# Patient Record
Sex: FEMALE | Race: WHITE | NOT HISPANIC OR LATINO | Employment: OTHER | URBAN - NONMETROPOLITAN AREA
[De-identification: names, ages, dates, MRNs, and addresses within clinical notes are randomized per-mention and may not be internally consistent; named-entity substitution may affect disease eponyms.]

---

## 2020-08-06 ENCOUNTER — HOSPITAL ENCOUNTER (EMERGENCY)
Facility: HOSPITAL | Age: 75
Discharge: HOME | End: 2020-08-06
Attending: EMERGENCY MEDICINE
Payer: MEDICARE

## 2020-08-06 VITALS
TEMPERATURE: 97 F | HEART RATE: 60 BPM | RESPIRATION RATE: 18 BRPM | BODY MASS INDEX: 24.63 KG/M2 | SYSTOLIC BLOOD PRESSURE: 168 MMHG | DIASTOLIC BLOOD PRESSURE: 76 MMHG | HEIGHT: 63 IN | WEIGHT: 139 LBS | OXYGEN SATURATION: 99 %

## 2020-08-06 DIAGNOSIS — J44.1 COPD EXACERBATION: Primary | ICD-10-CM

## 2020-08-06 PROCEDURE — 99900031 HC PATIENT EDUCATION (STAT)

## 2020-08-06 PROCEDURE — 96374 THER/PROPH/DIAG INJ IV PUSH: CPT

## 2020-08-06 PROCEDURE — 99285 EMERGENCY DEPT VISIT HI MDM: CPT | Mod: 25

## 2020-08-06 PROCEDURE — 94640 AIRWAY INHALATION TREATMENT: CPT

## 2020-08-06 PROCEDURE — 25000242 PHARM REV CODE 250 ALT 637 W/ HCPCS: Performed by: EMERGENCY MEDICINE

## 2020-08-06 PROCEDURE — 63600175 PHARM REV CODE 636 W HCPCS: Performed by: EMERGENCY MEDICINE

## 2020-08-06 PROCEDURE — 99900035 HC TECH TIME PER 15 MIN (STAT)

## 2020-08-06 RX ORDER — METHYLPREDNISOLONE SOD SUCC 125 MG
125 VIAL (EA) INJECTION
Status: COMPLETED | OUTPATIENT
Start: 2020-08-06 | End: 2020-08-06

## 2020-08-06 RX ORDER — PREDNISONE 10 MG/1
10 TABLET ORAL DAILY
Qty: 21 TABLET | Refills: 0 | Status: SHIPPED | OUTPATIENT
Start: 2020-08-06 | End: 2020-09-05 | Stop reason: CLARIF

## 2020-08-06 RX ORDER — IPRATROPIUM BROMIDE AND ALBUTEROL SULFATE 2.5; .5 MG/3ML; MG/3ML
3 SOLUTION RESPIRATORY (INHALATION)
Status: COMPLETED | OUTPATIENT
Start: 2020-08-06 | End: 2020-08-06

## 2020-08-06 RX ADMIN — IPRATROPIUM BROMIDE AND ALBUTEROL SULFATE 3 ML: .5; 3 SOLUTION RESPIRATORY (INHALATION) at 09:08

## 2020-08-06 RX ADMIN — METHYLPREDNISOLONE SODIUM SUCCINATE 125 MG: 125 INJECTION, POWDER, FOR SOLUTION INTRAMUSCULAR; INTRAVENOUS at 09:08

## 2020-08-06 NOTE — ED PROVIDER NOTES
Encounter Date: 8/6/2020       History     Chief Complaint   Patient presents with    Shortness of Breath     began at the beginning of the week; Hx. of COPD     This is a 75-year-old white female with a history of COPD, states she quit smoking 1 week ago.  Presents the emergency room with shortness of breath and wheezing that began gradually over the last 2-3 days.  Patient denies fever, denies chest pain, denies hemoptysis.  Patient has a history of this multiple times in the past.        Review of patient's allergies indicates:  No Known Allergies  No past medical history on file.  No past surgical history on file.  No family history on file.  Social History     Tobacco Use    Smoking status: Not on file   Substance Use Topics    Alcohol use: Not on file    Drug use: Not on file     Review of Systems   Constitutional: Negative for fever.   HENT: Negative for sore throat.    Respiratory: Positive for shortness of breath and wheezing.    Cardiovascular: Negative for chest pain.   Gastrointestinal: Negative for nausea.   Genitourinary: Negative for dysuria.   Musculoskeletal: Negative for back pain.   Skin: Negative for rash.   Neurological: Negative for weakness.   Hematological: Does not bruise/bleed easily.       Physical Exam     Initial Vitals   BP Pulse Resp Temp SpO2   08/06/20 0855 08/06/20 0851 08/06/20 0851 08/06/20 0851 08/06/20 0851   (!) 214/110 80 (!) 28 97 °F (36.1 °C) (!) 94 %      MAP       --                Physical Exam    Nursing note and vitals reviewed.  Constitutional: She appears well-developed and well-nourished. She is not diaphoretic. No distress.   HENT:   Head: Normocephalic and atraumatic.   Eyes: Conjunctivae and EOM are normal. Pupils are equal, round, and reactive to light.   Neck: Normal range of motion. Neck supple.   Cardiovascular: Normal rate, regular rhythm, normal heart sounds and intact distal pulses.   No murmur heard.  Pulmonary/Chest: No respiratory distress. She has  wheezes. She has no rhonchi. She has no rales. She exhibits no tenderness.   Abdominal: Soft. Bowel sounds are normal.   Musculoskeletal: Normal range of motion. No tenderness or edema.   Neurological: She is alert and oriented to person, place, and time. She has normal strength and normal reflexes. No cranial nerve deficit. GCS score is 15. GCS eye subscore is 4. GCS verbal subscore is 5. GCS motor subscore is 6.   Skin: Skin is warm and dry. Capillary refill takes less than 2 seconds.         ED Course   Procedures  Labs Reviewed - No data to display       Imaging Results          X-Ray Chest AP Portable (Final result)  Result time 08/06/20 09:19:57    Final result by Kassidy Olivera MD (08/06/20 09:19:57)                 Impression:      No acute findings    Follow-up as clinically indicated      Electronically signed by: Kassidy Olivera MD  Date:    08/06/2020  Time:    09:19             Narrative:    EXAMINATION:  XR CHEST AP PORTABLE    CLINICAL HISTORY:  Asthma;    FINDINGS:  Increased attenuation within the right perihilar region favored to represent artifact from overlapping bronchovascular structures and costochondral calcifications.  No definite infiltrates. No signs of CHF. Mild elevation of left hemidiaphragm. Normal size cardiac silhouette.                                 Medical Decision Making:   Initial Assessment:   Patient much improved after nebulizer treatments and steroids.  Chest x-ray is negative.  Sats are 99% on room air now.  No signs of infection, no fever.  Patient stable for discharge                                 Clinical Impression:       ICD-10-CM ICD-9-CM   1. COPD exacerbation  J44.1 491.21                                Roman Maxwell MD  08/06/20 7735

## 2020-08-06 NOTE — ED TRIAGE NOTES
74 y/o F presents to ED with severe SOB, mod. Respiratory distress, and cough.  History of COPD and smoker.  Pt. States just stopped smoking last week. Denies being in contact with Covid and denies fever at this time.

## 2020-09-05 ENCOUNTER — HOSPITAL ENCOUNTER (EMERGENCY)
Facility: HOSPITAL | Age: 75
Discharge: HOME | End: 2020-09-05
Attending: EMERGENCY MEDICINE
Payer: MEDICARE

## 2020-09-05 VITALS
HEART RATE: 84 BPM | OXYGEN SATURATION: 100 % | RESPIRATION RATE: 18 BRPM | DIASTOLIC BLOOD PRESSURE: 80 MMHG | SYSTOLIC BLOOD PRESSURE: 133 MMHG | HEIGHT: 63 IN | BODY MASS INDEX: 24.27 KG/M2 | TEMPERATURE: 98 F | WEIGHT: 137 LBS

## 2020-09-05 DIAGNOSIS — J44.1 COPD EXACERBATION: Primary | ICD-10-CM

## 2020-09-05 LAB
ALBUMIN SERPL BCP-MCNC: 3.5 G/DL (ref 3.5–5.2)
ALP SERPL-CCNC: 86 U/L (ref 55–135)
ALT SERPL W/O P-5'-P-CCNC: 15 U/L (ref 10–44)
ANION GAP SERPL CALC-SCNC: 5 MMOL/L (ref 8–16)
AST SERPL-CCNC: 9 U/L (ref 10–40)
BASOPHILS # BLD AUTO: 0.06 K/UL (ref 0–0.2)
BASOPHILS NFR BLD: 0.9 % (ref 0–1.9)
BILIRUB SERPL-MCNC: 0.4 MG/DL (ref 0.1–1)
BUN SERPL-MCNC: 11 MG/DL (ref 8–23)
CALCIUM SERPL-MCNC: 8.8 MG/DL (ref 8.7–10.5)
CHLORIDE SERPL-SCNC: 97 MMOL/L (ref 95–110)
CO2 SERPL-SCNC: 28 MMOL/L (ref 23–29)
CREAT SERPL-MCNC: 0.7 MG/DL (ref 0.5–1.4)
DIFFERENTIAL METHOD: ABNORMAL
EOSINOPHIL # BLD AUTO: 0.2 K/UL (ref 0–0.5)
EOSINOPHIL NFR BLD: 2.1 % (ref 0–8)
ERYTHROCYTE [DISTWIDTH] IN BLOOD BY AUTOMATED COUNT: 13.2 % (ref 11.5–14.5)
EST. GFR  (AFRICAN AMERICAN): >60 ML/MIN/1.73 M^2
EST. GFR  (NON AFRICAN AMERICAN): >60 ML/MIN/1.73 M^2
GLUCOSE SERPL-MCNC: 124 MG/DL (ref 70–110)
HCT VFR BLD AUTO: 38.9 % (ref 37–48.5)
HGB BLD-MCNC: 13.4 G/DL (ref 12–16)
IMM GRANULOCYTES # BLD AUTO: 0.03 K/UL (ref 0–0.04)
IMM GRANULOCYTES NFR BLD AUTO: 0.4 % (ref 0–0.5)
LYMPHOCYTES # BLD AUTO: 2.2 K/UL (ref 1–4.8)
LYMPHOCYTES NFR BLD: 31.8 % (ref 18–48)
MCH RBC QN AUTO: 30.7 PG (ref 27–31)
MCHC RBC AUTO-ENTMCNC: 34.4 G/DL (ref 32–36)
MCV RBC AUTO: 89 FL (ref 82–98)
MONOCYTES # BLD AUTO: 0.5 K/UL (ref 0.3–1)
MONOCYTES NFR BLD: 7.3 % (ref 4–15)
NEUTROPHILS # BLD AUTO: 4 K/UL (ref 1.8–7.7)
NEUTROPHILS NFR BLD: 57.5 % (ref 38–73)
NRBC BLD-RTO: 0 /100 WBC
PLATELET # BLD AUTO: 345 K/UL (ref 150–350)
PMV BLD AUTO: 9.1 FL (ref 9.2–12.9)
POTASSIUM SERPL-SCNC: 3.4 MMOL/L (ref 3.5–5.1)
PROT SERPL-MCNC: 7.1 G/DL (ref 6–8.4)
RBC # BLD AUTO: 4.36 M/UL (ref 4–5.4)
SODIUM SERPL-SCNC: 130 MMOL/L (ref 136–145)
TROPONIN I SERPL DL<=0.01 NG/ML-MCNC: <0.02 NG/ML (ref 0–0.03)
WBC # BLD AUTO: 6.99 K/UL (ref 3.9–12.7)

## 2020-09-05 PROCEDURE — 25000003 PHARM REV CODE 250: Performed by: EMERGENCY MEDICINE

## 2020-09-05 PROCEDURE — 84484 ASSAY OF TROPONIN QUANT: CPT

## 2020-09-05 PROCEDURE — 96366 THER/PROPH/DIAG IV INF ADDON: CPT

## 2020-09-05 PROCEDURE — 80053 COMPREHEN METABOLIC PANEL: CPT

## 2020-09-05 PROCEDURE — 36415 COLL VENOUS BLD VENIPUNCTURE: CPT

## 2020-09-05 PROCEDURE — 85025 COMPLETE CBC W/AUTO DIFF WBC: CPT

## 2020-09-05 PROCEDURE — 94760 N-INVAS EAR/PLS OXIMETRY 1: CPT

## 2020-09-05 PROCEDURE — 25000242 PHARM REV CODE 250 ALT 637 W/ HCPCS: Performed by: EMERGENCY MEDICINE

## 2020-09-05 PROCEDURE — 63600175 PHARM REV CODE 636 W HCPCS: Performed by: EMERGENCY MEDICINE

## 2020-09-05 PROCEDURE — 96365 THER/PROPH/DIAG IV INF INIT: CPT

## 2020-09-05 PROCEDURE — 99284 EMERGENCY DEPT VISIT MOD MDM: CPT | Mod: 25

## 2020-09-05 PROCEDURE — 94640 AIRWAY INHALATION TREATMENT: CPT

## 2020-09-05 PROCEDURE — 96375 TX/PRO/DX INJ NEW DRUG ADDON: CPT

## 2020-09-05 RX ORDER — PROMETHAZINE HYDROCHLORIDE AND DEXTROMETHORPHAN HYDROBROMIDE 6.25; 15 MG/5ML; MG/5ML
5 SYRUP ORAL EVERY 4 HOURS PRN
Qty: 1 BOTTLE | Refills: 0 | Status: SHIPPED | OUTPATIENT
Start: 2020-09-05 | End: 2020-09-10

## 2020-09-05 RX ORDER — ALBUTEROL SULFATE 90 UG/1
AEROSOL, METERED RESPIRATORY (INHALATION)
COMMUNITY
Start: 2020-07-25

## 2020-09-05 RX ORDER — IPRATROPIUM BROMIDE AND ALBUTEROL SULFATE 2.5; .5 MG/3ML; MG/3ML
3 SOLUTION RESPIRATORY (INHALATION) ONCE
Status: COMPLETED | OUTPATIENT
Start: 2020-09-05 | End: 2020-09-05

## 2020-09-05 RX ORDER — METHYLPREDNISOLONE SOD SUCC 125 MG
125 VIAL (EA) INJECTION ONCE
Status: COMPLETED | OUTPATIENT
Start: 2020-09-05 | End: 2020-09-05

## 2020-09-05 RX ORDER — OMEPRAZOLE 20 MG/1
CAPSULE, DELAYED RELEASE ORAL
COMMUNITY
Start: 2020-07-15 | End: 2023-10-04 | Stop reason: SDUPTHER

## 2020-09-05 RX ORDER — PREDNISONE 50 MG/1
50 TABLET ORAL DAILY
Qty: 5 TABLET | Refills: 0 | Status: SHIPPED | OUTPATIENT
Start: 2020-09-05 | End: 2020-09-10

## 2020-09-05 RX ORDER — IPRATROPIUM BROMIDE AND ALBUTEROL SULFATE 2.5; .5 MG/3ML; MG/3ML
3 SOLUTION RESPIRATORY (INHALATION) ONCE
Status: DISCONTINUED | OUTPATIENT
Start: 2020-09-05 | End: 2020-09-06 | Stop reason: HOSPADM

## 2020-09-05 RX ORDER — ALBUTEROL SULFATE 0.83 MG/ML
SOLUTION RESPIRATORY (INHALATION)
COMMUNITY
Start: 2020-06-19

## 2020-09-05 RX ORDER — FLUTICASONE FUROATE AND VILANTEROL TRIFENATATE 100; 25 UG/1; UG/1
POWDER RESPIRATORY (INHALATION)
COMMUNITY
Start: 2020-07-19 | End: 2022-06-14

## 2020-09-05 RX ORDER — PREDNISOLONE SODIUM PHOSPHATE 15 MG/5ML
30 SOLUTION ORAL DAILY
Status: DISCONTINUED | OUTPATIENT
Start: 2020-09-06 | End: 2020-09-06 | Stop reason: HOSPADM

## 2020-09-05 RX ORDER — CARVEDILOL 25 MG/1
TABLET ORAL
COMMUNITY
Start: 2020-07-27 | End: 2022-06-14

## 2020-09-05 RX ORDER — LISINOPRIL 30 MG/1
TABLET ORAL
COMMUNITY
Start: 2020-07-10 | End: 2022-06-30 | Stop reason: CLARIF

## 2020-09-05 RX ORDER — AZITHROMYCIN 500 MG/1
500 TABLET, FILM COATED ORAL DAILY
Qty: 5 TABLET | Refills: 0 | Status: SHIPPED | OUTPATIENT
Start: 2020-09-05 | End: 2022-06-30 | Stop reason: CLARIF

## 2020-09-05 RX ORDER — CITALOPRAM 20 MG/1
TABLET, FILM COATED ORAL DAILY
COMMUNITY
Start: 2020-07-27 | End: 2023-10-04 | Stop reason: SDUPTHER

## 2020-09-05 RX ADMIN — SODIUM CHLORIDE 1000 ML: 0.9 INJECTION, SOLUTION INTRAVENOUS at 08:09

## 2020-09-05 RX ADMIN — IPRATROPIUM BROMIDE AND ALBUTEROL SULFATE 3 ML: .5; 3 SOLUTION RESPIRATORY (INHALATION) at 10:09

## 2020-09-05 RX ADMIN — METHYLPREDNISOLONE SODIUM SUCCINATE 125 MG: 125 INJECTION, POWDER, FOR SOLUTION INTRAMUSCULAR; INTRAVENOUS at 08:09

## 2020-09-05 RX ADMIN — CEFTRIAXONE 1 G: 1 INJECTION, SOLUTION INTRAVENOUS at 08:09

## 2020-09-06 NOTE — ED NOTES
NEUROLOGICAL:   Patient is awake , alert  and oriented x 4 . Pupils are PERRL. Gait is steady.   Moves all extremities without difficulty.   Patient reports no neuro complaints..  GCS 15    HEENT:   Head appears normocephalic  and symmetric .   Eyes appear WNL to both eyes. Patient reports no complaints  to both eyes .   Ears appear WNL. Patient reports no complaints  to both ears.   Nares appear patent . Patient reports no nose complaints .  Mouth appears moist, pink and teeth intact. Patient reports no mouth complaints.   Throat appears pink and moist . Patient reports no throat complaints.    CARDIOVASCULAR:   S1 and S2 present, no murmurs, gallops, or rubs, rate regular  and pulses palpable (2+)    On palpation no edema noted , noted to none.   Patient reports no CV complaints.  .   Patient vitals are WNL.    RESPIRATORY:   Airway Clear, Open, and Patent.  Respirations are even and unlabored.   Breath sounds inspiratory wheeze and expiratory wheeze to all lung fields.   Patient reports no respiratory complaints, shortness of breath on exertion and productive cough.     GASTROINTESTINAL:   Abdomen is soft  and non-tender x 4 quadrants. Bowel sounds are normoactive to all quadrants .   Patient reports no GI complaints .     GENITOURINARY:   Patient reports no  complaints.     MUSCULOSKELETAL:   full range of motion to all extremities, no swelling noted , no tenderness noted and no weakness noted.   Patient reports no musculoskeletal complaints     SKIN:   Skin appears warm , dry , good turgor, color normal for race and intact. Patient reports no skin complaints   .

## 2020-09-06 NOTE — ED PROVIDER NOTES
"Encounter Date: 9/5/2020       History     Chief Complaint   Patient presents with    Shortness of Breath     SOB x 3 days. Worse today. Pt reports "my COPD is acting up. I usually need a treatment and some steroids"      The patient is a 75-year-old female, with a past medical history of COPD, that presents to the ER for evaluation because of a COPD exacerbation.  Patient states that for the last 2-3 days she is having increased productive cough, with associated congestion.  She states that today her wheezing became worse, and she felt like she needed to come to the hospital.  The patient specifically stated that she felt like she needed several nebulizer treatments, some steroids.  She denies any the fevers or chills, chest pain, abdominal prior, nausea, vomiting or any other acute symptoms at this time    Review of patient's allergies indicates:  No Known Allergies  Past Medical History:   Diagnosis Date    COPD (chronic obstructive pulmonary disease)     Hypertension      No past surgical history on file.  No family history on file.  Social History     Tobacco Use    Smoking status: Not on file   Substance Use Topics    Alcohol use: Not on file    Drug use: Not on file     Review of Systems   All other systems reviewed and are negative.      Physical Exam     Initial Vitals   BP Pulse Resp Temp SpO2   09/05/20 1926 09/05/20 1924 09/05/20 1924 09/05/20 1924 09/05/20 1924   (!) 143/74 73 (!) 24 98 °F (36.7 °C) 98 %      MAP       --                Physical Exam    Nursing note and vitals reviewed.  Constitutional: She appears well-developed and well-nourished.   HENT:   Head: Normocephalic and atraumatic.   Eyes: Conjunctivae and EOM are normal. Pupils are equal, round, and reactive to light.   Neck: Normal range of motion. Neck supple. No tracheal deviation present.   Cardiovascular: Normal rate, regular rhythm, normal heart sounds and intact distal pulses.   Pulmonary/Chest: No respiratory distress. She " has wheezes. She has no rhonchi. She has no rales. She exhibits no tenderness.   Who the patient does have increased work of breathing, but does not appear to be in significant acute distress.  She is speaking in mostly complete sentences.  Patient has diffuse scattered inspiratory and expiratory wheezes with scattered rhonchi that are exacerbated with coughing   Abdominal: Soft. Bowel sounds are normal. She exhibits no distension and no mass. There is no abdominal tenderness. There is no rebound and no guarding.   Musculoskeletal: Normal range of motion. No tenderness or edema.   Neurological: She is alert and oriented to person, place, and time. She has normal strength and normal reflexes. She displays normal reflexes. No cranial nerve deficit or sensory deficit.   Skin: Skin is warm and dry. Capillary refill takes less than 2 seconds.   Psychiatric: She has a normal mood and affect. Her behavior is normal. Judgment and thought content normal.         ED Course   Procedures  Labs Reviewed   CBC W/ AUTO DIFFERENTIAL - Abnormal; Notable for the following components:       Result Value    MPV 9.1 (*)     All other components within normal limits   COMPREHENSIVE METABOLIC PANEL - Abnormal; Notable for the following components:    Sodium 130 (*)     Potassium 3.4 (*)     Glucose 124 (*)     AST 9 (*)     Anion Gap 5 (*)     All other components within normal limits   TROPONIN I          Imaging Results          X-Ray Chest 1 View (In process)                  Medical Decision Making:   ED Management:  The patient's chest x-ray stable, of the patient's lab work was grossly within normal limits.  Should be noted the patient had mild hyponatremia but was given a L of fluid.  Room patient states that she is feeling much better after receiving nebs and steroids, and repeat auscultation revealed almost total resolution of all wheezing.  The patient is being discharged home with a short course of pulse dose steroids as well  as a short course of p.o. antibiotics, which will ultimately decrease her risk of hospitalization over the next 30 days.  The patient was given instructions to follow-up with her primary care physician/clinic for further assessment evaluation, and to return to the ER for any acute negative symptoms.  The patient verbalized understanding of this medical point of agreed                                 Clinical Impression:       ICD-10-CM ICD-9-CM   1. COPD exacerbation  J44.1 491.21             ED Disposition Condition    Discharge Stable        ED Prescriptions     Medication Sig Dispense Start Date End Date Auth. Provider    predniSONE (DELTASONE) 50 MG Tab Take 1 tablet (50 mg total) by mouth once daily. for 5 days 5 tablet 9/5/2020 9/10/2020 Florian uCevas MD    azithromycin (ZITHROMAX) 500 MG tablet Take 1 tablet (500 mg total) by mouth once daily. 5 tablet 9/5/2020  Florian Cuevas MD    promethazine-dextromethorphan (PROMETHAZINE-DM) 6.25-15 mg/5 mL Syrp Take 5 mLs by mouth every 4 (four) hours as needed (cough). 1 Bottle 9/5/2020 9/10/2020 Florian Cuevas MD        Follow-up Information    None                                    Florian Cuevas MD  09/05/20 2041

## 2022-04-17 ENCOUNTER — HOSPITAL ENCOUNTER (EMERGENCY)
Facility: HOSPITAL | Age: 77
Discharge: HOME | End: 2022-04-17
Attending: EMERGENCY MEDICINE
Payer: MEDICARE

## 2022-04-17 VITALS
BODY MASS INDEX: 21.71 KG/M2 | SYSTOLIC BLOOD PRESSURE: 143 MMHG | OXYGEN SATURATION: 96 % | HEART RATE: 105 BPM | HEIGHT: 62 IN | RESPIRATION RATE: 18 BRPM | TEMPERATURE: 98 F | DIASTOLIC BLOOD PRESSURE: 77 MMHG | WEIGHT: 118 LBS
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
## 2022-04-17 PROCEDURE — 99284 EMERGENCY DEPT VISIT MOD MDM: CPT | Mod: 25

## 2022-04-17 RX ORDER — METHOCARBAMOL 500 MG/1
1000 TABLET, FILM COATED ORAL 3 TIMES DAILY
Qty: 30 TABLET | Refills: 0 | Status: SHIPPED | OUTPATIENT
Start: 2022-04-17 | End: 2022-04-22
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
## 2022-05-12 ENCOUNTER — TELEPHONE (OUTPATIENT)
Dept: NEUROSURGERY | Facility: CLINIC | Age: 77
End: 2022-05-12
Payer: COMMERCIAL

## 2022-05-12 NOTE — TELEPHONE ENCOUNTER
Spoke with patient and confirmed time and date for appointment. Pt confirmed they will bring all imaging on disk to appointment.    ----- Message from Manuela Mayo sent at 5/12/2022 10:36 AM CDT -----  Regarding: STAT Referral  Good Morning,    Sofia Foreman NP would like to refer the following patient to Dr. Rouse in the Neurosurgery department. The patients diagnosis is lumbar pain. I have scanned the patients referral and records into .     Please let me know if I can help schedule in any way.    Thank you,   Manuela  Benjy Valdez

## 2022-05-17 ENCOUNTER — OFFICE VISIT (OUTPATIENT)
Dept: NEUROSURGERY | Facility: CLINIC | Age: 77
End: 2022-05-17
Payer: MEDICARE

## 2022-05-17 VITALS — SYSTOLIC BLOOD PRESSURE: 137 MMHG | DIASTOLIC BLOOD PRESSURE: 76 MMHG | HEART RATE: 74 BPM

## 2022-05-17 DIAGNOSIS — S06.0X1A CONCUSSION WITH LOSS OF CONSCIOUSNESS OF 30 MINUTES OR LESS, INITIAL ENCOUNTER: Primary | ICD-10-CM

## 2022-05-17 DIAGNOSIS — M54.9 DORSALGIA, UNSPECIFIED: ICD-10-CM

## 2022-05-17 DIAGNOSIS — S32.000D COMPRESSION FRACTURE OF LUMBAR VERTEBRA WITH ROUTINE HEALING, UNSPECIFIED LUMBAR VERTEBRAL LEVEL, SUBSEQUENT ENCOUNTER: ICD-10-CM

## 2022-05-17 PROCEDURE — 99204 OFFICE O/P NEW MOD 45 MIN: CPT | Mod: S$GLB,,, | Performed by: NURSE PRACTITIONER

## 2022-05-17 PROCEDURE — 99999 PR PBB SHADOW E&M-EST. PATIENT-LVL IV: ICD-10-PCS | Mod: PBBFAC,,, | Performed by: NURSE PRACTITIONER

## 2022-05-17 PROCEDURE — 99999 PR PBB SHADOW E&M-EST. PATIENT-LVL IV: CPT | Mod: PBBFAC,,, | Performed by: NURSE PRACTITIONER

## 2022-05-17 PROCEDURE — 99204 PR OFFICE/OUTPT VISIT, NEW, LEVL IV, 45-59 MIN: ICD-10-PCS | Mod: S$GLB,,, | Performed by: NURSE PRACTITIONER
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
## 2022-05-26 ENCOUNTER — HOSPITAL ENCOUNTER (OUTPATIENT)
Dept: RADIOLOGY | Facility: HOSPITAL | Age: 77
Discharge: HOME | End: 2022-05-26
Attending: NURSE PRACTITIONER
Payer: MEDICARE

## 2022-05-26 DIAGNOSIS — S32.000D COMPRESSION FRACTURE OF LUMBAR VERTEBRA WITH ROUTINE HEALING, UNSPECIFIED LUMBAR VERTEBRAL LEVEL, SUBSEQUENT ENCOUNTER: ICD-10-CM

## 2022-05-26 PROCEDURE — 77080 DXA BONE DENSITY AXIAL: CPT | Mod: TC

## 2022-05-26 PROCEDURE — 72148 MRI LUMBAR SPINE W/O DYE: CPT | Mod: TC

## 2022-05-30 ENCOUNTER — TELEPHONE (OUTPATIENT)
Dept: NEUROSURGERY | Facility: CLINIC | Age: 77
End: 2022-05-30
Payer: COMMERCIAL

## 2022-05-30 ENCOUNTER — HOSPITAL ENCOUNTER (OUTPATIENT)
Dept: RADIOLOGY | Facility: HOSPITAL | Age: 77
Discharge: HOME | End: 2022-05-30
Attending: NURSE PRACTITIONER
Payer: MEDICARE

## 2022-05-30 DIAGNOSIS — M54.9 DORSALGIA, UNSPECIFIED: ICD-10-CM

## 2022-05-30 PROCEDURE — 72082 X-RAY EXAM ENTIRE SPI 2/3 VW: CPT | Mod: TC

## 2022-05-30 PROCEDURE — 72120 X-RAY BEND ONLY L-S SPINE: CPT | Mod: TC

## 2022-05-30 NOTE — TELEPHONE ENCOUNTER
Patient states that she will have imaging done closer to home. Informed patient that I will reach out to Dr. Rouse's staff in regards to a f/u appointment patient verbally understood.

## 2022-05-30 NOTE — TELEPHONE ENCOUNTER
Spoke with pt's daughter and confirmed time and date for appt.     ----- Message from Tri Alcala MA sent at 5/30/2022  9:23 AM CDT -----  Satish Mcginnis,     Can you assist me with getting this patient scheduled with Dr. Rouse  for an appointment? Patient is in the process of getting  Xray's done closer to home.   Thank you   Tri

## 2022-06-14 ENCOUNTER — TELEPHONE (OUTPATIENT)
Dept: NEUROSURGERY | Facility: CLINIC | Age: 77
End: 2022-06-14
Payer: MEDICARE

## 2022-06-14 ENCOUNTER — OFFICE VISIT (OUTPATIENT)
Dept: NEUROSURGERY | Facility: CLINIC | Age: 77
End: 2022-06-14
Payer: COMMERCIAL

## 2022-06-14 DIAGNOSIS — S32.000A LUMBAR COMPRESSION FRACTURE, CLOSED, INITIAL ENCOUNTER: Primary | ICD-10-CM

## 2022-06-14 DIAGNOSIS — S32.000D COMPRESSION FRACTURE OF LUMBAR VERTEBRA WITH ROUTINE HEALING, UNSPECIFIED LUMBAR VERTEBRAL LEVEL, SUBSEQUENT ENCOUNTER: Primary | ICD-10-CM

## 2022-06-14 PROCEDURE — 99214 PR OFFICE/OUTPT VISIT, EST, LEVL IV, 30-39 MIN: ICD-10-PCS | Mod: S$GLB,,, | Performed by: NEUROLOGICAL SURGERY

## 2022-06-14 PROCEDURE — 99214 OFFICE O/P EST MOD 30 MIN: CPT | Mod: S$GLB,,, | Performed by: NEUROLOGICAL SURGERY

## 2022-06-14 RX ORDER — SULINDAC 150 MG/1
TABLET ORAL
COMMUNITY
Start: 2022-05-11

## 2022-06-14 RX ORDER — METHOCARBAMOL 500 MG/1
TABLET, FILM COATED ORAL
COMMUNITY
Start: 2022-05-11 | End: 2022-06-30 | Stop reason: CLARIF

## 2022-06-14 RX ORDER — FLUTICASONE FUROATE, UMECLIDINIUM BROMIDE AND VILANTEROL TRIFENATATE 200; 62.5; 25 UG/1; UG/1; UG/1
1 POWDER RESPIRATORY (INHALATION) DAILY
COMMUNITY
Start: 2022-04-07 | End: 2023-10-04

## 2022-06-14 RX ORDER — CITALOPRAM 20 MG/1
TABLET, FILM COATED ORAL
COMMUNITY
End: 2022-06-14

## 2022-06-14 RX ORDER — VALSARTAN 160 MG/1
160 TABLET ORAL DAILY
COMMUNITY
Start: 2022-03-21 | End: 2023-10-04

## 2022-06-14 RX ORDER — DOXYCYCLINE HYCLATE 100 MG
100 TABLET ORAL 2 TIMES DAILY
COMMUNITY
Start: 2022-04-06 | End: 2022-06-30 | Stop reason: CLARIF

## 2022-06-14 RX ORDER — FLUCONAZOLE 50 MG/1
TABLET ORAL
COMMUNITY
Start: 2022-05-10 | End: 2022-06-14

## 2022-06-14 RX ORDER — CARVEDILOL 25 MG/1
TABLET ORAL 2 TIMES DAILY WITH MEALS
COMMUNITY
End: 2023-10-04 | Stop reason: SDUPTHER

## 2022-06-14 RX ORDER — FLUTICASONE PROPIONATE 50 MCG
SPRAY, SUSPENSION (ML) NASAL
COMMUNITY
End: 2023-10-04 | Stop reason: SDUPTHER

## 2022-06-14 RX ORDER — NEBULIZER AND COMPRESSOR
EACH MISCELLANEOUS
COMMUNITY
Start: 2022-05-25

## 2022-06-14 RX ORDER — NYSTATIN 100000 [USP'U]/ML
SUSPENSION ORAL
COMMUNITY
Start: 2022-04-26 | End: 2022-06-30 | Stop reason: CLARIF

## 2022-06-14 RX ORDER — PREDNISONE 20 MG/1
20 TABLET ORAL DAILY PRN
COMMUNITY
Start: 2022-04-06 | End: 2023-11-15 | Stop reason: SDUPTHER

## 2022-06-24 ENCOUNTER — TELEPHONE (OUTPATIENT)
Dept: NEUROSURGERY | Facility: CLINIC | Age: 77
End: 2022-06-24
Payer: MEDICARE

## 2022-06-30 ENCOUNTER — HOSPITAL ENCOUNTER (OUTPATIENT)
Dept: PREADMISSION TESTING | Facility: OTHER | Age: 77
Discharge: HOME | End: 2022-06-30
Attending: NEUROLOGICAL SURGERY
Payer: MEDICARE

## 2022-06-30 ENCOUNTER — TELEPHONE (OUTPATIENT)
Dept: NEUROSURGERY | Facility: CLINIC | Age: 77
End: 2022-06-30
Payer: MEDICARE

## 2022-06-30 VITALS
WEIGHT: 125 LBS | OXYGEN SATURATION: 96 % | TEMPERATURE: 98 F | BODY MASS INDEX: 22.86 KG/M2 | DIASTOLIC BLOOD PRESSURE: 71 MMHG | HEART RATE: 70 BPM | SYSTOLIC BLOOD PRESSURE: 153 MMHG

## 2022-06-30 DIAGNOSIS — Z01.818 PREOP TESTING: Primary | ICD-10-CM

## 2022-06-30 DIAGNOSIS — S32.050A CLOSED COMPRESSION FRACTURE OF L5 LUMBAR VERTEBRA, INITIAL ENCOUNTER: Primary | ICD-10-CM

## 2022-06-30 LAB
ANION GAP SERPL CALC-SCNC: 10 MMOL/L (ref 8–16)
BASOPHILS # BLD AUTO: 0.05 K/UL (ref 0–0.2)
BASOPHILS NFR BLD: 0.8 % (ref 0–1.9)
BUN SERPL-MCNC: 12 MG/DL (ref 8–23)
CALCIUM SERPL-MCNC: 9.1 MG/DL (ref 8.7–10.5)
CHLORIDE SERPL-SCNC: 100 MMOL/L (ref 95–110)
CO2 SERPL-SCNC: 25 MMOL/L (ref 23–29)
CREAT SERPL-MCNC: 0.7 MG/DL (ref 0.5–1.4)
DIFFERENTIAL METHOD: ABNORMAL
EOSINOPHIL # BLD AUTO: 0.1 K/UL (ref 0–0.5)
EOSINOPHIL NFR BLD: 1.6 % (ref 0–8)
ERYTHROCYTE [DISTWIDTH] IN BLOOD BY AUTOMATED COUNT: 15 % (ref 11.5–14.5)
EST. GFR  (AFRICAN AMERICAN): >60 ML/MIN/1.73 M^2
EST. GFR  (NON AFRICAN AMERICAN): >60 ML/MIN/1.73 M^2
GLUCOSE SERPL-MCNC: 76 MG/DL (ref 70–110)
HCT VFR BLD AUTO: 38.6 % (ref 37–48.5)
HGB BLD-MCNC: 12.7 G/DL (ref 12–16)
IMM GRANULOCYTES # BLD AUTO: 0.02 K/UL (ref 0–0.04)
IMM GRANULOCYTES NFR BLD AUTO: 0.3 % (ref 0–0.5)
LYMPHOCYTES # BLD AUTO: 1.7 K/UL (ref 1–4.8)
LYMPHOCYTES NFR BLD: 26.7 % (ref 18–48)
MCH RBC QN AUTO: 28.3 PG (ref 27–31)
MCHC RBC AUTO-ENTMCNC: 32.9 G/DL (ref 32–36)
MCV RBC AUTO: 86 FL (ref 82–98)
MONOCYTES # BLD AUTO: 0.5 K/UL (ref 0.3–1)
MONOCYTES NFR BLD: 7.9 % (ref 4–15)
NEUTROPHILS # BLD AUTO: 4 K/UL (ref 1.8–7.7)
NEUTROPHILS NFR BLD: 62.7 % (ref 38–73)
NRBC BLD-RTO: 0 /100 WBC
PLATELET # BLD AUTO: 335 K/UL (ref 150–450)
PMV BLD AUTO: 9.4 FL (ref 9.2–12.9)
POTASSIUM SERPL-SCNC: 4.6 MMOL/L (ref 3.5–5.1)
RBC # BLD AUTO: 4.48 M/UL (ref 4–5.4)
SODIUM SERPL-SCNC: 135 MMOL/L (ref 136–145)
WBC # BLD AUTO: 6.33 K/UL (ref 3.9–12.7)

## 2022-06-30 PROCEDURE — 36415 COLL VENOUS BLD VENIPUNCTURE: CPT | Performed by: ANESTHESIOLOGY

## 2022-06-30 PROCEDURE — 93010 EKG 12-LEAD: ICD-10-PCS | Mod: ,,, | Performed by: INTERNAL MEDICINE

## 2022-06-30 PROCEDURE — 85025 COMPLETE CBC W/AUTO DIFF WBC: CPT | Performed by: ANESTHESIOLOGY

## 2022-06-30 PROCEDURE — 93005 ELECTROCARDIOGRAM TRACING: CPT

## 2022-06-30 PROCEDURE — 93010 ELECTROCARDIOGRAM REPORT: CPT | Mod: ,,, | Performed by: INTERNAL MEDICINE

## 2022-06-30 PROCEDURE — 80048 BASIC METABOLIC PNL TOTAL CA: CPT | Performed by: ANESTHESIOLOGY

## 2022-06-30 RX ORDER — IPRATROPIUM BROMIDE AND ALBUTEROL SULFATE 2.5; .5 MG/3ML; MG/3ML
3 SOLUTION RESPIRATORY (INHALATION)
Status: DISCONTINUED | OUTPATIENT
Start: 2022-06-30 | End: 2022-07-01 | Stop reason: HOSPADM

## 2022-06-30 RX ORDER — LIDOCAINE HYDROCHLORIDE 10 MG/ML
0.5 INJECTION, SOLUTION EPIDURAL; INFILTRATION; INTRACAUDAL; PERINEURAL ONCE
Status: CANCELLED | OUTPATIENT
Start: 2022-06-30 | End: 2022-06-30

## 2022-06-30 RX ORDER — IBUPROFEN 200 MG
1 TABLET ORAL
COMMUNITY

## 2022-06-30 RX ORDER — SODIUM CHLORIDE, SODIUM LACTATE, POTASSIUM CHLORIDE, CALCIUM CHLORIDE 600; 310; 30; 20 MG/100ML; MG/100ML; MG/100ML; MG/100ML
INJECTION, SOLUTION INTRAVENOUS CONTINUOUS
Status: CANCELLED | OUTPATIENT
Start: 2022-06-30

## 2022-06-30 NOTE — DISCHARGE INSTRUCTIONS
Information to Prepare you for your Surgery    PRE-ADMIT TESTING -  [phone]    [address] [facility]          Your surgery has been scheduled at [facility]. We are pleased to have the opportunity to serve you. For Further Information please call [phone].    On the day of surgery please report to the Information Desk on the 1st floor.    CONTACT YOUR PHYSICIAN'S OFFICE THE DAY PRIOR TO YOUR SURGERY TO OBTAIN YOUR ARRIVAL TIME.     The evening before surgery do not eat anything after 9 p.m. ( this includes hard candy, chewing gum and mints).  You may only have GATORADE, POWERADE AND WATER  from 9 p.m. until you leave your home.   DO NOT DRINK ANY LIQUIDS ON THE WAY TO THE HOSPITAL.      Why does your anesthesiologist allow you to drink Gatorade/Powerade before surgery?  Gatorade/Powerade helps to increase your comfort before surgery and to decrease your nausea after surgery. The carbohydrates in Gatorade/Powerade help reduce your body's stress response to surgery.  If you are a diabetic-drink only water prior to surgery.      Current Visitor policy(12/27/2021) - Patients may have 2 visitors pre and post procedure. Only 2 visitors will be allowed in the Surgical building with the patient.     SPECIAL MEDICATION INSTRUCTIONS: TAKE medications checked off by the Anesthesiologist on your Medication List.    Angiogram Patients: Take medications as instructed by your physician, including aspirin.     Surgery Patients:    If you take ASPIRIN - Your PHYSICIAN/SURGEON will need to inform you IF/OR when you need to stop taking aspirin prior to your surgery.     Do Not take any medications containing IBUPROFEN.    Do Not Wear any make-up (especially eye make-up) to surgery. Please remove any false eyelashes or eyelash extensions. If you arrive the day of surgery with makeup/eyelashes on you will be required to remove prior to surgery. (There is a risk of corneal  abrasions if eye makeup/eyelash extensions are not removed)      Leave all valuables at home.   Do Not wear any jewelry or watches, including any metal in body piercings. Jewelry must be removed prior to coming to the hospital.  There is a possibility that rings that are unable to be removed may be cut off if they are on the surgical extremity.    Please remove all hair extensions, wigs, clips and any other metal accessories/ ornaments from your hair.  These items may pose a flammable/fire risk in Surgery and must be removed.    Do not shave your surgical area at least 5 days prior to your surgery. The surgical prep will be performed at the hospital according to Infection Control regulations.    Contact Lens must be removed before surgery. Either do not wear the contact lens or bring a case and solution for storage.  Please bring a container for eyeglasses or dentures as required.  Bring any paperwork your physician has provided, such as consent forms,  history and physicals, doctor's orders, etc.   Bring comfortable clothes that are loose fitting to wear upon discharge. Take into consideration the type of surgery being performed.  Maintain your diet as advised per your physician the day prior to surgery.      Adequate rest the night before surgery is advised.   Park in the Parking lot behind the hospital or in the [employer] [facility] across the street from the parking lot. Parking is complimentary.  If you will be discharged the same day as your procedure, please arrange for a responsible adult to drive you home or to accompany you if traveling by taxi.   YOU WILL NOT BE PERMITTED TO DRIVE OR TO LEAVE THE HOSPITAL ALONE AFTER SURGERY.   If you are being discharged the same day, it is strongly recommended that you arrange for someone to remain with you for the first 24 hrs following your surgery.    The Surgeon will speak to your family/visitor after your surgery regarding the outcome of your surgery and post op  care.  The Surgeon may speak to you after your surgery, but there is a possibility you may not remember the details.  Please check with your family members regarding the conversation with the Surgeon.    We strongly recommend whoever is bringing you home be present for discharge instructions.  This will ensure a thorough understanding for your post op home care.    ALL CHILDREN MUST ALWAYS BE ACCOMPANIED BY AN ADULT.    Visitors-Refer to current Visitor policy handouts.    Thank you for your cooperation.  The Staff of [facility].            Bathing Instructions with Hibiclens    Shower the evening before and morning of your procedure with Hibiclens:  Wash your face with water and your regular face wash/soap  Apply Hibiclens directly on your skin or on a wet washcloth and wash gently. When showering: Move away from the shower stream when applying Hibiclens to avoid rinsing off too soon.  Rinse thoroughly with warm water  Do not dilute Hibiclens        Dry off as usual, do not use any deodorant, powder, body lotions, perfume, after shave or cologne.

## 2022-06-30 NOTE — TELEPHONE ENCOUNTER
----- Message from Laura Yusuf sent at 6/30/2022  2:15 PM CDT -----  Regarding: Surgery  Contact: daughter @ [phone]  Daughter is calling to see why office changed surgery date. Asking for urgent call back

## 2022-06-30 NOTE — TELEPHONE ENCOUNTER
Spoke with Jerri (pt's daughter), I notified her that Dr. Allred wasn't cleared to perform surgery which is why patient's surgery was pushed back. Daughter expressed concerns due to her already taking off of work. I apologized to Jerri for the last minute changes and said at this time that is the patients new surgery date, Jerri stated ok.

## 2022-07-01 ENCOUNTER — TELEPHONE (OUTPATIENT)
Dept: NEUROSURGERY | Facility: CLINIC | Age: 77
End: 2022-07-01
Payer: MEDICARE

## 2022-07-01 NOTE — TELEPHONE ENCOUNTER
"Patient's daughter has been called informed that she will get a call next week if Dr. Allred would like to precede with surgery under general anesthesia. Patient's daughter states" her mother fully understands the risks of having surgery under general anesthesia.  "

## 2022-07-01 NOTE — TELEPHONE ENCOUNTER
"----- Message from Luz Marina Townsend MA sent at 7/1/2022  4:34 PM CDT -----    ----- Message -----  From: Dino Escobar MA  Sent: 7/1/2022   4:13 PM CDT  To: [facility] Staff    Daughter calling for her mother, she states that her mother will agree to be put "fully under" for the surgery. Please advise patient and daughter.          MARGOT MCMAHON (Daughter)   [phone] (Mobile)      "

## 2022-07-11 ENCOUNTER — TELEPHONE (OUTPATIENT)
Dept: NEUROSURGERY | Facility: CLINIC | Age: 77
End: 2022-07-11
Payer: MEDICARE

## 2022-07-11 ENCOUNTER — PATIENT MESSAGE (OUTPATIENT)
Dept: NEUROSURGERY | Facility: CLINIC | Age: 77
End: 2022-07-11
Payer: MEDICARE

## 2022-07-11 NOTE — TELEPHONE ENCOUNTER
Called pt's dtr. She is very upset about the delays in her mother's getting L5 kyphoplasty done. Allowed her to vent the focused her away from forensics and toward a solution.    Have discussed with Tita and set up a Virtual Visit with Dr Carlisle to better define pt's options. Anesthesia will need to clear patient regardless if she is done by IR or Dr Allred (as previously planned).    After last discussion, pt was calm and apologetic. Assured her we would do what was necessary to get her mom treated safely and expeditiously.

## 2022-07-12 RX ORDER — FENTANYL CITRATE 50 UG/ML
100 INJECTION, SOLUTION INTRAMUSCULAR; INTRAVENOUS ONCE
Status: CANCELLED | OUTPATIENT
Start: 2022-07-12 | End: 2022-07-12

## 2022-07-12 RX ORDER — MIDAZOLAM HYDROCHLORIDE 1 MG/ML
2 INJECTION INTRAMUSCULAR; INTRAVENOUS ONCE
Status: CANCELLED | OUTPATIENT
Start: 2022-07-12 | End: 2022-07-12

## 2022-07-13 ENCOUNTER — OFFICE VISIT (OUTPATIENT)
Dept: INTERNAL MEDICINE | Facility: CLINIC | Age: 77
End: 2022-07-13
Payer: COMMERCIAL

## 2022-07-13 ENCOUNTER — CLINICAL SUPPORT (OUTPATIENT)
Dept: INTERVENTIONAL RADIOLOGY/VASCULAR | Facility: CLINIC | Age: 77
End: 2022-07-13
Payer: MEDICARE

## 2022-07-13 ENCOUNTER — TELEPHONE (OUTPATIENT)
Dept: PREADMISSION TESTING | Facility: HOSPITAL | Age: 77
End: 2022-07-13
Payer: MEDICARE

## 2022-07-13 VITALS
TEMPERATURE: 98 F | WEIGHT: 118 LBS | HEIGHT: 60 IN | OXYGEN SATURATION: 98 % | SYSTOLIC BLOOD PRESSURE: 166 MMHG | HEART RATE: 68 BPM | BODY MASS INDEX: 23.16 KG/M2 | DIASTOLIC BLOOD PRESSURE: 80 MMHG

## 2022-07-13 DIAGNOSIS — R91.8 PULMONARY NODULES: ICD-10-CM

## 2022-07-13 DIAGNOSIS — I10 HYPERTENSION, UNSPECIFIED TYPE: ICD-10-CM

## 2022-07-13 DIAGNOSIS — I70.0 ATHEROSCLEROSIS OF AORTA: ICD-10-CM

## 2022-07-13 DIAGNOSIS — K21.9 GASTROESOPHAGEAL REFLUX DISEASE WITHOUT ESOPHAGITIS: ICD-10-CM

## 2022-07-13 DIAGNOSIS — S32.050A CLOSED COMPRESSION FRACTURE OF L5 LUMBAR VERTEBRA, INITIAL ENCOUNTER: ICD-10-CM

## 2022-07-13 DIAGNOSIS — R06.83 SNORING: ICD-10-CM

## 2022-07-13 DIAGNOSIS — S32.050A CLOSED COMPRESSION FRACTURE OF L5 LUMBAR VERTEBRA, INITIAL ENCOUNTER: Primary | ICD-10-CM

## 2022-07-13 DIAGNOSIS — J44.9 CHRONIC OBSTRUCTIVE PULMONARY DISEASE, UNSPECIFIED COPD TYPE: ICD-10-CM

## 2022-07-13 DIAGNOSIS — F32.A DEPRESSION, UNSPECIFIED DEPRESSION TYPE: ICD-10-CM

## 2022-07-13 PROCEDURE — 99204 PR OFFICE/OUTPT VISIT, NEW, LEVL IV, 45-59 MIN: ICD-10-PCS | Mod: S$GLB,,, | Performed by: NURSE PRACTITIONER

## 2022-07-13 PROCEDURE — 99999 PR PBB SHADOW E&M-EST. PATIENT-LVL IV: ICD-10-PCS | Mod: PBBFAC,,, | Performed by: NURSE PRACTITIONER

## 2022-07-13 PROCEDURE — 99999 PR PBB SHADOW E&M-EST. PATIENT-LVL IV: CPT | Mod: PBBFAC,,, | Performed by: NURSE PRACTITIONER

## 2022-07-13 PROCEDURE — 99204 OFFICE O/P NEW MOD 45 MIN: CPT | Mod: S$GLB,,, | Performed by: NURSE PRACTITIONER

## 2022-07-13 NOTE — ASSESSMENT & PLAN NOTE
Treated with Albuterol inhaler prn/nebs and Trelegy daily.     Sats today: 98%. No distress noted today.   Followed per outside Pulmonology-; received last clinic note along with PFTs.   Last episode x 1 month ago with steroid use at that time. Reports multiple exacerbations requiring prednisone.   Current smoker  Denies intubations or hospitalizations    PFTs available: 4/6/22  Severe obstructive ventilatory defect with bronchodilator response
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
## 2022-07-13 NOTE — ASSESSMENT & PLAN NOTE
Denies SRINIVAS. Possible sleep apnea: recommend caution with sedating medication in the perioperative period.

## 2022-07-13 NOTE — TELEPHONE ENCOUNTER
----- Message from Charles Owens MA sent at 7/13/2022  9:00 AM CDT -----  Regarding: Medical Clearance  Good Morning,     Patient schedule for IR procedure on Friday 7/15/22. Can you please schedule an appointment for medical clearance.     - Risk Assessment/Stratification   - Medical/Surgical History Review   - Comprehensive Physical Assessment   - Statement of Optimization   - Perioperative Recommendations     Tita Gutierrez
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
## 2022-07-13 NOTE — ASSESSMENT & PLAN NOTE
Bilateral per outside CT of chest 8/26/21 --  to be scanned in media  Followed per outside pulmonology; stable.

## 2022-07-13 NOTE — ASSESSMENT & PLAN NOTE
Currently being treated with Prilosec prn.  Encouraged to elevate HOB and avoid laying down for 2-3 hours after meals.   Smoking cessation was recommended as well as reduction of alcohol consumption.

## 2022-07-13 NOTE — ASSESSMENT & PLAN NOTE
Treated with: Celexa 20 mg daily; followed per PCP.  Denies suicidal/homicidal ideations  Recommend good sleep (7-8 hrs), healthy eating, and limit use of alcohol and/or tobacco.

## 2022-07-13 NOTE — ASSESSMENT & PLAN NOTE
Current BP  not at goal today; 166/80.    Taking: Coreg/valsartan  Encouraged keeping a healthy weight and BMI    Lifestyle changes to reduce systolic BP:  Smoking cessation; exercise 30 minutes per day,  5 days per week or 150 minutes weekly; sodium reduction and avoidance of high salt foods such as processed meats, frozen meals and  fast foods.      I recommend monitoring BP during perioperative period as well as uncontrolled pain which can elevated blood pressure.

## 2022-07-14 ENCOUNTER — PATIENT MESSAGE (OUTPATIENT)
Dept: NEUROSURGERY | Facility: CLINIC | Age: 77
End: 2022-07-14
Payer: MEDICARE

## 2022-07-14 ENCOUNTER — LAB VISIT (OUTPATIENT)
Dept: LAB | Facility: HOSPITAL | Age: 77
End: 2022-07-14
Attending: FAMILY MEDICINE
Payer: MEDICARE

## 2022-07-14 ENCOUNTER — PATIENT MESSAGE (OUTPATIENT)
Dept: INTERVENTIONAL RADIOLOGY/VASCULAR | Facility: HOSPITAL | Age: 77
End: 2022-07-14
Payer: MEDICARE

## 2022-07-14 DIAGNOSIS — S32.050A CLOSED COMPRESSION FRACTURE OF L5 LUMBAR VERTEBRA, INITIAL ENCOUNTER: ICD-10-CM

## 2022-07-14 LAB
INR PPP: 1 (ref 0.8–1.2)
PROTHROMBIN TIME: 10.7 SEC (ref 9–12.5)

## 2022-07-14 PROCEDURE — 85610 PROTHROMBIN TIME: CPT | Performed by: FAMILY MEDICINE

## 2022-07-14 PROCEDURE — 36415 COLL VENOUS BLD VENIPUNCTURE: CPT | Performed by: FAMILY MEDICINE

## 2022-07-14 NOTE — ANESTHESIA PREPROCEDURE EVALUATION
07/14/2022  Pre-operative evaluation for * No procedures listed *    Ruth Gamboa is a 77 y.o. femalew/ PMHx of COPD (last episode ~1 months ago with steroid use), current smoker, GERD, HTN, w/ compression fracture of L5 vertabra.      PFTs 4/6/22  Severe obstructive ventilatory defect with bronchodilator response    Patient Active Problem List   Diagnosis    Closed compression fracture of L5 lumbar vertebra, initial encounter    COPD (chronic obstructive pulmonary disease)    Gastroesophageal reflux disease without esophagitis    Depression    Atherosclerosis of aorta    HTN (hypertension)    Snoring    Pulmonary nodules       Review of patient's allergies indicates:  No Known Allergies    Current Outpatient Medications on File Prior to Encounter   Medication Sig Dispense Refill    albuterol (PROVENTIL) 2.5 mg /3 mL (0.083 %) nebulizer solution USE 1 VIAL IN NEBULIZER EVERY 6 HOURS      carvediloL (COREG) 25 MG tablet 2 (two) times daily with meals.      citalopram (CELEXA) 20 MG tablet once daily.      fluticasone propionate (FLONASE) 50 mcg/actuation nasal spray 2 sprays in each nare      nicotine (NICODERM CQ) 21 mg/24 hr Place 1 patch onto the skin every 24 hours.      omeprazole (PRILOSEC) 20 MG capsule Take by mouth as needed.      sulindac (CLINORIL) 150 MG tablet TAKE 1 TABLET BY MOUTH TWICE DAILY WITH FOOD AS NEEDED FOR PAIN      TRELEGY ELLIPTA 200-62.5-25 mcg inhaler Inhale 1 puff into the lungs once daily.      valsartan (DIOVAN) 160 MG tablet Take 160 mg by mouth once daily.      albuterol (PROVENTIL/VENTOLIN HFA) 90 mcg/actuation inhaler       COMP-AIR NEBULIZER COMPRESSOR Kesha use as directed      predniSONE (DELTASONE) 20 MG tablet Take 20 mg by mouth daily as needed.       No current facility-administered medications on file prior to encounter.       Past Surgical  History:   Procedure Laterality Date    GALLBLADDER SURGERY      HYSTERECTOMY      SINUS SURGERY      TYMPANOSTOMY TUBE PLACEMENT         Social History     Socioeconomic History    Marital status:    Tobacco Use    Smoking status: Current Every Day Smoker     Packs/day: 0.25     Years: 60.00     Pack years: 15.00     Types: Cigarettes    Smokeless tobacco: Never Used   Substance and Sexual Activity    Alcohol use: Not Currently    Drug use: Never         CBC: No results for input(s): WBC, RBC, HGB, HCT, PLT, MCV, MCH, MCHC in the last 72 hours.    CMP: No results for input(s): NA, K, CL, CO2, BUN, CREATININE, GLU, MG, PHOS, CALCIUM, ALBUMIN, PROT, ALKPHOS, ALT, AST, BILITOT in the last 72 hours.    INR  Recent Labs     07/14/22  1045   INR 1.0           2D Echo:  No results found for this or any previous visit.      Pre-op Assessment    I have reviewed the Patient Summary Reports.     I have reviewed the Nursing Notes. I have reviewed the NPO Status.   I have reviewed the Medications.     Review of Systems  Cardiovascular:   Hypertension    Pulmonary:   COPD    Hepatic/GI:   GERD    Musculoskeletal:   subacute painful osteoporotic L5 vertebral fracture with delayed healing       Physical Exam  General: Well nourished, Alert and Oriented    Airway:  Mallampati: II   Mouth Opening: Normal  TM Distance: Normal  Tongue: Normal  Neck ROM: Normal ROM    Chest/Lungs:  Clear to auscultation, Normal Respiratory Rate    Heart:  Rate: Normal  Rhythm: Regular Rhythm  Sounds: Normal        Anesthesia Plan  Type of Anesthesia, risks & benefits discussed:    Anesthesia Type: Gen Natural Airway, Gen ETT, MAC  Intra-op Monitoring Plan: Standard ASA Monitors  Post Op Pain Control Plan: multimodal analgesia and IV/PO Opioids PRN  Induction:  IV  Airway Plan: Direct and Video, Post-Induction  Informed Consent: Informed consent signed with the Patient and all parties understand the risks and agree with anesthesia plan.   All questions answered.   ASA Score: 3  Anesthesia Plan Notes: Per IR NOTE, plan for vertebroplasty L5 under conscious sedation supplied by anesthesia    Ready For Surgery From Anesthesia Perspective.     .

## 2022-07-15 ENCOUNTER — HOSPITAL ENCOUNTER (OUTPATIENT)
Dept: INTERVENTIONAL RADIOLOGY/VASCULAR | Facility: HOSPITAL | Age: 77
Discharge: HOME | End: 2022-07-15
Attending: NEUROLOGICAL SURGERY
Payer: MEDICARE

## 2022-07-15 ENCOUNTER — ANESTHESIA (OUTPATIENT)
Dept: INTERVENTIONAL RADIOLOGY/VASCULAR | Facility: HOSPITAL | Age: 77
End: 2022-07-15
Payer: MEDICARE

## 2022-07-15 VITALS
TEMPERATURE: 98 F | OXYGEN SATURATION: 93 % | HEART RATE: 70 BPM | RESPIRATION RATE: 18 BRPM | DIASTOLIC BLOOD PRESSURE: 74 MMHG | BODY MASS INDEX: 22.78 KG/M2 | SYSTOLIC BLOOD PRESSURE: 161 MMHG | HEIGHT: 60 IN | WEIGHT: 116 LBS

## 2022-07-15 DIAGNOSIS — S32.050A CLOSED COMPRESSION FRACTURE OF L5 LUMBAR VERTEBRA, INITIAL ENCOUNTER: ICD-10-CM

## 2022-07-15 PROCEDURE — C1713 ANCHOR/SCREW BN/BN,TIS/BN: HCPCS

## 2022-07-15 PROCEDURE — 25000003 PHARM REV CODE 250: Performed by: FAMILY MEDICINE

## 2022-07-15 PROCEDURE — 37000008 HC ANESTHESIA 1ST 15 MINUTES

## 2022-07-15 PROCEDURE — 25000003 PHARM REV CODE 250: Performed by: RADIOLOGY

## 2022-07-15 PROCEDURE — 22511 IR KYPHOPLASTY LUMBAR FIRST VERTEBRAL WITH IMAGING: ICD-10-PCS | Mod: ,,, | Performed by: RADIOLOGY

## 2022-07-15 PROCEDURE — D9220A PRA ANESTHESIA: Mod: ANES,,, | Performed by: ANESTHESIOLOGY

## 2022-07-15 PROCEDURE — 63600175 PHARM REV CODE 636 W HCPCS: Performed by: STUDENT IN AN ORGANIZED HEALTH CARE EDUCATION/TRAINING PROGRAM

## 2022-07-15 PROCEDURE — D9220A PRA ANESTHESIA: Mod: CRNA,,, | Performed by: STUDENT IN AN ORGANIZED HEALTH CARE EDUCATION/TRAINING PROGRAM

## 2022-07-15 PROCEDURE — 37000009 HC ANESTHESIA EA ADD 15 MINS

## 2022-07-15 PROCEDURE — D9220A PRA ANESTHESIA: ICD-10-PCS | Mod: ANES,,, | Performed by: ANESTHESIOLOGY

## 2022-07-15 PROCEDURE — 22511 PERQ LUMBOSACRAL INJECTION: CPT | Mod: ,,, | Performed by: RADIOLOGY

## 2022-07-15 PROCEDURE — D9220A PRA ANESTHESIA: ICD-10-PCS | Mod: CRNA,,, | Performed by: STUDENT IN AN ORGANIZED HEALTH CARE EDUCATION/TRAINING PROGRAM

## 2022-07-15 PROCEDURE — 63600175 PHARM REV CODE 636 W HCPCS: Performed by: FAMILY MEDICINE

## 2022-07-15 RX ORDER — CEFAZOLIN SODIUM 1 G/50ML
1 SOLUTION INTRAVENOUS
Status: COMPLETED | OUTPATIENT
Start: 2022-07-15 | End: 2022-07-15

## 2022-07-15 RX ORDER — OXYCODONE HYDROCHLORIDE 5 MG/1
5 TABLET ORAL EVERY 4 HOURS PRN
Status: DISCONTINUED | OUTPATIENT
Start: 2022-07-15 | End: 2022-07-16 | Stop reason: HOSPADM

## 2022-07-15 RX ORDER — SODIUM CHLORIDE 9 MG/ML
INJECTION, SOLUTION INTRAVENOUS CONTINUOUS
Status: DISCONTINUED | OUTPATIENT
Start: 2022-07-15 | End: 2022-07-16 | Stop reason: HOSPADM

## 2022-07-15 RX ORDER — HYDROMORPHONE HYDROCHLORIDE 1 MG/ML
0.2 INJECTION, SOLUTION INTRAMUSCULAR; INTRAVENOUS; SUBCUTANEOUS EVERY 5 MIN PRN
Status: DISCONTINUED | OUTPATIENT
Start: 2022-07-15 | End: 2022-07-16 | Stop reason: HOSPADM

## 2022-07-15 RX ORDER — ONDANSETRON 2 MG/ML
4 INJECTION INTRAMUSCULAR; INTRAVENOUS DAILY PRN
Status: DISCONTINUED | OUTPATIENT
Start: 2022-07-15 | End: 2022-07-16 | Stop reason: HOSPADM

## 2022-07-15 RX ORDER — FENTANYL CITRATE 50 UG/ML
25 INJECTION, SOLUTION INTRAMUSCULAR; INTRAVENOUS EVERY 5 MIN PRN
Status: DISCONTINUED | OUTPATIENT
Start: 2022-07-15 | End: 2022-07-16 | Stop reason: HOSPADM

## 2022-07-15 RX ORDER — ONDANSETRON 2 MG/ML
INJECTION INTRAMUSCULAR; INTRAVENOUS
Status: DISCONTINUED | OUTPATIENT
Start: 2022-07-15 | End: 2022-07-15

## 2022-07-15 RX ORDER — HALOPERIDOL 5 MG/ML
0.5 INJECTION INTRAMUSCULAR EVERY 10 MIN PRN
Status: DISCONTINUED | OUTPATIENT
Start: 2022-07-15 | End: 2022-07-16 | Stop reason: HOSPADM

## 2022-07-15 RX ORDER — LIDOCAINE HYDROCHLORIDE 10 MG/ML
INJECTION INFILTRATION; PERINEURAL CODE/TRAUMA/SEDATION MEDICATION
Status: COMPLETED | OUTPATIENT
Start: 2022-07-15 | End: 2022-07-15

## 2022-07-15 RX ORDER — LIDOCAINE HYDROCHLORIDE 10 MG/ML
1 INJECTION, SOLUTION EPIDURAL; INFILTRATION; INTRACAUDAL; PERINEURAL ONCE
Status: DISCONTINUED | OUTPATIENT
Start: 2022-07-15 | End: 2022-07-16 | Stop reason: HOSPADM

## 2022-07-15 RX ORDER — FENTANYL CITRATE 50 UG/ML
INJECTION, SOLUTION INTRAMUSCULAR; INTRAVENOUS
Status: DISCONTINUED | OUTPATIENT
Start: 2022-07-15 | End: 2022-07-15

## 2022-07-15 RX ORDER — ACETAMINOPHEN 325 MG/1
650 TABLET ORAL EVERY 4 HOURS PRN
Status: DISCONTINUED | OUTPATIENT
Start: 2022-07-15 | End: 2022-07-16 | Stop reason: HOSPADM

## 2022-07-15 RX ORDER — OXYCODONE HYDROCHLORIDE 5 MG/1
5 TABLET ORAL
Status: DISCONTINUED | OUTPATIENT
Start: 2022-07-15 | End: 2022-07-16 | Stop reason: HOSPADM

## 2022-07-15 RX ORDER — MIDAZOLAM HYDROCHLORIDE 1 MG/ML
INJECTION, SOLUTION INTRAMUSCULAR; INTRAVENOUS
Status: DISCONTINUED | OUTPATIENT
Start: 2022-07-15 | End: 2022-07-15

## 2022-07-15 RX ORDER — ONDANSETRON 2 MG/ML
4 INJECTION INTRAMUSCULAR; INTRAVENOUS EVERY 6 HOURS PRN
Status: DISCONTINUED | OUTPATIENT
Start: 2022-07-15 | End: 2022-07-16 | Stop reason: HOSPADM

## 2022-07-15 RX ADMIN — LIDOCAINE HYDROCHLORIDE 10 ML: 10 INJECTION, SOLUTION INFILTRATION; PERINEURAL at 03:07

## 2022-07-15 RX ADMIN — FENTANYL CITRATE 25 MCG: 50 INJECTION, SOLUTION INTRAMUSCULAR; INTRAVENOUS at 02:07

## 2022-07-15 RX ADMIN — FENTANYL CITRATE 25 MCG: 50 INJECTION, SOLUTION INTRAMUSCULAR; INTRAVENOUS at 03:07

## 2022-07-15 RX ADMIN — MIDAZOLAM HYDROCHLORIDE 1 MG: 1 INJECTION, SOLUTION INTRAMUSCULAR; INTRAVENOUS at 02:07

## 2022-07-15 RX ADMIN — ONDANSETRON 4 MG: 2 INJECTION INTRAMUSCULAR; INTRAVENOUS at 03:07

## 2022-07-15 RX ADMIN — CEFAZOLIN SODIUM 1 G: 1 SOLUTION INTRAVENOUS at 02:07

## 2022-07-15 RX ADMIN — SODIUM CHLORIDE: 0.9 INJECTION, SOLUTION INTRAVENOUS at 02:07

## 2022-07-15 NOTE — PROCEDURES
Radiology Post-Procedure Note    Pre Op Diagnosis: LBP    Post Op Diagnosis: Same    Procedure: Lumbar verterbroplasty     Procedure performed by: Javier Carlisle MD    Written Informed Consent Obtained: Yes    Specimen Removed: NO    Estimated Blood Loss: Minimal    Findings:     L5 vertebroplasty    - right transpedicular / posterolateral approach   - Needle used: 13 gauge    Patient tolerated procedure well.        Bal Thomason MD     Neuro Endovascular Surgery Fellow   [facility]

## 2022-07-15 NOTE — TRANSFER OF CARE
Anesthesia Transfer of Care Note    Patient: Ruth Gamboa    Procedure(s) Performed: * No procedures listed *    Patient location: PACU    Anesthesia Type: MAC    Transport from OR: Transported from OR on room air with adequate spontaneous ventilation    Post pain: adequate analgesia    Post assessment: no apparent anesthetic complications and tolerated procedure well    Post vital signs: stable    Level of consciousness: awake, alert and oriented    Nausea/Vomiting: no nausea/vomiting    Complications: none    Transfer of care protocol was followed      Last vitals:   Visit Vitals  /66 (BP Location: Right arm, Patient Position: Lying)   Pulse 65   Temp 36.5 °C (97.7 °F) (Temporal)   Resp 16   Ht 5' (1.524 m)   Wt 52.6 kg (116 lb)   SpO2 95%   Breastfeeding No   BMI 22.65 kg/m²

## 2022-07-15 NOTE — NURSING TRANSFER
Nursing Transfer Note      7/15/2022     Reason patient is being transferred: postop    Transfer To: [facility]    Transfer via stretcher    Transfer with pt belongings (clothing)    Transported by RN    Medicines sent: n/a    Any special needs or follow-up needed: d/c home    Chart send with patient: Yes    Notified: daughter    Patient reassessed at: 7/15 7198
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
## 2022-07-15 NOTE — PLAN OF CARE
Pt arrived to unit accompanied by her daughter.  Pre op orders and assessment initiated.  Pt remains npo.

## 2022-07-15 NOTE — PLAN OF CARE
Vertebroplasty completed, pt tolerated well. No apparent distress noted. Dressing applied CDI. Per Dr Carlisle 1 hour flat till 1630., the 2 hours sitting up at 35-45 degrees. Patient transferred to PACU 10, report given at bedside, accompanied by anesthesia and IR teams.

## 2022-07-15 NOTE — PLAN OF CARE
Pt arrived to IR room 4 for vertebroplasty, no acute distress noted. Orders, consents and labs reviewed on chart. Anesthesia at bedside.

## 2022-07-18 NOTE — ANESTHESIA POSTPROCEDURE EVALUATION
Anesthesia Post Evaluation    Patient: Ruth Gamboa    Procedure(s) Performed: * No procedures listed *    Final Anesthesia Type: MAC      Patient location during evaluation: PACU  Patient participation: Yes- Able to Participate  Level of consciousness: awake and alert  Post-procedure vital signs: reviewed and stable  Pain management: adequate  Airway patency: patent    PONV status at discharge: No PONV  Anesthetic complications: no      Cardiovascular status: stable  Respiratory status: unassisted and spontaneous ventilation  Hydration status: euvolemic  Follow-up not needed.          Vitals Value Taken Time   /87 07/15/22 1831   Temp 36.7 °C (98 °F) 07/15/22 1815   Pulse 70 07/15/22 1836   Resp 18 07/15/22 1815   SpO2 91 % 07/15/22 1836   Vitals shown include unvalidated device data.      Event Time   Out of Recovery 17:28:00         Pain/Joey Score: No data recorded

## 2022-08-01 ENCOUNTER — CLINICAL SUPPORT (OUTPATIENT)
Dept: INTERVENTIONAL RADIOLOGY/VASCULAR | Facility: CLINIC | Age: 77
End: 2022-08-01
Payer: MEDICARE

## 2022-08-01 DIAGNOSIS — S32.050A CLOSED COMPRESSION FRACTURE OF L5 LUMBAR VERTEBRA, INITIAL ENCOUNTER: Primary | ICD-10-CM

## 2022-08-01 PROCEDURE — 99203 PR OFFICE/OUTPT VISIT, NEW, LEVL III, 30-44 MIN: ICD-10-PCS | Mod: 95,,, | Performed by: RADIOLOGY

## 2022-08-01 PROCEDURE — 99203 OFFICE O/P NEW LOW 30 MIN: CPT | Mod: 95,,, | Performed by: RADIOLOGY
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
## 2022-10-07 DIAGNOSIS — M54.9 BACK PAIN, UNSPECIFIED BACK LOCATION, UNSPECIFIED BACK PAIN LATERALITY, UNSPECIFIED CHRONICITY: Primary | ICD-10-CM

## 2022-10-11 ENCOUNTER — HOSPITAL ENCOUNTER (OUTPATIENT)
Dept: RADIOLOGY | Facility: HOSPITAL | Age: 77
Discharge: HOME | End: 2022-10-11
Payer: MEDICARE

## 2022-10-11 ENCOUNTER — TELEPHONE (OUTPATIENT)
Dept: INTERVENTIONAL RADIOLOGY/VASCULAR | Facility: HOSPITAL | Age: 77
End: 2022-10-11
Payer: MEDICARE

## 2022-10-11 DIAGNOSIS — M54.9 BACK PAIN, UNSPECIFIED BACK LOCATION, UNSPECIFIED BACK PAIN LATERALITY, UNSPECIFIED CHRONICITY: ICD-10-CM

## 2022-10-11 DIAGNOSIS — M47.819 FACET ARTHROPATHY: ICD-10-CM

## 2022-10-11 DIAGNOSIS — M54.9 BACK PAIN, UNSPECIFIED BACK LOCATION, UNSPECIFIED BACK PAIN LATERALITY, UNSPECIFIED CHRONICITY: Primary | ICD-10-CM

## 2022-10-11 PROCEDURE — 72100 X-RAY EXAM L-S SPINE 2/3 VWS: CPT | Mod: TC

## 2022-10-11 PROCEDURE — 72100 X-RAY EXAM L-S SPINE 2/3 VWS: CPT | Mod: 26,,, | Performed by: RADIOLOGY

## 2022-10-11 PROCEDURE — 72100 XR LUMBAR SPINE AP AND LATERAL: ICD-10-PCS | Mod: 26,,, | Performed by: RADIOLOGY

## 2022-10-11 NOTE — PROGRESS NOTES
Dr. Carlisle called patient and daughter to review X-Ray results. Patient reports her pain is different from the pain she had prior to khyphoplasty. Offered Bilat L4-5, L5-S1 facet joint injections. Will call if they want to schedule. Order placed and clinic staff notified.     Kellee Alex PA-C  Interventional Radiology  [phone]

## 2022-11-01 ENCOUNTER — HOSPITAL ENCOUNTER (OUTPATIENT)
Dept: INTERVENTIONAL RADIOLOGY/VASCULAR | Facility: HOSPITAL | Age: 77
Discharge: HOME | End: 2022-11-01
Payer: MEDICARE

## 2022-11-01 VITALS
DIASTOLIC BLOOD PRESSURE: 81 MMHG | HEART RATE: 89 BPM | RESPIRATION RATE: 16 BRPM | SYSTOLIC BLOOD PRESSURE: 183 MMHG | OXYGEN SATURATION: 100 %

## 2022-11-01 DIAGNOSIS — M47.819 FACET ARTHROPATHY: ICD-10-CM

## 2022-11-01 DIAGNOSIS — M54.9 BACK PAIN, UNSPECIFIED BACK LOCATION, UNSPECIFIED BACK PAIN LATERALITY, UNSPECIFIED CHRONICITY: ICD-10-CM

## 2022-11-01 PROCEDURE — 64494 PR INJ DX/THER AGNT PARAVERT FACET JOINT,IMG GUIDE,LUMBAR/SAC, 2ND LEVEL: ICD-10-PCS | Mod: 50,,, | Performed by: RADIOLOGY

## 2022-11-01 PROCEDURE — 64494 INJ PARAVERT F JNT L/S 2 LEV: CPT | Mod: 50 | Performed by: RADIOLOGY

## 2022-11-01 PROCEDURE — 64493 INJ PARAVERT F JNT L/S 1 LEV: CPT | Mod: 50 | Performed by: RADIOLOGY

## 2022-11-01 PROCEDURE — 64493 IR FACET INJ LUMBAR 1ST VERT BI: ICD-10-PCS | Mod: 50,,, | Performed by: RADIOLOGY

## 2022-11-01 PROCEDURE — 64494 INJ PARAVERT F JNT L/S 2 LEV: CPT | Mod: 50,,, | Performed by: RADIOLOGY

## 2022-11-01 PROCEDURE — A4215 STERILE NEEDLE: HCPCS

## 2022-11-01 PROCEDURE — 63600175 PHARM REV CODE 636 W HCPCS: Performed by: STUDENT IN AN ORGANIZED HEALTH CARE EDUCATION/TRAINING PROGRAM

## 2022-11-01 RX ORDER — METHYLPREDNISOLONE ACETATE 40 MG/ML
INJECTION, SUSPENSION INTRA-ARTICULAR; INTRALESIONAL; INTRAMUSCULAR; SOFT TISSUE
Status: COMPLETED | OUTPATIENT
Start: 2022-11-01 | End: 2022-11-01

## 2022-11-01 RX ADMIN — METHYLPREDNISOLONE ACETATE 80 MG: 40 INJECTION, SUSPENSION INTRA-ARTICULAR; INTRALESIONAL; INTRAMUSCULAR; SOFT TISSUE at 01:11

## 2022-11-01 NOTE — PROCEDURES
Radiology Post-Procedure Note    Pre Op Diagnosis: LBP    Post Op Diagnosis: Same    Procedure: Lumbar Facet    Procedure performed by: Javier Carlisle MD    Written Informed Consent Obtained: Yes    Specimen Removed: NO    Estimated Blood Loss: Minimal    Findings:     Level injected: Bilateral L4-L5 / L5-S1 facets   Needle used: 22 gauge  Dose:  20 mg Depo-methylprednisolone at each facet    2 mL Lidocaine 1% MPF at each facet     Patient tolerated procedure well.        Bal Thomason MD     Neuro Endovascular Surgery Fellow   [facility]
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
## 2022-11-01 NOTE — PLAN OF CARE
Procedure complete. Pt tolerated the procedure well. Site CDI. VS stable. Pt discharged to family.

## 2022-11-01 NOTE — PLAN OF CARE
Pt arrived to rm 4 for outpt spinal inj. Pt in no acute distress, placed on monitor, VSS. Awaiting consent.

## 2022-11-22 NOTE — TELEPHONE ENCOUNTER
----- Message from Aparna Lees NP sent at 5/29/2022 10:08 PM CDT -----  Regarding: Appointment  It looks like she got most of what I ordered. She just needs the flex/ex and needs to see Dr. Rouse please.    Thanks!      [FreeTextEntry1] : 11/18/2022: TURBT pathology\par 1. 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 Bladder Tumor #2: \par HG TCC infiltrates into muscle, (T2)
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
## 2023-01-19 ENCOUNTER — HOSPITAL ENCOUNTER (OUTPATIENT)
Dept: RADIOLOGY | Facility: HOSPITAL | Age: 78
Discharge: HOME | End: 2023-01-19
Attending: NEUROLOGICAL SURGERY
Payer: MEDICARE

## 2023-01-19 DIAGNOSIS — S32.000D COMPRESSION FRACTURE OF LUMBAR VERTEBRA WITH ROUTINE HEALING, UNSPECIFIED LUMBAR VERTEBRAL LEVEL, SUBSEQUENT ENCOUNTER: ICD-10-CM

## 2023-01-19 PROCEDURE — 72100 X-RAY EXAM L-S SPINE 2/3 VWS: CPT | Mod: TC

## 2023-10-04 ENCOUNTER — PATIENT MESSAGE (OUTPATIENT)
Dept: ADMINISTRATIVE | Facility: OTHER | Age: 78
End: 2023-10-04
Payer: MEDICARE

## 2023-10-04 PROBLEM — F17.210 CIGARETTE SMOKER: Status: ACTIVE | Noted: 2023-10-04

## 2023-10-06 ENCOUNTER — HOSPITAL ENCOUNTER (OUTPATIENT)
Dept: RADIOLOGY | Facility: HOSPITAL | Age: 78
Discharge: HOME | End: 2023-10-06
Attending: INTERNAL MEDICINE
Payer: MEDICARE

## 2023-10-06 DIAGNOSIS — F17.210 CIGARETTE SMOKER: ICD-10-CM

## 2023-10-06 DIAGNOSIS — R91.8 PULMONARY NODULES: ICD-10-CM

## 2023-10-06 PROCEDURE — 71046 X-RAY EXAM CHEST 2 VIEWS: CPT | Mod: TC

## 2023-10-12 ENCOUNTER — LAB VISIT (OUTPATIENT)
Dept: LAB | Facility: HOSPITAL | Age: 78
End: 2023-10-12
Attending: INTERNAL MEDICINE
Payer: MEDICARE

## 2023-10-12 DIAGNOSIS — D51.9 ANEMIA DUE TO VITAMIN B12 DEFICIENCY, UNSPECIFIED B12 DEFICIENCY TYPE: ICD-10-CM

## 2023-10-12 DIAGNOSIS — D52.0 DIETARY FOLATE DEFICIENCY ANEMIA: ICD-10-CM

## 2023-10-12 DIAGNOSIS — D64.9 MILD ANEMIA: ICD-10-CM

## 2023-10-12 LAB
FERRITIN SERPL-MCNC: 44 NG/ML (ref 20–300)
FOLATE SERPL-MCNC: 8.5 NG/ML (ref 4–24)
IRON SATN MFR SERPL: 12 % (ref 20–50)
IRON SERPL-MCNC: 31 UG/DL (ref 30–160)
RETICS/RBC NFR AUTO: 1.2 % (ref 0.5–2.5)
TOTAL IRON BINDING CAPACITY: 267 UG/DL (ref 250–450)
VIT B12 SERPL-MCNC: 807 PG/ML (ref 210–950)

## 2023-10-12 PROCEDURE — 82746 ASSAY OF FOLIC ACID SERUM: CPT | Performed by: INTERNAL MEDICINE

## 2023-10-12 PROCEDURE — 83540 ASSAY OF IRON: CPT | Performed by: INTERNAL MEDICINE

## 2023-10-12 PROCEDURE — 82607 VITAMIN B-12: CPT | Performed by: INTERNAL MEDICINE

## 2023-10-12 PROCEDURE — 85045 AUTOMATED RETICULOCYTE COUNT: CPT | Performed by: INTERNAL MEDICINE

## 2023-10-12 PROCEDURE — 83550 IRON BINDING TEST: CPT | Performed by: INTERNAL MEDICINE

## 2023-10-12 PROCEDURE — 36415 COLL VENOUS BLD VENIPUNCTURE: CPT | Performed by: INTERNAL MEDICINE

## 2023-10-12 PROCEDURE — 82728 ASSAY OF FERRITIN: CPT | Performed by: INTERNAL MEDICINE

## 2023-12-06 PROCEDURE — 82272 OCCULT BLD FECES 1-3 TESTS: CPT | Performed by: INTERNAL MEDICINE

## 2023-12-12 ENCOUNTER — LAB VISIT (OUTPATIENT)
Dept: LAB | Facility: HOSPITAL | Age: 78
End: 2023-12-12
Attending: INTERNAL MEDICINE
Payer: MEDICARE

## 2023-12-12 DIAGNOSIS — D64.9 MILD ANEMIA: ICD-10-CM

## 2023-12-12 PROBLEM — Z72.0 TOBACCO ABUSE: Status: ACTIVE | Noted: 2023-12-12

## 2023-12-12 LAB
OB PNL STL: NEGATIVE
OB PNL STL: NEGATIVE

## 2024-01-16 ENCOUNTER — LAB VISIT (OUTPATIENT)
Dept: LAB | Facility: HOSPITAL | Age: 79
End: 2024-01-16
Attending: OTOLARYNGOLOGY
Payer: MEDICARE

## 2024-01-16 DIAGNOSIS — J30.9 RHINITIS, ALLERGIC: ICD-10-CM

## 2024-01-16 DIAGNOSIS — J30.9 SPASMODIC RHINORRHEA: Primary | ICD-10-CM

## 2024-01-16 LAB — IGE SERPL-ACNC: <35 IU/ML (ref 0–100)

## 2024-01-16 PROCEDURE — 82785 ASSAY OF IGE: CPT | Performed by: OTOLARYNGOLOGY

## 2024-01-16 PROCEDURE — 36415 COLL VENOUS BLD VENIPUNCTURE: CPT | Performed by: OTOLARYNGOLOGY

## 2024-01-16 PROCEDURE — 86003 ALLG SPEC IGE CRUDE XTRC EA: CPT | Performed by: OTOLARYNGOLOGY

## 2024-01-16 PROCEDURE — 86003 ALLG SPEC IGE CRUDE XTRC EA: CPT | Mod: 59 | Performed by: OTOLARYNGOLOGY

## 2024-01-23 LAB
A ALTERNATA IGE QN: <0.1 KU/L
A FUMIGATUS IGE QN: <0.1 KU/L
ALLERGEN BOXELDER MAPLE TREE IGE: <0.1 KU/L
ALLERGEN MAPLE (BOX ELDER) CLASS: NORMAL
ALLERGEN MULBERRY CLASS: NORMAL
ALLERGEN MULBERRY TREE IGE: <0.1 KU/L
ALLERGEN PIGWEED IGE: <0.1 KU/L
ALLERGEN WALNUT TREE IGE: <0.1 KU/L
BERMUDA GRASS IGE QN: <0.1 KU/L
C HERBARUM IGE QN: <0.1 KU/L
CAT DANDER IGE QN: <0.1 KU/L
CEDAR IGE QN: <0.1 KU/L
CLAM IGE QN: <0.1 KU/L
CODFISH IGE QN: <0.1 KU/L
COMMON PIGWEED CLASS: NORMAL
COMMON RAGWEED IGE QN: <0.1 KU/L
CORN IGE QN: <0.1 KU/L
COW MILK IGE QN: <0.1 KU/L
D FARINAE IGE QN: <0.1 KU/L
D PTERONYSS IGE QN: <0.1 KU/L
DEPRECATED A ALTERNATA IGE RAST QL: NORMAL
DEPRECATED A FUMIGATUS IGE RAST QL: NORMAL
DEPRECATED BERMUDA GRASS IGE RAST QL: NORMAL
DEPRECATED C HERBARUM IGE RAST QL: NORMAL
DEPRECATED CAT DANDER IGE RAST QL: NORMAL
DEPRECATED CEDAR IGE RAST QL: NORMAL
DEPRECATED CLAM IGE RAST QL: NORMAL
DEPRECATED CODFISH IGE RAST QL: NORMAL
DEPRECATED COMMON RAGWEED IGE RAST QL: NORMAL
DEPRECATED CORN IGE RAST QL: NORMAL
DEPRECATED COW MILK IGE RAST QL: NORMAL
DEPRECATED D FARINAE IGE RAST QL: NORMAL
DEPRECATED D PTERONYSS IGE RAST QL: NORMAL
DEPRECATED DOG DANDER IGE RAST QL: NORMAL
DEPRECATED EGG WHITE IGE RAST QL: NORMAL
DEPRECATED ELDER IGE RAST QL: NORMAL
DEPRECATED P NOTATUM IGE RAST QL: NORMAL
DEPRECATED PEANUT IGE RAST QL: NORMAL
DEPRECATED PECAN/HICK TREE IGE RAST QL: NORMAL
DEPRECATED ROACH IGE RAST QL: NORMAL
DEPRECATED SCALLOP IGE RAST QL: NORMAL
DEPRECATED SESAME SEED IGE RAST QL: NORMAL
DEPRECATED SHRIMP IGE RAST QL: NORMAL
DEPRECATED SILVER BIRCH IGE RAST QL: NORMAL
DEPRECATED SOYBEAN IGE RAST QL: NORMAL
DEPRECATED TIMOTHY IGE RAST QL: NORMAL
DEPRECATED WALNUT IGE RAST QL: NORMAL
DEPRECATED WHEAT IGE RAST QL: NORMAL
DEPRECATED WHITE OAK IGE RAST QL: NORMAL
DOG DANDER IGE QN: <0.1 KU/L
EGG WHITE IGE QN: <0.1 KU/L
ELDER IGE QN: <0.1 KU/L
ELM CEDAR CLASS: NORMAL
ELM CEDAR, IGE: <0.1 KU/L
P NOTATUM IGE QN: <0.1 KU/L
PEANUT IGE QN: <0.1 KU/L
PECAN/HICK TREE IGE QN: <0.1 KU/L
ROACH IGE QN: <0.1 KU/L
SCALLOP IGE QN: <0.1 KU/L
SESAME SEED IGE QN: <0.1 KU/L
SHRIMP IGE QN: <0.1 KU/L
SILVER BIRCH IGE QN: <0.1 KU/L
SOYBEAN IGE QN: <0.1 KU/L
TIMOTHY IGE QN: <0.1 KU/L
WALNUT IGE QN: <0.1 KU/L
WALNUT TREE CLASS: NORMAL
WHEAT IGE QN: <0.1 KU/L
WHITE OAK IGE QN: <0.1 KU/L

## 2024-05-16 PROBLEM — F33.1 MAJOR DEPRESSIVE DISORDER, RECURRENT, MODERATE: Status: ACTIVE | Noted: 2024-05-16

## 2024-05-30 ENCOUNTER — HOSPITAL ENCOUNTER (OUTPATIENT)
Dept: RADIOLOGY | Facility: HOSPITAL | Age: 79
Discharge: HOME | End: 2024-05-30
Attending: INTERNAL MEDICINE
Payer: MEDICARE

## 2024-05-30 DIAGNOSIS — Z72.0 TOBACCO ABUSE: ICD-10-CM

## 2024-05-30 DIAGNOSIS — F17.210 CIGARETTE SMOKER: ICD-10-CM

## 2024-05-30 DIAGNOSIS — R63.0 ANOREXIA: ICD-10-CM

## 2024-05-30 PROCEDURE — 71250 CT THORAX DX C-: CPT | Mod: TC

## 2024-06-07 ENCOUNTER — LAB VISIT (OUTPATIENT)
Dept: LAB | Facility: HOSPITAL | Age: 79
End: 2024-06-07
Attending: INTERNAL MEDICINE
Payer: MEDICARE

## 2024-06-07 DIAGNOSIS — J44.9 COPD (CHRONIC OBSTRUCTIVE PULMONARY DISEASE): ICD-10-CM

## 2024-06-07 PROCEDURE — 87077 CULTURE AEROBIC IDENTIFY: CPT | Performed by: INTERNAL MEDICINE

## 2024-06-07 PROCEDURE — 87205 SMEAR GRAM STAIN: CPT | Performed by: INTERNAL MEDICINE

## 2024-06-07 PROCEDURE — 87070 CULTURE OTHR SPECIMN AEROBIC: CPT | Performed by: INTERNAL MEDICINE

## 2024-06-07 PROCEDURE — 87186 SC STD MICRODIL/AGAR DIL: CPT | Performed by: INTERNAL MEDICINE

## 2024-06-10 LAB
BACTERIA SPEC AEROBE CULT: ABNORMAL
BACTERIA SPEC AEROBE CULT: ABNORMAL
GRAM STN SPEC: ABNORMAL
GRAM STN SPEC: ABNORMAL

## 2024-09-17 PROBLEM — J47.9 BRONCHIOLECTASIS: Status: ACTIVE | Noted: 2024-09-17

## 2024-09-18 PROBLEM — R53.81 DEBILITY: Status: ACTIVE | Noted: 2024-09-18

## 2024-11-27 ENCOUNTER — HOSPITAL ENCOUNTER (OUTPATIENT)
Dept: RADIOLOGY | Facility: HOSPITAL | Age: 79
Discharge: HOME | End: 2024-11-27
Payer: MEDICARE

## 2024-11-27 DIAGNOSIS — R93.3 ABNORMAL FINDINGS ON DIAGNOSTIC IMAGING OF OTHER PARTS OF DIGESTIVE TRACT: ICD-10-CM

## 2024-11-27 PROBLEM — R30.0 DYSURIA: Status: ACTIVE | Noted: 2024-11-27

## 2024-11-27 PROBLEM — R79.9 ABNORMAL FINDING OF BLOOD CHEMISTRY, UNSPECIFIED: Status: ACTIVE | Noted: 2024-11-27

## 2024-11-27 PROBLEM — R63.0 ANOREXIA: Status: ACTIVE | Noted: 2024-11-27

## 2024-11-27 PROBLEM — R63.4 WEIGHT LOSS, UNINTENTIONAL: Status: ACTIVE | Noted: 2024-11-27

## 2024-11-27 PROCEDURE — 71271 CT THORAX LUNG CANCER SCR C-: CPT | Mod: TC

## 2024-12-02 ENCOUNTER — HOSPITAL ENCOUNTER (OUTPATIENT)
Facility: HOSPITAL | Age: 79
Discharge: HOME HEALTH | End: 2024-12-07
Attending: EMERGENCY MEDICINE | Admitting: INTERNAL MEDICINE
Payer: MEDICARE

## 2024-12-02 DIAGNOSIS — R19.7 DIARRHEA: ICD-10-CM

## 2024-12-02 DIAGNOSIS — J44.9 COPD (CHRONIC OBSTRUCTIVE PULMONARY DISEASE): Primary | ICD-10-CM

## 2024-12-02 DIAGNOSIS — S32.050A CLOSED COMPRESSION FRACTURE OF L5 LUMBAR VERTEBRA, INITIAL ENCOUNTER: ICD-10-CM

## 2024-12-02 DIAGNOSIS — R53.81 DEBILITY: ICD-10-CM

## 2024-12-02 DIAGNOSIS — R63.0 ANOREXIA: ICD-10-CM

## 2024-12-02 DIAGNOSIS — I10 HYPERTENSION, UNSPECIFIED TYPE: ICD-10-CM

## 2024-12-02 LAB
ALBUMIN SERPL BCP-MCNC: 2.4 G/DL (ref 3.5–5.2)
ALP SERPL-CCNC: 107 U/L (ref 55–135)
ALT SERPL W/O P-5'-P-CCNC: 10 U/L (ref 10–44)
AMORPH CRY UR QL COMP ASSIST: ABNORMAL
ANION GAP SERPL CALC-SCNC: 5 MMOL/L (ref 3–11)
AST SERPL-CCNC: 9 U/L (ref 10–40)
BACTERIA #/AREA URNS HPF: ABNORMAL /HPF
BASOPHILS # BLD AUTO: 0.04 K/UL (ref 0–0.2)
BASOPHILS NFR BLD: 0.5 % (ref 0–1.9)
BILIRUB SERPL-MCNC: 0.2 MG/DL (ref 0.1–1)
BILIRUB UR QL STRIP: NEGATIVE
BUN SERPL-MCNC: 12 MG/DL (ref 8–23)
CALCIUM SERPL-MCNC: 8.7 MG/DL (ref 8.7–10.5)
CHLORIDE SERPL-SCNC: 101 MMOL/L (ref 95–110)
CLARITY UR: ABNORMAL
CO2 SERPL-SCNC: 30 MMOL/L (ref 23–29)
COLOR UR: YELLOW
CREAT SERPL-MCNC: 0.8 MG/DL (ref 0.5–1.4)
DIFFERENTIAL METHOD BLD: ABNORMAL
EOSINOPHIL # BLD AUTO: 0.1 K/UL (ref 0–0.5)
EOSINOPHIL NFR BLD: 1.5 % (ref 0–8)
ERYTHROCYTE [DISTWIDTH] IN BLOOD BY AUTOMATED COUNT: 16.2 % (ref 11.5–14.5)
EST. GFR  (NO RACE VARIABLE): >60 ML/MIN/1.73 M^2
GLUCOSE SERPL-MCNC: 102 MG/DL (ref 70–110)
GLUCOSE UR QL STRIP: NEGATIVE
HCT VFR BLD AUTO: 32.5 % (ref 37–48.5)
HGB BLD-MCNC: 10.2 G/DL (ref 12–16)
HGB UR QL STRIP: NEGATIVE
HYALINE CASTS #/AREA URNS LPF: 0.3 /LPF
IMM GRANULOCYTES # BLD AUTO: 0.03 K/UL (ref 0–0.04)
IMM GRANULOCYTES NFR BLD AUTO: 0.4 % (ref 0–0.5)
KETONES UR QL STRIP: NEGATIVE
LACTATE SERPL-SCNC: 1.2 MMOL/L (ref 0.5–2.2)
LEUKOCYTE ESTERASE UR QL STRIP: ABNORMAL
LYMPHOCYTES # BLD AUTO: 1.5 K/UL (ref 1–4.8)
LYMPHOCYTES NFR BLD: 17.2 % (ref 18–48)
MAGNESIUM SERPL-MCNC: 2 MG/DL (ref 1.6–2.6)
MCH RBC QN AUTO: 26.6 PG (ref 27–31)
MCHC RBC AUTO-ENTMCNC: 31.4 G/DL (ref 32–36)
MCV RBC AUTO: 85 FL (ref 82–98)
MICROSCOPIC COMMENT: ABNORMAL
MONOCYTES # BLD AUTO: 0.7 K/UL (ref 0.3–1)
MONOCYTES NFR BLD: 7.8 % (ref 4–15)
NEUTROPHILS # BLD AUTO: 6.2 K/UL (ref 1.8–7.7)
NEUTROPHILS NFR BLD: 72.6 % (ref 38–73)
NITRITE UR QL STRIP: NEGATIVE
NRBC BLD-RTO: 0 /100 WBC
PH UR STRIP: 7 [PH] (ref 5–8)
PLATELET # BLD AUTO: 530 K/UL (ref 150–450)
PMV BLD AUTO: 9.2 FL (ref 9.2–12.9)
POTASSIUM SERPL-SCNC: 3.8 MMOL/L (ref 3.5–5.1)
PROT SERPL-MCNC: 7.5 G/DL (ref 6–8.4)
PROT UR QL STRIP: NEGATIVE
RBC # BLD AUTO: 3.84 M/UL (ref 4–5.4)
RBC #/AREA URNS HPF: 2 /HPF (ref 0–4)
SODIUM SERPL-SCNC: 136 MMOL/L (ref 136–145)
SP GR UR STRIP: <=1.005 (ref 1–1.03)
SQUAMOUS #/AREA URNS HPF: 4 /HPF
URN SPEC COLLECT METH UR: ABNORMAL
UROBILINOGEN UR STRIP-ACNC: NEGATIVE EU/DL
WBC # BLD AUTO: 8.56 K/UL (ref 3.9–12.7)
WBC #/AREA URNS HPF: 3 /HPF (ref 0–5)

## 2024-12-02 PROCEDURE — G0378 HOSPITAL OBSERVATION PER HR: HCPCS

## 2024-12-02 PROCEDURE — 25000003 PHARM REV CODE 250: Performed by: EMERGENCY MEDICINE

## 2024-12-02 PROCEDURE — 96374 THER/PROPH/DIAG INJ IV PUSH: CPT

## 2024-12-02 PROCEDURE — 96361 HYDRATE IV INFUSION ADD-ON: CPT

## 2024-12-02 PROCEDURE — 80053 COMPREHEN METABOLIC PANEL: CPT | Performed by: NURSE PRACTITIONER

## 2024-12-02 PROCEDURE — 83605 ASSAY OF LACTIC ACID: CPT | Performed by: NURSE PRACTITIONER

## 2024-12-02 PROCEDURE — 99285 EMERGENCY DEPT VISIT HI MDM: CPT

## 2024-12-02 PROCEDURE — 85025 COMPLETE CBC W/AUTO DIFF WBC: CPT | Performed by: NURSE PRACTITIONER

## 2024-12-02 PROCEDURE — 96375 TX/PRO/DX INJ NEW DRUG ADDON: CPT

## 2024-12-02 PROCEDURE — 63600175 PHARM REV CODE 636 W HCPCS: Performed by: NURSE PRACTITIONER

## 2024-12-02 PROCEDURE — 81000 URINALYSIS NONAUTO W/SCOPE: CPT | Performed by: NURSE PRACTITIONER

## 2024-12-02 PROCEDURE — 83735 ASSAY OF MAGNESIUM: CPT | Performed by: NURSE PRACTITIONER

## 2024-12-02 PROCEDURE — 25000003 PHARM REV CODE 250: Performed by: NURSE PRACTITIONER

## 2024-12-02 RX ORDER — ACETAMINOPHEN 325 MG/1
650 TABLET ORAL EVERY 8 HOURS PRN
Status: DISCONTINUED | OUTPATIENT
Start: 2024-12-02 | End: 2024-12-03 | Stop reason: SDUPTHER

## 2024-12-02 RX ORDER — CEFTRIAXONE 1 G/1
1 INJECTION, POWDER, FOR SOLUTION INTRAMUSCULAR; INTRAVENOUS
Status: COMPLETED | OUTPATIENT
Start: 2024-12-02 | End: 2024-12-02

## 2024-12-02 RX ORDER — SODIUM CHLORIDE 9 MG/ML
INJECTION, SOLUTION INTRAVENOUS CONTINUOUS
Status: ACTIVE | OUTPATIENT
Start: 2024-12-02 | End: 2024-12-03

## 2024-12-02 RX ORDER — TALC
6 POWDER (GRAM) TOPICAL NIGHTLY PRN
Status: DISCONTINUED | OUTPATIENT
Start: 2024-12-02 | End: 2024-12-07 | Stop reason: HOSPADM

## 2024-12-02 RX ORDER — SODIUM CHLORIDE 0.9 % (FLUSH) 0.9 %
10 SYRINGE (ML) INJECTION
Status: DISCONTINUED | OUTPATIENT
Start: 2024-12-02 | End: 2024-12-07 | Stop reason: HOSPADM

## 2024-12-02 RX ORDER — MORPHINE SULFATE 4 MG/ML
4 INJECTION, SOLUTION INTRAMUSCULAR; INTRAVENOUS
Status: COMPLETED | OUTPATIENT
Start: 2024-12-02 | End: 2024-12-02

## 2024-12-02 RX ORDER — ONDANSETRON HYDROCHLORIDE 2 MG/ML
4 INJECTION, SOLUTION INTRAVENOUS EVERY 8 HOURS PRN
Status: DISCONTINUED | OUTPATIENT
Start: 2024-12-02 | End: 2024-12-07 | Stop reason: HOSPADM

## 2024-12-02 RX ORDER — ONDANSETRON HYDROCHLORIDE 2 MG/ML
4 INJECTION, SOLUTION INTRAVENOUS
Status: COMPLETED | OUTPATIENT
Start: 2024-12-02 | End: 2024-12-02

## 2024-12-02 RX ADMIN — SODIUM CHLORIDE 1000 ML: 9 INJECTION, SOLUTION INTRAVENOUS at 03:12

## 2024-12-02 RX ADMIN — CEFTRIAXONE 1 G: 1 INJECTION, POWDER, FOR SOLUTION INTRAMUSCULAR; INTRAVENOUS at 06:12

## 2024-12-02 RX ADMIN — ONDANSETRON 4 MG: 2 INJECTION INTRAMUSCULAR; INTRAVENOUS at 03:12

## 2024-12-02 RX ADMIN — SODIUM CHLORIDE: 9 INJECTION, SOLUTION INTRAVENOUS at 07:12

## 2024-12-02 RX ADMIN — MORPHINE SULFATE 4 MG: 4 INJECTION, SOLUTION INTRAMUSCULAR; INTRAVENOUS at 03:12
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
## 2024-12-03 PROBLEM — R10.30 LOWER ABDOMINAL PAIN: Status: ACTIVE | Noted: 2024-12-03

## 2024-12-03 LAB
ALBUMIN SERPL BCP-MCNC: 1.9 G/DL (ref 3.5–5.2)
ALP SERPL-CCNC: 89 U/L (ref 55–135)
ALT SERPL W/O P-5'-P-CCNC: 6 U/L (ref 10–44)
ANION GAP SERPL CALC-SCNC: 5 MMOL/L (ref 3–11)
AST SERPL-CCNC: 10 U/L (ref 10–40)
BASOPHILS # BLD AUTO: 0.02 K/UL (ref 0–0.2)
BASOPHILS NFR BLD: 0.3 % (ref 0–1.9)
BILIRUB SERPL-MCNC: 0.2 MG/DL (ref 0.1–1)
BUN SERPL-MCNC: 9 MG/DL (ref 8–23)
CALCIUM SERPL-MCNC: 8.4 MG/DL (ref 8.7–10.5)
CHLORIDE SERPL-SCNC: 103 MMOL/L (ref 95–110)
CO2 SERPL-SCNC: 27 MMOL/L (ref 23–29)
CREAT SERPL-MCNC: 0.7 MG/DL (ref 0.5–1.4)
DIFFERENTIAL METHOD BLD: ABNORMAL
EOSINOPHIL # BLD AUTO: 0.1 K/UL (ref 0–0.5)
EOSINOPHIL NFR BLD: 1.3 % (ref 0–8)
ERYTHROCYTE [DISTWIDTH] IN BLOOD BY AUTOMATED COUNT: 15.7 % (ref 11.5–14.5)
EST. GFR  (NO RACE VARIABLE): >60 ML/MIN/1.73 M^2
FERRITIN SERPL-MCNC: 255 NG/ML (ref 20–300)
FOLATE SERPL-MCNC: 5.8 NG/ML (ref 4–24)
GLUCOSE SERPL-MCNC: 89 MG/DL (ref 70–110)
HCT VFR BLD AUTO: 29.3 % (ref 37–48.5)
HGB BLD-MCNC: 9.4 G/DL (ref 12–16)
IMM GRANULOCYTES # BLD AUTO: 0.01 K/UL (ref 0–0.04)
IMM GRANULOCYTES NFR BLD AUTO: 0.1 % (ref 0–0.5)
IRON SATN MFR SERPL: 10 % (ref 20–50)
IRON SERPL-MCNC: 17 UG/DL (ref 30–160)
LYMPHOCYTES # BLD AUTO: 1.4 K/UL (ref 1–4.8)
LYMPHOCYTES NFR BLD: 20.1 % (ref 18–48)
MCH RBC QN AUTO: 26.8 PG (ref 27–31)
MCHC RBC AUTO-ENTMCNC: 32.1 G/DL (ref 32–36)
MCV RBC AUTO: 84 FL (ref 82–98)
MONOCYTES # BLD AUTO: 0.5 K/UL (ref 0.3–1)
MONOCYTES NFR BLD: 7.4 % (ref 4–15)
NEUTROPHILS # BLD AUTO: 5.1 K/UL (ref 1.8–7.7)
NEUTROPHILS NFR BLD: 70.8 % (ref 38–73)
NRBC BLD-RTO: 0 /100 WBC
PLATELET # BLD AUTO: 455 K/UL (ref 150–450)
PMV BLD AUTO: 9.5 FL (ref 9.2–12.9)
POTASSIUM SERPL-SCNC: 4 MMOL/L (ref 3.5–5.1)
PROT SERPL-MCNC: 6.3 G/DL (ref 6–8.4)
RBC # BLD AUTO: 3.51 M/UL (ref 4–5.4)
SODIUM SERPL-SCNC: 135 MMOL/L (ref 136–145)
TOTAL IRON BINDING CAPACITY: 162 UG/DL (ref 250–450)
WBC # BLD AUTO: 7.18 K/UL (ref 3.9–12.7)

## 2024-12-03 PROCEDURE — 25000242 PHARM REV CODE 250 ALT 637 W/ HCPCS

## 2024-12-03 PROCEDURE — 63700000 PHARM REV CODE 250 ALT 637 W/O HCPCS: Performed by: INTERNAL MEDICINE

## 2024-12-03 PROCEDURE — G0378 HOSPITAL OBSERVATION PER HR: HCPCS

## 2024-12-03 PROCEDURE — 99900031 HC PATIENT EDUCATION (STAT)

## 2024-12-03 PROCEDURE — 94761 N-INVAS EAR/PLS OXIMETRY MLT: CPT

## 2024-12-03 PROCEDURE — 25000003 PHARM REV CODE 250

## 2024-12-03 PROCEDURE — 94640 AIRWAY INHALATION TREATMENT: CPT

## 2024-12-03 PROCEDURE — 80053 COMPREHEN METABOLIC PANEL: CPT | Performed by: EMERGENCY MEDICINE

## 2024-12-03 PROCEDURE — 36415 COLL VENOUS BLD VENIPUNCTURE: CPT

## 2024-12-03 PROCEDURE — 36415 COLL VENOUS BLD VENIPUNCTURE: CPT | Performed by: EMERGENCY MEDICINE

## 2024-12-03 PROCEDURE — 85025 COMPLETE CBC W/AUTO DIFF WBC: CPT | Performed by: EMERGENCY MEDICINE

## 2024-12-03 PROCEDURE — 25000242 PHARM REV CODE 250 ALT 637 W/ HCPCS: Performed by: INTERNAL MEDICINE

## 2024-12-03 PROCEDURE — 83540 ASSAY OF IRON: CPT

## 2024-12-03 PROCEDURE — 25500020 PHARM REV CODE 255: Performed by: INTERNAL MEDICINE

## 2024-12-03 PROCEDURE — 82607 VITAMIN B-12: CPT

## 2024-12-03 PROCEDURE — 99900035 HC TECH TIME PER 15 MIN (STAT)

## 2024-12-03 PROCEDURE — 94799 UNLISTED PULMONARY SVC/PX: CPT

## 2024-12-03 PROCEDURE — 25000003 PHARM REV CODE 250: Performed by: INTERNAL MEDICINE

## 2024-12-03 PROCEDURE — 82746 ASSAY OF FOLIC ACID SERUM: CPT

## 2024-12-03 PROCEDURE — 82728 ASSAY OF FERRITIN: CPT

## 2024-12-03 PROCEDURE — 83921 ORGANIC ACID SINGLE QUANT: CPT

## 2024-12-03 RX ORDER — SODIUM CHLORIDE 9 MG/ML
INJECTION, SOLUTION INTRAVENOUS CONTINUOUS
Status: DISCONTINUED | OUTPATIENT
Start: 2024-12-03 | End: 2024-12-03

## 2024-12-03 RX ORDER — FLUCONAZOLE 100 MG/1
100 TABLET ORAL DAILY
Status: DISCONTINUED | OUTPATIENT
Start: 2024-12-03 | End: 2024-12-07 | Stop reason: HOSPADM

## 2024-12-03 RX ORDER — ALBUTEROL SULFATE 2.5 MG/.5ML
2.5 SOLUTION RESPIRATORY (INHALATION) EVERY 6 HOURS
Status: DISCONTINUED | OUTPATIENT
Start: 2024-12-03 | End: 2024-12-03

## 2024-12-03 RX ORDER — MIRTAZAPINE 7.5 MG/1
7.5 TABLET, FILM COATED ORAL NIGHTLY
Status: DISCONTINUED | OUTPATIENT
Start: 2024-12-03 | End: 2024-12-07 | Stop reason: HOSPADM

## 2024-12-03 RX ORDER — HYDROCODONE BITARTRATE AND ACETAMINOPHEN 10; 325 MG/1; MG/1
1 TABLET ORAL 3 TIMES DAILY PRN
Status: DISCONTINUED | OUTPATIENT
Start: 2024-12-03 | End: 2024-12-07

## 2024-12-03 RX ORDER — BUDESONIDE 0.5 MG/2ML
0.5 INHALANT ORAL EVERY 12 HOURS
Status: DISCONTINUED | OUTPATIENT
Start: 2024-12-03 | End: 2024-12-03

## 2024-12-03 RX ORDER — HYDROCODONE BITARTRATE AND ACETAMINOPHEN 7.5; 325 MG/1; MG/1
1 TABLET ORAL 3 TIMES DAILY PRN
Status: DISCONTINUED | OUTPATIENT
Start: 2024-12-03 | End: 2024-12-07

## 2024-12-03 RX ORDER — PNV NO.95/FERROUS FUM/FOLIC AC 28MG-0.8MG
100 TABLET ORAL DAILY
Status: DISCONTINUED | OUTPATIENT
Start: 2024-12-03 | End: 2024-12-07 | Stop reason: HOSPADM

## 2024-12-03 RX ORDER — HYDROCODONE BITARTRATE AND ACETAMINOPHEN 7.5; 325 MG/1; MG/1
1 TABLET ORAL 3 TIMES DAILY PRN
Status: DISCONTINUED | OUTPATIENT
Start: 2024-12-03 | End: 2024-12-03

## 2024-12-03 RX ORDER — CYPROHEPTADINE HYDROCHLORIDE 4 MG/1
4 TABLET ORAL 2 TIMES DAILY
Status: DISCONTINUED | OUTPATIENT
Start: 2024-12-03 | End: 2024-12-07 | Stop reason: HOSPADM

## 2024-12-03 RX ORDER — ALBUTEROL SULFATE 90 UG/1
2 INHALANT RESPIRATORY (INHALATION) EVERY 4 HOURS PRN
Status: DISCONTINUED | OUTPATIENT
Start: 2024-12-03 | End: 2024-12-05

## 2024-12-03 RX ORDER — TRAMADOL HYDROCHLORIDE 50 MG/1
50 TABLET ORAL EVERY 6 HOURS PRN
Status: DISCONTINUED | OUTPATIENT
Start: 2024-12-03 | End: 2024-12-03

## 2024-12-03 RX ORDER — CITALOPRAM 10 MG/1
20 TABLET ORAL DAILY
Status: DISCONTINUED | OUTPATIENT
Start: 2024-12-03 | End: 2024-12-07 | Stop reason: HOSPADM

## 2024-12-03 RX ORDER — ALBUTEROL SULFATE 2.5 MG/.5ML
2.5 SOLUTION RESPIRATORY (INHALATION)
Status: DISCONTINUED | OUTPATIENT
Start: 2024-12-03 | End: 2024-12-03

## 2024-12-03 RX ORDER — TRAMADOL HYDROCHLORIDE 50 MG/1
50 TABLET ORAL EVERY 6 HOURS PRN
Status: DISCONTINUED | OUTPATIENT
Start: 2024-12-03 | End: 2024-12-07 | Stop reason: HOSPADM

## 2024-12-03 RX ORDER — CARVEDILOL 12.5 MG/1
25 TABLET ORAL 2 TIMES DAILY WITH MEALS
Status: DISCONTINUED | OUTPATIENT
Start: 2024-12-03 | End: 2024-12-07 | Stop reason: HOSPADM

## 2024-12-03 RX ADMIN — IOHEXOL 100 ML: 350 INJECTION, SOLUTION INTRAVENOUS at 10:12

## 2024-12-03 RX ADMIN — CARVEDILOL 25 MG: 12.5 TABLET, FILM COATED ORAL at 10:12

## 2024-12-03 RX ADMIN — FLUCONAZOLE 100 MG: 100 TABLET ORAL at 06:12

## 2024-12-03 RX ADMIN — SODIUM CHLORIDE: 0.9 INJECTION, SOLUTION INTRAVENOUS at 10:12

## 2024-12-03 RX ADMIN — HYDROCODONE BITARTRATE AND ACETAMINOPHEN 1 TABLET: 7.5; 325 TABLET ORAL at 09:12

## 2024-12-03 RX ADMIN — CYPROHEPTADINE HYDROCHLORIDE 4 MG: 4 TABLET ORAL at 09:12

## 2024-12-03 RX ADMIN — CITALOPRAM HYDROBROMIDE 20 MG: 10 TABLET ORAL at 10:12

## 2024-12-03 RX ADMIN — VITAM B12 100 MCG: 100 TAB at 10:12

## 2024-12-03 RX ADMIN — TRAMADOL HYDROCHLORIDE 50 MG: 50 TABLET, COATED ORAL at 10:12

## 2024-12-03 RX ADMIN — HYDROCODONE BITARTRATE AND ACETAMINOPHEN 1 TABLET: 7.5; 325 TABLET ORAL at 04:12

## 2024-12-03 RX ADMIN — MIRTAZAPINE 7.5 MG: 7.5 TABLET ORAL at 09:12

## 2024-12-03 RX ADMIN — ALBUTEROL SULFATE 2.5 MG: 2.5 SOLUTION RESPIRATORY (INHALATION) at 01:12

## 2024-12-03 RX ADMIN — LISINOPRIL 30 MG: 10 TABLET ORAL at 10:12

## 2024-12-03 RX ADMIN — FLUTICASONE FUROATE, UMECLIDINIUM BROMIDE AND VILANTEROL TRIFENATATE 1 PUFF: 200; 62.5; 25 POWDER RESPIRATORY (INHALATION) at 02:12

## 2024-12-03 NOTE — PLAN OF CARE
[place] [facility]  Initial Discharge Assessment       Primary Care Provider: Clark Calix III, MD    Admission Diagnosis: Diarrhea [R19.7]    Admission Date: 12/2/2024  Expected Discharge Date:     Transition of Care Barriers: None    Payor: [employer] [facility] / Plan: [employer] SECURE [employer] / Product Type: Medicare Advantage /     Extended Emergency Contact Information  Primary Emergency Contact: MARGOT MCMAHON  Address: [address] [place] [address]  Mobile Phone: [phone]  Relation: Daughter  Preferred language: English   needed? No    Discharge Plan A: Home with family, Home  Discharge Plan B: Other (TBD)      [id number] - [place], [place] - [address] AT [facility] [address]  [address]  [facility] [address]  Phone: [phone] Fax: [phone]      Initial Assessment (most recent)       Adult Discharge Assessment - 12/03/24 1144          Discharge Assessment    Assessment Type Discharge Planning Assessment     Confirmed/corrected address, phone number and insurance Yes     Confirmed Demographics Correct on Facesheet     Source of Information patient;family     When was your last doctors appointment? 12/02/24     Reason For Admission Diarrhea     People in Home alone   The patient live alone, but her daughter alternates from [facility] to check on the patient.    Do you expect to return to your current living situation? Yes     Do you have help at home or someone to help you manage your care at home? Yes     Who are your caregiver(s) and their phone number(s)? MARGOT (Daughter)  [phone] (Mobile)     Prior to hospitilization cognitive status: Alert/Oriented     Current cognitive status: Alert/Oriented     Walking or Climbing Stairs Difficulty no     Dressing/Bathing Difficulty no     Do you have any problems with: --   The patient's daughter assist the patient with food as  needed. She also reports that patient is able to cook when she is at her baseline.    Home Accessibility stairs to enter home     Number of Stairs, Main Entrance three     Stair Railings, Main Entrance railing on right side (ascending)   Daughter reports that there is also a rail on the porch if needed.    Home Layout Able to live on 1st floor     Equipment Currently Used at Home none     Readmission within 30 days? No     Patient currently being followed by outpatient case management? Unable to determine (comments)     Do you currently have service(s) that help you manage your care at home? No     Do you take prescription medications? Yes     Do you have prescription coverage? Yes     Coverage [facility] [employer] [facility] SECURE SNP     Do you have any problems affording any of your prescribed medications? TBD     Is the patient taking medications as prescribed? yes     Who is going to help you get home at discharge? family     How do you get to doctors appointments? family or friend will provide     Are you on dialysis? No     Discharge Plan A Home with family;Home     Discharge Plan B Other   TBD    Discharge Plan discussed with: Patient;Adult children     Transition of Care Barriers None                 Discharge assessment completed with the  patient and her daughter, Ginger. The patient lives in a mobile home alone, but Ginger reports that she visits the patient often. Ginger stated that the patient was able to complete all of her ADLs on her own prior to being admitted to the hospital. The patient currently does not utilize DME for her care. The plan is for the patient to return back home once she is discharged.     The patient does presents with a few social determinants of health. She declined community resources at this time. Discharge planning checklist given to patient/family with instructions to contact  for any needs.  SW will follow as needed.

## 2024-12-03 NOTE — ASSESSMENT & PLAN NOTE
Nutrition consulted. Most recent weight and BMI monitored-     Measurements:  Wt Readings from Last 1 Encounters:   12/02/24 39.7 kg (87 lb 8 oz)   Body mass index is 16.53 kg/m².    Patient has been screened and assessed by RD.    Malnutrition Type:  Context:    Level:      Malnutrition Characteristic Summary:       Interventions/Recommendations (treatment strategy):    12/3 KD: Monitor I&O.

## 2024-12-03 NOTE — HPI
79-year-old white female with a history of COPD, cholecystectomy, and HTN, remote hx C-dif, who presents to the ED as instructed by her pcp for evaluation of diarrhea, dehydration, possible C-dif. Patient reports that she has been experiencing diarrhea intermittently over the last 2 months, endorsing worsening over the last 2 weeks, characterized by frequent, black, watery stools with foul odor. Patient also reports generalized weakness and bilateral lower back pain radiating into abdomen and lower extremities. Daughter adds that patient was demonstrating decreased appetite but has been doing well on periactin. Lastly, patient endorses urinary incontinence that has started over the last few days. She denies measured fever, URI s/s, cough, chest pain, or painful urination. She presented to clinic today for evaluation, was found to be tachycardic, and was sent to the ED. Admitted to IP for further evaluation and treatment. CT-ABD/pelvis ordered

## 2024-12-03 NOTE — PLAN OF CARE
POC reviewed with pt, Purposeful rounding ongoing, VSS, IVFs infusing per MD orders without complications, IV site clean/Dry/Intact, Pt denies pain/needs at this time, CB in reach, bed in lowest position, lighting adjusted to pt's preference, care plan ongoing.        Problem: Skin Injury Risk Increased  Goal: Skin Health and Integrity  Outcome: Progressing     Problem: Adult Inpatient Plan of Care  Goal: Plan of Care Review  Outcome: Progressing  Goal: Patient-Specific Goal (Individualized)  Outcome: Progressing  Goal: Absence of Hospital-Acquired Illness or Injury  Outcome: Progressing  Goal: Optimal Comfort and Wellbeing  Outcome: Progressing  Goal: Readiness for Transition of Care  Outcome: Progressing

## 2024-12-03 NOTE — PLAN OF CARE
12/03/24 1233   ARIAS Message   Medicare Outpatient and Observation Notification regarding financial responsibility Given to patient/caregiver;Explained to patient/caregiver;Signed/date by patient/caregiver;Other (comments)  (Verbal consent from patient at the bedside.)   Date ARIAS was signed 12/03/24   Time ARIAS was signed 1239

## 2024-12-03 NOTE — CONSULTS
[facility]  Adult Nutrition  Consult Note    SUMMARY     Recommendations  1. Rec'd Cardiac Diet.   2. Rec'd ONS: Ensure Enlive TID to provide 1050kcal and 60g of protein.   3. Rec'd appetite stimulant.   4. Rec'd encourage PO intake and compliance with drinking ONS. 5. Rec'd daily weights.   6. RD to follow and make rec's accordingly.  Goals:   1. Pt will consume > 50% of meals by next RD follow up.  Nutrition Goal Status: new  Communication of RD Recs: reviewed with RN    Assessment and Plan    Nutrition Problem  Severe Malnutrition    Related to (etiology):   Inadequate protein energy intake    Signs and Symptoms (as evidenced by):   Severe muscle loss, severe fat loss,  weight loss > 2% in 1 week, energy intake less than 75% for greater than or equal to 1 month, BMI = 16.54    Interventions/Recommendations (treatment strategy):  1. Rec'd Cardiac Diet.   2. Rec'd ONS: Ensure Enlive TID to provide 1050kcal and 60g of protein.   3. Rec'd appetite stimulant.   4. Rec'd encourage PO intake and compliance with drinking ONS. 5. Rec'd daily weights.   6. RD to follow and make rec's accordingly.    Nutrition Diagnosis Status:   New      Malnutrition Assessment  Malnutrition Context: acute illness or injury  Malnutrition Level: severe  Skin (Micronutrient): dry  Nails (Micronutrient): brittle, thin  Hair/Scalp (Micronutrient): dry, plucked easily  Teeth (Micronutrient): broken dentition  Tongue (Micronutrient): other (see comments) (Yeast spots)  Neck/Chest (Micronutrient): bony prominence, muscle wasting  Musculoskeletal/Lower Extremities: muscle wasting, subcutaneous fat loss   Micronutrient Evaluation Summary: suspected deficiency   Weight Loss (Malnutrition): greater than 2% in 1 week  Energy Intake (Malnutrition): less than 75% for greater than or equal to 1 month  Subcutaneous Fat (Malnutrition): severe depletion  Muscle Mass (Malnutrition): severe depletion   Orbital Region (Subcutaneous Fat Loss): severe  "depletion  Upper Arm Region (Subcutaneous Fat Loss): severe depletion   [place] Region (Muscle Loss): severe depletion  Clavicle Bone Region (Muscle Loss): severe depletion  Clavicle and Acromion Bone Region (Muscle Loss): severe depletion  Scapular Bone Region (Muscle Loss): severe depletion  Dorsal Hand (Muscle Loss): severe depletion  Patellar Region (Muscle Loss): severe depletion  Anterior Thigh Region (Muscle Loss): severe depletion  Posterior Calf Region (Muscle Loss): severe depletion     Reason for Assessment    Reason For Assessment: consult (Weight Loss)  Diagnosis: other (see comments) (Diarrhea)  General Information Comments: RD consulted for weight loss. Spoke with pt about current appetite and PO intake. Pt's daughter present in room during assessment. Pt reports poor appetite and unintentional weight loss. Daughter states pt UBW is 109 lbs. Stated pt was her UBW this time last year. Pt's daughter also reports pt weight 90 lbs. on Wednesday and is currently 87 lbs today. Pt is having diarrhea. Pt is currently on an appetite stimulant. Will add ONS and rec'd continue appetite stimulant. NFPE completed. Also rec'd daily weights.  RD to follow and make rec's a ccordingly.  Nutrition Discharge Planning: TBD as care progresses    Nutrition Risk Screen    Nutrition Risk Screen: reduced oral intake over the last month    Nutrition/Diet History    Patient Reported Diet/Restrictions/Preferences: general  Spiritual, Cultural Beliefs, Yazidism Practices, Values that Affect Care: no  Food Allergies: NKFA  Factors Affecting Nutritional Intake: decreased appetite, diarrhea    Anthropometrics    Temp: 99.7 °F (37.6 °C)  Height Method: Stated  Height: 5' 1" (154.9 cm)  Height (inches): 61 in  Weight Method: Bed Scale  Weight: 39.7 kg (87 lb 8.4 oz)  Weight (lb): 87.52 lb  Ideal Body Weight (IBW), Female: 105 lb  % Ideal Body Weight, Female (lb): 83.35 %  % Ideal Body Weight Malnutrition: 80-90% - mild deficit  BMI " (Calculated): 16.5  BMI Grade: 16 - 16.9 protein-energy malnutrition grade II  Usual Body Weight (UBW), k.44 kg  Weight Change Amount: 22 lb  % Usual Body Weight: 80.47  % Weight Change From Usual Weight: -19.7 %    Lab/Procedures/Meds    Pertinent Labs Reviewed: reviewed  Pertinent Medications Reviewed: reviewed  Pertinent Medications Comments: Periactin    Estimated/Assessed Needs    Weight Used For Calorie Calculations: 39.7 kg (87 lb 8.4 oz)  Energy Calorie Requirements (kcal): 2052-7496 (35-40kcal/kg)  Energy Need Method: Kcal/kg  Protein Requirements: 47-59 (1.2-1.5g/kg)  Weight Used For Protein Calculations: 39.7 kg (87 lb 8.4 oz)  Fluid Requirements (mL): 1395-8344 (1mL/kcal)  Estimated Fluid Requirement Method: RDA Method  RDA Method (mL): 1389    Nutrition Prescription Ordered    Current Diet Order: Cardiac  Oral Nutrition Supplement: None    Evaluation of Received Nutrient/Fluid Intake    % Kcal Needs: 0-25%  % Protein Needs: 0-25%  I/O: +799  Energy Calories Required: not meeting needs  Protein Required: not meeting needs  Tolerance: tolerating  % Intake of Estimated Energy Needs: 0 - 25 %  % Meal Intake: 0 - 25 %    Nutrition Risk    Level of Risk/Frequency of Follow-up: moderate     Monitor and Evaluation    Food and Nutrient Intake: energy intake, food and beverage intake  Food and Nutrient Adminstration: diet order  Knowledge/Beliefs/Attitudes: food and nutrition knowledge/skill, beliefs and attitudes  Physical Activity and Function: nutrition-related ADLs and IADLs  Anthropometric Measurements: height/length, weight, weight change, body mass index  Biochemical Data, Medical Tests and Procedures: electrolyte and renal panel, gastrointestinal profile, glucose/endocrine profile, inflammatory profile, lipid profile  Nutrition-Focused Physical Findings: overall appearance, extremities, muscles and bones, head and eyes, skin     Nutrition Follow-Up    RD Follow-up?: Yes

## 2024-12-03 NOTE — PLAN OF CARE
Recommendations  1. Rec'd Cardiac Diet.   2. Rec'd ONS: Ensure Enlive TID to provide 1050kcal and 60g of protein.   3. Rec'd appetite stimulant.   4. Rec'd encourage PO intake and compliance with drinking ONS. 5. Rec'd daily weights.   6. RD to follow and make rec's accordingly.  Goals:   1. Pt will consume > 50% of meals by next RD follow up.  Nutrition Goal Status: new
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
## 2024-12-04 LAB
ALBUMIN SERPL BCP-MCNC: 1.9 G/DL (ref 3.5–5.2)
ALP SERPL-CCNC: 81 U/L (ref 55–135)
ALT SERPL W/O P-5'-P-CCNC: 10 U/L (ref 10–44)
ANION GAP SERPL CALC-SCNC: 9 MMOL/L (ref 3–11)
AST SERPL-CCNC: 14 U/L (ref 10–40)
BASOPHILS # BLD AUTO: 0.03 K/UL (ref 0–0.2)
BASOPHILS NFR BLD: 0.4 % (ref 0–1.9)
BILIRUB SERPL-MCNC: 0.3 MG/DL (ref 0.1–1)
BILIRUB UR QL STRIP: NEGATIVE
BUN SERPL-MCNC: 13 MG/DL (ref 8–23)
CALCIUM SERPL-MCNC: 8.6 MG/DL (ref 8.7–10.5)
CHLORIDE SERPL-SCNC: 101 MMOL/L (ref 95–110)
CLARITY UR: CLEAR
CO2 SERPL-SCNC: 27 MMOL/L (ref 23–29)
COLOR UR: YELLOW
CREAT SERPL-MCNC: 0.8 MG/DL (ref 0.5–1.4)
DIFFERENTIAL METHOD BLD: ABNORMAL
EOSINOPHIL # BLD AUTO: 0.1 K/UL (ref 0–0.5)
EOSINOPHIL NFR BLD: 1.6 % (ref 0–8)
ERYTHROCYTE [DISTWIDTH] IN BLOOD BY AUTOMATED COUNT: 15.5 % (ref 11.5–14.5)
EST. GFR  (NO RACE VARIABLE): >60 ML/MIN/1.73 M^2
GLUCOSE SERPL-MCNC: 67 MG/DL (ref 70–110)
GLUCOSE UR QL STRIP: NEGATIVE
HCT VFR BLD AUTO: 28.1 % (ref 37–48.5)
HGB BLD-MCNC: 8.9 G/DL (ref 12–16)
HGB UR QL STRIP: NEGATIVE
IMM GRANULOCYTES # BLD AUTO: 0.02 K/UL (ref 0–0.04)
IMM GRANULOCYTES NFR BLD AUTO: 0.3 % (ref 0–0.5)
KETONES UR QL STRIP: NEGATIVE
LEUKOCYTE ESTERASE UR QL STRIP: NEGATIVE
LYMPHOCYTES # BLD AUTO: 1.4 K/UL (ref 1–4.8)
LYMPHOCYTES NFR BLD: 19.5 % (ref 18–48)
MAGNESIUM SERPL-MCNC: 1.7 MG/DL (ref 1.6–2.6)
MCH RBC QN AUTO: 26.5 PG (ref 27–31)
MCHC RBC AUTO-ENTMCNC: 31.7 G/DL (ref 32–36)
MCV RBC AUTO: 84 FL (ref 82–98)
MONOCYTES # BLD AUTO: 0.5 K/UL (ref 0.3–1)
MONOCYTES NFR BLD: 7.7 % (ref 4–15)
NEUTROPHILS # BLD AUTO: 4.9 K/UL (ref 1.8–7.7)
NEUTROPHILS NFR BLD: 70.5 % (ref 38–73)
NITRITE UR QL STRIP: NEGATIVE
NRBC BLD-RTO: 0 /100 WBC
PH UR STRIP: 7 [PH] (ref 5–8)
PLATELET # BLD AUTO: 403 K/UL (ref 150–450)
PMV BLD AUTO: 9.1 FL (ref 9.2–12.9)
POTASSIUM SERPL-SCNC: 3.9 MMOL/L (ref 3.5–5.1)
PROT SERPL-MCNC: 6 G/DL (ref 6–8.4)
PROT UR QL STRIP: NEGATIVE
RBC # BLD AUTO: 3.36 M/UL (ref 4–5.4)
SODIUM SERPL-SCNC: 137 MMOL/L (ref 136–145)
SP GR UR STRIP: >=1.03 (ref 1–1.03)
URN SPEC COLLECT METH UR: ABNORMAL
UROBILINOGEN UR STRIP-ACNC: NEGATIVE EU/DL
WBC # BLD AUTO: 6.91 K/UL (ref 3.9–12.7)

## 2024-12-04 PROCEDURE — 80053 COMPREHEN METABOLIC PANEL: CPT

## 2024-12-04 PROCEDURE — 97166 OT EVAL MOD COMPLEX 45 MIN: CPT

## 2024-12-04 PROCEDURE — 25000003 PHARM REV CODE 250: Performed by: INTERNAL MEDICINE

## 2024-12-04 PROCEDURE — 99900031 HC PATIENT EDUCATION (STAT)

## 2024-12-04 PROCEDURE — 83735 ASSAY OF MAGNESIUM: CPT

## 2024-12-04 PROCEDURE — 25000003 PHARM REV CODE 250

## 2024-12-04 PROCEDURE — G0378 HOSPITAL OBSERVATION PER HR: HCPCS

## 2024-12-04 PROCEDURE — 97162 PT EVAL MOD COMPLEX 30 MIN: CPT

## 2024-12-04 PROCEDURE — 99900035 HC TECH TIME PER 15 MIN (STAT)

## 2024-12-04 PROCEDURE — 36415 COLL VENOUS BLD VENIPUNCTURE: CPT

## 2024-12-04 PROCEDURE — 63700000 PHARM REV CODE 250 ALT 637 W/O HCPCS: Performed by: INTERNAL MEDICINE

## 2024-12-04 PROCEDURE — 81003 URINALYSIS AUTO W/O SCOPE: CPT

## 2024-12-04 PROCEDURE — 94761 N-INVAS EAR/PLS OXIMETRY MLT: CPT

## 2024-12-04 PROCEDURE — 85025 COMPLETE CBC W/AUTO DIFF WBC: CPT

## 2024-12-04 RX ORDER — PANTOPRAZOLE SODIUM 40 MG/1
40 TABLET, DELAYED RELEASE ORAL DAILY
Status: DISCONTINUED | OUTPATIENT
Start: 2024-12-05 | End: 2024-12-04

## 2024-12-04 RX ORDER — PANTOPRAZOLE SODIUM 40 MG/1
40 TABLET, DELAYED RELEASE ORAL DAILY
Status: DISCONTINUED | OUTPATIENT
Start: 2024-12-04 | End: 2024-12-07 | Stop reason: HOSPADM

## 2024-12-04 RX ADMIN — FLUTICASONE FUROATE, UMECLIDINIUM BROMIDE AND VILANTEROL TRIFENATATE 1 PUFF: 200; 62.5; 25 POWDER RESPIRATORY (INHALATION) at 09:12

## 2024-12-04 RX ADMIN — LISINOPRIL 30 MG: 10 TABLET ORAL at 09:12

## 2024-12-04 RX ADMIN — HYDROCODONE BITARTRATE AND ACETAMINOPHEN 1 TABLET: 10; 325 TABLET ORAL at 06:12

## 2024-12-04 RX ADMIN — CARVEDILOL 25 MG: 12.5 TABLET, FILM COATED ORAL at 06:12

## 2024-12-04 RX ADMIN — FLUCONAZOLE 100 MG: 100 TABLET ORAL at 09:12

## 2024-12-04 RX ADMIN — PANTOPRAZOLE SODIUM 40 MG: 40 TABLET, DELAYED RELEASE ORAL at 08:12

## 2024-12-04 RX ADMIN — MIRTAZAPINE 7.5 MG: 7.5 TABLET ORAL at 08:12

## 2024-12-04 RX ADMIN — CYPROHEPTADINE HYDROCHLORIDE 4 MG: 4 TABLET ORAL at 09:12

## 2024-12-04 RX ADMIN — CITALOPRAM HYDROBROMIDE 20 MG: 10 TABLET ORAL at 09:12

## 2024-12-04 RX ADMIN — HYDROCODONE BITARTRATE AND ACETAMINOPHEN 1 TABLET: 10; 325 TABLET ORAL at 12:12

## 2024-12-04 RX ADMIN — CYPROHEPTADINE HYDROCHLORIDE 4 MG: 4 TABLET ORAL at 08:12

## 2024-12-04 RX ADMIN — VITAM B12 100 MCG: 100 TAB at 09:12

## 2024-12-04 RX ADMIN — CARVEDILOL 25 MG: 12.5 TABLET, FILM COATED ORAL at 09:12

## 2024-12-04 NOTE — ASSESSMENT & PLAN NOTE
12/3 KD: CT-ABD/pelvis ordered today, Labs- iron study, c-diff panel   12/4 KD:CT abdomen/pelvis indicate right hernia, no bowel obstruction, and possible sacroiliitis.  Plans to consult ortho outpatient.

## 2024-12-04 NOTE — PLAN OF CARE
12/04/24 1053   Post-Acute Status   Post-Acute Authorization Placement   Post-Acute Placement Status Referrals Sent   Coverage [facility]GD [facility] SECURE SNP   Discharge Delays None known at this time   Discharge Plan   Discharge Plan A Rehab   Discharge Plan B Home;Home Health     New referral. The patient is in agreement with the referral to inpatient rehab at the facility of her choice. New referral was sent MONICA Toure with [facility].    Addendum: 1515- MONICA Toure has submitted for the prior auth for inpatient rehab.
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
## 2024-12-04 NOTE — PLAN OF CARE
Problem: Occupational Therapy  Goal: Occupational Therapy Goal  Description: Goals to be met by: 12/11/24     Patient will increase functional independence with ADLs by performing:    Feeding with [place].  UE Dressing with Modified [place].  LE Dressing with Modified [place].  Grooming while standing at sink with Modified [place].  Toileting from toilet with Modified [place] for hygiene and clothing management.   Bathing from  shower chair/bench with Modified [place].  Toilet transfer to toilet with Modified [place].    Outcome: Established OT POC

## 2024-12-04 NOTE — PLAN OF CARE
The patient agreed for me to contact [facility] regarding Me[employer]. I spoke to Stephanie with [facility] who expressed that the assessment takes place at the patient's home. The patient and her daughter would have to contact the [place] agency after the patient is discharged to start the process. I expressed to her that we can possibly contact [facility] to inquire about one week supply of meals after she is discharged. The patient is unsure if she would like home health.
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
## 2024-12-04 NOTE — ASSESSMENT & PLAN NOTE
Nutrition consulted. Most recent weight and BMI monitored-     Measurements:  Wt Readings from Last 1 Encounters:   12/03/24 39.7 kg (87 lb 8.4 oz)   Body mass index is 16.54 kg/m².    Patient has been screened and assessed by RD.    Malnutrition Type:  Context: acute illness or injury  Level: severe    Malnutrition Characteristic Summary:  Weight Loss (Malnutrition): greater than 2% in 1 week  Energy Intake (Malnutrition): less than 75% for greater than or equal to 1 month  Subcutaneous Fat (Malnutrition): severe depletion  Muscle Mass (Malnutrition): severe depletion    Interventions/Recommendations (treatment strategy):    12/3 KD: Monitor I&O.  1. Rec'd Cardiac Diet. 2. Rec'd ONS: Ensure Enlive TID to provide 1050kcal and 60g of protein. 3. Rec'd appetite stimulant. 4. Rec'd encourage PO intake and compliance with drinking ONS. 5. Rec'd daily weights. 6. RD to follow and make rec's accordingly.

## 2024-12-04 NOTE — PROGRESS NOTES
Pharmacist Renal Dose Adjustment Note    Ruth Gamboa is a 79 y.o. female being treated with the medication fluconazole.     Patient Data:    Vital Signs (Most Recent):  Temp: 97.8 °F (36.6 °C) (12/03/24 1641)  Pulse: 91 (12/03/24 1641)  Resp: 18 (12/03/24 1641)  BP: (!) 106/56 (12/03/24 1641)  SpO2: (!) 91 % (12/03/24 1641) Vital Signs (72h Range):  Temp:  [97.7 °F (36.5 °C)-99.7 °F (37.6 °C)]   Pulse:  []   Resp:  [16-20]   BP: (106-178)/()   SpO2:  [91 %-100 %]      Recent Labs   Lab 11/27/24  1335 12/02/24  1534 12/03/24  0616   CREATININE 0.8 0.8 0.7     Serum creatinine: 0.7 mg/dL 12/03/24 0616  Estimated creatinine clearance: 41.5 mL/min    Fluconazole 200 mg oral daily will be changed to fluconazole 100 mg oral daily per renal dose protocol for CrCl < 50 ml/min.     Thank you,  Pharmacist's Name: Mariano Velasco

## 2024-12-04 NOTE — HOSPITAL COURSE
12/4 KD:  Awake and alert, appetite good.  Was not able to obtain stool specimen, but no longer having diarrhea.  CT abdomen/pelvis indicate right hernia, no bowel obstruction, and possible sacroiliitis.  We will order therapy today for strengthening and conditioning.  Requesting IP rehab.   consult to help sign pt. up with [employer]-meals on wheels and monthly commodities.  12/5 KD:  Awake and alert appetite good no abdominal pain.  No diarrhea reported.  Awaiting IP rehab approval from insurance.  12/6 KD:  Awaiting insurance approval for IP rehab.  12/7 [place] weekend coverage. Patient alert and awake, sitting upright endorses improvement to appetite. Daughter at bedside voices concern for constipation for 5 days, after episodes of loose bowel movements. Encouraged continued advancement of diet from bland and increasing fluid intake. Skin folds clearing with nystatin powder. Patient with no new complaints, requests to go home with continued strengthening and supportive management at home. Patient to be discharged with home health services.

## 2024-12-04 NOTE — PLAN OF CARE
Problem: Physical Therapy  Goal: Physical Therapy Goal  Description: Patient will increase functional independence with mobility by performin. Bed to chair transfer with Modified Independentwith or without none using Stand Pivot TECHNIQUE  2. Gait  x 200  feet with Modified Independent with or without none  3. Lower extremity exercise program x10 reps per handout, with assistance as needed   Outcome: Progressing

## 2024-12-05 ENCOUNTER — CLINICAL SUPPORT (OUTPATIENT)
Dept: SMOKING CESSATION | Facility: CLINIC | Age: 79
End: 2024-12-05

## 2024-12-05 DIAGNOSIS — F17.200 NICOTINE DEPENDENCE: Primary | ICD-10-CM

## 2024-12-05 LAB
ALBUMIN SERPL BCP-MCNC: 1.9 G/DL (ref 3.5–5.2)
ALP SERPL-CCNC: 92 U/L (ref 55–135)
ALT SERPL W/O P-5'-P-CCNC: 11 U/L (ref 10–44)
ANION GAP SERPL CALC-SCNC: 5 MMOL/L (ref 3–11)
AST SERPL-CCNC: 14 U/L (ref 10–40)
BASOPHILS # BLD AUTO: 0.02 K/UL (ref 0–0.2)
BASOPHILS NFR BLD: 0.3 % (ref 0–1.9)
BILIRUB SERPL-MCNC: 0.2 MG/DL (ref 0.1–1)
BUN SERPL-MCNC: 15 MG/DL (ref 8–23)
CALCIUM SERPL-MCNC: 8.6 MG/DL (ref 8.7–10.5)
CHLORIDE SERPL-SCNC: 99 MMOL/L (ref 95–110)
CO2 SERPL-SCNC: 29 MMOL/L (ref 23–29)
CREAT SERPL-MCNC: 0.8 MG/DL (ref 0.5–1.4)
DIFFERENTIAL METHOD BLD: ABNORMAL
EOSINOPHIL # BLD AUTO: 0.1 K/UL (ref 0–0.5)
EOSINOPHIL NFR BLD: 1 % (ref 0–8)
ERYTHROCYTE [DISTWIDTH] IN BLOOD BY AUTOMATED COUNT: 15.7 % (ref 11.5–14.5)
EST. GFR  (NO RACE VARIABLE): >60 ML/MIN/1.73 M^2
GLUCOSE SERPL-MCNC: 102 MG/DL (ref 70–110)
HCT VFR BLD AUTO: 27.3 % (ref 37–48.5)
HGB BLD-MCNC: 8.8 G/DL (ref 12–16)
IMM GRANULOCYTES # BLD AUTO: 0.03 K/UL (ref 0–0.04)
IMM GRANULOCYTES NFR BLD AUTO: 0.4 % (ref 0–0.5)
LYMPHOCYTES # BLD AUTO: 1.3 K/UL (ref 1–4.8)
LYMPHOCYTES NFR BLD: 16.5 % (ref 18–48)
MAGNESIUM SERPL-MCNC: 1.8 MG/DL (ref 1.6–2.6)
MCH RBC QN AUTO: 26.6 PG (ref 27–31)
MCHC RBC AUTO-ENTMCNC: 32.2 G/DL (ref 32–36)
MCV RBC AUTO: 83 FL (ref 82–98)
MONOCYTES # BLD AUTO: 0.6 K/UL (ref 0.3–1)
MONOCYTES NFR BLD: 7.2 % (ref 4–15)
NEUTROPHILS # BLD AUTO: 5.8 K/UL (ref 1.8–7.7)
NEUTROPHILS NFR BLD: 74.6 % (ref 38–73)
NRBC BLD-RTO: 0 /100 WBC
PLATELET # BLD AUTO: 400 K/UL (ref 150–450)
PMV BLD AUTO: 9.4 FL (ref 9.2–12.9)
POTASSIUM SERPL-SCNC: 4.4 MMOL/L (ref 3.5–5.1)
PROT SERPL-MCNC: 6.3 G/DL (ref 6–8.4)
RBC # BLD AUTO: 3.31 M/UL (ref 4–5.4)
SODIUM SERPL-SCNC: 133 MMOL/L (ref 136–145)
VIT B12 SERPL-MCNC: 239 NG/L (ref 180–914)
WBC # BLD AUTO: 7.78 K/UL (ref 3.9–12.7)

## 2024-12-05 PROCEDURE — 97110 THERAPEUTIC EXERCISES: CPT

## 2024-12-05 PROCEDURE — 25000242 PHARM REV CODE 250 ALT 637 W/ HCPCS

## 2024-12-05 PROCEDURE — 80053 COMPREHEN METABOLIC PANEL: CPT

## 2024-12-05 PROCEDURE — 94640 AIRWAY INHALATION TREATMENT: CPT | Mod: XB

## 2024-12-05 PROCEDURE — 85025 COMPLETE CBC W/AUTO DIFF WBC: CPT

## 2024-12-05 PROCEDURE — 83735 ASSAY OF MAGNESIUM: CPT

## 2024-12-05 PROCEDURE — 25000003 PHARM REV CODE 250

## 2024-12-05 PROCEDURE — 63700000 PHARM REV CODE 250 ALT 637 W/O HCPCS: Performed by: INTERNAL MEDICINE

## 2024-12-05 PROCEDURE — 94761 N-INVAS EAR/PLS OXIMETRY MLT: CPT

## 2024-12-05 PROCEDURE — 99900031 HC PATIENT EDUCATION (STAT)

## 2024-12-05 PROCEDURE — 97110 THERAPEUTIC EXERCISES: CPT | Mod: CO

## 2024-12-05 PROCEDURE — 97530 THERAPEUTIC ACTIVITIES: CPT

## 2024-12-05 PROCEDURE — 36415 COLL VENOUS BLD VENIPUNCTURE: CPT

## 2024-12-05 PROCEDURE — 99999 PR PBB SHADOW E&M-EST. PATIENT-LVL I: CPT | Mod: PBBFAC,,,

## 2024-12-05 PROCEDURE — G0378 HOSPITAL OBSERVATION PER HR: HCPCS

## 2024-12-05 PROCEDURE — 99900035 HC TECH TIME PER 15 MIN (STAT)

## 2024-12-05 PROCEDURE — 25000003 PHARM REV CODE 250: Performed by: INTERNAL MEDICINE

## 2024-12-05 RX ORDER — IPRATROPIUM BROMIDE AND ALBUTEROL SULFATE 2.5; .5 MG/3ML; MG/3ML
3 SOLUTION RESPIRATORY (INHALATION)
Status: DISCONTINUED | OUTPATIENT
Start: 2024-12-05 | End: 2024-12-07 | Stop reason: HOSPADM

## 2024-12-05 RX ORDER — ALBUTEROL SULFATE 2.5 MG/.5ML
2.5 SOLUTION RESPIRATORY (INHALATION) EVERY 4 HOURS PRN
Status: DISCONTINUED | OUTPATIENT
Start: 2024-12-05 | End: 2024-12-05

## 2024-12-05 RX ORDER — ALBUTEROL SULFATE 2.5 MG/.5ML
2.5 SOLUTION RESPIRATORY (INHALATION)
Status: DISCONTINUED | OUTPATIENT
Start: 2024-12-05 | End: 2024-12-05

## 2024-12-05 RX ORDER — NYSTATIN 100000 [USP'U]/ML
5 SUSPENSION ORAL
Status: DISCONTINUED | OUTPATIENT
Start: 2024-12-05 | End: 2024-12-07 | Stop reason: HOSPADM

## 2024-12-05 RX ORDER — MICONAZOLE NITRATE 2 G/100G
POWDER TOPICAL 2 TIMES DAILY
Status: DISCONTINUED | OUTPATIENT
Start: 2024-12-05 | End: 2024-12-07 | Stop reason: HOSPADM

## 2024-12-05 RX ADMIN — NYSTATIN 500000 UNITS: 100000 SUSPENSION ORAL at 08:12

## 2024-12-05 RX ADMIN — MICONAZOLE NITRATE: 20 POWDER TOPICAL at 08:12

## 2024-12-05 RX ADMIN — CYPROHEPTADINE HYDROCHLORIDE 4 MG: 4 TABLET ORAL at 08:12

## 2024-12-05 RX ADMIN — PANTOPRAZOLE SODIUM 40 MG: 40 TABLET, DELAYED RELEASE ORAL at 08:12

## 2024-12-05 RX ADMIN — ALBUTEROL SULFATE 2.5 MG: 2.5 SOLUTION RESPIRATORY (INHALATION) at 08:12

## 2024-12-05 RX ADMIN — VITAM B12 100 MCG: 100 TAB at 08:12

## 2024-12-05 RX ADMIN — FLUTICASONE FUROATE, UMECLIDINIUM BROMIDE AND VILANTEROL TRIFENATATE 1 PUFF: 200; 62.5; 25 POWDER RESPIRATORY (INHALATION) at 09:12

## 2024-12-05 RX ADMIN — CITALOPRAM HYDROBROMIDE 20 MG: 10 TABLET ORAL at 08:12

## 2024-12-05 RX ADMIN — MIRTAZAPINE 7.5 MG: 7.5 TABLET ORAL at 08:12

## 2024-12-05 RX ADMIN — HYDROCODONE BITARTRATE AND ACETAMINOPHEN 1 TABLET: 10; 325 TABLET ORAL at 07:12

## 2024-12-05 RX ADMIN — FLUCONAZOLE 100 MG: 100 TABLET ORAL at 08:12

## 2024-12-05 RX ADMIN — ALBUTEROL SULFATE 2 PUFF: 90 AEROSOL, METERED RESPIRATORY (INHALATION) at 02:12

## 2024-12-05 RX ADMIN — CARVEDILOL 25 MG: 12.5 TABLET, FILM COATED ORAL at 05:12

## 2024-12-05 RX ADMIN — HYDROCODONE BITARTRATE AND ACETAMINOPHEN 1 TABLET: 10; 325 TABLET ORAL at 02:12

## 2024-12-05 NOTE — PROGRESS NOTES
[facility]  Adult Nutrition  Progress Note    SUMMARY       Recommendations  1. Rec'd Cardiac Diet.   2. Rec'd ONS: Ensure Enlive TID to provide 1050kcal and 60g of protein.   3. Rec'd appetite stimulant.  4. Rec'd encourage PO intake and compliance with drinking ONS. 5. Rec'd daily weights. 6. RD to follow and make rec's accordingly.  Goals:   1. Pt will consume > 50% of meals by next RD follow up.  Nutrition Goal Status: progressing towards goal  Communication of RD Recs: other (Documented in POC)    Assessment and Plan     Nutrition Problem  Severe Malnutrition     Related to (etiology):   Inadequate protein energy intake     Signs and Symptoms (as evidenced by):   Severe muscle loss, severe fat loss,  weight loss > 2% in 1 week, energy intake less than 75% for greater than or equal to 1 month, BMI = 16.54     Interventions/Recommendations (treatment strategy):  1. Rec'd Cardiac Diet.   2. Rec'd ONS: Ensure Enlive TID to provide 1050kcal and 60g of protein.   3. Rec'd appetite stimulant.   4. Rec'd encourage PO intake and compliance with drinking ONS. 5. Rec'd daily weights.   6. RD to follow and make rec's accordingly.     Nutrition Diagnosis Status:   Continues    Nutrition Related Social Determinants of Health:  Case Management provided patient with information to [place] [facility] regarding Meals-on-Wheels and commodities.     Malnutrition Assessment  Malnutrition Context: acute illness or injury  Malnutrition Level: severe  Skin (Micronutrient): dry  Nails (Micronutrient): brittle, thin  Hair/Scalp (Micronutrient): dry, plucked easily  Teeth (Micronutrient): broken dentition  Tongue (Micronutrient): other (see comments) (Yeast spots)  Neck/Chest (Micronutrient): bony prominence, muscle wasting  Musculoskeletal/Lower Extremities: muscle wasting, subcutaneous fat loss   Micronutrient Evaluation Summary: suspected deficiency   Weight Loss (Malnutrition): greater than 2% in 1 week  Energy Intake  "(Malnutrition): less than 75% for greater than or equal to 1 month  Subcutaneous Fat (Malnutrition): severe depletion  Muscle Mass (Malnutrition): severe depletion   Orbital Region (Subcutaneous Fat Loss): severe depletion  Upper Arm Region (Subcutaneous Fat Loss): severe depletion   Confucianism Region (Muscle Loss): severe depletion  Clavicle Bone Region (Muscle Loss): severe depletion  Clavicle and Acromion Bone Region (Muscle Loss): severe depletion  Scapular Bone Region (Muscle Loss): severe depletion  Dorsal Hand (Muscle Loss): severe depletion  Patellar Region (Muscle Loss): severe depletion  Anterior Thigh Region (Muscle Loss): severe depletion  Posterior Calf Region (Muscle Loss): severe depletion     Reason for Assessment    Reason For Assessment: RD follow-up  Diagnosis: other (see comments) (Diarrhea)  General Information Comments: Followed up on pt this morning. Appetite is improving. Continue to encourage good PO intake and compliance with drinking ONS. RD to follow and make rec's accordingly.  Nutrition Discharge Planning: Cardiac Diet with Ensure or Boost TID    Nutrition Risk Screen    Nutrition Risk Screen: no indicators present    Nutrition/Diet History    Patient Reported Diet/Restrictions/Preferences: general  Spiritual, Cultural Beliefs, Mormon Practices, Values that Affect Care: no  Food Allergies: NKFA  Factors Affecting Nutritional Intake: decreased appetite, diarrhea    Anthropometrics    Temp: 98.6 °F (37 °C)  Height Method: Stated  Height: 5' 1" (154.9 cm)  Height (inches): 61 in  Weight Method: Bed Scale  Weight: 39.7 kg (87 lb 8.4 oz)  Weight (lb): 87.52 lb  Ideal Body Weight (IBW), Female: 105 lb  % Ideal Body Weight, Female (lb): 83.35 %  % Ideal Body Weight Malnutrition: 80-90% - mild deficit  BMI (Calculated): 16.5  BMI Grade: 16 - 16.9 protein-energy malnutrition grade II  Usual Body Weight (UBW), k.44 kg  Weight Change Amount: 22 lb  % Usual Body Weight: 80.47  % Weight Change " From Usual Weight: -19.7 %    Lab/Procedures/Meds    Pertinent Labs Reviewed: reviewed  Pertinent Medications Reviewed: reviewed  Pertinent Medications Comments: Periactin    Estimated/Assessed Needs    Weight Used For Calorie Calculations: 39.7 kg (87 lb 8.4 oz)  Energy Calorie Requirements (kcal): 9324-9740 (35-40kcal/kg)  Energy Need Method: Kcal/kg  Protein Requirements: 47-59 (1.2-1.5g/kg)  Weight Used For Protein Calculations: 39.7 kg (87 lb 8.4 oz)  Fluid Requirements (mL): 2610-3276 (1mL/kcal)  Estimated Fluid Requirement Method: RDA Method  RDA Method (mL): 1389    Nutrition Prescription Ordered    Current Diet Order: Cardiac  Oral Nutrition Supplement: Ensure Enlive (TID)    Evaluation of Received Nutrient/Fluid Intake    % Kcal Needs: 25-50%  % Protein Needs: 25-50%  I/O: +240  Energy Calories Required: not meeting needs  Protein Required: not meeting needs  Tolerance: tolerating  % Intake of Estimated Energy Needs: 25 - 50 %  % Meal Intake: 25 - 50 %    Nutrition Risk    Level of Risk/Frequency of Follow-up: moderate     Monitor and Evaluation    Food and Nutrient Intake: energy intake, food and beverage intake  Food and Nutrient Adminstration: diet order  Knowledge/Beliefs/Attitudes: food and nutrition knowledge/skill, beliefs and attitudes  Physical Activity and Function: nutrition-related ADLs and IADLs  Anthropometric Measurements: height/length, weight, weight change, body mass index  Biochemical Data, Medical Tests and Procedures: electrolyte and renal panel, gastrointestinal profile, glucose/endocrine profile, inflammatory profile, lipid profile  Nutrition-Focused Physical Findings: overall appearance, extremities, muscles and bones, head and eyes, skin     Nutrition Follow-Up    RD Follow-up?: Yes

## 2024-12-05 NOTE — PT/OT/SLP PROGRESS
"Physical Therapy Treatment    Patient Name:  Ruth Gamboa   MRN:  [id number]    Recommendations:     Discharge Recommendations:  (post acute care rehabilitation)  Discharge Equipment Recommendations: none (pending progress)  Barriers to discharge: None    Assessment:     Ruth Gamboa is a 79 y.o. female admitted with a medical diagnosis of Diarrhea.  She presents with the following impairments/functional limitations: weakness, impaired joint extensibility, impaired endurance, impaired muscle length, impaired balance, impaired cardiopulmonary response to activity, gait instability, decreased lower extremity function, decreased upper extremity function Patient was found sitting at edge of bed, on room air, agreeable to therapy.  Patient required set up assistance with sit <> stand with definite UE support; ambulated ~811 feet with RW, decrease JADYN and cues to stay inside the walker for better alignment and support. .    Rehab Prognosis: Good; patient would benefit from acute skilled PT services to address these deficits and reach maximum level of function.    Recent Surgery: * No surgery found *      Plan:     During this hospitalization, patient to be seen 5 x/week to address the identified rehab impairments via gait training, therapeutic activities, therapeutic exercises, neuromuscular re-education and progress toward the following goals:    Plan of Care Expires:  12/11/24    Subjective     Chief Complaint: "I live alone."  Patient/Family Comments/goals: Go to rehab per daughter to improve her strength.  Pain/Comfort:  Pain Rating 1:  (did not quantify)  Location - Side 1: Bilateral  Location 1: groin  Pain Addressed 1: Reposition  Pain Rating Post-Intervention 1:  (did not quantify)      Objective:     Communicated with nurse, patient and daughter  prior to session.  Patient found up in chair with peripheral IV, telemetry upon PT entry to room.     General Precautions: Standard, fall  Orthopedic Precautions: " N/A  Braces: N/A  Respiratory Status: Room air     Functional Mobility:  Transfers:     Sit to Stand:  supervision with rolling walker  Gait:  ~811 feet with RW, decrease JADYN and cues to stay inside the walker for better alignment and support., SPO2 after walking was 94%  with HR of 75 bpm , c/o pain on both groin area.  Balance: SBA with static standing using a RW       AM-PAC 6 CLICK MOBILITY  Turning over in bed (including adjusting bedclothes, sheets and blankets)?: 4  Sitting down on and standing up from a chair with arms (e.g., wheelchair, bedside commode, etc.): 4  Moving from lying on back to sitting on the side of the bed?: 4  Moving to and from a bed to a chair (including a wheelchair)?: 3  Need to walk in hospital room?: 3  Climbing 3-5 steps with a railing?: 3 (based on clinical judgement)  Basic Mobility Total Score: 21       Treatment & Education:  Sit to stand   Gait with RW   Deep breathing     Patient left up in chair with call button in reach, nurse  notified, and daughter  present..    GOALS:   Multidisciplinary Problems       Physical Therapy Goals          Problem: Physical Therapy    Goal Priority Disciplines Outcome Interventions   Physical Therapy Goal     PT, PT/OT Progressing    Description: Goals to be met by 2024  Patient will increase functional independence with mobility by performin. Bed to chair transfer with Modified Independent with or proper A.D.  using Stand step TECHNIQUE  2. Gait  x 800  feet with Modified Independent with proper A.D. 3. Lower extremity exercise program x10 reps .                       Time Tracking:     PT Received On: 24  PT Start Time: 0956     PT Stop Time: 1019  PT Total Time (min): 23 min     Billable Minutes: Therapeutic Activity 15 and Therapeutic Exercise 8    Treatment Type: Treatment  PT/PTA: PT     Number of PTA visits since last PT visit: 0     2024

## 2024-12-05 NOTE — PLAN OF CARE
Spoke to IPR , Mesfin, and she informed me that the prior authorization for rehab is still pending. SW will remain available.       Addendum: IPR , Joann, informed me that the patient's rehab referral has to go to the medical director for further review. The NP was informed.

## 2024-12-05 NOTE — PLAN OF CARE
Problem: Occupational Therapy  Goal: Occupational Therapy Goal  Description: Goals to be met by: 12/11/24     Patient will increase functional independence with ADLs by performing:    Feeding with [place].  UE Dressing with Modified [place].  LE Dressing with Modified [place].  Grooming while standing at sink with Modified [place].  Toileting from toilet with Modified [place] for hygiene and clothing management.   Bathing from  shower chair/bench with Modified [place].  Toilet transfer to toilet with Modified [place].    Outcome: Progressing

## 2024-12-05 NOTE — PLAN OF CARE
[facility] Surg  Discharge Reassessment    Primary Care Provider: Clark Calix III, MD    Expected Discharge Date:     Reassessment (most recent)       Discharge Reassessment - 12/05/24 1522          Discharge Reassessment    Assessment Type Discharge Planning Reassessment     Did the patient's condition or plan change since previous assessment? Yes     Discharge Plan discussed with: Patient;Adult children     Discharge Plan A Rehab     Discharge Plan B Home;Home with family     DME Needed Upon Discharge  other (see comments)     Why the patient remains in the hospital Insurance issues   Pending determination from the patient's insurance for [facility].       Post-Acute Status    Post-Acute Placement Status Pending payor medical review/second level review     Coverage [facility] SECURE SNP     Discharge Delays None known at this time                   Discharge re-assessment is completed. Spoke to the patient and her daughter, Jerri. I informed them that we are still waiting on the final determination for the inpatient rehab referral. If the patient is denied, the patient will return home with her daughter. I spoke to them about possible long-term placement, but Jerri expressed that she will not put her mother in a nursing home. The patient also declined home health at this time.     I informed the patient and her daughter that they would have to call [employer] [place] on Aging for an assessment in order to qualify for Meals-on-Wheels. A representative has to assess the patient in her home.      I plan to get with the NP to get the patient set up with post-acute meals after she discharge.

## 2024-12-05 NOTE — PROGRESS NOTES
Individual Follow-Up Form    12/5/2024    Quit Date: TBD    Clinical Status of Patient: Inpatient    Length of Service: 15 minutes    Continuing Medication: no       Target Symptoms: Withdrawal and medication side effects. The following were  rated moderate (3) to severe (4) on TCRS:  Moderate (3): desire/crave tobacco  Severe (4): none    Comments: Smoking cessation education provided. Pt reports that she is currently a .25-.5 pk/day cigarette smoker. Pt states that she and her daughter are interested in quitting tobacco. Provided Ms. Gamboa and her daughter with information regarding future treatment options, explanation of program structure, medication/NRT options, and general outline of counseling sessions.  Brochures and contact information for smoking cessation department left at the pt's bedside. Notified of virtual option through [facility] portal. Pt accepts referral to Ambulatory Smoking Cessation clinic. Order placed.    Diagnosis: F17.200

## 2024-12-05 NOTE — PLAN OF CARE
Plan of care reviewed and ongoing with patient and daughter at the bedside. 20 gauge IV to L FA saline locked/flushes well, room air tolerating well, ambulates to BR with minimal assistance. No BM occurences during the night. Call light and personal items within reach of patient.       Problem: Skin Injury Risk Increased  Goal: Skin Health and Integrity  Outcome: Not Progressing     Problem: Adult Inpatient Plan of Care  Goal: Plan of Care Review  Outcome: Not Progressing  Goal: Patient-Specific Goal (Individualized)  Outcome: Not Progressing  Goal: Absence of Hospital-Acquired Illness or Injury  Outcome: Not Progressing  Goal: Optimal Comfort and Wellbeing  Outcome: Not Progressing  Goal: Readiness for Transition of Care  Outcome: Not Progressing     Problem: Infection  Goal: Absence of Infection Signs and Symptoms  Outcome: Not Progressing     Problem: Fall Injury Risk  Goal: Absence of Fall and Fall-Related Injury  Outcome: Not Progressing
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
## 2024-12-05 NOTE — PT/OT/SLP PROGRESS
Occupational Therapy   Treatment    Name: Ruth Gamboa  MRN: [id number]  Admitting Diagnosis:  Diarrhea       Recommendations:     Discharge Recommendations: High Intensity Therapy  Discharge Equipment Recommendations:  other (see comments) (TBD based on progress)  Barriers to discharge:  Other (Comment) (medical and functional status)    Assessment:     Ruth Gamboa is a 79 y.o. female with a medical diagnosis of Diarrhea.  She presents with good participation and motivation. Upon entry, patient found ambulating in room with unsteady gait to bathroom. BURNETT assisted patient with ADL task of lower body dressing and toileting. Performance deficits affecting function are weakness, impaired endurance, impaired self care skills, impaired functional mobility, gait instability, impaired balance, decreased upper extremity function, decreased lower extremity function, decreased safety awareness, decreased ROM, impaired cardiopulmonary response to activity, impaired joint extensibility, impaired muscle length.     Rehab Prognosis:  Good; patient would benefit from acute skilled OT services to address these deficits and reach maximum level of function.       Plan:     Patient to be seen 5 x/week to address the above listed problems via self-care/home management, therapeutic activities, therapeutic exercises  Plan of Care Expires: 12/11/24  Plan of Care Reviewed with: patient    Subjective     Chief Complaint: Reported none  Patient/Family Comments/goals: Get stronger  Pain/Comfort:  Pain Rating 1: 0/10  Pain Rating Post-Intervention 1: 0/10    Objective:     Communicated with: occupational therapist and nurse prior to session.  Patient found HOB elevated with telemetry, peripheral IV upon OT entry to room.    General Precautions: Standard, fall, hearing impaired    Orthopedic Precautions:N/A  Braces: N/A  Respiratory Status: Room air     Occupational Performance:     Functional Mobility/Transfers:  Patient completed Sit <>  Stand Transfer with contact guard assistance  with  hand-held assist   Patient completed Toilet Transfer Step Transfer technique with contact guard assistance with  hand-held assist  Functional Mobility: Pt ambulated about 20 ft without AD and slight unsteady gait.     Activities of Daily Living:  Lower Body Dressing: supervision with moderate verbal cues. Physical therapist attempted to dilia brief while standing. Pt educated on safety while completing ADL tasks.   Toileting: supervision standing to complete manjit-care      [facility] 6 Click ADL: 19    Treatment & Education:  Patient engaged in active range of motion therapeutic exercise for 2 x 15 sitting up in chair in the following planes: shoulder flexion/extension, shoulder abduction/adduction, elbow flexion/extension, scapular retractions/protraction, scapular elevation/depression, shoulder rotations (forward and reverse), wrist flexion/extension, wrist radial/ulnar deviation, forearm supination/pronation, and composite fist. Patient needed rest breaks between each set due to impaired muscle endurance and activity tolerance. Patient educated to continue to complete exercise throughout the day to increase strength and endurance to facilitate an increase in ADL/IADLs. Patient verbally understood.       Patient left up in chair with all lines intact, call button in reach, and nurse notified    GOALS:   Multidisciplinary Problems       Occupational Therapy Goals          Problem: Occupational Therapy    Goal Priority Disciplines Outcome Interventions   Occupational Therapy Goal     OT, PT/OT Progressing    Description: Goals to be met by: 12/11/24     Patient will increase functional independence with ADLs by performing:    Feeding with [place].  UE Dressing with Modified [place].  LE Dressing with Modified [place].  Grooming while standing at sink with Modified [place].  Toileting from toilet with Modified [place] for hygiene and clothing  management.   Bathing from  shower chair/bench with Modified [place].  Toilet transfer to toilet with Modified [place].                         Time Tracking:     OT Date of Treatment: 12/05/24  OT Start Time: 0916  OT Stop Time: 0935  OT Total Time (min): 19 min    Billable Minutes:Therapeutic Exercise 19 min    OT/SUJIT: SUJIT     Number of SUJIT visits since last OT visit: 1    12/5/2024  Shala BURNETT

## 2024-12-05 NOTE — PLAN OF CARE
Problem: Physical Therapy  Goal: Physical Therapy Goal  Description: Goals to be met by 2024  Patient will increase functional independence with mobility by performin. Bed to chair transfer with Modified Independent with or proper A.D.  using Stand step TECHNIQUE  2. Gait  x 800  feet with Modified Independent with proper A.D. 3. Lower extremity exercise program x10 reps .  Outcome: Progressing, tolerated ambulation for about 811 feet on room air with no SOB, c/o fatigue post ambulation

## 2024-12-05 NOTE — PLAN OF CARE
Recommendations  1. Rec'd Cardiac Diet.   2. Rec'd ONS: Ensure Enlive TID to provide 1050kcal and 60g of protein.   3. Rec'd appetite stimulant.  4. Rec'd encourage PO intake and compliance with drinking ONS. 5. Rec'd daily weights. 6. RD to follow and make rec's accordingly.  Goals:   1. Pt will consume > 50% of meals by next RD follow up.  Nutrition Goal Status: progressing towards goal

## 2024-12-06 LAB
ALBUMIN SERPL BCP-MCNC: 1.9 G/DL (ref 3.5–5.2)
ALP SERPL-CCNC: 98 U/L (ref 55–135)
ALT SERPL W/O P-5'-P-CCNC: 11 U/L (ref 10–44)
ANION GAP SERPL CALC-SCNC: 6 MMOL/L (ref 3–11)
AST SERPL-CCNC: 13 U/L (ref 10–40)
BASOPHILS # BLD AUTO: 0.02 K/UL (ref 0–0.2)
BASOPHILS NFR BLD: 0.3 % (ref 0–1.9)
BILIRUB SERPL-MCNC: 0.2 MG/DL (ref 0.1–1)
BUN SERPL-MCNC: 19 MG/DL (ref 8–23)
CALCIUM SERPL-MCNC: 8.8 MG/DL (ref 8.7–10.5)
CHLORIDE SERPL-SCNC: 99 MMOL/L (ref 95–110)
CO2 SERPL-SCNC: 29 MMOL/L (ref 23–29)
CREAT SERPL-MCNC: 0.8 MG/DL (ref 0.5–1.4)
DIFFERENTIAL METHOD BLD: ABNORMAL
EOSINOPHIL # BLD AUTO: 0.1 K/UL (ref 0–0.5)
EOSINOPHIL NFR BLD: 1 % (ref 0–8)
ERYTHROCYTE [DISTWIDTH] IN BLOOD BY AUTOMATED COUNT: 15.4 % (ref 11.5–14.5)
EST. GFR  (NO RACE VARIABLE): >60 ML/MIN/1.73 M^2
GLUCOSE SERPL-MCNC: 95 MG/DL (ref 70–110)
HCT VFR BLD AUTO: 28.5 % (ref 37–48.5)
HGB BLD-MCNC: 9.2 G/DL (ref 12–16)
IMM GRANULOCYTES # BLD AUTO: 0.02 K/UL (ref 0–0.04)
IMM GRANULOCYTES NFR BLD AUTO: 0.3 % (ref 0–0.5)
LYMPHOCYTES # BLD AUTO: 1.2 K/UL (ref 1–4.8)
LYMPHOCYTES NFR BLD: 16.5 % (ref 18–48)
MAGNESIUM SERPL-MCNC: 1.8 MG/DL (ref 1.6–2.6)
MCH RBC QN AUTO: 26.9 PG (ref 27–31)
MCHC RBC AUTO-ENTMCNC: 32.3 G/DL (ref 32–36)
MCV RBC AUTO: 83 FL (ref 82–98)
MONOCYTES # BLD AUTO: 0.7 K/UL (ref 0.3–1)
MONOCYTES NFR BLD: 9.7 % (ref 4–15)
NEUTROPHILS # BLD AUTO: 5.3 K/UL (ref 1.8–7.7)
NEUTROPHILS NFR BLD: 72.2 % (ref 38–73)
NRBC BLD-RTO: 0 /100 WBC
PLATELET # BLD AUTO: 419 K/UL (ref 150–450)
PMV BLD AUTO: 9.8 FL (ref 9.2–12.9)
POTASSIUM SERPL-SCNC: 4.9 MMOL/L (ref 3.5–5.1)
PROT SERPL-MCNC: 6.6 G/DL (ref 6–8.4)
RBC # BLD AUTO: 3.42 M/UL (ref 4–5.4)
SODIUM SERPL-SCNC: 134 MMOL/L (ref 136–145)
WBC # BLD AUTO: 7.32 K/UL (ref 3.9–12.7)

## 2024-12-06 PROCEDURE — 99900031 HC PATIENT EDUCATION (STAT)

## 2024-12-06 PROCEDURE — 99900035 HC TECH TIME PER 15 MIN (STAT)

## 2024-12-06 PROCEDURE — 63700000 PHARM REV CODE 250 ALT 637 W/O HCPCS: Performed by: INTERNAL MEDICINE

## 2024-12-06 PROCEDURE — 80053 COMPREHEN METABOLIC PANEL: CPT

## 2024-12-06 PROCEDURE — 85025 COMPLETE CBC W/AUTO DIFF WBC: CPT

## 2024-12-06 PROCEDURE — G0378 HOSPITAL OBSERVATION PER HR: HCPCS

## 2024-12-06 PROCEDURE — 25000003 PHARM REV CODE 250

## 2024-12-06 PROCEDURE — 94640 AIRWAY INHALATION TREATMENT: CPT | Mod: XB

## 2024-12-06 PROCEDURE — 36415 COLL VENOUS BLD VENIPUNCTURE: CPT

## 2024-12-06 PROCEDURE — 97110 THERAPEUTIC EXERCISES: CPT | Mod: CO

## 2024-12-06 PROCEDURE — 25000003 PHARM REV CODE 250: Performed by: INTERNAL MEDICINE

## 2024-12-06 PROCEDURE — 25000242 PHARM REV CODE 250 ALT 637 W/ HCPCS: Performed by: INTERNAL MEDICINE

## 2024-12-06 PROCEDURE — 83735 ASSAY OF MAGNESIUM: CPT

## 2024-12-06 PROCEDURE — 94761 N-INVAS EAR/PLS OXIMETRY MLT: CPT

## 2024-12-06 PROCEDURE — 97110 THERAPEUTIC EXERCISES: CPT

## 2024-12-06 PROCEDURE — 97530 THERAPEUTIC ACTIVITIES: CPT

## 2024-12-06 RX ORDER — BISACODYL 5 MG
10 TABLET, DELAYED RELEASE (ENTERIC COATED) ORAL 2 TIMES DAILY PRN
Status: DISCONTINUED | OUTPATIENT
Start: 2024-12-06 | End: 2024-12-06

## 2024-12-06 RX ORDER — DOCUSATE SODIUM 100 MG/1
100 CAPSULE, LIQUID FILLED ORAL 2 TIMES DAILY PRN
Status: DISCONTINUED | OUTPATIENT
Start: 2024-12-06 | End: 2024-12-07 | Stop reason: HOSPADM

## 2024-12-06 RX ADMIN — FLUTICASONE FUROATE, UMECLIDINIUM BROMIDE AND VILANTEROL TRIFENATATE 1 PUFF: 200; 62.5; 25 POWDER RESPIRATORY (INHALATION) at 10:12

## 2024-12-06 RX ADMIN — CYPROHEPTADINE HYDROCHLORIDE 4 MG: 4 TABLET ORAL at 09:12

## 2024-12-06 RX ADMIN — IPRATROPIUM BROMIDE AND ALBUTEROL SULFATE 3 ML: 2.5; .5 SOLUTION RESPIRATORY (INHALATION) at 08:12

## 2024-12-06 RX ADMIN — CARVEDILOL 25 MG: 12.5 TABLET, FILM COATED ORAL at 08:12

## 2024-12-06 RX ADMIN — NYSTATIN 500000 UNITS: 100000 SUSPENSION ORAL at 08:12

## 2024-12-06 RX ADMIN — FLUCONAZOLE 100 MG: 100 TABLET ORAL at 10:12

## 2024-12-06 RX ADMIN — VITAM B12 100 MCG: 100 TAB at 10:12

## 2024-12-06 RX ADMIN — IPRATROPIUM BROMIDE AND ALBUTEROL SULFATE 3 ML: 2.5; .5 SOLUTION RESPIRATORY (INHALATION) at 07:12

## 2024-12-06 RX ADMIN — BISACODYL 10 MG: 5 TABLET, COATED ORAL at 10:12

## 2024-12-06 RX ADMIN — MICONAZOLE NITRATE: 20 POWDER TOPICAL at 09:12

## 2024-12-06 RX ADMIN — TRAMADOL HYDROCHLORIDE 50 MG: 50 TABLET, COATED ORAL at 02:12

## 2024-12-06 RX ADMIN — NYSTATIN 500000 UNITS: 100000 SUSPENSION ORAL at 09:12

## 2024-12-06 RX ADMIN — MIRTAZAPINE 7.5 MG: 7.5 TABLET ORAL at 09:12

## 2024-12-06 RX ADMIN — CITALOPRAM HYDROBROMIDE 20 MG: 10 TABLET ORAL at 10:12

## 2024-12-06 RX ADMIN — MICONAZOLE NITRATE: 20 POWDER TOPICAL at 08:12

## 2024-12-06 RX ADMIN — CARVEDILOL 25 MG: 12.5 TABLET, FILM COATED ORAL at 06:12

## 2024-12-06 RX ADMIN — LISINOPRIL 30 MG: 10 TABLET ORAL at 10:12

## 2024-12-06 RX ADMIN — IPRATROPIUM BROMIDE AND ALBUTEROL SULFATE 3 ML: 2.5; .5 SOLUTION RESPIRATORY (INHALATION) at 02:12

## 2024-12-06 RX ADMIN — PANTOPRAZOLE SODIUM 40 MG: 40 TABLET, DELAYED RELEASE ORAL at 10:12

## 2024-12-06 RX ADMIN — TRAMADOL HYDROCHLORIDE 50 MG: 50 TABLET, COATED ORAL at 09:12

## 2024-12-06 RX ADMIN — NYSTATIN 500000 UNITS: 100000 SUSPENSION ORAL at 12:12

## 2024-12-06 RX ADMIN — NYSTATIN 500000 UNITS: 100000 SUSPENSION ORAL at 06:12

## 2024-12-06 NOTE — PLAN OF CARE
Plan of care reviewed and ongoing with patient and daughter at the bedside. 20 gauge IV to L FA saline locked/flushes well, room air tolerating well, ambulates to BR with minimal assistance. No BM occurences during the night. Call light and personal items within reach of patient.       Problem: Adult Inpatient Plan of Care  Goal: Plan of Care Review  Outcome: Progressing  Goal: Absence of Hospital-Acquired Illness or Injury  Outcome: Progressing     Problem: Skin Injury Risk Increased  Goal: Skin Health and Integrity  Outcome: Not Progressing     Problem: Adult Inpatient Plan of Care  Goal: Patient-Specific Goal (Individualized)  Outcome: Not Progressing  Goal: Optimal Comfort and Wellbeing  Outcome: Not Progressing  Goal: Readiness for Transition of Care  Outcome: Not Progressing     Problem: Infection  Goal: Absence of Infection Signs and Symptoms  Outcome: Not Progressing     Problem: Fall Injury Risk  Goal: Absence of Fall and Fall-Related Injury  Outcome: Not Progressing

## 2024-12-06 NOTE — PT/OT/SLP PROGRESS
"Physical Therapy Treatment    Patient Name:  Ruth Gamboa   MRN:  [id number]    Recommendations:     Discharge Recommendations:  (post acute care rehabilitation)  Discharge Equipment Recommendations: walker, rolling  Barriers to discharge: None    Assessment:     Ruth Gamboa is a 79 y.o. female admitted with a medical diagnosis of Diarrhea.  She presents with the following impairments/functional limitations: weakness, impaired endurance, impaired balance, impaired cardiopulmonary response to activity, gait instability Patient tolerated ambulation with a RW on room air with no SOB or LOB. Progress as tolerated and encourage increase activity.     Rehab Prognosis: Good and Fair; patient would benefit from acute skilled PT services to address these deficits and reach maximum level of function.    Recent Surgery: * No surgery found *      Plan:     During this hospitalization, patient to be seen 5 x/week to address the identified rehab impairments via gait training, therapeutic activities, neuromuscular re-education and progress toward the following goals:    Plan of Care Expires:  12/11/24    Subjective     Chief Complaint: "The doctor said I can walk but not far."   Patient/Family Comments/goals: Daughter wants the patient to stop smoking.   Pain/Comfort:  Pain Rating 1: 0/10  Pain Rating Post-Intervention 1: 0/10      Objective:     Communicated with nurse, patient and daughter  prior to session.  Patient found supine with peripheral IV, telemetry upon PT entry to room.     General Precautions: Standard, fall  Orthopedic Precautions: N/A  Braces: N/A  Respiratory Status: Room air     Functional Mobility:  Bed Mobility:     Rolling Left:  independence  Rolling Right: independence  Scooting: independence  Bridging: independence  Supine to Sit: independence  Transfers:     Sit to Stand:  modified independence with rolling walker  Gait:  ~833 feet with RW, decrease JADYN and cues to stay inside the walker for better " alignment and support.  Balance: independent with static sitting, modified independent with static standing using a RW       AM-PAC 6 CLICK MOBILITY  Turning over in bed (including adjusting bedclothes, sheets and blankets)?: 4  Sitting down on and standing up from a chair with arms (e.g., wheelchair, bedside commode, etc.): 4  Moving from lying on back to sitting on the side of the bed?: 4  Moving to and from a bed to a chair (including a wheelchair)?: 4  Need to walk in hospital room?: 3  Climbing 3-5 steps with a railing?: 3 (based on clinical judgement)  Basic Mobility Total Score: 22       Treatment & Education:  Rolling side <> side  Supine > sit  Scooting to the edge  of the bed   Sitting balance/tolerance  Sit <> stand with RW  Standing balance/tolerance   Out of bed   Gait with RW     Patient left up in chair with call button in reach and nurse  notified..    GOALS:   Multidisciplinary Problems       Physical Therapy Goals          Problem: Physical Therapy    Goal Priority Disciplines Outcome Interventions   Physical Therapy Goal     PT, PT/OT Progressing    Description: Goals to be met by 2024  Patient will increase functional independence with mobility by performin. Bed to chair transfer with Modified Independent with or proper A.D.  using Stand step TECHNIQUE  2. Gait  x 800  feet with Modified Independent with proper A.D. 3. Lower extremity exercise program x10 reps .                       Time Tracking:     PT Received On: 24  PT Start Time: 901     PT Stop Time: 931  PT Total Time (min): 30 min     Billable Minutes: Therapeutic Activity 15 and Therapeutic Exercise 15    Treatment Type: Treatment  PT/PTA: PT     Number of PTA visits since last PT visit: 0     2024

## 2024-12-06 NOTE — PLAN OF CARE
Problem: Physical Therapy  Goal: Physical Therapy Goal  Description: Goals to be met by 2024  Patient will increase functional independence with mobility by performin. Bed to chair transfer with Modified Independent with or proper A.D.  using Stand step TECHNIQUE  2. Gait  x 800  feet with Modified Independent with proper A.D. 3. Lower extremity exercise program x10 reps .  Outcome: Progressing, tolerated gait with RW with no LOB or SOB

## 2024-12-06 NOTE — PLAN OF CARE
Information was sent to [facility] for possible post-acute meals to be set-up for when the patient is discharged.       Addendum:I had another discussion with the patient and her daughter about there options for discharge. The patient is now agreeing to home health services with the agency of her choice. The Patient's Choice Form was signed for Nursing Care. A copy was placed in her chart.

## 2024-12-06 NOTE — PLAN OF CARE
Recommendations  1. Rec'd Cardiac Diet.    2. Rec'd ONS: Ensure Enlive TID to provide 1050kcal and 60g of protein.     3. Rec'd appetite stimulant.    4. Rec'd encourage PO intake and compliance with drinking ONS. 5. Rec'd daily weights.   6. Rec'd daily multivitamin.    7. RD to follow and make rec's accordingly.  Goals:   1. Pt will consume > 50% of meals by next RD follow up.  Nutrition Goal Status: progressing towards goal

## 2024-12-06 NOTE — PT/OT/SLP PROGRESS
Occupational Therapy   Treatment    Name: Ruth Gamboa  MRN: [id number]  Admitting Diagnosis:  Diarrhea       Recommendations:     Discharge Recommendations: High Intensity Therapy  Discharge Equipment Recommendations:  none  Barriers to discharge:  Other (Comment) (medical and functional status)    Assessment:     Ruth Gamboa is a 79 y.o. female with a medical diagnosis of Diarrhea.  She presents with good participation and motivation. Performance deficits affecting function are weakness, impaired endurance, impaired self care skills, impaired functional mobility, gait instability, impaired balance, decreased upper extremity function, decreased lower extremity function, decreased safety awareness, decreased ROM, impaired cardiopulmonary response to activity, impaired joint extensibility, impaired muscle length.     Rehab Prognosis:  Good; patient would benefit from acute skilled OT services to address these deficits and reach maximum level of function.       Plan:     Patient to be seen 5 x/week to address the above listed problems via self-care/home management, therapeutic activities, therapeutic exercises  Plan of Care Expires: 12/11/24  Plan of Care Reviewed with: patient, daughter    Subjective     Chief Complaint: Its cold in here  Patient/Family Comments/goals: Get stronger  Pain/Comfort:  Pain Rating 1: 0/10  Pain Rating Post-Intervention 1: 0/10    Objective:     Communicated with: occupational therapist and nurse prior to session.  Patient found HOB elevated with peripheral IV, telemetry upon OT entry to room.    General Precautions: Standard, fall    Orthopedic Precautions:N/A  Braces: N/A  Respiratory Status: Room air     AMPAC 6 Click ADL: 19    Treatment & Education:  Patient engaged in active range of motion therapeutic exercise for 2 x 10 up in chair in the following planes: shoulder flexion/extension, shoulder abduction/adduction, elbow flexion/extension, scapular retractions/protraction,  scapular elevation/depression, shoulder rotations (forward and reverse), wrist flexion/extension, wrist radial/ulnar deviation, forearm supination/pronation, and composite fist. Patient needed rest breaks between each set due to impaired muscle endurance and activity tolerance. Patient educated to continue to complete exercise throughout the day to increase strength and endurance to facilitate an increase in ADL/IADLs. Patient verbally understood. Pt was provided education / instruction regarding role of OT and established OT POC.       Patient left up in chair with all lines intact, call button in reach, nurse notified, and daughter present    GOALS:   Multidisciplinary Problems       Occupational Therapy Goals          Problem: Occupational Therapy    Goal Priority Disciplines Outcome Interventions   Occupational Therapy Goal     OT, PT/OT Progressing    Description: Goals to be met by: 12/11/24     Patient will increase functional independence with ADLs by performing:    Feeding with [place].  UE Dressing with Modified [place].  LE Dressing with Modified [place].  Grooming while standing at sink with Modified [place].  Toileting from toilet with Modified [place] for hygiene and clothing management.   Bathing from  shower chair/bench with Modified [place].  Toilet transfer to toilet with Modified [place].                         Time Tracking:     OT Date of Treatment: 12/06/24  OT Start Time: 0949  OT Stop Time: 1006  OT Total Time (min): 17 min    Billable Minutes:Therapeutic Exercise 17 min    OT/SUJIT: SUJIT     Number of SUJIT visits since last OT visit: 2    12/6/2024  Shala BURNETT

## 2024-12-06 NOTE — CONSULTS
[facility] Surg  Adult Nutrition  Consult Note    SUMMARY     Recommendations  1. Rec'd Cardiac Diet.    2. Rec'd ONS: Ensure Enlive TID to provide 1050kcal and 60g of protein.     3. Rec'd appetite stimulant.    4. Rec'd encourage PO intake and compliance with drinking ONS. 5. Rec'd daily weights.   6. Rec'd daily multivitamin.    7. RD to follow and make rec's accordingly.  Goals:   1. Pt will consume > 50% of meals by next RD follow up.  Nutrition Goal Status: progressing towards goal  Communication of RD Recs: other (Documented in POC)    Assessment and Plan     Nutrition Problem  Severe Malnutrition     Related to (etiology):   Inadequate protein energy intake     Signs and Symptoms (as evidenced by):   Severe muscle loss, severe fat loss,  weight loss > 2% in 1 week, energy intake less than 75% for greater than or equal to 1 month, BMI = 16.54     Interventions/Recommendations (treatment strategy):  1. Rec'd Cardiac Diet.    2. Rec'd ONS: Ensure Enlive TID to provide 1050kcal and 60g of protein.     3. Rec'd appetite stimulant.    4. Rec'd encourage PO intake and compliance with drinking ONS. 5. Rec'd daily weights.   6. Rec'd daily multivitamin.    7. RD to follow and make rec's accordingly.     Nutrition Diagnosis Status:   Continues     Nutrition Related Social Determinants of Health:  Case Management provided patient with information to [place] [employer] regarding Meals-on-Wheels and commodities.      Malnutrition Assessment  Malnutrition Context: acute illness or injury  Malnutrition Level: severe  Skin (Micronutrient): dry  Nails (Micronutrient): brittle, thin  Hair/Scalp (Micronutrient): dry, plucked easily  Teeth (Micronutrient): broken dentition  Tongue (Micronutrient): other (see comments) (Yeast spots)  Neck/Chest (Micronutrient): bony prominence, muscle wasting  Musculoskeletal/Lower Extremities: muscle wasting, subcutaneous fat loss   Micronutrient Evaluation Summary: suspected deficiency  "  Weight Loss (Malnutrition): greater than 2% in 1 week  Energy Intake (Malnutrition): less than 75% for greater than or equal to 1 month  Subcutaneous Fat (Malnutrition): severe depletion  Muscle Mass (Malnutrition): severe depletion   Orbital Region (Subcutaneous Fat Loss): severe depletion  Upper Arm Region (Subcutaneous Fat Loss): severe depletion   [place] Region (Muscle Loss): severe depletion  Clavicle Bone Region (Muscle Loss): severe depletion  Clavicle and Acromion Bone Region (Muscle Loss): severe depletion  Scapular Bone Region (Muscle Loss): severe depletion  Dorsal Hand (Muscle Loss): severe depletion  Patellar Region (Muscle Loss): severe depletion  Anterior Thigh Region (Muscle Loss): severe depletion  Posterior Calf Region (Muscle Loss): severe depletion     Reason for Assessment    Reason For Assessment: consult  Diagnosis: other (see comments) (Diarrhea)  General Information Comments: RD re-consulted for rec's. Pt's PO intake is poor with 25% of meals eaten. Pt is recieving Ensure Enlive TID. RD to follow and continue to make rec's accordingly.  Nutrition Discharge Planning: Cardiac Diet with Ensure or Boost TID    Nutrition Risk Screen    Nutrition Risk Screen: no indicators present    Nutrition/Diet History    Patient Reported Diet/Restrictions/Preferences: general  Spiritual, Cultural Beliefs, Roman Catholic Practices, Values that Affect Care: no  Food Allergies: NKFA  Factors Affecting Nutritional Intake: decreased appetite, diarrhea    Anthropometrics    Temp: 98.6 °F (37 °C)  Height Method: Stated  Height: 5' 1" (154.9 cm)  Height (inches): 61 in  Weight Method: Bed Scale  Weight: 39.7 kg (87 lb 8.4 oz)  Weight (lb): 87.52 lb  Ideal Body Weight (IBW), Female: 105 lb  % Ideal Body Weight, Female (lb): 83.35 %  % Ideal Body Weight Malnutrition: 80-90% - mild deficit  BMI (Calculated): 16.5  BMI Grade: 16 - 16.9 protein-energy malnutrition grade II  Usual Body Weight (UBW), k.44 kg  Weight " Change Amount: 22 lb  % Usual Body Weight: 80.47  % Weight Change From Usual Weight: -19.7 %    Lab/Procedures/Meds    Pertinent Labs Reviewed: reviewed  Pertinent Medications Reviewed: reviewed  Pertinent Medications Comments: Periactin    Estimated/Assessed Needs    Weight Used For Calorie Calculations: 39.7 kg (87 lb 8.4 oz)  Energy Calorie Requirements (kcal): 2407-3402 (35-40kcal/kg)  Energy Need Method: Kcal/kg  Protein Requirements: 47-59 (1.2-1.5g/kg)  Weight Used For Protein Calculations: 39.7 kg (87 lb 8.4 oz)  Fluid Requirements (mL): 1935-8204 (1mL/kcal)  Estimated Fluid Requirement Method: RDA Method  RDA Method (mL): 1389    Nutrition Prescription Ordered    Current Diet Order: Cardiac  Oral Nutrition Supplement: Ensure Enlive (TID)    Evaluation of Received Nutrient/Fluid Intake    % Kcal Needs: 25%  % Protein Needs: 25%  I/O: +240  Energy Calories Required: not meeting needs  Protein Required: not meeting needs  Tolerance: tolerating  % Intake of Estimated Energy Needs: 0 - 25 %  % Meal Intake: 0 - 25 %    Nutrition Risk    Level of Risk/Frequency of Follow-up: moderate     Monitor and Evaluation    Food and Nutrient Intake: energy intake, food and beverage intake  Food and Nutrient Adminstration: diet order  Knowledge/Beliefs/Attitudes: food and nutrition knowledge/skill, beliefs and attitudes  Physical Activity and Function: nutrition-related ADLs and IADLs  Anthropometric Measurements: height/length, weight, weight change, body mass index  Biochemical Data, Medical Tests and Procedures: electrolyte and renal panel, gastrointestinal profile, glucose/endocrine profile, inflammatory profile, lipid profile  Nutrition-Focused Physical Findings: overall appearance, extremities, muscles and bones, head and eyes, skin     Nutrition Follow-Up    RD Follow-up?: Yes

## 2024-12-06 NOTE — PLAN OF CARE
Patient was denied for IPR after the attending completed the [id number]. Dr. Calix was notified that the patient was denied.
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
## 2024-12-06 NOTE — PLAN OF CARE
Problem: Occupational Therapy  Goal: Occupational Therapy Goal  Description: Goals to be met by: 12/11/24     Patient will increase functional independence with ADLs by performing:    Feeding with [place].  UE Dressing with Modified [place].  LE Dressing with Modified [place].  Grooming while standing at sink with Modified [place].  Toileting from toilet with Modified [place] for hygiene and clothing management.   Bathing from  shower chair/bench with Modified [place].  Toilet transfer to toilet with Modified [place].    Outcome: Progressing

## 2024-12-06 NOTE — PLAN OF CARE
POC reviewed w/ pt and daughter and purposeful rounding ongoing. Meds administered per MAR. Pt c/o pain and PRN tramadol given w/ relief obtained. VSS on RA. Pt AAOx4 and stand-by assist to bathroom. PRN stool softener given for c/o constipation w/ no relief reported at this time. Call bell and personal items w/ in reach. Pt denies needs at this time.     Problem: Skin Injury Risk Increased  Goal: Skin Health and Integrity  Outcome: Progressing     Problem: Adult Inpatient Plan of Care  Goal: Plan of Care Review  Outcome: Progressing  Goal: Patient-Specific Goal (Individualized)  Outcome: Progressing  Goal: Absence of Hospital-Acquired Illness or Injury  Outcome: Progressing  Goal: Optimal Comfort and Wellbeing  Outcome: Progressing  Goal: Readiness for Transition of Care  Outcome: Progressing     Problem: Infection  Goal: Absence of Infection Signs and Symptoms  Outcome: Progressing     Problem: Fall Injury Risk  Goal: Absence of Fall and Fall-Related Injury  Outcome: Progressing
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
## 2024-12-07 VITALS
HEIGHT: 61 IN | OXYGEN SATURATION: 91 % | DIASTOLIC BLOOD PRESSURE: 49 MMHG | TEMPERATURE: 99 F | BODY MASS INDEX: 16.52 KG/M2 | HEART RATE: 87 BPM | WEIGHT: 87.5 LBS | SYSTOLIC BLOOD PRESSURE: 103 MMHG | RESPIRATION RATE: 24 BRPM

## 2024-12-07 PROBLEM — K59.09 OTHER CONSTIPATION: Status: ACTIVE | Noted: 2024-12-07

## 2024-12-07 LAB
ALBUMIN SERPL BCP-MCNC: 1.8 G/DL (ref 3.5–5.2)
ALP SERPL-CCNC: 92 U/L (ref 55–135)
ALT SERPL W/O P-5'-P-CCNC: 9 U/L (ref 10–44)
ANION GAP SERPL CALC-SCNC: 6 MMOL/L (ref 3–11)
AST SERPL-CCNC: 17 U/L (ref 10–40)
BASOPHILS # BLD AUTO: 0.03 K/UL (ref 0–0.2)
BASOPHILS NFR BLD: 0.4 % (ref 0–1.9)
BILIRUB SERPL-MCNC: 0.2 MG/DL (ref 0.1–1)
BUN SERPL-MCNC: 20 MG/DL (ref 8–23)
CALCIUM SERPL-MCNC: 8.6 MG/DL (ref 8.7–10.5)
CHLORIDE SERPL-SCNC: 98 MMOL/L (ref 95–110)
CO2 SERPL-SCNC: 29 MMOL/L (ref 23–29)
CREAT SERPL-MCNC: 0.8 MG/DL (ref 0.5–1.4)
DIFFERENTIAL METHOD BLD: ABNORMAL
EOSINOPHIL # BLD AUTO: 0.1 K/UL (ref 0–0.5)
EOSINOPHIL NFR BLD: 0.9 % (ref 0–8)
ERYTHROCYTE [DISTWIDTH] IN BLOOD BY AUTOMATED COUNT: 15.6 % (ref 11.5–14.5)
EST. GFR  (NO RACE VARIABLE): >60 ML/MIN/1.73 M^2
GLUCOSE SERPL-MCNC: 91 MG/DL (ref 70–110)
HCT VFR BLD AUTO: 25.2 % (ref 37–48.5)
HGB BLD-MCNC: 8.2 G/DL (ref 12–16)
IMM GRANULOCYTES # BLD AUTO: 0.03 K/UL (ref 0–0.04)
IMM GRANULOCYTES NFR BLD AUTO: 0.4 % (ref 0–0.5)
LYMPHOCYTES # BLD AUTO: 1.3 K/UL (ref 1–4.8)
LYMPHOCYTES NFR BLD: 17.3 % (ref 18–48)
MAGNESIUM SERPL-MCNC: 1.8 MG/DL (ref 1.6–2.6)
MCH RBC QN AUTO: 26.9 PG (ref 27–31)
MCHC RBC AUTO-ENTMCNC: 32.5 G/DL (ref 32–36)
MCV RBC AUTO: 83 FL (ref 82–98)
MONOCYTES # BLD AUTO: 0.6 K/UL (ref 0.3–1)
MONOCYTES NFR BLD: 8 % (ref 4–15)
NEUTROPHILS # BLD AUTO: 5.4 K/UL (ref 1.8–7.7)
NEUTROPHILS NFR BLD: 73 % (ref 38–73)
NRBC BLD-RTO: 0 /100 WBC
PLATELET # BLD AUTO: 386 K/UL (ref 150–450)
PMV BLD AUTO: 9.4 FL (ref 9.2–12.9)
POTASSIUM SERPL-SCNC: 4.7 MMOL/L (ref 3.5–5.1)
PROT SERPL-MCNC: 6.3 G/DL (ref 6–8.4)
RBC # BLD AUTO: 3.05 M/UL (ref 4–5.4)
SODIUM SERPL-SCNC: 133 MMOL/L (ref 136–145)
WBC # BLD AUTO: 7.42 K/UL (ref 3.9–12.7)

## 2024-12-07 PROCEDURE — 36415 COLL VENOUS BLD VENIPUNCTURE: CPT

## 2024-12-07 PROCEDURE — 25000242 PHARM REV CODE 250 ALT 637 W/ HCPCS: Performed by: INTERNAL MEDICINE

## 2024-12-07 PROCEDURE — 25000003 PHARM REV CODE 250

## 2024-12-07 PROCEDURE — 94761 N-INVAS EAR/PLS OXIMETRY MLT: CPT

## 2024-12-07 PROCEDURE — 83735 ASSAY OF MAGNESIUM: CPT

## 2024-12-07 PROCEDURE — 25000003 PHARM REV CODE 250: Performed by: INTERNAL MEDICINE

## 2024-12-07 PROCEDURE — 63700000 PHARM REV CODE 250 ALT 637 W/O HCPCS: Performed by: INTERNAL MEDICINE

## 2024-12-07 PROCEDURE — 25000003 PHARM REV CODE 250: Performed by: STUDENT IN AN ORGANIZED HEALTH CARE EDUCATION/TRAINING PROGRAM

## 2024-12-07 PROCEDURE — G0378 HOSPITAL OBSERVATION PER HR: HCPCS

## 2024-12-07 PROCEDURE — 85025 COMPLETE CBC W/AUTO DIFF WBC: CPT

## 2024-12-07 PROCEDURE — 99900035 HC TECH TIME PER 15 MIN (STAT)

## 2024-12-07 PROCEDURE — 80053 COMPREHEN METABOLIC PANEL: CPT

## 2024-12-07 PROCEDURE — 94640 AIRWAY INHALATION TREATMENT: CPT | Mod: XB

## 2024-12-07 PROCEDURE — 99900031 HC PATIENT EDUCATION (STAT)

## 2024-12-07 RX ORDER — AMOXICILLIN 250 MG
1 CAPSULE ORAL ONCE
Status: DISCONTINUED | OUTPATIENT
Start: 2024-12-07 | End: 2024-12-07

## 2024-12-07 RX ORDER — ADHESIVE BANDAGE
30 BANDAGE TOPICAL DAILY PRN
Qty: 500 ML | Refills: 0 | Status: SHIPPED | OUTPATIENT
Start: 2024-12-07 | End: 2024-12-10

## 2024-12-07 RX ORDER — ADHESIVE BANDAGE
30 BANDAGE TOPICAL ONCE
Status: COMPLETED | OUTPATIENT
Start: 2024-12-07 | End: 2024-12-07

## 2024-12-07 RX ORDER — AMOXICILLIN 250 MG
1 CAPSULE ORAL 2 TIMES DAILY
Qty: 60 TABLET | Refills: 0 | Status: SHIPPED | OUTPATIENT
Start: 2024-12-07

## 2024-12-07 RX ORDER — NYSTATIN 100000 [USP'U]/ML
5 SUSPENSION ORAL
Qty: 200 ML | Refills: 0 | Status: SHIPPED | OUTPATIENT
Start: 2024-12-07 | End: 2024-12-17

## 2024-12-07 RX ORDER — MICONAZOLE NITRATE 2 G/100G
POWDER TOPICAL 2 TIMES DAILY
Qty: 85 G | Refills: 0 | Status: SHIPPED | OUTPATIENT
Start: 2024-12-07

## 2024-12-07 RX ORDER — PANTOPRAZOLE SODIUM 40 MG/1
40 TABLET, DELAYED RELEASE ORAL DAILY
Qty: 14 TABLET | Refills: 0 | Status: SHIPPED | OUTPATIENT
Start: 2024-12-08

## 2024-12-07 RX ORDER — ADHESIVE BANDAGE
30 BANDAGE TOPICAL ONCE
Qty: 30 ML | Refills: 0 | Status: SHIPPED | OUTPATIENT
Start: 2024-12-07 | End: 2024-12-07

## 2024-12-07 RX ORDER — CYPROHEPTADINE HYDROCHLORIDE 4 MG/1
4 TABLET ORAL 2 TIMES DAILY
Qty: 60 TABLET | Refills: 0 | Status: SHIPPED | OUTPATIENT
Start: 2024-12-07

## 2024-12-07 RX ORDER — AMOXICILLIN 250 MG
1 CAPSULE ORAL 2 TIMES DAILY
Status: DISCONTINUED | OUTPATIENT
Start: 2024-12-07 | End: 2024-12-07 | Stop reason: HOSPADM

## 2024-12-07 RX ADMIN — DOCUSATE SODIUM 100 MG: 100 CAPSULE, LIQUID FILLED ORAL at 09:12

## 2024-12-07 RX ADMIN — FLUCONAZOLE 100 MG: 100 TABLET ORAL at 09:12

## 2024-12-07 RX ADMIN — CYPROHEPTADINE HYDROCHLORIDE 4 MG: 4 TABLET ORAL at 09:12

## 2024-12-07 RX ADMIN — MICONAZOLE NITRATE: 20 POWDER TOPICAL at 09:12

## 2024-12-07 RX ADMIN — VITAM B12 100 MCG: 100 TAB at 09:12

## 2024-12-07 RX ADMIN — FLUTICASONE FUROATE, UMECLIDINIUM BROMIDE AND VILANTEROL TRIFENATATE 1 PUFF: 200; 62.5; 25 POWDER RESPIRATORY (INHALATION) at 09:12

## 2024-12-07 RX ADMIN — NYSTATIN 500000 UNITS: 100000 SUSPENSION ORAL at 07:12

## 2024-12-07 RX ADMIN — LISINOPRIL 30 MG: 10 TABLET ORAL at 09:12

## 2024-12-07 RX ADMIN — PANTOPRAZOLE SODIUM 40 MG: 40 TABLET, DELAYED RELEASE ORAL at 09:12

## 2024-12-07 RX ADMIN — CITALOPRAM HYDROBROMIDE 20 MG: 10 TABLET ORAL at 09:12

## 2024-12-07 RX ADMIN — IPRATROPIUM BROMIDE AND ALBUTEROL SULFATE 3 ML: 2.5; .5 SOLUTION RESPIRATORY (INHALATION) at 01:12

## 2024-12-07 RX ADMIN — SENNOSIDES AND DOCUSATE SODIUM 1 TABLET: 50; 8.6 TABLET ORAL at 10:12

## 2024-12-07 RX ADMIN — MAGNESIUM HYDROXIDE 2400 MG: 400 SUSPENSION ORAL at 10:12

## 2024-12-07 RX ADMIN — NYSTATIN 500000 UNITS: 100000 SUSPENSION ORAL at 12:12

## 2024-12-07 RX ADMIN — IPRATROPIUM BROMIDE AND ALBUTEROL SULFATE 3 ML: 2.5; .5 SOLUTION RESPIRATORY (INHALATION) at 07:12

## 2024-12-07 RX ADMIN — CARVEDILOL 25 MG: 12.5 TABLET, FILM COATED ORAL at 07:12

## 2024-12-07 NOTE — SUBJECTIVE & OBJECTIVE
Interval History: Patient seen and examined.     Review of Systems   Constitutional:  Positive for unexpected weight change. Negative for activity change, appetite change and fever.   Respiratory:  Negative for chest tightness and shortness of breath.    Cardiovascular:  Negative for chest pain.   Gastrointestinal:  Negative for abdominal distention, abdominal pain, anal bleeding, blood in stool, constipation, diarrhea, nausea, rectal pain and vomiting.   Genitourinary:  Negative for decreased urine volume, difficulty urinating, dysuria, flank pain, frequency, hematuria, urgency, vaginal bleeding, vaginal discharge and vaginal pain.   Musculoskeletal:  Positive for arthralgias, back pain and neck pain.   Skin:  Negative for pallor.   Neurological:  Positive for weakness.   Psychiatric/Behavioral:  The patient is not nervous/anxious.      Objective:     Vital Signs (Most Recent):  Temp: 98.5 °F (36.9 °C) (12/07/24 0719)  Pulse: 94 (12/07/24 1040)  Resp: 18 (12/07/24 0729)  BP: (!) 103/49 (12/07/24 0719)  SpO2: 95 % (12/07/24 0729) Vital Signs (24h Range):  Temp:  [96.4 °F (35.8 °C)-98.5 °F (36.9 °C)] 98.5 °F (36.9 °C)  Pulse:  [77-94] 94  Resp:  [14-18] 18  SpO2:  [88 %-95 %] 95 %  BP: (103-124)/(49-57) 103/49     Weight: 39.7 kg (87 lb 8.4 oz)  Body mass index is 16.54 kg/m².    Intake/Output Summary (Last 24 hours) at 12/7/2024 1245  Last data filed at 12/6/2024 1715  Gross per 24 hour   Intake 240 ml   Output --   Net 240 ml         Physical Exam  Constitutional:       Appearance: Normal appearance. She is not ill-appearing.   HENT:      Head: Normocephalic and atraumatic.      Mouth/Throat:      Mouth: Mucous membranes are dry.   Eyes:      Extraocular Movements: Extraocular movements intact.      Pupils: Pupils are equal, round, and reactive to light.   Cardiovascular:      Rate and Rhythm: Normal rate and regular rhythm.      Pulses: Normal pulses.   Pulmonary:      Breath sounds: No wheezing or rhonchi.    Chest:      Chest wall: No tenderness.   Abdominal:      General: Abdomen is flat. There is no distension.      Palpations: Abdomen is soft. There is no mass.      Tenderness: There is no abdominal tenderness. There is no right CVA tenderness, left CVA tenderness, guarding or rebound.      Hernia: No hernia is present.   Musculoskeletal:      Cervical back: No tenderness.   Skin:     General: Skin is dry.      Coloration: Skin is not pale.   Neurological:      Mental Status: She is alert and oriented to person, place, and time. Mental status is at baseline.   Psychiatric:         Mood and Affect: Mood normal.         Behavior: Behavior normal.             Significant Labs: All pertinent labs within the past 24 hours have been reviewed.  A1C:   Recent Labs   Lab 11/27/24  1335   HGBA1C 5.6     Recent Labs   Lab 12/03/24  0616 12/04/24  0550 12/05/24  0554 12/06/24  0549 12/07/24  0550   BILITOT 0.2 0.3 0.2 0.2 0.2     Recent Labs   Lab 12/07/24  0550   GLU 91   *   K 4.7   CL 98   CO2 29   BUN 20   CREATININE 0.8   CALCIUM 8.6*   MG 1.8     CBC:   Recent Labs   Lab 12/06/24  0549 12/07/24  0550   WBC 7.32 7.42   HGB 9.2* 8.2*   HCT 28.5* 25.2*    386     CMP:   Recent Labs   Lab 12/06/24  0549 12/07/24  0550   * 133*   K 4.9 4.7   CL 99 98   CO2 29 29   GLU 95 91   BUN 19 20   CREATININE 0.8 0.8   CALCIUM 8.8 8.6*   PROT 6.6 6.3   ALBUMIN 1.9* 1.8*   BILITOT 0.2 0.2   ALKPHOS 98 92   AST 13 17   ALT 11 9*   ANIONGAP 6 6      Recent Labs   Lab 12/06/24  0549 12/07/24  0550   MG 1.8 1.8     Lab Results   Component Value Date    TSH 4.970 (H) 11/27/2024    FREET4 1.01 11/27/2024     CT Abdomen Pelvis W WO Contrast  Narrative: EXAMINATION:  CT ABDOMEN PELVIS W WO CONTRAST    CLINICAL HISTORY:  LLQ abdominal pain;    TECHNIQUE:  Iterative reconstruction technique was used.    CT/cardiac nuclear exam/s in prior 12 months:  2.    FINDINGS:  Partially imaged volume loss and bronchiectasis right middle  lobe.  Unremarkable liver and spleen.  Status post cholecystectomy.  Unremarkable pancreas and adrenal glands.  No renal mass hydronephrosis.  Right inguinal hernia containing nondilated loops of bowel.  No bowel obstruction.  No free fluid.  Widening and sclerosis at the SI joints raising the question is of sacroiliitis.  Impression:   1. Right inguinal hernia containing nondilated loops of bowel.  No bowel obstruction.  2. No acute abdominopelvic findings.  3. Possible sacroiliitis.    Electronically signed by: Cesar Burgos MD  Date:    12/03/2024  Time:    14:28     Significant Imaging: I have reviewed all pertinent imaging results/findings within the past 24 hours.

## 2024-12-07 NOTE — PLAN OF CARE
AVS reviewed w/ pt and daughter and purposeful rounding complete. Tele discontinued per discharge orders. Pt discharged robyn w/ home health.

## 2024-12-07 NOTE — ASSESSMENT & PLAN NOTE
12/3 KD: Awaiting Urine C/S  12/5 KD:  Urine CS negative  12/7 KY encouraged maintaining adequate hydration

## 2024-12-07 NOTE — ASSESSMENT & PLAN NOTE
Add daily bowel regimen. Per patient no bowel movement for 5 days during which time, PO intake minimum with appetite improving in the past couple of days. Maintain daily adequate water intake to 1.5 to 2L (50-68 oz) fluid volume.

## 2024-12-07 NOTE — DISCHARGE SUMMARY
[facility] Medicine  Discharge Summary      Patient Name: Ruht Gamboa  MRN: [id number]  ALLI: [id number]  Patient Class: OP- Observation  Admission Date: 12/2/2024  Hospital Length of Stay: 0 days  Discharge Date and Time:  12/07/2024 12:53 PM  Attending Physician: Clark Calix III, MD   Discharging Provider: Hipolito Bond DO  Primary Care Provider: Clark Calix III, MD    Primary Care Team: Networked reference to record PCT     HPI:   79-year-old white female with a history of COPD, cholecystectomy, and HTN, remote hx C-dif, who presents to the ED as instructed by her pcp for evaluation of diarrhea, dehydration, possible C-dif. Patient reports that she has been experiencing diarrhea intermittently over the last 2 months, endorsing worsening over the last 2 weeks, characterized by frequent, black, watery stools with foul odor. Patient also reports generalized weakness and bilateral lower back pain radiating into abdomen and lower extremities. Daughter adds that patient was demonstrating decreased appetite but has been doing well on periactin. Lastly, patient endorses urinary incontinence that has started over the last few days. She denies measured fever, URI s/s, cough, chest pain, or painful urination. She presented to clinic today for evaluation, was found to be tachycardic, and was sent to the ED. Admitted to IP for further evaluation and treatment. CT-ABD/pelvis ordered     * No surgery found *      Hospital Course:   12/4 KD:  Awake and alert, appetite good.  Was not able to obtain stool specimen, but no longer having diarrhea.  CT abdomen/pelvis indicate right hernia, no bowel obstruction, and possible sacroiliitis.  We will order therapy today for strengthening and conditioning.  Requesting IP rehab.   consult to help sign pt. up with [facility] [employer]-meals on wheels and monthly commodities.  12/5 KD:  Awake and alert appetite good no abdominal pain.  No  diarrhea reported.  Awaiting IP rehab approval from insurance.  12/6 KD:  Awaiting insurance approval for IP rehab.  12/7 [place] weekend coverage. Patient alert and awake, sitting upright endorses improvement to appetite. Daughter at bedside voices concern for constipation for 5 days, after episodes of loose bowel movements. Encouraged continued advancement of diet from bland and increasing fluid intake. Skin folds clearing with nystatin powder. Patient with no new complaints, requests to go home with continued strengthening and supportive management at home. Patient to be discharged with home health services.      Goals of Care Treatment Preferences:  Code Status: DNR      SDOH Screening:  The patient declined to be screened for utility difficulties, food insecurity, transport difficulties, housing insecurity, and interpersonal safety, so no concerns could be identified this admission.     Consults:   Consults (From admission, onward)          Status Ordering Provider     Inpatient consult to Social Work/Case Management  Once        Provider:  (Not yet assigned)    Acknowledged ARIANE WHYTE III     Inpatient consult to Social Work/Case Management  Once        Provider:  (Not yet assigned)    Acknowledged ARIANE WHYTE III     Inpatient consult to Registered Dietitian/Nutritionist  Once        Provider:  (Not yet assigned)    Completed ARIANE WHYTE III     Inpatient consult to Social Work/Case Management  Once        Provider:  (Not yet assigned)    Acknowledged ROBB NUNEZ     Inpatient consult to Social Work/Case Management  Once        Provider:  (Not yet assigned)    Acknowledged ROBB NUNEZ     Inpatient consult to Registered Dietitian/Nutritionist  Once        Provider:  (Not yet assigned)    Completed ARIANE WHYTE III            * Diarrhea  12/3 KD: CT-ABD/pelvis ordered today, Labs- iron study, c-diff panel   Resolved      Other constipation  Add daily bowel regimen. Per patient no bowel  movement for 5 days during which time, PO intake minimum with appetite improving in the past couple of days. Maintain daily adequate water intake to 1.5 to 2L (50-68 oz) fluid volume.      Lower abdominal pain    12/3 KD: CT-ABD/pelvis ordered today, Labs- iron study, c-diff panel   12/4 KD:CT abdomen/pelvis indicate right hernia, no bowel obstruction, and possible sacroiliitis.  Plans to consult ortho outpatient.      Dysuria  12/3 KD: Awaiting Urine C/S  12/5 KD:  Urine CS negative  12/7 [place] encouraged maintaining adequate hydration    Weight loss, unintentional  Nutrition consulted. Most recent weight and BMI monitored-     Measurements:  Wt Readings from Last 1 Encounters:   12/03/24 39.7 kg (87 lb 8.4 oz)   Body mass index is 16.54 kg/m².    Patient has been screened and assessed by RD.    Malnutrition Type:  Context: acute illness or injury  Level: severe    Malnutrition Characteristic Summary:  Weight Loss (Malnutrition): greater than 2% in 1 week  Energy Intake (Malnutrition): less than 75% for greater than or equal to 1 month  Subcutaneous Fat (Malnutrition): severe depletion  Muscle Mass (Malnutrition): severe depletion    Interventions/Recommendations (treatment strategy):    12/3 KD: Monitor I&O.  1. Rec'd Cardiac Diet.   2. Rec'd ONS: Ensure Enlive TID to provide 1050kcal and 60g of protein.   3. Rec'd appetite stimulant.  4. Rec'd encourage PO intake and compliance with drinking ONS. 5. Rec'd daily weights. 6. Rec'd daily multivitamin.  7. RD to follow and make rec's accordingly.      Tobacco abuse  Nicotine patch and encourage smoking cessation.       Final Active Diagnoses:    Diagnosis Date Noted POA    PRINCIPAL PROBLEM:  Diarrhea [R19.7] 12/02/2024 Yes    Other constipation [K59.09] 12/07/2024 No    Lower abdominal pain [R10.30] 12/03/2024 Unknown    Dysuria [R30.0] 11/27/2024 Yes    Weight loss, unintentional [R63.4] 11/27/2024 Yes    Tobacco abuse [Z72.0] 12/12/2023 Yes      Problems Resolved  During this Admission:       Discharged Condition: stable    Disposition:     Follow Up:   Contact information for follow-up providers       Clark Calix III, MD Follow up in 3 day(s).    Specialty: Internal Medicine  Contact information:  [address] [facility] [address]  [phone]                       Contact information for after-discharge care       Home Medical Care       [facility] .    Services: Home Rehabilitation, Home Nursing, Home Medical   Contact information:  [address]  [facility] [address]  [phone]                                 Patient Instructions:      Ambulatory referral/consult to Home Health   Standing Status: Future   Referral Priority: Routine Referral Type: Home Health   Referral Reason: Specialty Services Required   Requested Specialty: Home Health Services   Number of Visits Requested: 1       Significant Diagnostic Studies: Labs: BMP:   Recent Labs   Lab 12/06/24  0549 12/07/24  0550   GLU 95 91   * 133*   K 4.9 4.7   CL 99 98   CO2 29 29   BUN 19 20   CREATININE 0.8 0.8   CALCIUM 8.8 8.6*   MG 1.8 1.8   , CMP   Recent Labs   Lab 12/06/24  0549 12/07/24  0550   * 133*   K 4.9 4.7   CL 99 98   CO2 29 29   GLU 95 91   BUN 19 20   CREATININE 0.8 0.8   CALCIUM 8.8 8.6*   PROT 6.6 6.3   ALBUMIN 1.9* 1.8*   BILITOT 0.2 0.2   ALKPHOS 98 92   AST 13 17   ALT 11 9*   ANIONGAP 6 6   , CBC   Recent Labs   Lab 12/06/24  0549 12/07/24  0550   WBC 7.32 7.42   HGB 9.2* 8.2*   HCT 28.5* 25.2*    386   , INR   Lab Results   Component Value Date    INR 1.0 07/14/2022   , Lipid Panel   Lab Results   Component Value Date    CHOL 117 (L) 11/27/2024    HDL 44 11/27/2024    LDLCALC 58.4 (L) 11/27/2024    TRIG 73 11/27/2024    CHOLHDL 37.6 11/27/2024     Recent Labs   Lab 11/27/24  1335   HGBA1C 5.6     CT Abdomen Pelvis W WO Contrast  Narrative: EXAMINATION:  CT ABDOMEN PELVIS W WO CONTRAST    CLINICAL HISTORY:  LLQ abdominal  pain;    TECHNIQUE:  Iterative reconstruction technique was used.    CT/cardiac nuclear exam/s in prior 12 months:  2.    FINDINGS:  Partially imaged volume loss and bronchiectasis right middle lobe.  Unremarkable liver and spleen.  Status post cholecystectomy.  Unremarkable pancreas and adrenal glands.  No renal mass hydronephrosis.  Right inguinal hernia containing nondilated loops of bowel.  No bowel obstruction.  No free fluid.  Widening and sclerosis at the SI joints raising the question is of sacroiliitis.  Impression: 1. Right inguinal hernia containing nondilated loops of bowel.  No bowel obstruction.  2. No acute abdominopelvic findings.  3. Possible sacroiliitis.    Electronically signed by: Cesar Burgos MD  Date:    2024  Time:    14:28     Pending Diagnostic Studies:       None           Medications:  Reconciled Home Medications:      Medication List        START taking these medications      magnesium hydroxide 400 mg/5 ml 400 mg/5 mL Susp  Commonly known as: MILK OF MAGNESIA  Take 30 mLs (2,400 mg total) by mouth once. for 1 dose            ASK your doctor about these medications      * albuterol 2.5 mg /3 mL (0.083 %) nebulizer solution  Commonly known as: PROVENTIL  USE 1 VIAL IN NEBULIZER EVERY 6 HOURS     * albuterol 90 mcg/actuation inhaler  Commonly known as: PROVENTIL/VENTOLIN HFA     * albuterol 90 mcg/actuation inhaler  Commonly known as: PROVENTIL/VENTOLIN HFA  2 puffs Inhalation every 4 hrs for 90 days  as needed for wheeze, cough or shortness of breath     alendronate 70 MG tablet  Commonly known as: FOSAMAX  TAKE 1 TABLET(70 MG) BY MOUTH EVERY 7 DAYS     azelastine 137 mcg (0.1 %) nasal spray  Commonly known as: ASTELIN  1 spray 2 (two) times daily.     budesonide 0.5 mg/2 mL nebulizer solution  Commonly known as: PULMICORT  SMARTSI Vial(s) Both Nares Twice Daily     carvediloL 25 MG tablet  Commonly known as: COREG  Take 1 tablet (25 mg total) by mouth 2 (two) times daily with  meals.     ciprofloxacin HCl 250 MG tablet  Commonly known as: CIPRO  Take 1 tablet (250 mg total) by mouth 2 (two) times daily. for 7 days     citalopram 20 MG tablet  Commonly known as: CeleXA  Take 1 tablet (20 mg total) by mouth once daily.     COMP-AIR NEBULIZER COMPRESSOR Kesha  Generic drug: nebulizer and compressor  use as directed     cyproheptadine 4 mg tablet  Commonly known as: PERIACTIN  Take 1 tablet (4 mg total) by mouth 2 (two) times daily as needed (loss appetite).     famotidine 40 MG tablet  Commonly known as: PEPCID  Take 1 tablet (40 mg total) by mouth once daily.     FeroSuL 325 mg (65 mg iron) Tab tablet  Generic drug: ferrous sulfate  TAKE 1 TABLET BY MOUTH DAILY WITH BREAKFAST     Fish OiL 1,200 (144-216) mg Cap  Generic drug: omega 3-dha-epa-fish oil  Take by mouth.     fluconazole 150 MG Tab  Commonly known as: DIFLUCAN  Take 1 tablet (150 mg total) by mouth once daily. May repeat in 72 hours if needed.     fluticasone propionate 50 mcg/actuation nasal spray  Commonly known as: FLONASE  SHAKE LIQUID AND USE 2 SPRAYS(100 MCG) IN EACH NOSTRIL DAILY     lisinopriL 30 MG tablet  Commonly known as: PRINIVIL,ZESTRIL  Take 1 tablet (30 mg total) by mouth once daily.     mirtazapine 7.5 MG Tab  Commonly known as: REMERON  Take 1 tablet (7.5 mg total) by mouth every evening.     multivitamin per tablet  Commonly known as: THERAGRAN  Take 1 tablet by mouth once daily.     nystatin-triamcinolone ointment  Commonly known as: MYCOLOG  SMARTSIG:Sparingly Topical Daily     omeprazole 20 MG capsule  Commonly known as: PRILOSEC  TAKE 1 CAPSULE BY MOUTH EVERY DAY AS NEEDED     TRELEGY ELLIPTA 100-62.5-25 mcg Dsdv  Generic drug: fluticasone-umeclidin-vilanter  INHALE 1 PUFF BY MOUTH DAILY     VITAMIN D3 25 mcg (1,000 unit) capsule  Generic drug: cholecalciferol (vitamin D3)  Take 1,000 Units by mouth once daily.     vitamin E 400 UNIT capsule  Take 400 Units by mouth once daily.           * This list has 3  medication(s) that are the same as other medications prescribed for you. Read the directions carefully, and ask your doctor or other care provider to review them with you.                  Indwelling Lines/Drains at time of discharge:   Lines/Drains/Airways       None                   Time spent on the discharge of patient: 35 minutes         Hipolito Bond DO  Department of Hospital Medicine  [facility]

## 2024-12-07 NOTE — PLAN OF CARE
POC reviewed with pt, Purposeful rounding ongoing, VSS, Pt denies pain/needs at this time, CB in reach, bed in lowest position, lighting adjusted to pt's preference, care plan ongoing.        Problem: Skin Injury Risk Increased  Goal: Skin Health and Integrity  Outcome: Progressing     Problem: Adult Inpatient Plan of Care  Goal: Plan of Care Review  Outcome: Progressing  Goal: Patient-Specific Goal (Individualized)  Outcome: Progressing  Goal: Absence of Hospital-Acquired Illness or Injury  Outcome: Progressing  Goal: Optimal Comfort and Wellbeing  Outcome: Progressing  Goal: Readiness for Transition of Care  Outcome: Progressing     Problem: Infection  Goal: Absence of Infection Signs and Symptoms  Outcome: Progressing     Problem: Fall Injury Risk  Goal: Absence of Fall and Fall-Related Injury  Outcome: Progressing

## 2024-12-07 NOTE — ASSESSMENT & PLAN NOTE
Nutrition consulted. Most recent weight and BMI monitored-     Measurements:  Wt Readings from Last 1 Encounters:   12/03/24 39.7 kg (87 lb 8.4 oz)   Body mass index is 16.54 kg/m².    Patient has been screened and assessed by RD.    Malnutrition Type:  Context: acute illness or injury  Level: severe    Malnutrition Characteristic Summary:  Weight Loss (Malnutrition): greater than 2% in 1 week  Energy Intake (Malnutrition): less than 75% for greater than or equal to 1 month  Subcutaneous Fat (Malnutrition): severe depletion  Muscle Mass (Malnutrition): severe depletion    Interventions/Recommendations (treatment strategy):    12/3 KD: Monitor I&O.  1. Rec'd Cardiac Diet.   2. Rec'd ONS: Ensure Enlive TID to provide 1050kcal and 60g of protein.   3. Rec'd appetite stimulant.  4. Rec'd encourage PO intake and compliance with drinking ONS. 5. Rec'd daily weights. 6. Rec'd daily multivitamin.  7. RD to follow and make rec's accordingly.

## 2024-12-08 NOTE — PT/OT/SLP DISCHARGE
Physical Therapy Discharge Summary    Name: Ruth Gamboa  MRN: [id number]   Principal Problem: Diarrhea     Patient Discharged from acute Physical Therapy on 2024.  Please refer to prior PT note dated   2024 for functional status.     Assessment:     Patient appropriate for care in another setting.    Objective:     GOALS:   Multidisciplinary Problems       Physical Therapy Goals          Problem: Physical Therapy    Goal Priority Disciplines Outcome Interventions   Physical Therapy Goal     PT, PT/OT Adequate for Care Transition    Description: Goals to be met by 2024  Patient will increase functional independence with mobility by performin. Bed to chair transfer with Modified Independent with or proper A.D.  using Stand step TECHNIQUE  2. Gait  x 800  feet with Modified Independent with proper A.D. 3. Lower extremity exercise program x10 reps .                       Reasons for Discontinuation of Therapy Services  Transfer to alternate level of care.      Plan:     Patient Discharged to: Home with Home Health Service.      2024

## 2024-12-08 NOTE — PLAN OF CARE
Problem: Physical Therapy  Goal: Physical Therapy Goal  Description: Goals to be met by 2024  Patient will increase functional independence with mobility by performin. Bed to chair transfer with Modified Independent with or proper A.D.  using Stand step TECHNIQUE  2. Gait  x 800  feet with Modified Independent with proper A.D. 3. Lower extremity exercise program x10 reps .  Outcome: Adequate for Care Transition, home with home health

## 2024-12-11 LAB — METHYLMALONATE SERPL-SCNC: 0.14 NMOL/ML

## 2025-01-08 ENCOUNTER — LAB VISIT (OUTPATIENT)
Dept: LAB | Facility: HOSPITAL | Age: 80
End: 2025-01-08
Attending: INTERNAL MEDICINE
Payer: MEDICARE

## 2025-01-08 DIAGNOSIS — I10 ESSENTIAL HYPERTENSION, MALIGNANT: ICD-10-CM

## 2025-01-08 DIAGNOSIS — J44.9 VANISHING LUNG: Primary | ICD-10-CM

## 2025-01-08 LAB
ALBUMIN SERPL BCP-MCNC: 2.4 G/DL (ref 3.5–5.2)
ALP SERPL-CCNC: 87 U/L (ref 55–135)
ALT SERPL W/O P-5'-P-CCNC: 8 U/L (ref 10–44)
ANION GAP SERPL CALC-SCNC: 9 MMOL/L (ref 8–16)
AST SERPL-CCNC: 13 U/L (ref 10–40)
BASOPHILS # BLD AUTO: 0.05 K/UL (ref 0–0.2)
BASOPHILS NFR BLD: 0.5 % (ref 0–1.9)
BILIRUB SERPL-MCNC: 0.2 MG/DL (ref 0.1–1)
BUN SERPL-MCNC: 11 MG/DL (ref 8–23)
CALCIUM SERPL-MCNC: 8.6 MG/DL (ref 8.7–10.5)
CHLORIDE SERPL-SCNC: 99 MMOL/L (ref 95–110)
CO2 SERPL-SCNC: 27 MMOL/L (ref 23–29)
CREAT SERPL-MCNC: 0.6 MG/DL (ref 0.5–1.4)
DIFFERENTIAL METHOD BLD: ABNORMAL
EOSINOPHIL # BLD AUTO: 0.1 K/UL (ref 0–0.5)
EOSINOPHIL NFR BLD: 0.8 % (ref 0–8)
ERYTHROCYTE [DISTWIDTH] IN BLOOD BY AUTOMATED COUNT: 15.9 % (ref 11.5–14.5)
EST. GFR  (NO RACE VARIABLE): >60 ML/MIN/1.73 M^2
GLUCOSE SERPL-MCNC: 103 MG/DL (ref 70–110)
HCT VFR BLD AUTO: 29.6 % (ref 37–48.5)
HGB BLD-MCNC: 9.3 G/DL (ref 12–16)
IMM GRANULOCYTES # BLD AUTO: 0.03 K/UL (ref 0–0.04)
IMM GRANULOCYTES NFR BLD AUTO: 0.3 % (ref 0–0.5)
LYMPHOCYTES # BLD AUTO: 1.9 K/UL (ref 1–4.8)
LYMPHOCYTES NFR BLD: 17.3 % (ref 18–48)
MAGNESIUM SERPL-MCNC: 1.9 MG/DL (ref 1.6–2.6)
MCH RBC QN AUTO: 26.2 PG (ref 27–31)
MCHC RBC AUTO-ENTMCNC: 31.4 G/DL (ref 32–36)
MCV RBC AUTO: 83 FL (ref 82–98)
MONOCYTES # BLD AUTO: 0.8 K/UL (ref 0.3–1)
MONOCYTES NFR BLD: 7 % (ref 4–15)
NEUTROPHILS # BLD AUTO: 8.2 K/UL (ref 1.8–7.7)
NEUTROPHILS NFR BLD: 74.1 % (ref 38–73)
NRBC BLD-RTO: 0 /100 WBC
PLATELET # BLD AUTO: 573 K/UL (ref 150–450)
PMV BLD AUTO: 10 FL (ref 9.2–12.9)
POTASSIUM SERPL-SCNC: 4.3 MMOL/L (ref 3.5–5.1)
PROT SERPL-MCNC: 6.4 G/DL (ref 6–8.4)
RBC # BLD AUTO: 3.55 M/UL (ref 4–5.4)
SODIUM SERPL-SCNC: 135 MMOL/L (ref 136–145)
T4 FREE SERPL-MCNC: 1.1 NG/DL (ref 0.71–1.51)
TSH SERPL DL<=0.005 MIU/L-ACNC: 5.88 UIU/ML (ref 0.4–4)
WBC # BLD AUTO: 10.99 K/UL (ref 3.9–12.7)

## 2025-01-08 PROCEDURE — 36415 COLL VENOUS BLD VENIPUNCTURE: CPT | Performed by: INTERNAL MEDICINE

## 2025-01-08 PROCEDURE — 84443 ASSAY THYROID STIM HORMONE: CPT | Performed by: INTERNAL MEDICINE

## 2025-01-08 PROCEDURE — 85025 COMPLETE CBC W/AUTO DIFF WBC: CPT | Performed by: INTERNAL MEDICINE

## 2025-01-08 PROCEDURE — 83735 ASSAY OF MAGNESIUM: CPT | Performed by: INTERNAL MEDICINE

## 2025-01-08 PROCEDURE — 80053 COMPREHEN METABOLIC PANEL: CPT | Performed by: INTERNAL MEDICINE

## 2025-01-08 PROCEDURE — 84439 ASSAY OF FREE THYROXINE: CPT | Performed by: INTERNAL MEDICINE

## 2025-01-09 PROBLEM — E03.9 HYPOTHYROIDISM: Status: ACTIVE | Noted: 2025-01-09

## 2025-01-09 PROBLEM — R79.89 ELEVATED TSH: Status: ACTIVE | Noted: 2025-01-09

## 2025-01-15 ENCOUNTER — HOSPITAL ENCOUNTER (OUTPATIENT)
Dept: RADIOLOGY | Facility: HOSPITAL | Age: 80
Discharge: HOME | End: 2025-01-15
Attending: INTERNAL MEDICINE
Payer: MEDICARE

## 2025-01-15 DIAGNOSIS — R05.9 COUGH, UNSPECIFIED TYPE: ICD-10-CM

## 2025-01-15 DIAGNOSIS — J44.9 CHRONIC OBSTRUCTIVE PULMONARY DISEASE, UNSPECIFIED COPD TYPE: ICD-10-CM

## 2025-01-15 PROCEDURE — 71046 X-RAY EXAM CHEST 2 VIEWS: CPT | Mod: TC

## 2025-02-05 ENCOUNTER — LAB VISIT (OUTPATIENT)
Dept: LAB | Facility: HOSPITAL | Age: 80
End: 2025-02-05
Payer: MEDICARE

## 2025-02-05 DIAGNOSIS — E07.9 DISEASE OF THYROID GLAND: Primary | ICD-10-CM

## 2025-02-05 LAB
T4 FREE SERPL-MCNC: 1 NG/DL (ref 0.71–1.51)
TSH SERPL DL<=0.005 MIU/L-ACNC: 2.87 UIU/ML (ref 0.4–4)

## 2025-02-05 PROCEDURE — 84443 ASSAY THYROID STIM HORMONE: CPT

## 2025-02-05 PROCEDURE — 36415 COLL VENOUS BLD VENIPUNCTURE: CPT

## 2025-02-05 PROCEDURE — 84439 ASSAY OF FREE THYROXINE: CPT

## 2025-02-18 ENCOUNTER — HOSPITAL ENCOUNTER (INPATIENT)
Facility: HOSPITAL | Age: 80
LOS: 9 days | Discharge: HOME HEALTH | DRG: 640 | End: 2025-03-01
Attending: EMERGENCY MEDICINE | Admitting: INTERNAL MEDICINE
Payer: MEDICARE

## 2025-02-18 DIAGNOSIS — R53.81 DEBILITY: ICD-10-CM

## 2025-02-18 DIAGNOSIS — J44.9 CHRONIC OBSTRUCTIVE PULMONARY DISEASE, UNSPECIFIED COPD TYPE: ICD-10-CM

## 2025-02-18 DIAGNOSIS — E43 SEVERE MALNUTRITION: ICD-10-CM

## 2025-02-18 DIAGNOSIS — Z13.6 SCREENING FOR CARDIOVASCULAR CONDITION: ICD-10-CM

## 2025-02-18 DIAGNOSIS — L89.159 PRESSURE INJURY OF SKIN OF SACRAL REGION, UNSPECIFIED INJURY STAGE: ICD-10-CM

## 2025-02-18 DIAGNOSIS — E87.6 HYPOKALEMIA: ICD-10-CM

## 2025-02-18 DIAGNOSIS — R62.7 FAILURE TO THRIVE IN ADULT: Primary | ICD-10-CM

## 2025-02-18 DIAGNOSIS — E83.42 HYPOMAGNESEMIA: ICD-10-CM

## 2025-02-18 DIAGNOSIS — Z72.0 TOBACCO ABUSE: ICD-10-CM

## 2025-02-18 PROBLEM — F17.200 TOBACCO DEPENDENCY: Status: ACTIVE | Noted: 2025-02-18

## 2025-02-18 LAB
ALBUMIN SERPL BCP-MCNC: 2.4 G/DL (ref 3.5–5.2)
ALP SERPL-CCNC: 84 U/L (ref 55–135)
ALT SERPL W/O P-5'-P-CCNC: <8 U/L (ref 10–44)
ANION GAP SERPL CALC-SCNC: 9 MMOL/L (ref 8–16)
AST SERPL-CCNC: 20 U/L (ref 10–40)
BASOPHILS # BLD AUTO: 0.04 K/UL (ref 0–0.2)
BASOPHILS NFR BLD: 0.4 % (ref 0–1.9)
BILIRUB SERPL-MCNC: 0.3 MG/DL (ref 0.1–1)
BUN SERPL-MCNC: 10 MG/DL (ref 8–23)
CALCIUM SERPL-MCNC: 9 MG/DL (ref 8.7–10.5)
CHLORIDE SERPL-SCNC: 98 MMOL/L (ref 95–110)
CO2 SERPL-SCNC: 26 MMOL/L (ref 23–29)
CREAT SERPL-MCNC: 0.6 MG/DL (ref 0.5–1.4)
DIFFERENTIAL METHOD BLD: ABNORMAL
EOSINOPHIL # BLD AUTO: 0.1 K/UL (ref 0–0.5)
EOSINOPHIL NFR BLD: 0.5 % (ref 0–8)
ERYTHROCYTE [DISTWIDTH] IN BLOOD BY AUTOMATED COUNT: 15.9 % (ref 11.5–14.5)
EST. GFR  (NO RACE VARIABLE): >60 ML/MIN/1.73 M^2
GLUCOSE SERPL-MCNC: 90 MG/DL (ref 70–110)
HCT VFR BLD AUTO: 32.9 % (ref 37–48.5)
HGB BLD-MCNC: 10.5 G/DL (ref 12–16)
IMM GRANULOCYTES # BLD AUTO: 0.03 K/UL (ref 0–0.04)
IMM GRANULOCYTES NFR BLD AUTO: 0.3 % (ref 0–0.5)
LACTATE SERPL-SCNC: 1 MMOL/L (ref 0.5–2.2)
LACTATE SERPL-SCNC: 1.1 MMOL/L (ref 0.5–2.2)
LACTATE SERPL-SCNC: 2.6 MMOL/L (ref 0.5–2.2)
LYMPHOCYTES # BLD AUTO: 1.1 K/UL (ref 1–4.8)
LYMPHOCYTES NFR BLD: 10.8 % (ref 18–48)
MAGNESIUM SERPL-MCNC: 1.7 MG/DL (ref 1.6–2.6)
MCH RBC QN AUTO: 25.9 PG (ref 27–31)
MCHC RBC AUTO-ENTMCNC: 31.9 G/DL (ref 32–36)
MCV RBC AUTO: 81 FL (ref 82–98)
MONOCYTES # BLD AUTO: 0.8 K/UL (ref 0.3–1)
MONOCYTES NFR BLD: 8 % (ref 4–15)
NEUTROPHILS # BLD AUTO: 8.2 K/UL (ref 1.8–7.7)
NEUTROPHILS NFR BLD: 80 % (ref 38–73)
NRBC BLD-RTO: 0 /100 WBC
OHS QRS DURATION: 132 MS
OHS QTC CALCULATION: 487 MS
PLATELET # BLD AUTO: 488 K/UL (ref 150–450)
PMV BLD AUTO: 9 FL (ref 9.2–12.9)
POTASSIUM SERPL-SCNC: 3.8 MMOL/L (ref 3.5–5.1)
PROCALCITONIN SERPL IA-MCNC: 0.02 NG/ML
PROT SERPL-MCNC: 6.8 G/DL (ref 6–8.4)
RBC # BLD AUTO: 4.05 M/UL (ref 4–5.4)
SODIUM SERPL-SCNC: 133 MMOL/L (ref 136–145)
WBC # BLD AUTO: 10.27 K/UL (ref 3.9–12.7)

## 2025-02-18 PROCEDURE — 99285 EMERGENCY DEPT VISIT HI MDM: CPT | Mod: 25

## 2025-02-18 PROCEDURE — 63600175 PHARM REV CODE 636 W HCPCS: Performed by: INTERNAL MEDICINE

## 2025-02-18 PROCEDURE — 85025 COMPLETE CBC W/AUTO DIFF WBC: CPT | Performed by: EMERGENCY MEDICINE

## 2025-02-18 PROCEDURE — 83605 ASSAY OF LACTIC ACID: CPT | Mod: 91 | Performed by: EMERGENCY MEDICINE

## 2025-02-18 PROCEDURE — 94799 UNLISTED PULMONARY SVC/PX: CPT

## 2025-02-18 PROCEDURE — 99900031 HC PATIENT EDUCATION (STAT)

## 2025-02-18 PROCEDURE — 96360 HYDRATION IV INFUSION INIT: CPT

## 2025-02-18 PROCEDURE — 25000003 PHARM REV CODE 250: Performed by: EMERGENCY MEDICINE

## 2025-02-18 PROCEDURE — 80053 COMPREHEN METABOLIC PANEL: CPT | Performed by: EMERGENCY MEDICINE

## 2025-02-18 PROCEDURE — 87040 BLOOD CULTURE FOR BACTERIA: CPT | Performed by: EMERGENCY MEDICINE

## 2025-02-18 PROCEDURE — 36415 COLL VENOUS BLD VENIPUNCTURE: CPT | Performed by: EMERGENCY MEDICINE

## 2025-02-18 PROCEDURE — 83735 ASSAY OF MAGNESIUM: CPT | Performed by: EMERGENCY MEDICINE

## 2025-02-18 PROCEDURE — 99900035 HC TECH TIME PER 15 MIN (STAT)

## 2025-02-18 PROCEDURE — 25000242 PHARM REV CODE 250 ALT 637 W/ HCPCS: Performed by: EMERGENCY MEDICINE

## 2025-02-18 PROCEDURE — 94761 N-INVAS EAR/PLS OXIMETRY MLT: CPT

## 2025-02-18 PROCEDURE — 94640 AIRWAY INHALATION TREATMENT: CPT

## 2025-02-18 PROCEDURE — 25000003 PHARM REV CODE 250: Performed by: INTERNAL MEDICINE

## 2025-02-18 PROCEDURE — 36415 COLL VENOUS BLD VENIPUNCTURE: CPT | Mod: XB | Performed by: INTERNAL MEDICINE

## 2025-02-18 PROCEDURE — 83605 ASSAY OF LACTIC ACID: CPT | Mod: 91 | Performed by: INTERNAL MEDICINE

## 2025-02-18 PROCEDURE — 93010 ELECTROCARDIOGRAM REPORT: CPT | Mod: ,,, | Performed by: INTERNAL MEDICINE

## 2025-02-18 PROCEDURE — 84145 PROCALCITONIN (PCT): CPT | Performed by: EMERGENCY MEDICINE

## 2025-02-18 PROCEDURE — 21400001 HC TELEMETRY ROOM

## 2025-02-18 PROCEDURE — 93005 ELECTROCARDIOGRAM TRACING: CPT

## 2025-02-18 RX ORDER — ONDANSETRON 4 MG/1
8 TABLET, ORALLY DISINTEGRATING ORAL EVERY 8 HOURS PRN
Status: DISCONTINUED | OUTPATIENT
Start: 2025-02-18 | End: 2025-03-01 | Stop reason: HOSPADM

## 2025-02-18 RX ORDER — MIRTAZAPINE 7.5 MG/1
7.5 TABLET, FILM COATED ORAL NIGHTLY
Status: DISCONTINUED | OUTPATIENT
Start: 2025-02-18 | End: 2025-02-19

## 2025-02-18 RX ORDER — CARVEDILOL 12.5 MG/1
25 TABLET ORAL 2 TIMES DAILY WITH MEALS
Status: DISCONTINUED | OUTPATIENT
Start: 2025-02-18 | End: 2025-02-18

## 2025-02-18 RX ORDER — LEVOFLOXACIN 250 MG/1
750 TABLET ORAL EVERY OTHER DAY
Status: DISCONTINUED | OUTPATIENT
Start: 2025-02-18 | End: 2025-02-20

## 2025-02-18 RX ORDER — LEVOTHYROXINE SODIUM 25 UG/1
25 TABLET ORAL
Status: DISCONTINUED | OUTPATIENT
Start: 2025-02-19 | End: 2025-03-01 | Stop reason: HOSPADM

## 2025-02-18 RX ORDER — IPRATROPIUM BROMIDE AND ALBUTEROL SULFATE 2.5; .5 MG/3ML; MG/3ML
3 SOLUTION RESPIRATORY (INHALATION) EVERY 4 HOURS PRN
Status: DISCONTINUED | OUTPATIENT
Start: 2025-02-18 | End: 2025-02-26

## 2025-02-18 RX ORDER — FAMOTIDINE 20 MG/1
20 TABLET, FILM COATED ORAL DAILY
Status: DISCONTINUED | OUTPATIENT
Start: 2025-02-18 | End: 2025-02-21

## 2025-02-18 RX ORDER — SODIUM CHLORIDE 0.9 % (FLUSH) 0.9 %
10 SYRINGE (ML) INJECTION
Status: DISCONTINUED | OUTPATIENT
Start: 2025-02-18 | End: 2025-03-01 | Stop reason: HOSPADM

## 2025-02-18 RX ORDER — IBUPROFEN 200 MG
1 TABLET ORAL DAILY
Status: DISCONTINUED | OUTPATIENT
Start: 2025-02-19 | End: 2025-03-01 | Stop reason: HOSPADM

## 2025-02-18 RX ORDER — CITALOPRAM 10 MG/1
20 TABLET ORAL DAILY
Status: DISCONTINUED | OUTPATIENT
Start: 2025-02-18 | End: 2025-03-01 | Stop reason: HOSPADM

## 2025-02-18 RX ORDER — CARVEDILOL 12.5 MG/1
12.5 TABLET ORAL 2 TIMES DAILY WITH MEALS
Status: DISCONTINUED | OUTPATIENT
Start: 2025-02-19 | End: 2025-02-27

## 2025-02-18 RX ORDER — HYDROCODONE BITARTRATE AND ACETAMINOPHEN 5; 325 MG/1; MG/1
1 TABLET ORAL EVERY 6 HOURS PRN
Refills: 0 | Status: DISCONTINUED | OUTPATIENT
Start: 2025-02-18 | End: 2025-03-01 | Stop reason: HOSPADM

## 2025-02-18 RX ORDER — SODIUM CHLORIDE 9 MG/ML
INJECTION, SOLUTION INTRAVENOUS CONTINUOUS
Status: DISCONTINUED | OUTPATIENT
Start: 2025-02-18 | End: 2025-02-23

## 2025-02-18 RX ORDER — BUDESONIDE 0.5 MG/2ML
0.5 INHALANT ORAL EVERY 12 HOURS
Status: DISCONTINUED | OUTPATIENT
Start: 2025-02-18 | End: 2025-03-01 | Stop reason: HOSPADM

## 2025-02-18 RX ORDER — TALC
6 POWDER (GRAM) TOPICAL NIGHTLY PRN
Status: DISCONTINUED | OUTPATIENT
Start: 2025-02-18 | End: 2025-03-01 | Stop reason: HOSPADM

## 2025-02-18 RX ORDER — LISINOPRIL 10 MG/1
10 TABLET ORAL DAILY
Status: DISCONTINUED | OUTPATIENT
Start: 2025-02-19 | End: 2025-02-25

## 2025-02-18 RX ORDER — ACETAMINOPHEN 325 MG/1
650 TABLET ORAL EVERY 8 HOURS PRN
Status: DISCONTINUED | OUTPATIENT
Start: 2025-02-18 | End: 2025-02-25

## 2025-02-18 RX ORDER — CYPROHEPTADINE HYDROCHLORIDE 4 MG/1
4 TABLET ORAL 2 TIMES DAILY
Status: DISCONTINUED | OUTPATIENT
Start: 2025-02-18 | End: 2025-02-19

## 2025-02-18 RX ORDER — LEVOFLOXACIN 250 MG/1
750 TABLET ORAL DAILY
Status: DISCONTINUED | OUTPATIENT
Start: 2025-02-18 | End: 2025-02-18

## 2025-02-18 RX ADMIN — BUDESONIDE 0.5 MG: 0.5 INHALANT RESPIRATORY (INHALATION) at 07:02

## 2025-02-18 RX ADMIN — CARVEDILOL 25 MG: 12.5 TABLET, FILM COATED ORAL at 05:02

## 2025-02-18 RX ADMIN — MIRTAZAPINE 7.5 MG: 7.5 TABLET ORAL at 09:02

## 2025-02-18 RX ADMIN — SODIUM CHLORIDE 1089 ML: 9 INJECTION, SOLUTION INTRAVENOUS at 11:02

## 2025-02-18 RX ADMIN — SODIUM CHLORIDE: 9 INJECTION, SOLUTION INTRAVENOUS at 11:02

## 2025-02-18 RX ADMIN — HYDROCORTISONE SODIUM SUCCINATE 50 MG: 100 INJECTION, POWDER, FOR SOLUTION INTRAMUSCULAR; INTRAVENOUS at 10:02

## 2025-02-18 RX ADMIN — HYDROCODONE BITARTRATE AND ACETAMINOPHEN 1 TABLET: 5; 325 TABLET ORAL at 09:02

## 2025-02-18 RX ADMIN — SODIUM CHLORIDE: 9 INJECTION, SOLUTION INTRAVENOUS at 03:02
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
## 2025-02-18 NOTE — PROGRESS NOTES
Pharmacist Renal Dose Adjustment Note    Ruth Gamboa is a 79 y.o. female being treated with the medication Levofloxacin    Patient Data:    Vital Signs (Most Recent):  Temp: 98.4 °F (36.9 °C) (02/18/25 1105)  Pulse: 98 (02/18/25 1432)  Resp: (!) 26 (02/18/25 1105)  BP: (!) 157/70 (02/18/25 1432)  SpO2: 96 % (02/18/25 1432) Vital Signs (72h Range):  Temp:  [98.4 °F (36.9 °C)]   Pulse:  []   Resp:  [26]   BP: (157-176)/(70-94)   SpO2:  [93 %-97 %]      Recent Labs   Lab 02/18/25  1133   CREATININE 0.6     Serum creatinine: 0.6 mg/dL 02/18/25 1133  Estimated creatinine clearance: 44.3 mL/min    Medication: Levofloxacin dose: 750 mg frequency  QD will be changed to medication: Levofloxacin dose: 750 mg frequency: every other day due to crcl 20-50 ml/min    Pharmacist's Name: Kimberly Conde  Pharmacist's Extension: [phone]

## 2025-02-19 PROBLEM — R13.10 DYSPHAGIA: Status: ACTIVE | Noted: 2025-02-19

## 2025-02-19 PROBLEM — E87.6 HYPOKALEMIA: Status: ACTIVE | Noted: 2025-02-19

## 2025-02-19 PROBLEM — R62.7 ADULT FAILURE TO THRIVE: Status: ACTIVE | Noted: 2025-02-19

## 2025-02-19 PROBLEM — E87.1 HYPONATREMIA: Status: ACTIVE | Noted: 2025-02-19

## 2025-02-19 PROBLEM — E83.42 HYPOMAGNESEMIA: Status: ACTIVE | Noted: 2025-02-19

## 2025-02-19 PROBLEM — E86.0 DEHYDRATION: Status: ACTIVE | Noted: 2025-02-19

## 2025-02-19 LAB
ALBUMIN SERPL BCP-MCNC: 2 G/DL (ref 3.5–5.2)
ALP SERPL-CCNC: 77 U/L (ref 55–135)
ALT SERPL W/O P-5'-P-CCNC: <8 U/L (ref 10–44)
ANION GAP SERPL CALC-SCNC: 9 MMOL/L (ref 8–16)
AST SERPL-CCNC: 12 U/L (ref 10–40)
BACTERIA #/AREA URNS HPF: NEGATIVE /HPF
BASOPHILS # BLD AUTO: 0.01 K/UL (ref 0–0.2)
BASOPHILS NFR BLD: 0.1 % (ref 0–1.9)
BILIRUB SERPL-MCNC: 0.1 MG/DL (ref 0.1–1)
BILIRUB UR QL STRIP: NEGATIVE
BUN SERPL-MCNC: 9 MG/DL (ref 8–23)
CALCIUM SERPL-MCNC: 7.9 MG/DL (ref 8.7–10.5)
CHLORIDE SERPL-SCNC: 104 MMOL/L (ref 95–110)
CLARITY UR: CLEAR
CO2 SERPL-SCNC: 20 MMOL/L (ref 23–29)
COLOR UR: YELLOW
CREAT SERPL-MCNC: 0.5 MG/DL (ref 0.5–1.4)
DIFFERENTIAL METHOD BLD: ABNORMAL
EOSINOPHIL # BLD AUTO: 0 K/UL (ref 0–0.5)
EOSINOPHIL NFR BLD: 0 % (ref 0–8)
ERYTHROCYTE [DISTWIDTH] IN BLOOD BY AUTOMATED COUNT: 16.2 % (ref 11.5–14.5)
EST. GFR  (NO RACE VARIABLE): >60 ML/MIN/1.73 M^2
GLUCOSE SERPL-MCNC: 190 MG/DL (ref 70–110)
GLUCOSE UR QL STRIP: NEGATIVE
HCT VFR BLD AUTO: 28.9 % (ref 37–48.5)
HGB BLD-MCNC: 9 G/DL (ref 12–16)
HGB UR QL STRIP: NEGATIVE
HYALINE CASTS #/AREA URNS LPF: 0.7 /LPF
IMM GRANULOCYTES # BLD AUTO: 0.04 K/UL (ref 0–0.04)
IMM GRANULOCYTES NFR BLD AUTO: 0.5 % (ref 0–0.5)
KETONES UR QL STRIP: NEGATIVE
LEUKOCYTE ESTERASE UR QL STRIP: ABNORMAL
LYMPHOCYTES # BLD AUTO: 0.6 K/UL (ref 1–4.8)
LYMPHOCYTES NFR BLD: 7.9 % (ref 18–48)
MAGNESIUM SERPL-MCNC: 1.5 MG/DL (ref 1.6–2.6)
MCH RBC QN AUTO: 25.8 PG (ref 27–31)
MCHC RBC AUTO-ENTMCNC: 31.1 G/DL (ref 32–36)
MCV RBC AUTO: 83 FL (ref 82–98)
MICROSCOPIC COMMENT: ABNORMAL
MONOCYTES # BLD AUTO: 0.2 K/UL (ref 0.3–1)
MONOCYTES NFR BLD: 2.5 % (ref 4–15)
NEUTROPHILS # BLD AUTO: 6.9 K/UL (ref 1.8–7.7)
NEUTROPHILS NFR BLD: 89 % (ref 38–73)
NITRITE UR QL STRIP: NEGATIVE
NRBC BLD-RTO: 0 /100 WBC
PH UR STRIP: 7 [PH] (ref 5–8)
PLATELET # BLD AUTO: 429 K/UL (ref 150–450)
PMV BLD AUTO: 9 FL (ref 9.2–12.9)
POTASSIUM SERPL-SCNC: 3.2 MMOL/L (ref 3.5–5.1)
PROT SERPL-MCNC: 5.7 G/DL (ref 6–8.4)
PROT UR QL STRIP: NEGATIVE
RBC # BLD AUTO: 3.49 M/UL (ref 4–5.4)
RBC #/AREA URNS HPF: 0 /HPF (ref 0–4)
SODIUM SERPL-SCNC: 133 MMOL/L (ref 136–145)
SP GR UR STRIP: 1.01 (ref 1–1.03)
SQUAMOUS #/AREA URNS HPF: 2 /HPF
URN SPEC COLLECT METH UR: ABNORMAL
UROBILINOGEN UR STRIP-ACNC: 1 EU/DL
WBC # BLD AUTO: 7.74 K/UL (ref 3.9–12.7)
WBC #/AREA URNS HPF: 1 /HPF (ref 0–5)

## 2025-02-19 PROCEDURE — 25000003 PHARM REV CODE 250: Performed by: EMERGENCY MEDICINE

## 2025-02-19 PROCEDURE — 85025 COMPLETE CBC W/AUTO DIFF WBC: CPT | Performed by: INTERNAL MEDICINE

## 2025-02-19 PROCEDURE — 97530 THERAPEUTIC ACTIVITIES: CPT

## 2025-02-19 PROCEDURE — 97116 GAIT TRAINING THERAPY: CPT

## 2025-02-19 PROCEDURE — 63600175 PHARM REV CODE 636 W HCPCS

## 2025-02-19 PROCEDURE — 99900031 HC PATIENT EDUCATION (STAT)

## 2025-02-19 PROCEDURE — 94640 AIRWAY INHALATION TREATMENT: CPT | Mod: XB

## 2025-02-19 PROCEDURE — 25000003 PHARM REV CODE 250: Performed by: INTERNAL MEDICINE

## 2025-02-19 PROCEDURE — S4991 NICOTINE PATCH NONLEGEND: HCPCS | Performed by: INTERNAL MEDICINE

## 2025-02-19 PROCEDURE — 97161 PT EVAL LOW COMPLEX 20 MIN: CPT

## 2025-02-19 PROCEDURE — 63600175 PHARM REV CODE 636 W HCPCS: Performed by: INTERNAL MEDICINE

## 2025-02-19 PROCEDURE — S0179 MEGESTROL 20 MG: HCPCS

## 2025-02-19 PROCEDURE — 97165 OT EVAL LOW COMPLEX 30 MIN: CPT

## 2025-02-19 PROCEDURE — 99900035 HC TECH TIME PER 15 MIN (STAT)

## 2025-02-19 PROCEDURE — 80053 COMPREHEN METABOLIC PANEL: CPT | Performed by: INTERNAL MEDICINE

## 2025-02-19 PROCEDURE — 25000003 PHARM REV CODE 250

## 2025-02-19 PROCEDURE — 94761 N-INVAS EAR/PLS OXIMETRY MLT: CPT

## 2025-02-19 PROCEDURE — 83735 ASSAY OF MAGNESIUM: CPT | Performed by: INTERNAL MEDICINE

## 2025-02-19 PROCEDURE — 25000242 PHARM REV CODE 250 ALT 637 W/ HCPCS: Performed by: EMERGENCY MEDICINE

## 2025-02-19 PROCEDURE — 36415 COLL VENOUS BLD VENIPUNCTURE: CPT | Performed by: INTERNAL MEDICINE

## 2025-02-19 PROCEDURE — G0378 HOSPITAL OBSERVATION PER HR: HCPCS

## 2025-02-19 PROCEDURE — 81000 URINALYSIS NONAUTO W/SCOPE: CPT | Performed by: EMERGENCY MEDICINE

## 2025-02-19 RX ORDER — MEGESTROL ACETATE 40 MG/ML
200 SUSPENSION ORAL DAILY
Status: DISCONTINUED | OUTPATIENT
Start: 2025-02-19 | End: 2025-03-01 | Stop reason: HOSPADM

## 2025-02-19 RX ORDER — MIRTAZAPINE 15 MG/1
15 TABLET, FILM COATED ORAL NIGHTLY
Status: DISCONTINUED | OUTPATIENT
Start: 2025-02-19 | End: 2025-03-01 | Stop reason: HOSPADM

## 2025-02-19 RX ORDER — MAGNESIUM SULFATE HEPTAHYDRATE 40 MG/ML
2 INJECTION, SOLUTION INTRAVENOUS ONCE
Status: COMPLETED | OUTPATIENT
Start: 2025-02-19 | End: 2025-02-19

## 2025-02-19 RX ORDER — DIAPER,BRIEF,INFANT-TODD,DISP
EACH MISCELLANEOUS 2 TIMES DAILY
Status: DISCONTINUED | OUTPATIENT
Start: 2025-02-19 | End: 2025-03-01 | Stop reason: HOSPADM

## 2025-02-19 RX ORDER — POTASSIUM CHLORIDE 7.45 MG/ML
10 INJECTION INTRAVENOUS
Status: COMPLETED | OUTPATIENT
Start: 2025-02-19 | End: 2025-02-19

## 2025-02-19 RX ADMIN — POTASSIUM CHLORIDE 10 MEQ: 7.46 INJECTION, SOLUTION INTRAVENOUS at 12:02

## 2025-02-19 RX ADMIN — MIRTAZAPINE 15 MG: 15 TABLET, FILM COATED ORAL at 09:02

## 2025-02-19 RX ADMIN — HYDROCORTISONE SODIUM SUCCINATE 50 MG: 100 INJECTION, POWDER, FOR SOLUTION INTRAMUSCULAR; INTRAVENOUS at 02:02

## 2025-02-19 RX ADMIN — BUDESONIDE 0.5 MG: 0.5 INHALANT RESPIRATORY (INHALATION) at 08:02

## 2025-02-19 RX ADMIN — CITALOPRAM HYDROBROMIDE 20 MG: 10 TABLET ORAL at 08:02

## 2025-02-19 RX ADMIN — HYDROCORTISONE: 0.5 CREAM TOPICAL at 10:02

## 2025-02-19 RX ADMIN — CARVEDILOL 12.5 MG: 12.5 TABLET, FILM COATED ORAL at 08:02

## 2025-02-19 RX ADMIN — LEVOTHYROXINE SODIUM 25 MCG: 25 TABLET ORAL at 05:02

## 2025-02-19 RX ADMIN — FAMOTIDINE 20 MG: 20 TABLET, FILM COATED ORAL at 08:02

## 2025-02-19 RX ADMIN — SODIUM CHLORIDE: 9 INJECTION, SOLUTION INTRAVENOUS at 09:02

## 2025-02-19 RX ADMIN — CYPROHEPTADINE HYDROCHLORIDE 4 MG: 4 TABLET ORAL at 08:02

## 2025-02-19 RX ADMIN — CARVEDILOL 12.5 MG: 12.5 TABLET, FILM COATED ORAL at 05:02

## 2025-02-19 RX ADMIN — MEGESTROL ACETATE 200 MG: 400 SUSPENSION ORAL at 10:02

## 2025-02-19 RX ADMIN — LISINOPRIL 10 MG: 10 TABLET ORAL at 08:02

## 2025-02-19 RX ADMIN — HYDROCORTISONE: 0.5 CREAM TOPICAL at 09:02

## 2025-02-19 RX ADMIN — POTASSIUM CHLORIDE 10 MEQ: 7.46 INJECTION, SOLUTION INTRAVENOUS at 10:02

## 2025-02-19 RX ADMIN — HYDROCORTISONE SODIUM SUCCINATE 50 MG: 100 INJECTION, POWDER, FOR SOLUTION INTRAMUSCULAR; INTRAVENOUS at 09:02

## 2025-02-19 RX ADMIN — HYDROCORTISONE SODIUM SUCCINATE 50 MG: 100 INJECTION, POWDER, FOR SOLUTION INTRAMUSCULAR; INTRAVENOUS at 05:02

## 2025-02-19 RX ADMIN — IPRATROPIUM BROMIDE AND ALBUTEROL SULFATE 3 ML: 2.5; .5 SOLUTION RESPIRATORY (INHALATION) at 08:02

## 2025-02-19 RX ADMIN — NICOTINE 1 PATCH: 14 PATCH, EXTENDED RELEASE TRANSDERMAL at 08:02

## 2025-02-19 RX ADMIN — MAGNESIUM SULFATE IN WATER 2 G: 40 INJECTION, SOLUTION INTRAVENOUS at 10:02

## 2025-02-19 NOTE — HPI
"ED HPI:    Chief Complaint   Patient presents with    Fever       Family stated that for the past 2 weeks pt has been having worsening generalized weakness / fever - hypotension / low SPO2 - developing wounds. Started on Levaquin on Sunday.       79-year-old female with a history of arthritis, back pain, COPD, GERD, hypertension presents to the emergency room at the direction of "home health".  They state her blood pressure was low, oxygen saturation was low, and she was dehydrated needs to be admitted to the hospital.  Family concerned about a bed sore that has been worsening over the past week or so.  Not eating and drinking well.  Continues to lose weight.  They are requesting that she be admitted to the hospital.  Questionable fever at home.    ED Course as of 02/18/25 1244   Tue Feb 18, 2025   1231 Chest x-ray with chronic changes [SD]   1237 Discussed case with  - will admit for failure to thrive, possible nursing home placement, wound care [SD     IM HPI:  Agree with above HPI.  Patient is lying in bed this morning and is awake, alert, and oriented.  She states she is feeling much better this morning.  Daughter reports patient has had decreased p.o. intake over the past few weeks.  Daughter reports continued unintentional weight loss.  Daughter reports patient has been running fever over the past few days.  Daughter reports patient has O2 levels have been in the mid 80s and patient has been hypotensive for the past several days.  Patient also noted to have incontinence dermatitis to perineum, buttocks, sacral area.  Wound care consulted.  Continue current treatment plan and monitoring.  Patient's vital signs stable this morning.  Hyponatremia noted.  We will continue IV fluids.  Hypomagnesemia and hypokalemia also noted.  We will replenish today.  I have had long conversation with patient and daughter regarding prognosis and discharge planning.  Patient is of sound mind and body.  She states she " just does not feel like taking medication or eating most of the time.  Daughter does report patient has had difficulty swallowing for past few weeks.  We will consult speech therapy.  Patient states she will do it when she wants to.  Discussed with patient that taking medications as prescribed and good nutrition or vital to patient's overall health and wound healing.  Patient states she is aware and is aware of the consequences should she not take medications or should she continue to not eat.  Daughter states they would like patient to remain in the home at this time.  She states they feel they are still able to care for patient at home.  We have discussed discharge options including hospice.  Daughter states she feels this would be beneficial service however patient states she does not feel that is necessary at this time.  Discussed patient's case with  who we will further discuss discharge planning with patient and family.

## 2025-02-19 NOTE — ASSESSMENT & PLAN NOTE
March 5, 2024      Fritz Godyo  [address] [id number]        To Whom It May Concern:    Fritz Godoy was seen in our clinic. She may return to school 3/5/2024 without restrictions.      Sincerely,        Mily Jacinto MD           Patient's most recent potassium results are listed below.   Recent Labs     02/18/25  1133 02/19/25  0541   K 3.8 3.2*     Plan  - Replete potassium per protocol  - Monitor potassium Daily  - Patient's hypokalemia is worsening. Will adjust treatment as follows:  IV replacement ordered

## 2025-02-19 NOTE — PLAN OF CARE
02/19/25 0946   Medicare Message   Important Message from Medicare regarding Discharge Appeal Rights Given to patient/caregiver;Explained to patient/caregiver;Signed/date by patient/caregiver;Other (comments)  (completed in registration)   Date IMM was signed 02/18/25   Time IMM was signed [id number]        > 5% in 1 month

## 2025-02-19 NOTE — PLAN OF CARE
Recommendations  1. Rec'd Cardiac Diet.   -Consistency rec's as per SLP.        2. Rec'd ONS: Ensure Enlive TID to provide 1050kcal and 60g of protein.        3. Rec'd appetite stimulant.      4. Rec'd Beneprotein TID.   5. Rec'd Moshe BID to promote wound healing and to provide additional nutrition.    6. Rec'd encourage PO intake and compliance with drinking ONS.   7. Rec'd daily weights.        8. Rec'd daily multivitamin.        9. RD to follow and make rec's accordingly.  Goals:   1. Pt will consume > 50% of meals by next RD follow up.  2. Pt will be started on ONS by next RD follow up.  Nutrition Goal Status: new

## 2025-02-19 NOTE — PT/OT/SLP PROGRESS
Speech Language Pathology      Ruth Gamboa  MRN: [id number]    Patient not seen today at 1135 ST evaluation attempt secondary to pt working w/ OT. Will follow-up as schedule allows.    2/19/2025

## 2025-02-19 NOTE — ASSESSMENT & PLAN NOTE
Hyponatremia is likely due to Dehydration/hypovolemia. The patient's most recent sodium results are listed below.  Recent Labs     02/18/25  1133 02/19/25  0541   * 133*     Plan  - Correct the sodium by 4-6mEq in 24 hours.   - Will treat the hyponatremia with IV fluids as follows: 127mL/hr  - Monitor sodium Daily.   - Patient hyponatremia is stable

## 2025-02-19 NOTE — ASSESSMENT & PLAN NOTE
Patient has recurrent depression which is moderate and is currently uncontrolled. Will Increase anti-depressant medications. We will not consult psychiatry at this time. Patient does not display psychosis at this time. Continue to monitor closely and adjust plan of care as needed.
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
## 2025-02-19 NOTE — ASSESSMENT & PLAN NOTE
Nutrition consulted. Most recent weight and BMI monitored-     Measurements:  Wt Readings from Last 1 Encounters:   02/18/25 36.3 kg (80 lb)   Body mass index is 14.17 kg/m².    Patient has been screened and assessed by RD.    Malnutrition Type:  Context:    Level:      Malnutrition Characteristic Summary:       Interventions/Recommendations (treatment strategy):

## 2025-02-19 NOTE — PLAN OF CARE
02/19/25 1316   ARIAS Message   Medicare Outpatient and Observation Notification regarding financial responsibility Given to patient/caregiver;Explained to patient/caregiver;Signed/date by patient/caregiver   Date ARIAS was signed 02/19/25   Time ARIAS was signed 1307

## 2025-02-19 NOTE — PLAN OF CARE
Problem: Physical Therapy  Goal: Physical Therapy Goal  Description: Goals to be met by: 2025     Patient will increase functional independence with mobility by performin. Gait  x 800 feet with Modified [place] using Rolling Walker.     Outcome: Plan of Care Established

## 2025-02-19 NOTE — PLAN OF CARE
Plan of care reviewed with the patient. Patient ate 50% of supper provided.  Call bell in reach. Patient educated to not get out of bed without assistance.
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
## 2025-02-19 NOTE — CONSULTS
Incontinence-asssociated dermatitis to perineum, sacrum  and buttocks.  Orders obtained to cleanse perineum and buttocks with perineal cleanser, pat dry and apply zinc oxide 2 X daily and prn soiling
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
## 2025-02-19 NOTE — CONSULTS
[facility]  Adult Nutrition  Consult Note    SUMMARY     Recommendations  1. Rec'd Cardiac Diet.   -Consistency rec's as per SLP.        2. Rec'd ONS: Ensure Enlive TID to provide 1050kcal and 60g of protein.        3. Rec'd appetite stimulant.      4. Rec'd Beneprotein TID.   5. Rec'd Moshe BID to promote wound healing and to provide additional nutrition.    6. Rec'd encourage PO intake and compliance with drinking ONS.   7. Rec'd daily weights.        8. Rec'd daily multivitamin.        9. RD to follow and make rec's accordingly.  Goals:   1. Pt will consume > 50% of meals by next RD follow up.  2. Pt will be started on ONS by next RD follow up.  Nutrition Goal Status: new  Communication of RD Recs: reviewed with RN    Assessment and Plan    Nutrition Problem  Severe Malnutrition  in the context of Chronic Illness (COPD)    Related to (etiology):   Inadequate protein energy intake  secondary to increased nutrient needs (calories and protein)    Signs and Symptoms (as evidenced by):   Severe muscle loss, severe fat loss, weight loss > 2% in 1 week, energy intake less than 75% for greater than 3 months, BMI = 14.18       Interventions/Recommendations (treatment strategy):  1. Rec'd Cardiac Diet.   -Consistency rec's as per SLP.        2. Rec'd ONS: Ensure Enlive TID to provide 1050kcal and 60g of protein.        3. Rec'd appetite stimulant.      4. Rec'd Beneprotein TID.   5. Rec'd Moshe BID to promote wound healing and to provide additional nutrition.    6. Rec'd encourage PO intake and compliance with drinking ONS.   7. Rec'd daily weights.        8. Rec'd daily multivitamin.        9. RD to follow and make rec's accordingly.    Nutrition Diagnosis Status:   New    Malnutrition Assessment  Malnutrition Context: chronic illness  Malnutrition Level: severe  Skin (Micronutrient): thinned, dry, wounds unhealed  Nails (Micronutrient): brittle, thin  Hair/Scalp (Micronutrient): dry, plucked  "easily  Lips/Mucous Membranes (Micronutrient): pallor  Teeth (Micronutrient): broken dentition  Tongue (Micronutrient): other (see comments) (Yeast spots)  Neck/Chest (Micronutrient): muscle wasting, bony prominence, subcutaneous fat loss  Musculoskeletal/Lower Extremities: subcutaneous fat loss, muscle wasting   Micronutrient Evaluation Summary: suspected deficiency   Energy Intake (Malnutrition): less than 75% for greater than or equal to 3 months  Subcutaneous Fat (Malnutrition): severe depletion  Muscle Mass (Malnutrition): severe depletion   Orbital Region (Subcutaneous Fat Loss): severe depletion  Upper Arm Region (Subcutaneous Fat Loss): severe depletion  Thoracic and Lumbar Region: severe depletion   [place] Region (Muscle Loss): severe depletion  Clavicle Bone Region (Muscle Loss): severe depletion  Clavicle and Acromion Bone Region (Muscle Loss): severe depletion  Scapular Bone Region (Muscle Loss): severe depletion  Dorsal Hand (Muscle Loss): severe depletion  Patellar Region (Muscle Loss): severe depletion  Anterior Thigh Region (Muscle Loss): severe depletion  Posterior Calf Region (Muscle Loss): severe depletion     Nutrition Related Social Determinants of Health:  As per , pt does have some social determinants of health. Discussed with  options for meals on wheels and commodities but  says pt/pt's family will have to set up themselves once discharged from the hospital.    Reason for Assessment    Reason For Assessment: consult (anorexia; unintentional weight loss)  Diagnosis: other (see comments) (Hypomagnesemia)  General Information Comments: RD consulted for anorexia and unintentional weight loss. Pt is severly malnourished with both muscle wasting and fat loss. As per pt's daughter the pt has lost 2-13 lbs in the last week. Noted pt's hx of COPD. Pt reports she "eats when she wants to eat." and verbalizes that she understands what she needs to do for her " "health. Rec'd multiple ONS and appetite stimulant. Continune to encourage PO intake and compliance with all ONS once ordered. RD to follow and make rec's accordingly.  Nutrition Discharge Planning: TBD as care progresses    Nutrition/Diet History    Nutrition Intake History: General diet. Pt reports she eats when she wants to.  Food Preferences: None  Spiritual, Cultural Beliefs, Bahai Practices, Values that Affect Care: no  Food Allergies: NKFA  Factors Affecting Nutritional Intake: decreased appetite, difficulty/impaired swallowing    Anthropometrics    Height: 5' 3" (160 cm)  Height (inches): 63 in  Height Method: Stated  Weight: 36.3 kg (80 lb 0.4 oz)  Weight (lb): 80.03 lb  Weight Method: Bed Scale  Ideal Body Weight (IBW), Female: 115 lb  % Ideal Body Weight, Female (lb): 69.59 %  % Ideal Body Weight Malnutrition: less than 70% -severe deficit  BMI (Calculated): 14.2  Weight Loss: unintentional  Usual Body Weight (UBW), k.44 kg  % Usual Body Weight: 73.58  % Weight Change From Usual Weight: -26.58 %    Lab/Procedures/Meds    Pertinent Labs Reviewed: reviewed  Pertinent Medications Reviewed: reviewed    Estimated/Assessed Needs    Weight Used For Calorie Calculations: 36.3 kg (80 lb 0.4 oz)  Energy Calorie Requirements (kcal): 1003-4586 (35-40 kcal/kg)  Energy Need Method: Kcal/kg  Protein Requirements: 54-72 (1.5-2.0g/kg)  Weight Used For Protein Calculations: 36.3 kg (80 lb 0.4 oz)  Fluid Requirements (mL): 1194-7677 (1mL/kcal)  Estimated Fluid Requirement Method: RDA Method  RDA Method (mL): 1270    Nutrition Prescription Ordered    Current Diet Order: Cardiac  Oral Nutrition Supplement: None    Evaluation of Received Nutrient/Fluid Intake    % Kcal Needs: 0-25%  % Protein Needs: 0-25%  Energy Calories Required: not meeting needs  Protein Required: not meeting needs  Tolerance: tolerating  % Intake of Estimated Energy Needs: 0 - 25 %  % Meal Intake: 0 - 25 %    Nutrition Risk    Level of " Risk/Frequency of Follow-up: moderate     Monitor and Evaluation    Food and Nutrient Intake: energy intake, food and beverage intake  Food and Nutrient Adminstration: diet order  Knowledge/Beliefs/Attitudes: beliefs and attitudes, food and nutrition knowledge/skill  Physical Activity and Function: nutrition-related ADLs and IADLs  Anthropometric Measurements: height/length, weight, weight change, body mass index  Biochemical Data, Medical Tests and Procedures: electrolyte and renal panel, gastrointestinal profile, glucose/endocrine profile, inflammatory profile, lipid profile  Nutrition-Focused Physical Findings: overall appearance     Nutrition Follow-Up    RD Follow-up?: Yes

## 2025-02-19 NOTE — PLAN OF CARE
Plan of care reviewed and ongoing with patient and daughter at the bedside. 18 gauge IV to L FA saline locked, room air tolerating well. Ambulated to BR independently, free of falls during the night. PRN medication administered for pain with relief noted. Call light and personal items within reach.         Problem: Skin Injury Risk Increased  Goal: Skin Health and Integrity  Outcome: Not Progressing     Problem: Adult Inpatient Plan of Care  Goal: Plan of Care Review  Outcome: Not Progressing  Goal: Patient-Specific Goal (Individualized)  Outcome: Not Progressing  Goal: Absence of Hospital-Acquired Illness or Injury  Outcome: Not Progressing  Goal: Optimal Comfort and Wellbeing  Outcome: Not Progressing  Goal: Readiness for Transition of Care  Outcome: Not Progressing

## 2025-02-19 NOTE — ASSESSMENT & PLAN NOTE
Patient with Acute on chronic debility due to age-related physical debility. The patient's latest AMPAC (Activity Measure for Post Acute Care) Score is listed below.    AM-PAC Score - How much help does the patient need for each activity listed  Basic Mobility Total Score: 17  Turning over in bed (including adjusting bedclothes, sheets and blankets)?: A little  Sitting down on and standing up from a chair with arms (e.g., wheelchair, bedside commode, etc.): A little  Moving from lying on back to sitting on the side of the bed?: A little  Moving to and from a bed to a chair (including a wheelchair)?: A little  Need to walk in hospital room?: A little  Climbing 3-5 steps with a railing?: A lot    Plan  - Progressive mobility protocol initated  - PT/OT consulted  - Fall precautions in place

## 2025-02-19 NOTE — PLAN OF CARE
Problem: Occupational Therapy  Goal: Occupational Therapy Goal  Description: Goals to be met by: 02/26/25     Patient will increase functional independence with ADLs by performing:    Feeding with Modified [place].  UE Dressing with Modified [place].  LE Dressing with Modified [place].  Grooming while standing at sink with Modified [place].  Toileting from toilet with Modified [place] for hygiene and clothing management.   Bathing from  shower chair/bench with Set-up Assistance.  Toilet transfer to toilet with Modified [place].    Outcome: Established OT POC

## 2025-02-19 NOTE — PLAN OF CARE
[facility]  [facility]       Primary Care Provider: Clark Calix III, MD    Admission Diagnosis: Screening for cardiovascular condition [Z13.6]  Failure to thrive in adult [R62.7]  Pressure injury of skin of sacral region, unspecified injury stage [L89.159]    Admission Date: 2/18/2025  Expected Discharge Date:          Payor: [employer] MGD [employer]ARE [facility] / Plan: [employer] / Product Type: Medicare Advantage /     Extended Emergency Contact Information  Primary Emergency Contact: MARGOT MCMAHON  Address: [address] [place] [address] [place]  Mobile Phone: [phone]  Relation: Daughter  Preferred language: English   needed? No    Discharge Plan A: Home with family, Home Health  Discharge Plan B: Other (TBD)      [id number] - [place], [place] - [address] AT [facility] [address]  [address] [facility] [address]  Phone: [phone] Fax: [phone]      Initial Assessment (most recent)       Adult Discharge Assessment - 02/19/25 1150          Discharge Assessment    Assessment Type Discharge Planning Assessment     Confirmed/corrected address, phone number and insurance Yes     Confirmed Demographics Correct on Facesheet     Source of Information patient;family     When was your last doctors appointment? 01/09/25     Reason For Admission Hypomagnesemia     People in Home grandchild(imelda)     Do you expect to return to your current living situation? Yes     Prior to hospitilization cognitive status: Alert/Oriented     Current cognitive status: Alert/Oriented     Walking or Climbing Stairs Difficulty no     Dressing/Bathing Difficulty no     Do you have any problems with: Needs other help     Specify other help The patient has family who is able to assist her with meals.     Home Accessibility stairs to enter home     Number of Stairs, Main Entrance three     Home Layout Able to live  on 1st floor     Equipment Currently Used at Home none     Readmission within 30 days? No     Patient currently being followed by outpatient case management? No     Do you currently have service(s) that help you manage your care at home? Yes     Name and Contact number of agency [phone]     Is the pt/caregiver preference to resume services with current agency Yes     Do you take prescription medications? Yes     Do you have prescription coverage? Yes     Coverage [facility]GD [facility] SECURE SNP     How do you get to doctors appointments? family or friend will provide     Are you on dialysis? No     Do you take coumadin? No     Discharge Plan A Home with family;Home Health     Discharge Plan B Other   TBD    DME Needed Upon Discharge  walker, rolling;commode     Discharge Plan discussed with: Patient;Adult children                 Discharge assessment completed with the patient and her daughter, Ginger. The patient is from home. She presents with a few social determinants of health. The patient lives with her grandchildren. Ginger does not live with the patient, but she is involved in the patient's care. The patient currently has home health with [facility], and she would like to resume with this agency.     I had a long discussion with the patient about her discharge plan of care. The patient's family expressed concerns about the patient lack of eating. The patient expressed that she eats when she wants. She denied any depression. I spoke to the patient about a regimen, and she was open. I talked to the patient about mental health services, but she denied. The attendings were informed.      Discharge planning checklist given to the patient/family with instructions to contact  for any needs.  SW will follow as needed.

## 2025-02-20 LAB
ALBUMIN SERPL BCP-MCNC: 2.1 G/DL (ref 3.5–5.2)
ALP SERPL-CCNC: 85 U/L (ref 55–135)
ALT SERPL W/O P-5'-P-CCNC: <8 U/L (ref 10–44)
ANION GAP SERPL CALC-SCNC: 9 MMOL/L (ref 8–16)
AST SERPL-CCNC: 14 U/L (ref 10–40)
BASOPHILS # BLD AUTO: 0.02 K/UL (ref 0–0.2)
BASOPHILS NFR BLD: 0.2 % (ref 0–1.9)
BILIRUB SERPL-MCNC: 0.1 MG/DL (ref 0.1–1)
BUN SERPL-MCNC: 12 MG/DL (ref 8–23)
CALCIUM SERPL-MCNC: 8.3 MG/DL (ref 8.7–10.5)
CHLORIDE SERPL-SCNC: 108 MMOL/L (ref 95–110)
CO2 SERPL-SCNC: 22 MMOL/L (ref 23–29)
CREAT SERPL-MCNC: 0.5 MG/DL (ref 0.5–1.4)
DIFFERENTIAL METHOD BLD: ABNORMAL
EOSINOPHIL # BLD AUTO: 0 K/UL (ref 0–0.5)
EOSINOPHIL NFR BLD: 0 % (ref 0–8)
ERYTHROCYTE [DISTWIDTH] IN BLOOD BY AUTOMATED COUNT: 16.5 % (ref 11.5–14.5)
EST. GFR  (NO RACE VARIABLE): >60 ML/MIN/1.73 M^2
GLUCOSE SERPL-MCNC: 136 MG/DL (ref 70–110)
HCT VFR BLD AUTO: 28.1 % (ref 37–48.5)
HGB BLD-MCNC: 8.8 G/DL (ref 12–16)
IMM GRANULOCYTES # BLD AUTO: 0.06 K/UL (ref 0–0.04)
IMM GRANULOCYTES NFR BLD AUTO: 0.6 % (ref 0–0.5)
LYMPHOCYTES # BLD AUTO: 0.8 K/UL (ref 1–4.8)
LYMPHOCYTES NFR BLD: 7.7 % (ref 18–48)
MAGNESIUM SERPL-MCNC: 1.9 MG/DL (ref 1.6–2.6)
MCH RBC QN AUTO: 25.9 PG (ref 27–31)
MCHC RBC AUTO-ENTMCNC: 31.3 G/DL (ref 32–36)
MCV RBC AUTO: 83 FL (ref 82–98)
MONOCYTES # BLD AUTO: 0.4 K/UL (ref 0.3–1)
MONOCYTES NFR BLD: 3.6 % (ref 4–15)
NEUTROPHILS # BLD AUTO: 8.9 K/UL (ref 1.8–7.7)
NEUTROPHILS NFR BLD: 87.9 % (ref 38–73)
NRBC BLD-RTO: 0 /100 WBC
PLATELET # BLD AUTO: 468 K/UL (ref 150–450)
PMV BLD AUTO: 9.3 FL (ref 9.2–12.9)
POTASSIUM SERPL-SCNC: 3.3 MMOL/L (ref 3.5–5.1)
PROT SERPL-MCNC: 5.8 G/DL (ref 6–8.4)
RBC # BLD AUTO: 3.4 M/UL (ref 4–5.4)
SODIUM SERPL-SCNC: 139 MMOL/L (ref 136–145)
WBC # BLD AUTO: 10.17 K/UL (ref 3.9–12.7)

## 2025-02-20 PROCEDURE — 99900031 HC PATIENT EDUCATION (STAT)

## 2025-02-20 PROCEDURE — 36415 COLL VENOUS BLD VENIPUNCTURE: CPT | Performed by: INTERNAL MEDICINE

## 2025-02-20 PROCEDURE — 25000003 PHARM REV CODE 250: Performed by: EMERGENCY MEDICINE

## 2025-02-20 PROCEDURE — 83735 ASSAY OF MAGNESIUM: CPT | Performed by: INTERNAL MEDICINE

## 2025-02-20 PROCEDURE — 25000242 PHARM REV CODE 250 ALT 637 W/ HCPCS: Performed by: EMERGENCY MEDICINE

## 2025-02-20 PROCEDURE — 63600175 PHARM REV CODE 636 W HCPCS

## 2025-02-20 PROCEDURE — 94640 AIRWAY INHALATION TREATMENT: CPT | Mod: XB

## 2025-02-20 PROCEDURE — 92610 EVALUATE SWALLOWING FUNCTION: CPT

## 2025-02-20 PROCEDURE — 97530 THERAPEUTIC ACTIVITIES: CPT | Mod: CO

## 2025-02-20 PROCEDURE — G0378 HOSPITAL OBSERVATION PER HR: HCPCS

## 2025-02-20 PROCEDURE — 63600175 PHARM REV CODE 636 W HCPCS: Performed by: INTERNAL MEDICINE

## 2025-02-20 PROCEDURE — 80053 COMPREHEN METABOLIC PANEL: CPT | Performed by: INTERNAL MEDICINE

## 2025-02-20 PROCEDURE — 97116 GAIT TRAINING THERAPY: CPT

## 2025-02-20 PROCEDURE — 85025 COMPLETE CBC W/AUTO DIFF WBC: CPT | Performed by: INTERNAL MEDICINE

## 2025-02-20 PROCEDURE — 25000003 PHARM REV CODE 250

## 2025-02-20 PROCEDURE — 97530 THERAPEUTIC ACTIVITIES: CPT

## 2025-02-20 PROCEDURE — S0179 MEGESTROL 20 MG: HCPCS

## 2025-02-20 PROCEDURE — 25000003 PHARM REV CODE 250: Performed by: INTERNAL MEDICINE

## 2025-02-20 PROCEDURE — S4991 NICOTINE PATCH NONLEGEND: HCPCS | Performed by: INTERNAL MEDICINE

## 2025-02-20 PROCEDURE — 99900035 HC TECH TIME PER 15 MIN (STAT)

## 2025-02-20 PROCEDURE — 94761 N-INVAS EAR/PLS OXIMETRY MLT: CPT

## 2025-02-20 RX ORDER — LEVOFLOXACIN 250 MG/1
750 TABLET ORAL DAILY
Status: COMPLETED | OUTPATIENT
Start: 2025-02-21 | End: 2025-02-21

## 2025-02-20 RX ORDER — ELECTROLYTES/DEXTROSE
200 SOLUTION, ORAL ORAL
Status: DISCONTINUED | OUTPATIENT
Start: 2025-02-20 | End: 2025-03-01 | Stop reason: HOSPADM

## 2025-02-20 RX ORDER — POTASSIUM CHLORIDE 7.45 MG/ML
10 INJECTION INTRAVENOUS
Status: COMPLETED | OUTPATIENT
Start: 2025-02-20 | End: 2025-02-20

## 2025-02-20 RX ADMIN — FAMOTIDINE 20 MG: 20 TABLET, FILM COATED ORAL at 08:02

## 2025-02-20 RX ADMIN — Medication 200 ML: at 06:02

## 2025-02-20 RX ADMIN — BUDESONIDE 0.5 MG: 0.5 INHALANT RESPIRATORY (INHALATION) at 08:02

## 2025-02-20 RX ADMIN — LEVOFLOXACIN 750 MG: 250 TABLET, FILM COATED ORAL at 08:02

## 2025-02-20 RX ADMIN — HYDROCORTISONE SODIUM SUCCINATE 50 MG: 100 INJECTION, POWDER, FOR SOLUTION INTRAMUSCULAR; INTRAVENOUS at 01:02

## 2025-02-20 RX ADMIN — POTASSIUM CHLORIDE 10 MEQ: 7.46 INJECTION, SOLUTION INTRAVENOUS at 11:02

## 2025-02-20 RX ADMIN — CITALOPRAM HYDROBROMIDE 20 MG: 10 TABLET ORAL at 08:02

## 2025-02-20 RX ADMIN — Medication 200 ML: at 10:02

## 2025-02-20 RX ADMIN — Medication 200 ML: at 02:02

## 2025-02-20 RX ADMIN — LEVOTHYROXINE SODIUM 25 MCG: 25 TABLET ORAL at 06:02

## 2025-02-20 RX ADMIN — HYDROCORTISONE SODIUM SUCCINATE 50 MG: 100 INJECTION, POWDER, FOR SOLUTION INTRAMUSCULAR; INTRAVENOUS at 09:02

## 2025-02-20 RX ADMIN — BUDESONIDE 0.5 MG: 0.5 INHALANT RESPIRATORY (INHALATION) at 07:02

## 2025-02-20 RX ADMIN — HYDROCORTISONE: 0.5 CREAM TOPICAL at 09:02

## 2025-02-20 RX ADMIN — IPRATROPIUM BROMIDE AND ALBUTEROL SULFATE 3 ML: 2.5; .5 SOLUTION RESPIRATORY (INHALATION) at 08:02

## 2025-02-20 RX ADMIN — CARVEDILOL 12.5 MG: 12.5 TABLET, FILM COATED ORAL at 04:02

## 2025-02-20 RX ADMIN — NICOTINE 1 PATCH: 14 PATCH, EXTENDED RELEASE TRANSDERMAL at 08:02

## 2025-02-20 RX ADMIN — CARVEDILOL 12.5 MG: 12.5 TABLET, FILM COATED ORAL at 08:02

## 2025-02-20 RX ADMIN — MIRTAZAPINE 15 MG: 15 TABLET, FILM COATED ORAL at 09:02

## 2025-02-20 RX ADMIN — HYDROCORTISONE: 0.5 CREAM TOPICAL at 08:02

## 2025-02-20 RX ADMIN — HYDROCORTISONE SODIUM SUCCINATE 50 MG: 100 INJECTION, POWDER, FOR SOLUTION INTRAMUSCULAR; INTRAVENOUS at 06:02

## 2025-02-20 RX ADMIN — LISINOPRIL 10 MG: 10 TABLET ORAL at 08:02

## 2025-02-20 RX ADMIN — POTASSIUM CHLORIDE 10 MEQ: 7.46 INJECTION, SOLUTION INTRAVENOUS at 09:02

## 2025-02-20 RX ADMIN — MEGESTROL ACETATE 200 MG: 400 SUSPENSION ORAL at 08:02

## 2025-02-20 NOTE — PT/OT/SLP PROGRESS
Occupational Therapy   Treatment    Name: Ruth Gamboa  MRN: [id number]  Admitting Diagnosis:  Hypomagnesemia       Recommendations:     Discharge Recommendations: Low Intensity Therapy  Discharge Equipment Recommendations:  bedside commode, walker, rolling, shower chair  Barriers to discharge:  Other (Comment) (current medical status)    Assessment:     Ruth Gamboa is a 80 y.o. female with a medical diagnosis of Hypomagnesemia.  She presents with good participation. Performance deficits affecting function are weakness, impaired endurance, impaired self care skills, impaired functional mobility, decreased safety awareness, impaired cardiopulmonary response to activity, impaired balance. Pt requested not to ambulate again this afternoon due to completing it already with physical therapy this morning.     Rehab Prognosis:  Good; patient would benefit from acute skilled OT services to address these deficits and reach maximum level of function.       Plan:     Patient to be seen 3 x/week to address the above listed problems via self-care/home management, therapeutic activities, therapeutic exercises  Plan of Care Expires: 02/26/25  Plan of Care Reviewed with: patient, grandchild(imelda)    Subjective     Chief Complaint: I am tired.   Patient/Family Comments/goals: Get stronger  Pain/Comfort:  Pain Rating 1: 0/10  Pain Rating Post-Intervention 1: 0/10    Objective:     Communicated with: occupational therapist and nurse prior to session.  Patient found HOB elevated with peripheral IV, telemetry upon OT entry to room.    General Precautions: Standard, fall    Orthopedic Precautions:N/A  Braces: N/A  Respiratory Status: Room air     Occupational Performance:     Bed Mobility:    Patient completed Rolling/Turning to Left with  independence  Patient completed Rolling/Turning to Right with independence  Patient completed Scooting/Bridging with modified independence  Patient completed Supine to Sit with  independence  Patient completed Sit to Supine with independence     Functional Mobility/Transfers:  Patient completed Sit <> Stand Transfer with supervision  with  rolling walker   Patient completed Toilet Transfer Step Transfer technique with supervision with  rolling walker and grab bars  Functional Mobility: Pt ambulated about 10ft to and from bathroom with no LOB with supervision    Activities of Daily Living:  Lower Body Dressing: supervision to dilia/doff brief  Toileting: supervision for manjit-care standing holding onto grab bar      [facility] 6 Click ADL: 22    Treatment & Education:  Pt was provided education / instruction regarding role of OT and established OT POC.  Patient engaged in active range of motion therapeutic exercise for 2 x 15 sitting EOB in the following planes: shoulder flexion/extension, shoulder abduction/adduction, elbow flexion/extension, scapular retractions/protraction, scapular elevation/depression, shoulder rotations (forward and reverse), wrist flexion/extension, wrist radial/ulnar deviation, forearm supination/pronation, and composite fist. Patient needed rest breaks between each set due to impaired muscle endurance and activity tolerance. Patient educated to continue to complete exercise throughout the day to increase strength and endurance to facilitate an increase in ADL/IADLs. Patient verbally understood. After completing therapeutic exercise, patient being breathing heavier. Pt O2 was checked and O2 was 96%. Pt educated to take some deep breaths. Breathing then returned back to normal.       Patient left HOB elevated with all lines intact, call button in reach, nurse notified, and grand son present    GOALS:   Multidisciplinary Problems       Occupational Therapy Goals          Problem: Occupational Therapy    Goal Priority Disciplines Outcome Interventions   Occupational Therapy Goal     OT, PT/OT Progressing    Description: Goals to be met by: 02/26/25     Patient will increase  functional independence with ADLs by performing:    Feeding with Modified [place].  UE Dressing with Modified [place].  LE Dressing with Modified [place].  Grooming while standing at sink with Modified [place].  Toileting from toilet with Modified [place] for hygiene and clothing management.   Bathing from  shower chair/bench with Set-up Assistance.  Toilet transfer to toilet with Modified [place].                         DME Justifications:  Ruth requires a commode for home use because she is confined to a single room. Ruth's mobility limitation cannot be sufficiently resolved by the use of a cane. Her functional mobility deficit can be sufficiently resolved with the use of a Rolling Walker. Patient's mobility limitation significantly impairs their ability to participate in one of more activities of daily living.  The use of a RW will significantly improve the patient's ability to participate in MRADLS and the patient will use it on regular basis in the home.    Time Tracking:     OT Date of Treatment: 02/20/25  OT Start Time: 1343  OT Stop Time: 1403  OT Total Time (min): 20 min    Billable Minutes:Therapeutic Activity 20 min    OT/SUJIT: SUJIT     Number of SUJIT visits since last OT visit: 1    2/20/2025  Shala BURNETT
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
## 2025-02-20 NOTE — ASSESSMENT & PLAN NOTE
Patient's blood pressure range in the last 24 hours was: BP  Min: 155/73  Max: 192/91.The patient's inpatient anti-hypertensive regimen is listed below:  Current Antihypertensives  carvediloL tablet 12.5 mg, 2 times daily with meals, Oral  lisinopriL tablet 10 mg, Daily, Oral    Plan  - BP is controlled, no changes needed to their regimen

## 2025-02-20 NOTE — ASSESSMENT & PLAN NOTE
Hyponatremia is likely due to Dehydration/hypovolemia. The patient's most recent sodium results are listed below.  Recent Labs     02/18/25  1133 02/19/25  0541 02/20/25  0528   * 133* 139       Plan  - Correct the sodium by 4-6mEq in 24 hours.   - Will treat the hyponatremia with IV fluids as follows: 127mL/hr  - Monitor sodium Daily.   - Patient hyponatremia is stable
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
## 2025-02-20 NOTE — ASSESSMENT & PLAN NOTE
Nutrition consulted. Most recent weight and BMI monitored-     Measurements:  Wt Readings from Last 1 Encounters:   02/19/25 36.3 kg (80 lb 0.4 oz)   Body mass index is 14.18 kg/m².    Patient has been screened and assessed by RD.    Malnutrition Type:  Context: chronic illness  Level: severe    Malnutrition Characteristic Summary:  Energy Intake (Malnutrition): less than 75% for greater than or equal to 3 months  Subcutaneous Fat (Malnutrition): severe depletion  Muscle Mass (Malnutrition): severe depletion    Interventions/Recommendations (treatment strategy):  1. Rec'd Cardiac Diet. - Consistency rec's as per SLP.      2. Rec'd ONS: Ensure Enlive TID to provide 1050kcal and 60g of protein.      3. Rec'd appetite stimulant.    4. Rec'd Beneprotein TID. 5. Rec'd Moshe BID to promote wound healing and to provide additional nutrition.  6. Rec'd encourage PO intake and compliance with drinking ONS. 7. Rec'd daily weights.      8. Rec'd daily multivitamin.      9. RD to follow and make rec's accordingly.

## 2025-02-20 NOTE — ASSESSMENT & PLAN NOTE
Patient's most recent potassium results are listed below.   Recent Labs     02/18/25  1133 02/19/25  0541 02/20/25  0528   K 3.8 3.2* 3.3*       Plan  - Replete potassium per protocol  - Monitor potassium Daily  - Patient's hypokalemia is worsening. Will adjust treatment as follows:  IV replacement ordered

## 2025-02-20 NOTE — ASSESSMENT & PLAN NOTE
Patient has Abnormal Magnesium: hypomagnesemia. Will continue to monitor electrolytes closely. Will replace the affected electrolytes and repeat labs to be done after interventions completed. The patient's magnesium results have been reviewed and are listed below.  Recent Labs   Lab 02/20/25  0528   MG 1.9

## 2025-02-20 NOTE — PLAN OF CARE
Plan of care reviewed with patient.  Patient verbalized understanding and had no further questions.  Patient tolerating diet and has had no complaints this shift.  Patient able to work with PT/OT this shift.  Patient now resting comfortably in bed locked in lowest position, side rails up x3, and call bell in reach.  Will continue to monitor.       Problem: Skin Injury Risk Increased  Goal: Skin Health and Integrity  Outcome: Progressing     Problem: Adult Inpatient Plan of Care  Goal: Plan of Care Review  Outcome: Progressing  Goal: Patient-Specific Goal (Individualized)  Outcome: Progressing  Goal: Absence of Hospital-Acquired Illness or Injury  Outcome: Progressing  Goal: Optimal Comfort and Wellbeing  Outcome: Progressing  Goal: Readiness for Transition of Care  Outcome: Progressing

## 2025-02-20 NOTE — PLAN OF CARE
Problem: Occupational Therapy  Goal: Occupational Therapy Goal  Description: Goals to be met by: 02/26/25     Patient will increase functional independence with ADLs by performing:    Feeding with Modified [place].  UE Dressing with Modified [place].  LE Dressing with Modified [place].  Grooming while standing at sink with Modified [place].  Toileting from toilet with Modified [place] for hygiene and clothing management.   Bathing from  shower chair/bench with Set-up Assistance.  Toilet transfer to toilet with Modified [place].    Outcome: Progressing

## 2025-02-20 NOTE — PLAN OF CARE
Problem: Physical Therapy  Goal: Physical Therapy Goal  Description: Goals to be met by: 2025     Patient will increase functional independence with mobility by performin. Gait  x 800 feet with Modified [place] using Rolling Walker.     Outcome: Progressing

## 2025-02-20 NOTE — PT/OT/SLP PROGRESS
"Physical Therapy Treatment    Patient Name:  Ruth Gamboa   MRN:  [id number]    Recommendations:     Discharge Recommendations: Low Intensity Therapy  Discharge Equipment Recommendations: bedside commode, walker, rolling, shower chair  Barriers to discharge: None    Assessment:     Ruth Gamboa is a 80 y.o. female admitted with a medical diagnosis of Hypomagnesemia.  She presents with the following impairments/functional limitations: weakness, impaired endurance, impaired self care skills, impaired functional mobility, impaired balance.     Patient pleasant and cooperative this morning. She was able to ambulate with improved level of independence with rolling walker today. Her daughter reports that she has been going to the bathroom independently at night. Patient is ready to go home.     Rehab Prognosis: Fair; patient would benefit from acute skilled PT services to address these deficits and reach maximum level of function.    Recent Surgery: * No surgery found *      Plan:     During this hospitalization, patient to be seen 5 x/week to address the identified rehab impairments via gait training, therapeutic activities, therapeutic exercises, neuromuscular re-education and progress toward the following goals:    Plan of Care Expires:  02/26/25    Subjective     Chief Complaint: "I want to go home"  Patient/Family Comments/goals: "She has been going to the bathroom by herself"  Pain/Comfort:  Pain Rating 1: 0/10      Objective:     Communicated with nurse, patient, and daughter prior to session.  Patient found supine with telemetry, peripheral IV upon PT entry to room.     General Precautions: Standard, fall  Orthopedic Precautions: N/A  Braces: N/A  Respiratory Status: Room air     Functional Mobility:  Bed Mobility:     Rolling Left:  independence  Rolling Right: independence  Scooting: independence  Bridging: independence  Supine to Sit: independence  Sit to Supine: independence  Transfers:     Sit to Stand:  " independence with no AD  Gait: >800 ft with RW and modified independence  Balance: independent standing with RW, independent sitting unsupported      AM-PAC 6 CLICK MOBILITY  Turning over in bed (including adjusting bedclothes, sheets and blankets)?: 4  Sitting down on and standing up from a chair with arms (e.g., wheelchair, bedside commode, etc.): 4  Moving from lying on back to sitting on the side of the bed?: 4  Moving to and from a bed to a chair (including a wheelchair)?: 4  Need to walk in hospital room?: 4  Climbing 3-5 steps with a railing?: 4  Basic Mobility Total Score: 24       Treatment & Education:  Bed mobility   Rolling x2   Supine<>Sit   Bridging x5   Scooting x3  Transfers   STSx3  Gait    Gait training with RW monitoring patient response to activity  Balance   Sitting EOB shifting weight   Standing with AD  Education   Patient educated on proper AD management, role of acute care PT, POC, importance of OOB mobility, transfer safety, and call bell usage.     Patient left ambulatory in room/atkinson with all lines intact, call button in reach, nurse notified, and family present..    GOALS:   Multidisciplinary Problems       Physical Therapy Goals          Problem: Physical Therapy    Goal Priority Disciplines Outcome Interventions   Physical Therapy Goal     PT, PT/OT Progressing    Description: Goals to be met by: 2025     Patient will increase functional independence with mobility by performin. Gait  x 800 feet with Modified [place] using Rolling Walker.                          Time Tracking:     PT Received On: 25  PT Start Time: 0900     PT Stop Time: 0923  PT Total Time (min): 23 min     Billable Minutes: Gait Training 15 and Therapeutic Activity 8    Treatment Type: Treatment  PT/PTA: PT     Number of PTA visits since last PT visit: 0     2025
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
## 2025-02-20 NOTE — ASSESSMENT & PLAN NOTE
Patient with Acute on chronic debility due to age-related physical debility. The patient's latest AMPAC (Activity Measure for Post Acute Care) Score is listed below.    AM-PAC Score - How much help does the patient need for each activity listed  Basic Mobility Total Score: 21  Turning over in bed (including adjusting bedclothes, sheets and blankets)?: None  Sitting down on and standing up from a chair with arms (e.g., wheelchair, bedside commode, etc.): None  Moving from lying on back to sitting on the side of the bed?: None  Moving to and from a bed to a chair (including a wheelchair)?: A little  Need to walk in hospital room?: A little  Climbing 3-5 steps with a railing?: A little (Clinical judgment)    Plan  - Progressive mobility protocol initated  - PT/OT consulted  - Fall precautions in place

## 2025-02-20 NOTE — HOSPITAL COURSE
2/20 DL:  Patient doing well this morning.  She is ambulating back to bed from restroom in his awake, alert, oriented.  Vital signs stable.  Hypomagnesemia resolved.  Continued hypokalemia noted.  We will replenish.  Sodium levels improving.  Continue IV fluids.  Patient with increased p.o. intake throughout the day yesterday.  Dietary following.  Recommended ensure t.i.d. with meals which we have added.  Continue Megace and Remeron.  Continue discharge planning.    2/21 DL:  Patient doing well this morning.  She is sitting up in bed eating breakfast in his awake, alert, oriented.  She continues with increased appetite and good p.o. intake.  Vital signs stable.  Magnesium 1.5 today.  We will replenish.  Patient with continued hypokalemia.  Potassium 2.5 today.  IV potassium ordered.  We will also add daily p.o. potassium.  Labs otherwise stable.  Continue discharge planning.  We will plan to discharge home once electrolytes stable.    2/22 FM:  Patient is encouraged to drink her meal supplements and increase protein.  She primarily is motivated to have sugars and sweets.  We will add protein supplements.  Patient's appetite is improved.  Electrolytes still low patient's BMs are normal.  Continue to replace.    2/23 FM:  Electrolytes improved and her p.o. intake has improved.  We will discontinue IV fluids and continue electrolyte monitoring.  We will discontinue telemetry monitor.  We will watch therapy results tomorrow and determine disposition with family.    2/24 DL:  Doing well this morning.  She is sitting up in bed and is awake, alert, oriented.  Patient continues with good p.o. intake.  Electrolytes stable this morning.  Patient with increased weakness and debility since admission.  Continue PT/OT.  Discussed potential rehab at discharge.  We will see how patient works with therapy today and further discuss final disposition is with patient and family in the morning.    2/25 DL:  Patient is sitting up on side  of bed eating breakfast.  Patient endorses headache this morning.  Patient also febrile this morning temp 100.7.  She does endorse increased SOB.  No leukocytosis.  We will obtain chest x-ray.  Blood pressure is elevated.  Medications adjusted.  Hypokalemia noted.  We will replenish.  Patient ambulate well with therapy yesterday.  Discussed discharge planning with family.  We will home with home health appropriate.  Continue discharge.    2/26 DL:  Patient lying in bed and is awake and alert.  She reports she does not feel well this morning.  She reports increased SOB, headache.  Chest x-ray obtained yesterday reveals mild bibasilar opacities which may reflect pulmonary edema or atypical pneumonia.  We will start patient on antibiotics today.  Continue steroids.  We will switch nebulizer treatments from as needed to scheduled.  Mild leukocytosis noted this morning.  Patient remains afebrile for the past 24 hours.  Labs otherwise stable.  Hypomagnesemia and hypokalemia improved.  Blood pressures improved since medication adjustment yesterday.  Continue current treatment plan and monitoring.  Continue discharge planning.    2/27 DL:  Patient is sitting on side of bed eating breakfast in his awake, alert, oriented.  Respiratory status improving.  Patient afebrile without leukocytosis.  Continue current treatment atypical pneumonia.  Patient with continued increase in p.o. intake.  Electrolytes stable.  Hypertension noted.  Antihypertensive medications adjusted.  Continue monitoring.  Labs and vital signs otherwise stable.  Continue current treatment plan.  Continue discharge planning.    2/28 DL:  Patient lying in bed in his awake, alert, oriented.  She does endorse continued increased respiratory effort.  On exam, patient with continued rhonchi and wheezes however overall improving.  She remains afebrile without leukocytosis.  We will repeat chest x-ray today.  Electrolytes stable.  Patient with good p.o. intake.   Continue appetite stimulants.  Discharge planning discussed with patient family.  Plan for discharge in the morning on oral antibiotics, appetite stimulants, and continued potassium replacement.    3/1 DL:  Patient is sitting up in bed this morning eating breakfast in his awake, alert, oriented.  Family at bedside.  Patient reports respiratory status improved.  She continues improved p.o. intake.  Repeat chest x-ray obtained yesterday stable.  Patient remains afebrile without leukocytosis.  She has completed p.o. antibiotic therapy.  We will discharge patient home today.  We will continue potassium replacement appetite stimulants at discharge.  Resume home health.  Patient advised to follow up in office in 1 week.  We will repeat labs at follow up visit.

## 2025-02-20 NOTE — PROGRESS NOTES
Pharmacist Renal Dose Adjustment Note    Ruth Gamboa is a 80 y.o. female being treated with the medication Levofloxacin    Patient Data:    Vital Signs (Most Recent):  Temp: 98.2 °F (36.8 °C) (02/20/25 0414)  Pulse: 106 (02/20/25 0838)  Resp: 18 (02/20/25 0743)  BP: (!) 192/91 (02/20/25 0838)  SpO2: (!) 94 % (02/20/25 0838) Vital Signs (72h Range):  Temp:  [97.8 °F (36.6 °C)-99.2 °F (37.3 °C)]   Pulse:  []   Resp:  [16-26]   BP: (127-192)/(58-94)   SpO2:  [93 %-100 %]      Recent Labs   Lab 02/18/25  1133 02/19/25  0541 02/20/25  0528   CREATININE 0.6 0.5 0.5     Serum creatinine: 0.5 mg/dL 02/20/25 0528  Estimated creatinine clearance: 52.3 mL/min    Medication: Levofloxacin dose:  750 mg frequency  every other day will be changed to medication: Levofloxacin dose: 750 mg frequency: QD due to crcl > 50 ml/min    Pharmacist's Name: Kimberly Conde  Pharmacist's Extension: [phone]

## 2025-02-21 PROBLEM — E43 SEVERE MALNUTRITION: Status: ACTIVE | Noted: 2025-02-21

## 2025-02-21 LAB
ALBUMIN SERPL BCP-MCNC: 2 G/DL (ref 3.5–5.2)
ALP SERPL-CCNC: 74 U/L (ref 55–135)
ALT SERPL W/O P-5'-P-CCNC: 8 U/L (ref 10–44)
ANION GAP SERPL CALC-SCNC: 8 MMOL/L (ref 8–16)
AST SERPL-CCNC: 16 U/L (ref 10–40)
BASOPHILS # BLD AUTO: 0.01 K/UL (ref 0–0.2)
BASOPHILS NFR BLD: 0.1 % (ref 0–1.9)
BILIRUB SERPL-MCNC: 0.1 MG/DL (ref 0.1–1)
BUN SERPL-MCNC: 9 MG/DL (ref 8–23)
CALCIUM SERPL-MCNC: 7.8 MG/DL (ref 8.7–10.5)
CHLORIDE SERPL-SCNC: 105 MMOL/L (ref 95–110)
CO2 SERPL-SCNC: 25 MMOL/L (ref 23–29)
CREAT SERPL-MCNC: 0.4 MG/DL (ref 0.5–1.4)
DIFFERENTIAL METHOD BLD: ABNORMAL
EOSINOPHIL # BLD AUTO: 0 K/UL (ref 0–0.5)
EOSINOPHIL NFR BLD: 0.1 % (ref 0–8)
ERYTHROCYTE [DISTWIDTH] IN BLOOD BY AUTOMATED COUNT: 16.6 % (ref 11.5–14.5)
EST. GFR  (NO RACE VARIABLE): >60 ML/MIN/1.73 M^2
GLUCOSE SERPL-MCNC: 126 MG/DL (ref 70–110)
HCT VFR BLD AUTO: 29.5 % (ref 37–48.5)
HGB BLD-MCNC: 9.2 G/DL (ref 12–16)
IMM GRANULOCYTES # BLD AUTO: 0.07 K/UL (ref 0–0.04)
IMM GRANULOCYTES NFR BLD AUTO: 0.6 % (ref 0–0.5)
LYMPHOCYTES # BLD AUTO: 0.8 K/UL (ref 1–4.8)
LYMPHOCYTES NFR BLD: 6.6 % (ref 18–48)
MAGNESIUM SERPL-MCNC: 1.5 MG/DL (ref 1.6–2.6)
MCH RBC QN AUTO: 25.5 PG (ref 27–31)
MCHC RBC AUTO-ENTMCNC: 31.2 G/DL (ref 32–36)
MCV RBC AUTO: 82 FL (ref 82–98)
MONOCYTES # BLD AUTO: 0.2 K/UL (ref 0.3–1)
MONOCYTES NFR BLD: 1.6 % (ref 4–15)
NEUTROPHILS # BLD AUTO: 10.5 K/UL (ref 1.8–7.7)
NEUTROPHILS NFR BLD: 91 % (ref 38–73)
NRBC BLD-RTO: 0 /100 WBC
PLATELET # BLD AUTO: 489 K/UL (ref 150–450)
PMV BLD AUTO: 9 FL (ref 9.2–12.9)
POTASSIUM SERPL-SCNC: 2.5 MMOL/L (ref 3.5–5.1)
PROT SERPL-MCNC: 5.6 G/DL (ref 6–8.4)
RBC # BLD AUTO: 3.61 M/UL (ref 4–5.4)
SODIUM SERPL-SCNC: 138 MMOL/L (ref 136–145)
WBC # BLD AUTO: 11.54 K/UL (ref 3.9–12.7)

## 2025-02-21 PROCEDURE — 25000003 PHARM REV CODE 250: Performed by: INTERNAL MEDICINE

## 2025-02-21 PROCEDURE — 94761 N-INVAS EAR/PLS OXIMETRY MLT: CPT

## 2025-02-21 PROCEDURE — 25000003 PHARM REV CODE 250

## 2025-02-21 PROCEDURE — 63600175 PHARM REV CODE 636 W HCPCS

## 2025-02-21 PROCEDURE — 21400001 HC TELEMETRY ROOM

## 2025-02-21 PROCEDURE — 25000003 PHARM REV CODE 250: Performed by: EMERGENCY MEDICINE

## 2025-02-21 PROCEDURE — 36410 VNPNXR 3YR/> PHY/QHP DX/THER: CPT

## 2025-02-21 PROCEDURE — 80053 COMPREHEN METABOLIC PANEL: CPT | Performed by: INTERNAL MEDICINE

## 2025-02-21 PROCEDURE — 99900031 HC PATIENT EDUCATION (STAT)

## 2025-02-21 PROCEDURE — 25000242 PHARM REV CODE 250 ALT 637 W/ HCPCS: Performed by: EMERGENCY MEDICINE

## 2025-02-21 PROCEDURE — 85025 COMPLETE CBC W/AUTO DIFF WBC: CPT | Performed by: INTERNAL MEDICINE

## 2025-02-21 PROCEDURE — 36415 COLL VENOUS BLD VENIPUNCTURE: CPT | Performed by: INTERNAL MEDICINE

## 2025-02-21 PROCEDURE — S0179 MEGESTROL 20 MG: HCPCS

## 2025-02-21 PROCEDURE — 97535 SELF CARE MNGMENT TRAINING: CPT

## 2025-02-21 PROCEDURE — 63600175 PHARM REV CODE 636 W HCPCS: Performed by: INTERNAL MEDICINE

## 2025-02-21 PROCEDURE — 97530 THERAPEUTIC ACTIVITIES: CPT

## 2025-02-21 PROCEDURE — S4991 NICOTINE PATCH NONLEGEND: HCPCS | Performed by: INTERNAL MEDICINE

## 2025-02-21 PROCEDURE — 94640 AIRWAY INHALATION TREATMENT: CPT | Mod: XB

## 2025-02-21 PROCEDURE — 99900035 HC TECH TIME PER 15 MIN (STAT)

## 2025-02-21 PROCEDURE — C1751 CATH, INF, PER/CENT/MIDLINE: HCPCS

## 2025-02-21 PROCEDURE — 83735 ASSAY OF MAGNESIUM: CPT | Performed by: INTERNAL MEDICINE

## 2025-02-21 RX ORDER — POTASSIUM CHLORIDE 7.45 MG/ML
10 INJECTION INTRAVENOUS
Status: COMPLETED | OUTPATIENT
Start: 2025-02-21 | End: 2025-02-21

## 2025-02-21 RX ORDER — MAGNESIUM SULFATE HEPTAHYDRATE 40 MG/ML
2 INJECTION, SOLUTION INTRAVENOUS ONCE
Status: COMPLETED | OUTPATIENT
Start: 2025-02-21 | End: 2025-02-21

## 2025-02-21 RX ORDER — LANOLIN ALCOHOL/MO/W.PET/CERES
1 CREAM (GRAM) TOPICAL DAILY
Status: DISCONTINUED | OUTPATIENT
Start: 2025-02-22 | End: 2025-03-01 | Stop reason: HOSPADM

## 2025-02-21 RX ORDER — POTASSIUM CHLORIDE 20 MEQ/1
20 TABLET, EXTENDED RELEASE ORAL 2 TIMES DAILY
Status: DISCONTINUED | OUTPATIENT
Start: 2025-02-21 | End: 2025-03-01 | Stop reason: HOSPADM

## 2025-02-21 RX ORDER — SODIUM CHLORIDE 0.9 % (FLUSH) 0.9 %
10 SYRINGE (ML) INJECTION EVERY 12 HOURS PRN
Status: DISCONTINUED | OUTPATIENT
Start: 2025-02-21 | End: 2025-03-01 | Stop reason: HOSPADM

## 2025-02-21 RX ORDER — PANTOPRAZOLE SODIUM 40 MG/1
40 TABLET, DELAYED RELEASE ORAL DAILY
Status: DISCONTINUED | OUTPATIENT
Start: 2025-02-21 | End: 2025-03-01 | Stop reason: HOSPADM

## 2025-02-21 RX ORDER — POTASSIUM CHLORIDE 7.45 MG/ML
10 INJECTION INTRAVENOUS
Status: DISCONTINUED | OUTPATIENT
Start: 2025-02-21 | End: 2025-02-21

## 2025-02-21 RX ADMIN — MAGNESIUM SULFATE IN WATER 2 G: 40 INJECTION, SOLUTION INTRAVENOUS at 10:02

## 2025-02-21 RX ADMIN — HYDROCORTISONE SODIUM SUCCINATE 50 MG: 100 INJECTION, POWDER, FOR SOLUTION INTRAMUSCULAR; INTRAVENOUS at 01:02

## 2025-02-21 RX ADMIN — MIRTAZAPINE 15 MG: 15 TABLET, FILM COATED ORAL at 08:02

## 2025-02-21 RX ADMIN — BUDESONIDE 0.5 MG: 0.5 INHALANT RESPIRATORY (INHALATION) at 08:02

## 2025-02-21 RX ADMIN — LISINOPRIL 10 MG: 10 TABLET ORAL at 09:02

## 2025-02-21 RX ADMIN — LEVOTHYROXINE SODIUM 25 MCG: 25 TABLET ORAL at 06:02

## 2025-02-21 RX ADMIN — IPRATROPIUM BROMIDE AND ALBUTEROL SULFATE 3 ML: 2.5; .5 SOLUTION RESPIRATORY (INHALATION) at 08:02

## 2025-02-21 RX ADMIN — HYDROCORTISONE SODIUM SUCCINATE 50 MG: 100 INJECTION, POWDER, FOR SOLUTION INTRAMUSCULAR; INTRAVENOUS at 11:02

## 2025-02-21 RX ADMIN — CARVEDILOL 12.5 MG: 12.5 TABLET, FILM COATED ORAL at 08:02

## 2025-02-21 RX ADMIN — POTASSIUM CHLORIDE 20 MEQ: 1500 TABLET, EXTENDED RELEASE ORAL at 08:02

## 2025-02-21 RX ADMIN — POTASSIUM CHLORIDE 20 MEQ: 1500 TABLET, EXTENDED RELEASE ORAL at 09:02

## 2025-02-21 RX ADMIN — NICOTINE 1 PATCH: 14 PATCH, EXTENDED RELEASE TRANSDERMAL at 09:02

## 2025-02-21 RX ADMIN — MEGESTROL ACETATE 200 MG: 400 SUSPENSION ORAL at 09:02

## 2025-02-21 RX ADMIN — POTASSIUM CHLORIDE 10 MEQ: 7.46 INJECTION, SOLUTION INTRAVENOUS at 01:02

## 2025-02-21 RX ADMIN — POTASSIUM CHLORIDE 10 MEQ: 7.46 INJECTION, SOLUTION INTRAVENOUS at 03:02

## 2025-02-21 RX ADMIN — POTASSIUM CHLORIDE 10 MEQ: 7.46 INJECTION, SOLUTION INTRAVENOUS at 12:02

## 2025-02-21 RX ADMIN — CARVEDILOL 12.5 MG: 12.5 TABLET, FILM COATED ORAL at 05:02

## 2025-02-21 RX ADMIN — FAMOTIDINE 20 MG: 20 TABLET, FILM COATED ORAL at 09:02

## 2025-02-21 RX ADMIN — BUDESONIDE 0.5 MG: 0.5 INHALANT RESPIRATORY (INHALATION) at 07:02

## 2025-02-21 RX ADMIN — POTASSIUM CHLORIDE 10 MEQ: 7.46 INJECTION, SOLUTION INTRAVENOUS at 02:02

## 2025-02-21 RX ADMIN — LEVOFLOXACIN 750 MG: 250 TABLET, FILM COATED ORAL at 09:02

## 2025-02-21 RX ADMIN — CITALOPRAM HYDROBROMIDE 20 MG: 10 TABLET ORAL at 09:02

## 2025-02-21 RX ADMIN — HYDROCORTISONE SODIUM SUCCINATE 50 MG: 100 INJECTION, POWDER, FOR SOLUTION INTRAMUSCULAR; INTRAVENOUS at 06:02

## 2025-02-21 RX ADMIN — HYDROCORTISONE: 0.5 CREAM TOPICAL at 09:02

## 2025-02-21 RX ADMIN — PANTOPRAZOLE SODIUM 40 MG: 40 TABLET, DELAYED RELEASE ORAL at 10:02
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
## 2025-02-21 NOTE — PT/OT/SLP PROGRESS
"Occupational Therapy   Treatment    Name: Ruth Gamboa  MRN: [id number]  Admitting Diagnosis:  Hypomagnesemia       Recommendations:     Discharge Recommendations: Low Intensity Therapy  Discharge Equipment Recommendations:  bedside commode, walker, rolling, shower chair  Barriers to discharge:  Other (Comment) (Medical status)    Assessment:     Ruth Gamboa is a 80 y.o. female with a medical diagnosis of Hypomagnesemia.  She presents with improved functional performance with transfers as noted by modified independence with use of RW and supervision without use of AD. Performance deficits affecting function are weakness, impaired endurance, impaired self care skills, impaired functional mobility, decreased safety awareness, impaired cardiopulmonary response to activity, impaired balance.     Rehab Prognosis:  Good; patient would benefit from acute skilled OT services to address these deficits and reach maximum level of function.       Plan:     Patient to be seen 3 x/week to address the above listed problems via self-care/home management, therapeutic activities, therapeutic exercises  Plan of Care Expires: 02/26/25  Plan of Care Reviewed with: patient, grandchild(imelda)    Subjective     Chief Complaint: Pt stated, "I don't feel well."  Patient/Family Comments/goals: Pt would like to return home with support of family.   Pain/Comfort:  Pain Rating 1: 0/10  Pain Rating Post-Intervention 1: 0/10    Objective:     Communicated with: nurse prior to session.  Patient found HOB elevated with telemetry, peripheral IV upon OT entry to room.    General Precautions: Standard, fall    Orthopedic Precautions:N/A  Braces: N/A  Respiratory Status: Room air     Occupational Performance:     Bed Mobility:    Patient completed Rolling/Turning to Left with  modified independence  Patient completed Rolling/Turning to Right with modified independence  Patient completed Scooting/Bridging with modified independence  Patient completed " Supine to Sit with modified independence  Patient completed Sit to Supine with modified independence     Functional Mobility/Transfers:  Patient completed Sit <> Stand Transfer with modified independence  with  rolling walker   Patient completed Bed <> Chair Transfer using Step Transfer technique with modified independence with rolling walker  Patient completed Toilet Transfer Step Transfer technique with modified independence with  grab bars  Functional Mobility: Pt ambulated greater than 200' between surfaces requiring supervision with use of IV Pole, but exhibit modified independence with use of RW with transfers.     Treatment & Education:  Pt was cooperative and motivated with minimal verbal encouragement while exhibiting more positive affect following activity. She performed bed mobility demonstrating modified independence secondary to use of bedrails. Pt then participated in extensive functional trasfer retraining to / from bed, chair, and toilet emphasizing fall prevention providing repetition requiring supervision for safety without use of AD, but demonstrated modified independence utilizing RW secondary to no verbal or tactile cueing. Pt ambulated greater than 200' between surfaces requiring supervision with use of IV Pole.    Patient left HOB elevated with all lines intact, call button in reach, and nurse notified    GOALS:   Multidisciplinary Problems       Occupational Therapy Goals          Problem: Occupational Therapy    Goal Priority Disciplines Outcome Interventions   Occupational Therapy Goal     OT, PT/OT Progressing    Description: Goals to be met by: 02/26/25     Patient will increase functional independence with ADLs by performing:    Feeding with Modified [place].  UE Dressing with Modified [place].  LE Dressing with Modified [place].  Grooming while standing at sink with Modified [place].  Toileting from toilet with Modified Williamson for hygiene and clothing  management.   Bathing from  shower chair/bench with Set-up Assistance.  Toilet transfer to toilet with Modified Coffee.                         DME Justifications:   Ruth's mobility limitation cannot be sufficiently resolved by the use of a cane. Her functional mobility deficit can be sufficiently resolved with the use of a Rolling Walker. Patient's mobility limitation significantly impairs their ability to participate in one of more activities of daily living.  The use of a RW will significantly improve the patient's ability to participate in MRADLS and the patient will use it on regular basis in the home.    Time Tracking:     OT Date of Treatment: 02/21/25  OT Start Time: 1432  OT Stop Time: 1503  OT Total Time (min): 31 min    Billable Minutes:Therapeutic Activity 31    OT/SUJIT: OT     Number of SUJIT visits since last OT visit: 1 2/21/2025

## 2025-02-21 NOTE — ASSESSMENT & PLAN NOTE
We will discontinue Periactin and add Megace.  Increase Remeron.  Encouraged p.o. intake.    2/21:  Patient with improved appetite and increase p.o. intake.  Continue Megace and Remeron.

## 2025-02-21 NOTE — PT/OT/SLP PROGRESS
"Physical Therapy      Patient Name:  Ruth Gamboa   MRN:  [id number]    Patient not seen this afternoon (second attempt for today) secondary to patient  is unwilling to participate in therapy, daughter present.  Patient states that she just do not want to do anything today, "may be tomorrow" , explained to patient that no P.T. is scheduled this weekend and we will follow up with her on Monday.  Daughter reports that patient has been getting up to go to the bathroom  and that the patient is not feeling well due to not "absorbing her medications".   Attending nurse KUN Luna.  We will follow up as appropriate. .       2/21/2025  "

## 2025-02-21 NOTE — ASSESSMENT & PLAN NOTE
Nutrition consulted. Most recent weight and BMI monitored-     Measurements:  Wt Readings from Last 1 Encounters:   02/19/25 36.3 kg (80 lb 0.4 oz)   Body mass index is 14.18 kg/m².    Patient has been screened and assessed by RD.    Malnutrition Type:  Context: chronic illness  Level: severe    Malnutrition Characteristic Summary:  Energy Intake (Malnutrition): less than 75% for greater than or equal to 3 months  Subcutaneous Fat (Malnutrition): severe depletion  Muscle Mass (Malnutrition): severe depletion    Interventions/Recommendations (treatment strategy):  1. Rec'd Cardiac Diet. - Consistency rec's as per SLP.      2. Rec'd ONS: Ensure Enlive TID to provide 1050kcal and 60g of protein.      3. Rec'd appetite stimulant.    4. Rec'd Beneprotein TID. 5. Rec'd Moshe BID to promote wound healing and to provide additional nutrition.  6. Rec'd encourage PO intake and compliance with drinking ONS. 7. Rec'd daily weights.      8. Rec'd daily multivitamin.      9. RD to follow and make rec's accordingly.    2/21:  Ensure t.i.d. with meals ordered.  Patient with improved p.o. intake.

## 2025-02-21 NOTE — ASSESSMENT & PLAN NOTE
Patient's COPD is controlled currently.  Patient is currently on COPD Pathway. Continue scheduled inhalers Steroids, Antibiotics, and Supplemental oxygen and monitor respiratory status closely.     2/21:  Smoking cessation encouraged.

## 2025-02-21 NOTE — PLAN OF CARE
Nutrition Plan of Care:    Recommendations  1. Rec'd Cardiac Diet.   -Consistency rec's as per SLP.        2. Rec'd ONS: Ensure Enlive TID to provide 1050kcal and 60g of protein.        3. Rec'd appetite stimulant.      4. Rec'd Beneprotein TID.   5. Rec'd Moshe BID to promote wound healing and to provide additional nutrition.    6. Rec'd encourage PO intake and compliance with drinking ONS.   7. Rec'd daily weights.        8. Rec'd daily multivitamin.        9. RD to follow and make rec's accordingly.  Goals:   1. Pt will consume > 50% of meals by next RD follow up.  2. Pt will be started on ONS by next RD follow up.  Nutrition Goal Status: progressing toward goals   Communication of RD Recs: reviewed with RN     Assessment and Plan     Nutrition Problem  Severe Malnutrition  in the context of Chronic Illness (COPD)     Related to (etiology):   Inadequate protein energy intake  secondary to increased nutrient needs (calories and protein)     Signs and Symptoms (as evidenced by):   Severe muscle loss, severe fat loss, weight loss > 2% in 1 week, energy intake less than 75% for greater than 3 months, BMI = 14.18        Interventions/Recommendations (treatment strategy):  1. Rec'd Cardiac Diet.   -Consistency rec's as per SLP.        2. Rec'd ONS: Ensure Enlive TID to provide 1050kcal and 60g of protein.        3. Rec'd appetite stimulant.      4. Rec'd Beneprotein TID.   5. Rec'd Moshe BID to promote wound healing and to provide additional nutrition.    6. Rec'd encourage PO intake and compliance with drinking ONS.   7. Rec'd daily weights.        8. Rec'd daily multivitamin.        9. RD to follow and make rec's accordingly.     Nutrition Diagnosis Status:   Continues      Dari Coulter, MS, RDN, LDN

## 2025-02-21 NOTE — PLAN OF CARE
02/21/25 1322   Medicare Message   Important Message from Medicare regarding Discharge Appeal Rights Given to patient/caregiver;Explained to patient/caregiver;Signed/date by patient/caregiver   Date IMM was signed 02/21/25   Time IMM was signed 1322     Daughter at bedside.  Patient awake.  Delivered copy of IM to patient.  Explained IP status.  Reviewed Medicare Important Message with daughter and patient.  Daughter signs for delivery of IM with patient's permission.  Copy at bedside.

## 2025-02-21 NOTE — PLAN OF CARE
Plan of care reviewed with the patient and daughter. Patient able to ambulate to the bathroom with stand by assist.

## 2025-02-21 NOTE — ASSESSMENT & PLAN NOTE
Appetite stimulant medications adjusted.  P.o. intake encouraged.  Nutrition consulted.    2/21:  Patient with improved p.o. intake.  Continue current regimen.

## 2025-02-21 NOTE — ASSESSMENT & PLAN NOTE
Patient with Acute on chronic debility due to age-related physical debility. The patient's latest AMPAC (Activity Measure for Post Acute Care) Score is listed below.    AM-PAC Score - How much help does the patient need for each activity listed  Basic Mobility Total Score: 24  Turning over in bed (including adjusting bedclothes, sheets and blankets)?: None  Sitting down on and standing up from a chair with arms (e.g., wheelchair, bedside commode, etc.): None  Moving from lying on back to sitting on the side of the bed?: None  Moving to and from a bed to a chair (including a wheelchair)?: None  Need to walk in hospital room?: None  Climbing 3-5 steps with a railing?: None    Plan  - Progressive mobility protocol initated  - PT/OT consulted  - Fall precautions in place

## 2025-02-21 NOTE — ASSESSMENT & PLAN NOTE
Malnutrition Type:  Context: chronic illness  Level: severe    Related to (etiology):   Inadequate protein energy intake  secondary to increased nutrient needs (calories and protein)    Signs and Symptoms (as evidenced by):   Severe muscle loss, severe fat loss, weight loss > 2% in 1 week, energy intake less than 75% for greater than 3 months, BMI = 14.18     Malnutrition Characteristic Summary:  Energy Intake (Malnutrition): less than 75% for greater than or equal to 3 months  Subcutaneous Fat (Malnutrition): severe depletion  Muscle Mass (Malnutrition): severe depletion      Interventions (treatment strategy):  1. Texture modified diet   2. Commercial beverage medical food supplement therapy     Nutrition Diagnosis Status:   Continues

## 2025-02-21 NOTE — ASSESSMENT & PLAN NOTE
Famotidine ordered.    2/21:  Patient reports increased reflux symptoms.  We will discontinue famotidine and resume home Protonix.

## 2025-02-21 NOTE — PT/OT/SLP PROGRESS
Physical Therapy      Patient Name:  Ruth Gamboa   MRN:  [id number]    Patient not seen today secondary to Patient ill. Patient reports she is not feeling well and did not get much sleep. She reports that she feels like she may have fever. She has been having acid reflux after meals. Patient requested to rest this morning. Nurse notified. Patient encouraged to walk with nursing staff after meals to assist with digestive motility and minimize symptoms of acid reflux. Will follow-up 2/24/2025.

## 2025-02-21 NOTE — PROCEDURES
"Ruth Gamboa is a 80 y.o. female patient.    Temp: (!) 100.4 °F (38 °C) (02/21/25 0819)  Pulse: 90 (02/21/25 0819)  Resp: 16 (02/21/25 0734)  BP: (!) 183/81 (02/21/25 0819)  SpO2: 97 % (02/21/25 0819)  Weight: 36.3 kg (80 lb 0.4 oz) (02/19/25 1456)  Height: 5' 3" (160 cm) (02/19/25 1456)    PICC  Date/Time: 2/21/2025 12:44 PM  Performed by: Mariano Javier RN  Consent Done: Yes  Time out: Immediately prior to procedure a time out was called to verify the correct patient, procedure, equipment, support staff and site/side marked as required  Indications: med administration  Preparation: skin prepped with ChloraPrep  Skin prep agent dried: skin prep agent completely dried prior to procedure  Sterile barriers: all five maximum sterile barriers used - cap, mask, sterile gown, sterile gloves, and large sterile sheet  Hand hygiene: hand hygiene performed prior to central venous catheter insertion  Location details: left basilic  Catheter type: single lumen  Catheter size: 4 Fr  Catheter Length: 18cm    Number of attempts: 1  Post-procedure: blood return through all ports and sterile dressing applied            Mariano Javier Rn  2/21/2025    "
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
## 2025-02-21 NOTE — ASSESSMENT & PLAN NOTE
Patient's blood pressure range in the last 24 hours was: BP  Min: 138/59  Max: 188/84.The patient's inpatient anti-hypertensive regimen is listed below:  Current Antihypertensives  carvediloL tablet 12.5 mg, 2 times daily with meals, Oral  lisinopriL tablet 10 mg, Daily, Oral    Plan  - BP is controlled, no changes needed to their regimen

## 2025-02-21 NOTE — PLAN OF CARE
Plan of care reviewed with patient and daughter at the bedside. Peripheral IV in place and infusing NS @ 127 ml/hr. Pt tolerated meds well. PRN pain meds given per MD orders with relief obtained. Critical lab this morning. Notified [place] Mejia. New orders placed. VSS. NADN. Pt free from falls and injury. Questions and concerns addressed. Pt belongings and call bell within reach.   Problem: Skin Injury Risk Increased  Goal: Skin Health and Integrity  Outcome: Progressing     Problem: Adult Inpatient Plan of Care  Goal: Plan of Care Review  Outcome: Progressing  Goal: Patient-Specific Goal (Individualized)  Outcome: Progressing  Goal: Absence of Hospital-Acquired Illness or Injury  Outcome: Progressing  Goal: Optimal Comfort and Wellbeing  Outcome: Progressing  Goal: Readiness for Transition of Care  Outcome: Progressing

## 2025-02-21 NOTE — PLAN OF CARE
Problem: Occupational Therapy  Goal: Occupational Therapy Goal  Description: Goals to be met by: 02/26/25     Patient will increase functional independence with ADLs by performing:    Feeding with Modified [place].  UE Dressing with Modified [place].  LE Dressing with Modified [place].  Grooming while standing at sink with Modified [place].  Toileting from toilet with Modified [place] for hygiene and clothing management.   Bathing from  shower chair/bench with Set-up Assistance.  Toilet transfer to toilet with Modified [place].    Outcome: Progressing

## 2025-02-21 NOTE — ASSESSMENT & PLAN NOTE
Hyponatremia is likely due to Dehydration/hypovolemia. The patient's most recent sodium results are listed below.  Recent Labs     02/19/25  0541 02/20/25  0528 02/21/25  0523   * 139 138       Plan  - Correct the sodium by 4-6mEq in 24 hours.   - Will treat the hyponatremia with IV fluids as follows: 127mL/hr  - Monitor sodium Daily.   - Patient hyponatremia is stable    2/21:  Resolved.  Continue monitoring with daily labs.

## 2025-02-21 NOTE — ASSESSMENT & PLAN NOTE
Patient has Abnormal Magnesium: hypomagnesemia. Will continue to monitor electrolytes closely. Will replace the affected electrolytes and repeat labs to be done after interventions completed. The patient's magnesium results have been reviewed and are listed below.  Recent Labs   Lab 02/21/25  0523   MG 1.5*        2/21:  Magnesium 1.5 today.  We will replenish.  Continue daily monitoring and replenish as needed.

## 2025-02-21 NOTE — ASSESSMENT & PLAN NOTE
Patient's most recent potassium results are listed below.   Recent Labs     02/19/25  0541 02/20/25  0528 02/21/25  0523   K 3.2* 3.3* 2.5*       Plan  - Replete potassium per protocol  - Monitor potassium Daily  - Patient's hypokalemia is worsening. Will adjust treatment as follows:  IV replacement ordered    2/21:  Continued hypokalemia noted.  Potassium 2.5 today.  IV replacement ordered.  We will also add daily p.o. potassium.  Pedialyte ordered.

## 2025-02-21 NOTE — PROGRESS NOTES
[facility]  Adult Nutrition  Progress Note    SUMMARY     Recommendations  1. Rec'd Cardiac Diet.   -Consistency rec's as per SLP.        2. Rec'd ONS: Ensure Enlive TID to provide 1050kcal and 60g of protein.        3. Rec'd appetite stimulant.      4. Rec'd Beneprotein TID.   5. Rec'd Moshe BID to promote wound healing and to provide additional nutrition.    6. Rec'd encourage PO intake and compliance with drinking ONS.   7. Rec'd daily weights.        8. Rec'd daily multivitamin.        9. RD to follow and make rec's accordingly.  Goals:   1. Pt will consume > 50% of meals by next RD follow up.  2. Pt will be started on ONS by next RD follow up.  Nutrition Goal Status: progressing toward goals   Communication of RD Recs: reviewed with RN    Assessment and Plan    Nutrition Problem  Severe Malnutrition  in the context of Chronic Illness (COPD)    Related to (etiology):   Inadequate protein energy intake  secondary to increased nutrient needs (calories and protein)    Signs and Symptoms (as evidenced by):   Severe muscle loss, severe fat loss, weight loss > 2% in 1 week, energy intake less than 75% for greater than 3 months, BMI = 14.18       Interventions/Recommendations (treatment strategy):  1. Rec'd Cardiac Diet.   -Consistency rec's as per SLP.        2. Rec'd ONS: Ensure Enlive TID to provide 1050kcal and 60g of protein.        3. Rec'd appetite stimulant.      4. Rec'd Beneprotein TID.   5. Rec'd Moshe BID to promote wound healing and to provide additional nutrition.    6. Rec'd encourage PO intake and compliance with drinking ONS.   7. Rec'd daily weights.        8. Rec'd daily multivitamin.        9. RD to follow and make rec's accordingly.    Nutrition Diagnosis Status:   Continues    Malnutrition Assessment  Malnutrition Context: chronic illness  Malnutrition Level: severe  Skin (Micronutrient): thinned, dry, wounds unhealed  Nails (Micronutrient): brittle, thin  Hair/Scalp (Micronutrient):  "dry, plucked easily  Lips/Mucous Membranes (Micronutrient): pallor  Teeth (Micronutrient): broken dentition  Tongue (Micronutrient): other (see comments) (Yeast spots)  Neck/Chest (Micronutrient): muscle wasting, bony prominence, subcutaneous fat loss  Musculoskeletal/Lower Extremities: subcutaneous fat loss, muscle wasting   Micronutrient Evaluation Summary: suspected deficiency   Energy Intake (Malnutrition): less than 75% for greater than or equal to 3 months  Subcutaneous Fat (Malnutrition): severe depletion  Muscle Mass (Malnutrition): severe depletion   Orbital Region (Subcutaneous Fat Loss): severe depletion  Upper Arm Region (Subcutaneous Fat Loss): severe depletion  Thoracic and Lumbar Region: severe depletion   Judaism Region (Muscle Loss): severe depletion  Clavicle Bone Region (Muscle Loss): severe depletion  Clavicle and Acromion Bone Region (Muscle Loss): severe depletion  Scapular Bone Region (Muscle Loss): severe depletion  Dorsal Hand (Muscle Loss): severe depletion  Patellar Region (Muscle Loss): severe depletion  Anterior Thigh Region (Muscle Loss): severe depletion  Posterior Calf Region (Muscle Loss): severe depletion     Nutrition Related Social Determinants of Health:  As per , pt does have some social determinants of health. Discussed with  options for meals on wheels and commodities but  says pt/pt's family will have to set up themselves once discharged from the hospital.    Reason for Assessment    Reason For Assessment: RD follow-up  Diagnosis: other (see comments) (Hypomagnesemia)  General Information Comments: RD consulted for anorexia and unintentional weight loss. Pt is severly malnourished with both muscle wasting and fat loss. As per pt's daughter the pt has lost 2-13 lbs in the last week. Noted pt's hx of COPD. Pt reports she "eats when she wants to eat." and verbalizes that she understands what she needs to do for her health. Rec'd multiple ONS " "and appetite stimulant. Continune to encourage PO intake and compliance with all ONS once ordered. RD to follow and make rec's accordingly.    Follow up on 25: Patient continues on an IDDSI level 6; soft and bite size oral diet with varying po intake noted between 25-75%.  RD recommends to continue with the above nutrition recommendations to help patient achieve adequate energy and protein intake. Labs reviewed.  NKFA.  LBM: 25.  RD to continue to encourage po intake and monitor tolerance    Nutrition Discharge Planning: TBD as care progresses    Nutrition/Diet History    Nutrition Intake History: General diet. Pt reports she eats when she wants to.  Food Preferences: None  Spiritual, Cultural Beliefs, Episcopal Practices, Values that Affect Care: no  Food Allergies: NKFA  Factors Affecting Nutritional Intake: decreased appetite, difficulty/impaired swallowing    Anthropometrics    Height: 5' 3" (160 cm)  Height (inches): 63 in  Height Method: Stated  Weight: 36.3 kg (80 lb 0.4 oz)  Weight (lb): 80.03 lb  Weight Method: Bed Scale  Ideal Body Weight (IBW), Female: 115 lb  % Ideal Body Weight, Female (lb): 69.59 %  % Ideal Body Weight Malnutrition: less than 70% -severe deficit  BMI (Calculated): 14.2  Weight Loss: unintentional  Usual Body Weight (UBW), k.44 kg  % Usual Body Weight: 73.58  % Weight Change From Usual Weight: -26.58 %    Lab/Procedures/Meds    Pertinent Labs Reviewed: reviewed  BMP  Lab Results   Component Value Date     2025    K 2.5 (LL) 2025     2025    CO2 25 2025    BUN 9 2025    CREATININE 0.4 (L) 2025    CALCIUM 7.8 (L) 2025    ANIONGAP 8 2025    EGFRNORACEVR >60.0 2025     Lab Results   Component Value Date    HGBA1C 5.6 2024     Lab Results   Component Value Date    CALCIUM 7.8 (L) 2025       Pertinent Medications Reviewed: reviewed  Scheduled Meds:   budesonide  0.5 mg Nebulization Q12H    carvediloL  " 12.5 mg Oral BID WM    citalopram  20 mg Oral Daily    electrolytes-dextrose  200 mL Oral Q4H    hydrocortisone   Topical (Top) BID    hydrocortisone sodium succinate  50 mg Intravenous Q8H    levothyroxine  25 mcg Oral Before breakfast    lisinopriL  10 mg Oral Daily    magnesium sulfate 2 g IVPB  2 g Intravenous Once    megestroL  200 mg Oral Daily    mirtazapine  15 mg Oral QHS    nicotine  1 patch Transdermal Daily    pantoprazole  40 mg Oral Daily    potassium chloride  10 mEq Intravenous Q1H    potassium chloride  20 mEq Oral BID     Continuous Infusions:   0.9% NaCl   Intravenous Continuous 127 mL/hr at 02/19/25 2130 New Bag at 02/19/25 2130     PRN Meds:.  Current Facility-Administered Medications:     acetaminophen, 650 mg, Oral, Q8H PRN    albuterol-ipratropium, 3 mL, Nebulization, Q4H PRN    HYDROcodone-acetaminophen, 1 tablet, Oral, Q6H PRN    melatonin, 6 mg, Oral, Nightly PRN    ondansetron, 8 mg, Oral, Q8H PRN    sodium chloride 0.9%, 10 mL, Intravenous, PRN    Flushing PICC/Midline Protocol, , , Until Discontinued **AND** sodium chloride 0.9%, 10 mL, Intravenous, Q12H PRN    Estimated/Assessed Needs    Weight Used For Calorie Calculations: 36.3 kg (80 lb 0.4 oz)  Energy Calorie Requirements (kcal): 1134-6583 (35-40 kcal/kg)  Energy Need Method: Kcal/kg  Protein Requirements: 54-72 (1.5-2.0g/kg)  Weight Used For Protein Calculations: 36.3 kg (80 lb 0.4 oz)  Fluid Requirements (mL): 0596-8050 (1mL/kcal)  Estimated Fluid Requirement Method: RDA Method  RDA Method (mL): 1270    Nutrition Prescription Ordered    Current Diet Order: Cardiac, IDDSI level 6: soft and bite size  Oral Nutrition Supplement: Ensure Enlive    Evaluation of Received Nutrient/Fluid Intake    % Kcal Needs: 25-75%  % Protein Needs: 25-75%  Energy Calories Required: not meeting needs  Protein Required: not meeting needs  Tolerance: tolerating  I/O since admit: +6123mls  % Intake of Estimated Energy Needs: 0 - 25 %  % Meal Intake: 0 -  25 %    Nutrition Risk    Level of Risk/Frequency of Follow-up: moderate Follow up: 2x per week     Monitor and Evaluation    Food and Nutrient Intake: energy intake, food and beverage intake  Food and Nutrient Adminstration: diet order  Knowledge/Beliefs/Attitudes: beliefs and attitudes, food and nutrition knowledge/skill  Physical Activity and Function: nutrition-related ADLs and IADLs  Anthropometric Measurements: height/length, weight, weight change, body mass index  Biochemical Data, Medical Tests and Procedures: electrolyte and renal panel, gastrointestinal profile, glucose/endocrine profile, inflammatory profile, lipid profile  Nutrition-Focused Physical Findings: overall appearance     Nutrition Follow-Up    RD Follow-up?: Yes  Dari Coulter, MS, RDN, LDN

## 2025-02-21 NOTE — ASSESSMENT & PLAN NOTE
Continue IV fluids.  Monitor with daily labs.    2/21:  Patient tolerating p.o. fluids.  We will DC IV fluids.     Quality 431: Preventive Care And Screening: Unhealthy Alcohol Use - Screening: Patient not identified as an unhealthy alcohol user when screened for unhealthy alcohol use using a systematic screening method Quality 110: Preventive Care And Screening: Influenza Immunization: Influenza Immunization previously received during influenza season Quality 226: Preventive Care And Screening: Tobacco Use: Screening And Cessation Intervention: Patient screened for tobacco use and is an ex/non-smoker Quality 154 Part B: Falls: Risk Screening (Should Be Reported With Measure 155.): Patient screened for future fall risk; documentation of no falls in the past year or only one fall without injury in the past year Quality 111:Pneumonia Vaccination Status For Older Adults: Pneumococcal vaccine was not administered on or after patient’s 60th birthday and before the end of the measurement period, reason not otherwise specified Quality 402: Tobacco Use And Help With Quitting Among Adolescents: Patient screened for tobacco and never smoked Quality 154 Part A: Falls: Risk Assessment (Should Be Reported With Measure 155.): Falls risk assessment completed and documented in the past 12 months. Detail Level: Generalized Quality 155 (Denominator): Falls Plan Of Care: Plan of Care not Documented, Reason not Otherwise Specified

## 2025-02-22 LAB
ALBUMIN SERPL BCP-MCNC: 2.1 G/DL (ref 3.5–5.2)
ALP SERPL-CCNC: 78 U/L (ref 55–135)
ALT SERPL W/O P-5'-P-CCNC: 15 U/L (ref 10–44)
ANION GAP SERPL CALC-SCNC: 9 MMOL/L (ref 8–16)
AST SERPL-CCNC: 22 U/L (ref 10–40)
BASOPHILS # BLD AUTO: 0.01 K/UL (ref 0–0.2)
BASOPHILS NFR BLD: 0.1 % (ref 0–1.9)
BILIRUB SERPL-MCNC: 0.1 MG/DL (ref 0.1–1)
BUN SERPL-MCNC: 11 MG/DL (ref 8–23)
CALCIUM SERPL-MCNC: 8 MG/DL (ref 8.7–10.5)
CHLORIDE SERPL-SCNC: 100 MMOL/L (ref 95–110)
CO2 SERPL-SCNC: 29 MMOL/L (ref 23–29)
CREAT SERPL-MCNC: 0.4 MG/DL (ref 0.5–1.4)
DIFFERENTIAL METHOD BLD: ABNORMAL
EOSINOPHIL # BLD AUTO: 0 K/UL (ref 0–0.5)
EOSINOPHIL NFR BLD: 0.1 % (ref 0–8)
ERYTHROCYTE [DISTWIDTH] IN BLOOD BY AUTOMATED COUNT: 16 % (ref 11.5–14.5)
EST. GFR  (NO RACE VARIABLE): >60 ML/MIN/1.73 M^2
GLUCOSE SERPL-MCNC: 183 MG/DL (ref 70–110)
HCT VFR BLD AUTO: 31.7 % (ref 37–48.5)
HGB BLD-MCNC: 10 G/DL (ref 12–16)
IMM GRANULOCYTES # BLD AUTO: 0.08 K/UL (ref 0–0.04)
IMM GRANULOCYTES NFR BLD AUTO: 0.6 % (ref 0–0.5)
LYMPHOCYTES # BLD AUTO: 0.8 K/UL (ref 1–4.8)
LYMPHOCYTES NFR BLD: 5.6 % (ref 18–48)
MAGNESIUM SERPL-MCNC: 1.4 MG/DL (ref 1.6–2.6)
MCH RBC QN AUTO: 25.6 PG (ref 27–31)
MCHC RBC AUTO-ENTMCNC: 31.5 G/DL (ref 32–36)
MCV RBC AUTO: 81 FL (ref 82–98)
MONOCYTES # BLD AUTO: 0.5 K/UL (ref 0.3–1)
MONOCYTES NFR BLD: 3.3 % (ref 4–15)
NEUTROPHILS # BLD AUTO: 12.4 K/UL (ref 1.8–7.7)
NEUTROPHILS NFR BLD: 90.3 % (ref 38–73)
NRBC BLD-RTO: 0 /100 WBC
PLATELET # BLD AUTO: 514 K/UL (ref 150–450)
PMV BLD AUTO: 9.2 FL (ref 9.2–12.9)
POCT GLUCOSE: 201 MG/DL (ref 70–110)
POTASSIUM SERPL-SCNC: 2.5 MMOL/L (ref 3.5–5.1)
PROT SERPL-MCNC: 5.7 G/DL (ref 6–8.4)
RBC # BLD AUTO: 3.9 M/UL (ref 4–5.4)
SODIUM SERPL-SCNC: 138 MMOL/L (ref 136–145)
WBC # BLD AUTO: 13.68 K/UL (ref 3.9–12.7)

## 2025-02-22 PROCEDURE — S0179 MEGESTROL 20 MG: HCPCS

## 2025-02-22 PROCEDURE — 94761 N-INVAS EAR/PLS OXIMETRY MLT: CPT

## 2025-02-22 PROCEDURE — 80053 COMPREHEN METABOLIC PANEL: CPT | Performed by: INTERNAL MEDICINE

## 2025-02-22 PROCEDURE — 25000003 PHARM REV CODE 250: Performed by: INTERNAL MEDICINE

## 2025-02-22 PROCEDURE — 83735 ASSAY OF MAGNESIUM: CPT | Performed by: INTERNAL MEDICINE

## 2025-02-22 PROCEDURE — 25000242 PHARM REV CODE 250 ALT 637 W/ HCPCS: Performed by: EMERGENCY MEDICINE

## 2025-02-22 PROCEDURE — 21400001 HC TELEMETRY ROOM

## 2025-02-22 PROCEDURE — 94640 AIRWAY INHALATION TREATMENT: CPT

## 2025-02-22 PROCEDURE — 25000003 PHARM REV CODE 250

## 2025-02-22 PROCEDURE — 36415 COLL VENOUS BLD VENIPUNCTURE: CPT | Performed by: INTERNAL MEDICINE

## 2025-02-22 PROCEDURE — S4991 NICOTINE PATCH NONLEGEND: HCPCS | Performed by: INTERNAL MEDICINE

## 2025-02-22 PROCEDURE — 99900035 HC TECH TIME PER 15 MIN (STAT)

## 2025-02-22 PROCEDURE — 99900031 HC PATIENT EDUCATION (STAT)

## 2025-02-22 PROCEDURE — 63600175 PHARM REV CODE 636 W HCPCS: Performed by: INTERNAL MEDICINE

## 2025-02-22 PROCEDURE — 85025 COMPLETE CBC W/AUTO DIFF WBC: CPT | Performed by: INTERNAL MEDICINE

## 2025-02-22 PROCEDURE — 25000003 PHARM REV CODE 250: Performed by: EMERGENCY MEDICINE

## 2025-02-22 RX ORDER — FAMOTIDINE 20 MG/1
20 TABLET, FILM COATED ORAL 2 TIMES DAILY
Status: DISCONTINUED | OUTPATIENT
Start: 2025-02-22 | End: 2025-03-01 | Stop reason: HOSPADM

## 2025-02-22 RX ORDER — MAGNESIUM SULFATE HEPTAHYDRATE 40 MG/ML
2 INJECTION, SOLUTION INTRAVENOUS ONCE
Status: COMPLETED | OUTPATIENT
Start: 2025-02-22 | End: 2025-02-22

## 2025-02-22 RX ORDER — POTASSIUM CHLORIDE 7.45 MG/ML
10 INJECTION INTRAVENOUS
Status: COMPLETED | OUTPATIENT
Start: 2025-02-22 | End: 2025-02-22

## 2025-02-22 RX ADMIN — MEGESTROL ACETATE 200 MG: 400 SUSPENSION ORAL at 09:02

## 2025-02-22 RX ADMIN — Medication 200 ML: at 06:02

## 2025-02-22 RX ADMIN — LEVOTHYROXINE SODIUM 25 MCG: 25 TABLET ORAL at 06:02

## 2025-02-22 RX ADMIN — BUDESONIDE 0.5 MG: 0.5 INHALANT RESPIRATORY (INHALATION) at 07:02

## 2025-02-22 RX ADMIN — HYDROCORTISONE: 0.5 CREAM TOPICAL at 09:02

## 2025-02-22 RX ADMIN — POTASSIUM CHLORIDE 20 MEQ: 1500 TABLET, EXTENDED RELEASE ORAL at 09:02

## 2025-02-22 RX ADMIN — PANTOPRAZOLE SODIUM 40 MG: 40 TABLET, DELAYED RELEASE ORAL at 09:02

## 2025-02-22 RX ADMIN — CITALOPRAM HYDROBROMIDE 20 MG: 10 TABLET ORAL at 09:02

## 2025-02-22 RX ADMIN — SODIUM CHLORIDE: 9 INJECTION, SOLUTION INTRAVENOUS at 02:02

## 2025-02-22 RX ADMIN — POTASSIUM CHLORIDE 10 MEQ: 7.46 INJECTION, SOLUTION INTRAVENOUS at 01:02

## 2025-02-22 RX ADMIN — POTASSIUM CHLORIDE 10 MEQ: 7.46 INJECTION, SOLUTION INTRAVENOUS at 12:02

## 2025-02-22 RX ADMIN — POTASSIUM CHLORIDE 10 MEQ: 7.46 INJECTION, SOLUTION INTRAVENOUS at 09:02

## 2025-02-22 RX ADMIN — FAMOTIDINE 20 MG: 20 TABLET, FILM COATED ORAL at 06:02

## 2025-02-22 RX ADMIN — POTASSIUM CHLORIDE 10 MEQ: 7.46 INJECTION, SOLUTION INTRAVENOUS at 11:02

## 2025-02-22 RX ADMIN — MIRTAZAPINE 15 MG: 15 TABLET, FILM COATED ORAL at 09:02

## 2025-02-22 RX ADMIN — CARVEDILOL 12.5 MG: 12.5 TABLET, FILM COATED ORAL at 06:02

## 2025-02-22 RX ADMIN — FERROUS SULFATE TAB 325 MG (65 MG ELEMENTAL FE) 1 EACH: 325 (65 FE) TAB at 09:02

## 2025-02-22 RX ADMIN — HYDROCORTISONE SODIUM SUCCINATE 50 MG: 100 INJECTION, POWDER, FOR SOLUTION INTRAMUSCULAR; INTRAVENOUS at 06:02

## 2025-02-22 RX ADMIN — CARVEDILOL 12.5 MG: 12.5 TABLET, FILM COATED ORAL at 09:02

## 2025-02-22 RX ADMIN — MAGNESIUM SULFATE HEPTAHYDRATE 2 G: 40 INJECTION, SOLUTION INTRAVENOUS at 02:02

## 2025-02-22 RX ADMIN — HYDROCORTISONE SODIUM SUCCINATE 50 MG: 100 INJECTION, POWDER, FOR SOLUTION INTRAMUSCULAR; INTRAVENOUS at 09:02

## 2025-02-22 RX ADMIN — MAGNESIUM SULFATE HEPTAHYDRATE 2 G: 40 INJECTION, SOLUTION INTRAVENOUS at 04:02

## 2025-02-22 RX ADMIN — LISINOPRIL 10 MG: 10 TABLET ORAL at 09:02

## 2025-02-22 RX ADMIN — HYDROCORTISONE SODIUM SUCCINATE 50 MG: 100 INJECTION, POWDER, FOR SOLUTION INTRAMUSCULAR; INTRAVENOUS at 04:02

## 2025-02-22 RX ADMIN — NICOTINE 1 PATCH: 14 PATCH, EXTENDED RELEASE TRANSDERMAL at 09:02

## 2025-02-22 RX ADMIN — ONDANSETRON 8 MG: 4 TABLET, ORALLY DISINTEGRATING ORAL at 04:02

## 2025-02-22 NOTE — ASSESSMENT & PLAN NOTE
Continue IV fluids.  Monitor with daily labs.    2/21:  Patient tolerating p.o. fluids.  We will DC IV fluids.

## 2025-02-22 NOTE — ASSESSMENT & PLAN NOTE
Patient's most recent potassium results are listed below.   Recent Labs     02/20/25  0528 02/21/25  0523 02/22/25  0605   K 3.3* 2.5* 2.5*       Plan  - Replete potassium per protocol  - Monitor potassium Daily  - Patient's hypokalemia is worsening. Will adjust treatment as follows:  IV replacement ordered    2/21:  Continued hypokalemia noted.  Potassium 2.5 today.  IV replacement ordered.  We will also add daily p.o. potassium.  Pedialyte ordered.

## 2025-02-22 NOTE — ASSESSMENT & PLAN NOTE
Nutrition consulted. Most recent weight and BMI monitored-     Measurements:  Wt Readings from Last 1 Encounters:   02/19/25 36.3 kg (80 lb 0.4 oz)   Body mass index is 14.18 kg/m².    Patient has been screened and assessed by RD.    Malnutrition Type:  Context: chronic illness  Level: severe    Malnutrition Characteristic Summary:  Energy Intake (Malnutrition): less than 75% for greater than or equal to 3 months  Subcutaneous Fat (Malnutrition): severe depletion  Muscle Mass (Malnutrition): severe depletion    Interventions/Recommendations (treatment strategy):  1. Rec'd Cardiac Diet. - Consistency rec's as per SLP.      2. Rec'd ONS: Ensure Enlive TID to provide 1050kcal and 60g of protein.      3. Rec'd appetite stimulant.    4. Rec'd Beneprotein TID. 5. Rec'd Moshe BID to promote wound healing and to provide additional nutrition.  6. Rec'd encourage PO intake and compliance with drinking ONS. 7. Rec'd daily weights.      8. Rec'd daily multivitamin.      9. RD to follow and make rec's accordingly.    2/21:  Ensure t.i.d. with meals ordered.  Patient with improved p.o. intake.  2/22 FM:  Adding protein supplements.
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
## 2025-02-22 NOTE — ASSESSMENT & PLAN NOTE
Patient's blood pressure range in the last 24 hours was: BP  Min: 162/73  Max: 198/88.The patient's inpatient anti-hypertensive regimen is listed below:  Current Antihypertensives  carvediloL tablet 12.5 mg, 2 times daily with meals, Oral  lisinopriL tablet 10 mg, Daily, Oral    Plan  - BP is controlled, no changes needed to their regimen

## 2025-02-22 NOTE — ASSESSMENT & PLAN NOTE
Nutrition consulted. Most recent weight and BMI monitored-     Measurements:  Wt Readings from Last 1 Encounters:   02/19/25 36.3 kg (80 lb 0.4 oz)   Body mass index is 14.18 kg/m².    Patient has been screened and assessed by RD.    Malnutrition Type:  Context: chronic illness  Level: severe    Malnutrition Characteristic Summary:  Energy Intake (Malnutrition): less than 75% for greater than or equal to 3 months  Subcutaneous Fat (Malnutrition): severe depletion  Muscle Mass (Malnutrition): severe depletion    Interventions/Recommendations (treatment strategy):  1. Rec'd Cardiac Diet. - Consistency rec's as per SLP.      2. Rec'd ONS: Ensure Enlive TID to provide 1050kcal and 60g of protein.      3. Rec'd appetite stimulant.    4. Rec'd Beneprotein TID. 5. Rec'd Omshe BID to promote wound healing and to provide additional nutrition.  6. Rec'd encourage PO intake and compliance with drinking ONS. 7. Rec'd daily weights.      8. Rec'd daily multivitamin.      9. RD to follow and make rec's accordingly.

## 2025-02-22 NOTE — ASSESSMENT & PLAN NOTE
Hyponatremia is likely due to Dehydration/hypovolemia. The patient's most recent sodium results are listed below.  Recent Labs     02/20/25  0528 02/21/25  0523 02/22/25  0605    138 138       Plan  - Correct the sodium by 4-6mEq in 24 hours.   - Will treat the hyponatremia with IV fluids as follows: 127mL/hr  - Monitor sodium Daily.   - Patient hyponatremia is stable    2/21:  Resolved.  Continue monitoring with daily labs.

## 2025-02-22 NOTE — PLAN OF CARE
Plan of care reviewed and ongoing with patient. Midline to L UA infusing NS @ 127 mL/hr, room air tolerating well, ambulated to BR with walker at the bedside; free of falls during the night. Wound care refused during ordered time, pt allowed nurse to perform wound care orders this morning. Free of pain and complaints during the night. Call light and personal items within reach of patient.       Problem: Skin Injury Risk Increased  Goal: Skin Health and Integrity  Outcome: Not Progressing     Problem: Adult Inpatient Plan of Care  Goal: Plan of Care Review  Outcome: Not Progressing  Goal: Patient-Specific Goal (Individualized)  Outcome: Not Progressing  Goal: Absence of Hospital-Acquired Illness or Injury  Outcome: Not Progressing  Goal: Optimal Comfort and Wellbeing  Outcome: Not Progressing  Goal: Readiness for Transition of Care  Outcome: Not Progressing     Problem: Infection  Goal: Absence of Infection Signs and Symptoms  Outcome: Not Progressing
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
## 2025-02-22 NOTE — ASSESSMENT & PLAN NOTE
Patient has Abnormal Magnesium: hypomagnesemia. Will continue to monitor electrolytes closely. Will replace the affected electrolytes and repeat labs to be done after interventions completed. The patient's magnesium results have been reviewed and are listed below.  Recent Labs   Lab 02/22/25  0605   MG 1.4*        2/21:  Magnesium 1.5 today.  We will replenish.  Continue daily monitoring and replenish as needed.  2/22 FM:  Replete.

## 2025-02-23 LAB
ALBUMIN SERPL BCP-MCNC: 2.1 G/DL (ref 3.5–5.2)
ALP SERPL-CCNC: 78 U/L (ref 55–135)
ALT SERPL W/O P-5'-P-CCNC: 16 U/L (ref 10–44)
ANION GAP SERPL CALC-SCNC: 6 MMOL/L (ref 8–16)
AST SERPL-CCNC: 20 U/L (ref 10–40)
BACTERIA BLD CULT: NORMAL
BACTERIA BLD CULT: NORMAL
BASOPHILS # BLD AUTO: 0.01 K/UL (ref 0–0.2)
BASOPHILS NFR BLD: 0.1 % (ref 0–1.9)
BILIRUB SERPL-MCNC: 0.1 MG/DL (ref 0.1–1)
BUN SERPL-MCNC: 17 MG/DL (ref 8–23)
CALCIUM SERPL-MCNC: 7.9 MG/DL (ref 8.7–10.5)
CHLORIDE SERPL-SCNC: 100 MMOL/L (ref 95–110)
CO2 SERPL-SCNC: 30 MMOL/L (ref 23–29)
CREAT SERPL-MCNC: 0.4 MG/DL (ref 0.5–1.4)
DIFFERENTIAL METHOD BLD: ABNORMAL
EOSINOPHIL # BLD AUTO: 0 K/UL (ref 0–0.5)
EOSINOPHIL NFR BLD: 0.1 % (ref 0–8)
ERYTHROCYTE [DISTWIDTH] IN BLOOD BY AUTOMATED COUNT: 16.6 % (ref 11.5–14.5)
EST. GFR  (NO RACE VARIABLE): >60 ML/MIN/1.73 M^2
GLUCOSE SERPL-MCNC: 151 MG/DL (ref 70–110)
HCT VFR BLD AUTO: 32.6 % (ref 37–48.5)
HGB BLD-MCNC: 10.3 G/DL (ref 12–16)
IMM GRANULOCYTES # BLD AUTO: 0.11 K/UL (ref 0–0.04)
IMM GRANULOCYTES NFR BLD AUTO: 0.8 % (ref 0–0.5)
LYMPHOCYTES # BLD AUTO: 0.9 K/UL (ref 1–4.8)
LYMPHOCYTES NFR BLD: 6.3 % (ref 18–48)
MAGNESIUM SERPL-MCNC: 2.1 MG/DL (ref 1.6–2.6)
MCH RBC QN AUTO: 25.6 PG (ref 27–31)
MCHC RBC AUTO-ENTMCNC: 31.6 G/DL (ref 32–36)
MCV RBC AUTO: 81 FL (ref 82–98)
MONOCYTES # BLD AUTO: 0.6 K/UL (ref 0.3–1)
MONOCYTES NFR BLD: 4.4 % (ref 4–15)
NEUTROPHILS # BLD AUTO: 12.7 K/UL (ref 1.8–7.7)
NEUTROPHILS NFR BLD: 88.3 % (ref 38–73)
NRBC BLD-RTO: 0 /100 WBC
PLATELET # BLD AUTO: 513 K/UL (ref 150–450)
PMV BLD AUTO: 9 FL (ref 9.2–12.9)
POTASSIUM SERPL-SCNC: 3.7 MMOL/L (ref 3.5–5.1)
PROT SERPL-MCNC: 5.5 G/DL (ref 6–8.4)
RBC # BLD AUTO: 4.03 M/UL (ref 4–5.4)
SODIUM SERPL-SCNC: 136 MMOL/L (ref 136–145)
WBC # BLD AUTO: 14.39 K/UL (ref 3.9–12.7)

## 2025-02-23 PROCEDURE — 25000003 PHARM REV CODE 250: Performed by: INTERNAL MEDICINE

## 2025-02-23 PROCEDURE — S4991 NICOTINE PATCH NONLEGEND: HCPCS | Performed by: INTERNAL MEDICINE

## 2025-02-23 PROCEDURE — 25000003 PHARM REV CODE 250: Performed by: EMERGENCY MEDICINE

## 2025-02-23 PROCEDURE — 83735 ASSAY OF MAGNESIUM: CPT | Performed by: INTERNAL MEDICINE

## 2025-02-23 PROCEDURE — 94761 N-INVAS EAR/PLS OXIMETRY MLT: CPT

## 2025-02-23 PROCEDURE — 94640 AIRWAY INHALATION TREATMENT: CPT

## 2025-02-23 PROCEDURE — 36415 COLL VENOUS BLD VENIPUNCTURE: CPT | Performed by: INTERNAL MEDICINE

## 2025-02-23 PROCEDURE — S0179 MEGESTROL 20 MG: HCPCS

## 2025-02-23 PROCEDURE — 25000003 PHARM REV CODE 250

## 2025-02-23 PROCEDURE — 99900031 HC PATIENT EDUCATION (STAT)

## 2025-02-23 PROCEDURE — 25000242 PHARM REV CODE 250 ALT 637 W/ HCPCS: Performed by: EMERGENCY MEDICINE

## 2025-02-23 PROCEDURE — 63600175 PHARM REV CODE 636 W HCPCS: Performed by: INTERNAL MEDICINE

## 2025-02-23 PROCEDURE — 80053 COMPREHEN METABOLIC PANEL: CPT | Performed by: INTERNAL MEDICINE

## 2025-02-23 PROCEDURE — 21400001 HC TELEMETRY ROOM

## 2025-02-23 PROCEDURE — 99900035 HC TECH TIME PER 15 MIN (STAT)

## 2025-02-23 PROCEDURE — 85025 COMPLETE CBC W/AUTO DIFF WBC: CPT | Performed by: INTERNAL MEDICINE

## 2025-02-23 RX ADMIN — SODIUM CHLORIDE: 9 INJECTION, SOLUTION INTRAVENOUS at 06:02

## 2025-02-23 RX ADMIN — Medication 200 ML: at 02:02

## 2025-02-23 RX ADMIN — MIRTAZAPINE 15 MG: 15 TABLET, FILM COATED ORAL at 09:02

## 2025-02-23 RX ADMIN — BUDESONIDE 0.5 MG: 0.5 INHALANT RESPIRATORY (INHALATION) at 07:02

## 2025-02-23 RX ADMIN — HYDROCORTISONE SODIUM SUCCINATE 50 MG: 100 INJECTION, POWDER, FOR SOLUTION INTRAMUSCULAR; INTRAVENOUS at 02:02

## 2025-02-23 RX ADMIN — HYDROCORTISONE SODIUM SUCCINATE 50 MG: 100 INJECTION, POWDER, FOR SOLUTION INTRAMUSCULAR; INTRAVENOUS at 09:02

## 2025-02-23 RX ADMIN — POTASSIUM CHLORIDE 20 MEQ: 1500 TABLET, EXTENDED RELEASE ORAL at 09:02

## 2025-02-23 RX ADMIN — FAMOTIDINE 20 MG: 20 TABLET, FILM COATED ORAL at 09:02

## 2025-02-23 RX ADMIN — PANTOPRAZOLE SODIUM 40 MG: 40 TABLET, DELAYED RELEASE ORAL at 09:02

## 2025-02-23 RX ADMIN — NICOTINE 1 PATCH: 14 PATCH, EXTENDED RELEASE TRANSDERMAL at 09:02

## 2025-02-23 RX ADMIN — HYDROCORTISONE SODIUM SUCCINATE 50 MG: 100 INJECTION, POWDER, FOR SOLUTION INTRAMUSCULAR; INTRAVENOUS at 05:02

## 2025-02-23 RX ADMIN — CITALOPRAM HYDROBROMIDE 20 MG: 10 TABLET ORAL at 09:02

## 2025-02-23 RX ADMIN — FERROUS SULFATE TAB 325 MG (65 MG ELEMENTAL FE) 1 EACH: 325 (65 FE) TAB at 09:02

## 2025-02-23 RX ADMIN — IPRATROPIUM BROMIDE AND ALBUTEROL SULFATE 3 ML: 2.5; .5 SOLUTION RESPIRATORY (INHALATION) at 07:02

## 2025-02-23 RX ADMIN — HYDROCORTISONE: 0.5 CREAM TOPICAL at 09:02

## 2025-02-23 RX ADMIN — MEGESTROL ACETATE 200 MG: 400 SUSPENSION ORAL at 09:02

## 2025-02-23 RX ADMIN — LEVOTHYROXINE SODIUM 25 MCG: 25 TABLET ORAL at 06:02

## 2025-02-23 RX ADMIN — CARVEDILOL 12.5 MG: 12.5 TABLET, FILM COATED ORAL at 09:02

## 2025-02-23 RX ADMIN — LISINOPRIL 10 MG: 10 TABLET ORAL at 09:02
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
## 2025-02-23 NOTE — ASSESSMENT & PLAN NOTE
Hyponatremia is likely due to Dehydration/hypovolemia. The patient's most recent sodium results are listed below.  Recent Labs     02/21/25  0523 02/22/25  0605 02/23/25  0603    138 136       Plan  - Correct the sodium by 4-6mEq in 24 hours.   - Will treat the hyponatremia with IV fluids as follows: 127mL/hr  - Monitor sodium Daily.   - Patient hyponatremia is stable    2/21:  Resolved.  Continue monitoring with daily labs.

## 2025-02-23 NOTE — ASSESSMENT & PLAN NOTE
Patient has Abnormal Magnesium: hypomagnesemia. Will continue to monitor electrolytes closely. Will replace the affected electrolytes and repeat labs to be done after interventions completed. The patient's magnesium results have been reviewed and are listed below.  Recent Labs   Lab 02/23/25  0603   MG 2.1        2/21:  Magnesium 1.5 today.  We will replenish.  Continue daily monitoring and replenish as needed.  2/22 FM:  Replete.

## 2025-02-23 NOTE — PLAN OF CARE
02/23/25 1045   Medicare Message   Important Message from Medicare regarding Discharge Appeal Rights Given to patient/caregiver;Explained to patient/caregiver;Signed/date by patient/caregiver;Other (comments)  (Signed by patient's daughter at the bedside.)   Date IMM was signed 02/23/25   Time IMM was signed 0218

## 2025-02-23 NOTE — ASSESSMENT & PLAN NOTE
Patient's most recent potassium results are listed below.   Recent Labs     02/21/25  0523 02/22/25  0605 02/23/25  0603   K 2.5* 2.5* 3.7       Plan  - Replete potassium per protocol  - Monitor potassium Daily  - Patient's hypokalemia is worsening. Will adjust treatment as follows:  IV replacement ordered    2/21:  Continued hypokalemia noted.  Potassium 2.5 today.  IV replacement ordered.  We will also add daily p.o. potassium.  Pedialyte ordered.

## 2025-02-23 NOTE — ASSESSMENT & PLAN NOTE
Patient's blood pressure range in the last 24 hours was: BP  Min: 152/98  Max: 187/88.The patient's inpatient anti-hypertensive regimen is listed below:  Current Antihypertensives  carvediloL tablet 12.5 mg, 2 times daily with meals, Oral  lisinopriL tablet 10 mg, Daily, Oral    Plan  - BP is controlled, no changes needed to their regimen

## 2025-02-23 NOTE — PLAN OF CARE
Plan of care reviewed and ongoing with patient. Midline to L UA infusing NS @ 50 mL/hr, room air tolerating well, ambulated to BR with walker and stand by assist; free of falls during the night. Bed alarm set due to unsteady gait noted during the night. Free of pain and complaints at this time. Call light and personal items within reach of patient.       Problem: Skin Injury Risk Increased  Goal: Skin Health and Integrity  Outcome: Not Progressing     Problem: Adult Inpatient Plan of Care  Goal: Plan of Care Review  Outcome: Not Progressing  Goal: Patient-Specific Goal (Individualized)  Outcome: Not Progressing  Goal: Absence of Hospital-Acquired Illness or Injury  Outcome: Not Progressing  Goal: Optimal Comfort and Wellbeing  Outcome: Not Progressing  Goal: Readiness for Transition of Care  Outcome: Not Progressing     Problem: Infection  Goal: Absence of Infection Signs and Symptoms  Outcome: Not Progressing
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
## 2025-02-23 NOTE — PLAN OF CARE
"Plan of care reviewed with the patient and daughter. Patient with complaints of "heart burn and just not feeling good" and requested something for the heart burn. Dr. Calix notified. New orders placed for famotidine. See MAR. Patient able to ambulate with stand by assist to bathroom.   "

## 2025-02-24 LAB
ALBUMIN SERPL BCP-MCNC: 2.2 G/DL (ref 3.5–5.2)
ALP SERPL-CCNC: 73 U/L (ref 55–135)
ALT SERPL W/O P-5'-P-CCNC: 15 U/L (ref 10–44)
ANION GAP SERPL CALC-SCNC: 6 MMOL/L (ref 8–16)
AST SERPL-CCNC: 29 U/L (ref 10–40)
BASOPHILS # BLD AUTO: 0.02 K/UL (ref 0–0.2)
BASOPHILS NFR BLD: 0.1 % (ref 0–1.9)
BILIRUB SERPL-MCNC: 0.1 MG/DL (ref 0.1–1)
BUN SERPL-MCNC: 19 MG/DL (ref 8–23)
CALCIUM SERPL-MCNC: 8 MG/DL (ref 8.7–10.5)
CHLORIDE SERPL-SCNC: 100 MMOL/L (ref 95–110)
CO2 SERPL-SCNC: 29 MMOL/L (ref 23–29)
CREAT SERPL-MCNC: 0.5 MG/DL (ref 0.5–1.4)
DIFFERENTIAL METHOD BLD: ABNORMAL
EOSINOPHIL # BLD AUTO: 0 K/UL (ref 0–0.5)
EOSINOPHIL NFR BLD: 0.1 % (ref 0–8)
ERYTHROCYTE [DISTWIDTH] IN BLOOD BY AUTOMATED COUNT: 17 % (ref 11.5–14.5)
EST. GFR  (NO RACE VARIABLE): >60 ML/MIN/1.73 M^2
GLUCOSE SERPL-MCNC: 118 MG/DL (ref 70–110)
HCT VFR BLD AUTO: 31.9 % (ref 37–48.5)
HGB BLD-MCNC: 10.2 G/DL (ref 12–16)
IMM GRANULOCYTES # BLD AUTO: 0.16 K/UL (ref 0–0.04)
IMM GRANULOCYTES NFR BLD AUTO: 1.1 % (ref 0–0.5)
LYMPHOCYTES # BLD AUTO: 0.8 K/UL (ref 1–4.8)
LYMPHOCYTES NFR BLD: 5.4 % (ref 18–48)
MAGNESIUM SERPL-MCNC: 1.8 MG/DL (ref 1.6–2.6)
MCH RBC QN AUTO: 26.1 PG (ref 27–31)
MCHC RBC AUTO-ENTMCNC: 32 G/DL (ref 32–36)
MCV RBC AUTO: 82 FL (ref 82–98)
MONOCYTES # BLD AUTO: 0.3 K/UL (ref 0.3–1)
MONOCYTES NFR BLD: 2 % (ref 4–15)
NEUTROPHILS # BLD AUTO: 12.9 K/UL (ref 1.8–7.7)
NEUTROPHILS NFR BLD: 91.3 % (ref 38–73)
NRBC BLD-RTO: 0 /100 WBC
PLATELET # BLD AUTO: 497 K/UL (ref 150–450)
PMV BLD AUTO: 9 FL (ref 9.2–12.9)
POTASSIUM SERPL-SCNC: 3.7 MMOL/L (ref 3.5–5.1)
PROT SERPL-MCNC: 5.5 G/DL (ref 6–8.4)
RBC # BLD AUTO: 3.91 M/UL (ref 4–5.4)
SODIUM SERPL-SCNC: 135 MMOL/L (ref 136–145)
WBC # BLD AUTO: 14.08 K/UL (ref 3.9–12.7)

## 2025-02-24 PROCEDURE — S4991 NICOTINE PATCH NONLEGEND: HCPCS | Performed by: INTERNAL MEDICINE

## 2025-02-24 PROCEDURE — 25000242 PHARM REV CODE 250 ALT 637 W/ HCPCS: Performed by: EMERGENCY MEDICINE

## 2025-02-24 PROCEDURE — 97535 SELF CARE MNGMENT TRAINING: CPT

## 2025-02-24 PROCEDURE — 25000003 PHARM REV CODE 250: Performed by: EMERGENCY MEDICINE

## 2025-02-24 PROCEDURE — 25000003 PHARM REV CODE 250: Performed by: INTERNAL MEDICINE

## 2025-02-24 PROCEDURE — 80053 COMPREHEN METABOLIC PANEL: CPT | Performed by: INTERNAL MEDICINE

## 2025-02-24 PROCEDURE — 36415 COLL VENOUS BLD VENIPUNCTURE: CPT | Performed by: INTERNAL MEDICINE

## 2025-02-24 PROCEDURE — 97110 THERAPEUTIC EXERCISES: CPT | Mod: CQ

## 2025-02-24 PROCEDURE — 85025 COMPLETE CBC W/AUTO DIFF WBC: CPT | Performed by: INTERNAL MEDICINE

## 2025-02-24 PROCEDURE — 99900031 HC PATIENT EDUCATION (STAT)

## 2025-02-24 PROCEDURE — 25000003 PHARM REV CODE 250

## 2025-02-24 PROCEDURE — 21400001 HC TELEMETRY ROOM

## 2025-02-24 PROCEDURE — 83735 ASSAY OF MAGNESIUM: CPT | Performed by: INTERNAL MEDICINE

## 2025-02-24 PROCEDURE — 94640 AIRWAY INHALATION TREATMENT: CPT

## 2025-02-24 PROCEDURE — 97530 THERAPEUTIC ACTIVITIES: CPT

## 2025-02-24 PROCEDURE — 97530 THERAPEUTIC ACTIVITIES: CPT | Mod: CQ

## 2025-02-24 PROCEDURE — 99900035 HC TECH TIME PER 15 MIN (STAT)

## 2025-02-24 PROCEDURE — S0179 MEGESTROL 20 MG: HCPCS

## 2025-02-24 PROCEDURE — 63600175 PHARM REV CODE 636 W HCPCS: Performed by: INTERNAL MEDICINE

## 2025-02-24 PROCEDURE — 94761 N-INVAS EAR/PLS OXIMETRY MLT: CPT

## 2025-02-24 RX ORDER — CALCIUM CARBONATE 200(500)MG
500 TABLET,CHEWABLE ORAL 2 TIMES DAILY PRN
Status: DISCONTINUED | OUTPATIENT
Start: 2025-02-24 | End: 2025-03-01 | Stop reason: HOSPADM

## 2025-02-24 RX ADMIN — MEGESTROL ACETATE 200 MG: 400 SUSPENSION ORAL at 10:02

## 2025-02-24 RX ADMIN — CARVEDILOL 12.5 MG: 12.5 TABLET, FILM COATED ORAL at 08:02

## 2025-02-24 RX ADMIN — HYDROCORTISONE SODIUM SUCCINATE 50 MG: 100 INJECTION, POWDER, FOR SOLUTION INTRAMUSCULAR; INTRAVENOUS at 02:02

## 2025-02-24 RX ADMIN — FAMOTIDINE 20 MG: 20 TABLET, FILM COATED ORAL at 10:02

## 2025-02-24 RX ADMIN — POTASSIUM CHLORIDE 20 MEQ: 1500 TABLET, EXTENDED RELEASE ORAL at 09:02

## 2025-02-24 RX ADMIN — HYDROCORTISONE: 0.5 CREAM TOPICAL at 09:02

## 2025-02-24 RX ADMIN — PANTOPRAZOLE SODIUM 40 MG: 40 TABLET, DELAYED RELEASE ORAL at 10:02

## 2025-02-24 RX ADMIN — FERROUS SULFATE TAB 325 MG (65 MG ELEMENTAL FE) 1 EACH: 325 (65 FE) TAB at 10:02

## 2025-02-24 RX ADMIN — BUDESONIDE 0.5 MG: 0.5 INHALANT RESPIRATORY (INHALATION) at 07:02

## 2025-02-24 RX ADMIN — MIRTAZAPINE 15 MG: 15 TABLET, FILM COATED ORAL at 09:02

## 2025-02-24 RX ADMIN — LISINOPRIL 10 MG: 10 TABLET ORAL at 10:02

## 2025-02-24 RX ADMIN — POTASSIUM CHLORIDE 20 MEQ: 1500 TABLET, EXTENDED RELEASE ORAL at 10:02

## 2025-02-24 RX ADMIN — IPRATROPIUM BROMIDE AND ALBUTEROL SULFATE 3 ML: 2.5; .5 SOLUTION RESPIRATORY (INHALATION) at 06:02

## 2025-02-24 RX ADMIN — HYDROCORTISONE SODIUM SUCCINATE 50 MG: 100 INJECTION, POWDER, FOR SOLUTION INTRAMUSCULAR; INTRAVENOUS at 09:02

## 2025-02-24 RX ADMIN — CARVEDILOL 12.5 MG: 12.5 TABLET, FILM COATED ORAL at 05:02

## 2025-02-24 RX ADMIN — NICOTINE 1 PATCH: 14 PATCH, EXTENDED RELEASE TRANSDERMAL at 10:02

## 2025-02-24 RX ADMIN — HYDROCORTISONE SODIUM SUCCINATE 50 MG: 100 INJECTION, POWDER, FOR SOLUTION INTRAMUSCULAR; INTRAVENOUS at 05:02

## 2025-02-24 RX ADMIN — FAMOTIDINE 20 MG: 20 TABLET, FILM COATED ORAL at 09:02

## 2025-02-24 RX ADMIN — LEVOTHYROXINE SODIUM 25 MCG: 25 TABLET ORAL at 05:02

## 2025-02-24 RX ADMIN — CITALOPRAM HYDROBROMIDE 20 MG: 10 TABLET ORAL at 10:02

## 2025-02-24 NOTE — ASSESSMENT & PLAN NOTE
Hyponatremia is likely due to Dehydration/hypovolemia. The patient's most recent sodium results are listed below.  Recent Labs     02/22/25  0605 02/23/25  0603 02/24/25  0540    136 135*       Plan  - Correct the sodium by 4-6mEq in 24 hours.   - Will treat the hyponatremia with IV fluids as follows: 127mL/hr  - Monitor sodium Daily.   - Patient hyponatremia is stable    2/21:  Resolved.  Continue monitoring with daily labs.

## 2025-02-24 NOTE — PLAN OF CARE
Problem: Occupational Therapy  Goal: Occupational Therapy Goal  Description: Goals to be met by: 02/26/25     Patient will increase functional independence with ADLs by performing:    Feeding with Modified [place].  UE Dressing with Modified [place].  LE Dressing with Modified [place].  Grooming while standing at sink with Modified [place].  Toileting from toilet with Modified [place] for hygiene and clothing management.   Bathing from  shower chair/bench with Set-up Assistance.  Toilet transfer to toilet with Modified [place].    Outcome: Progressing

## 2025-02-24 NOTE — ASSESSMENT & PLAN NOTE
Patient with Acute on chronic debility due to age-related physical debility. The patient's latest AMPAC (Activity Measure for Post Acute Care) Score is listed below.    AM-PAC Score - How much help does the patient need for each activity listed  Basic Mobility Total Score: 24  Turning over in bed (including adjusting bedclothes, sheets and blankets)?: None  Sitting down on and standing up from a chair with arms (e.g., wheelchair, bedside commode, etc.): None  Moving from lying on back to sitting on the side of the bed?: None  Moving to and from a bed to a chair (including a wheelchair)?: None  Need to walk in hospital room?: None  Climbing 3-5 steps with a railing?: None    Plan  - Progressive mobility protocol initated  - PT/OT consulted  - Fall precautions in place    2/24:  Patient with increased weakness and debility since admission.  Continue PT/OT efforts.

## 2025-02-24 NOTE — PLAN OF CARE
Problem: Physical Therapy  Goal: Physical Therapy Goal  Description: Goals to be met by: 2025     Patient will increase functional independence with mobility by performin. Gait  x 800 feet with Modified [place] using Rolling Walker. (Refused)    Outcome: Progressing

## 2025-02-24 NOTE — ASSESSMENT & PLAN NOTE
Patient's most recent potassium results are listed below.   Recent Labs     02/22/25  0605 02/23/25  0603 02/24/25  0540   K 2.5* 3.7 3.7       Plan  - Replete potassium per protocol  - Monitor potassium Daily  - Patient's hypokalemia is worsening. Will adjust treatment as follows:  IV replacement ordered    2/21:  Continued hypokalemia noted.  Potassium 2.5 today.  IV replacement ordered.  We will also add daily p.o. potassium.  Pedialyte ordered.

## 2025-02-24 NOTE — ASSESSMENT & PLAN NOTE
Patient's blood pressure range in the last 24 hours was: BP  Min: 164/73  Max: 181/90.The patient's inpatient anti-hypertensive regimen is listed below:  Current Antihypertensives  carvediloL tablet 12.5 mg, 2 times daily with meals, Oral  lisinopriL tablet 10 mg, Daily, Oral    Plan  - BP is controlled, no changes needed to their regimen

## 2025-02-24 NOTE — ASSESSMENT & PLAN NOTE
Famotidine ordered.    2/21:  Patient reports increased reflux symptoms.  We will discontinue famotidine and resume home Protonix.    2/24:  Patient reports reflux improved with Protonix and added famotidine.

## 2025-02-24 NOTE — PT/OT/SLP PROGRESS
Physical Therapy Treatment    Patient Name:  Ruth Gamboa   MRN:  [id number]    Recommendations:     Discharge Recommendations: Low Intensity Therapy  Discharge Equipment Recommendations: bedside commode, walker, rolling, shower chair  Barriers to discharge:  functional status    Assessment:     Ruth Gamboa is a 80 y.o. female admitted with a medical diagnosis of Hypomagnesemia.  She presents with the following impairments/functional limitations: weakness, impaired self care skills, impaired functional mobility, impaired cardiopulmonary response to activity, impaired balance     Pt did not want to walk but knows she can. She agreed to perform exercises in room. C/o back pain sitting on edge of bed. .    Rehab Prognosis: Good; patient would benefit from acute skilled PT services to address these deficits and reach maximum level of function.    Recent Surgery: * No surgery found *      Plan:     During this hospitalization, patient to be seen 5 x/week to address the identified rehab impairments via gait training, therapeutic activities, therapeutic exercises, neuromuscular re-education and progress toward the following goals:    Plan of Care Expires:  02/26/25    Subjective     Chief Complaint: my back hurts. I need to gain weight.   Patient/Family Comments/goals: return home to [facility].  Pain/Comfort:  Pain Rating 1: 1/10  Location - Side 1: Bilateral  Location - Orientation 1: generalized  Location 1: back  Pain Addressed 1: Reposition, Nurse notified  Pain Rating Post-Intervention 1: 3/10      Objective:     Communicated with nurse, OT, and supervising PT prior to session.  Patient found HOB elevated with peripheral IV upon PT entry to room.     General Precautions: Standard, fall  Orthopedic Precautions: N/A  Braces: N/A  Respiratory Status: Room air     Functional Mobility:  Bed Mobility:     Scooting: modified independence  Bridging: modified independence  Supine to Sit: modified independence  Sit to Supine:  modified independence  Balance: standing static good.       AM-PAC 6 CLICK MOBILITY  Turning over in bed (including adjusting bedclothes, sheets and blankets)?: 4  Sitting down on and standing up from a chair with arms (e.g., wheelchair, bedside commode, etc.): 4  Moving from lying on back to sitting on the side of the bed?: 4  Moving to and from a bed to a chair (including a wheelchair)?: 4  Need to walk in hospital room?: 3  Climbing 3-5 steps with a railing?: 3  Basic Mobility Total Score: 22       Treatment & Education:  Seated, -   High knees  Hip abduction  Ankle taps   STS - without UE support    Postural cues/stabilization  Breath work     Patient left with bed in chair position with all lines intact, call button in reach, and nurse notified..    GOALS:   Multidisciplinary Problems       Physical Therapy Goals          Problem: Physical Therapy    Goal Priority Disciplines Outcome Interventions   Physical Therapy Goal     PT, PT/OT Progressing    Description: Goals to be met by: 2025     Patient will increase functional independence with mobility by performin. Gait  x 800 feet with Modified [place] using Rolling Walker.                          DME Justifications:  No DME recommended requiring DME justifications    Time Tracking:     PT Received On:    PT Start Time: 1425     PT Stop Time: 1440  PT Total Time (min): 15 min     Billable Minutes: Therapeutic Activity 8 and Therapeutic Exercise 9    Treatment Type: Treatment  PT/PTA: PTA   Roxann Schwarz, NITA, CI, MS, MFDc  2025    Number of PTA visits since last PT visit: 2025
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
## 2025-02-24 NOTE — PT/OT/SLP PROGRESS
"Occupational Therapy   Treatment    Name: Ruth Gamboa  MRN: [id number]  Admitting Diagnosis:  Hypomagnesemia       Recommendations:     Discharge Recommendations: Low Intensity Therapy  Discharge Equipment Recommendations:  bedside commode, walker, rolling, shower chair  Barriers to discharge:  Other (Comment) (Medical status)    Assessment:     Ruth Gamboa is a 80 y.o. female with a medical diagnosis of Hypomagnesemia.  She presents with improved functional performance with UB and LB dressing as noted by modified independence with extra time while demonstrating modified independence with transfers. Performance deficits affecting function are weakness, impaired endurance, impaired self care skills, impaired functional mobility, decreased safety awareness, impaired cardiopulmonary response to activity, impaired balance.     Rehab Prognosis:  Good; patient would benefit from acute skilled OT services to address these deficits and reach maximum level of function.       Plan:     Patient to be seen 3 x/week to address the above listed problems via self-care/home management, therapeutic activities, therapeutic exercises  Plan of Care Expires: 02/26/25  Plan of Care Reviewed with: patient, grandchild(imelda)    Subjective     Chief Complaint: Pt stated, "only thing that bothers me is the acid reflux, but I don't really have pain".  Patient/Family Comments/goals: Pt would like to return home with support of family.   Pain/Comfort:  Pain Rating 1: 0/10  Pain Rating Post-Intervention 1: 0/10    Objective:     Communicated with: nurse prior to session.  Patient found HOB elevated with peripheral IV upon OT entry to room.    General Precautions: Standard, fall    Orthopedic Precautions:N/A  Braces: N/A  Respiratory Status: Room air     Occupational Performance:     Bed Mobility:    Patient completed Rolling/Turning to Left with  independence  Patient completed Rolling/Turning to Right with independence  Patient completed " Scooting/Bridging with independence  Patient completed Supine to Sit with independence  Patient completed Sit to Supine with independence     Functional Mobility/Transfers:  Patient completed Sit <> Stand Transfer with modified independence  with  rolling walker   Patient completed Bed <> Chair Transfer using Step Transfer technique with modified independence with rolling walker  Patient completed Toilet Transfer Step Transfer technique with modified independence with  grab bars  Functional Mobility: Pt ambulated greater than 200' between surfaces utilizing RW.     Activities of Daily Living:  Upper Body Dressing: modified independence    Lower Body Dressing: modified independence        [facility] 6 Click ADL:      Treatment & Education:  Pt was cooperative and motivated without verbal encouragement while exhibiting more positive affect. She performed bed mobility demonstrating independence without use of bedrails. Pt then participated in ADL retraining regarding UB and LB dressing demonstrating modified independence secondary to extra time performing while seated at EOB unsupported. Also, she participated in functional trasfer retraining to / from bed and toilet emphasizing fall prevention demonstrating modified independence with use of grab bar and RW for safety. Pt ambulated greater than 200' between surfaces utilizing RW.     Patient left HOB elevated with all lines intact, call button in reach, and nurse notified    GOALS:   Multidisciplinary Problems       Occupational Therapy Goals          Problem: Occupational Therapy    Goal Priority Disciplines Outcome Interventions   Occupational Therapy Goal     OT, PT/OT Progressing    Description: Goals to be met by: 02/26/25     Patient will increase functional independence with ADLs by performing:    Feeding with Modified [place].  UE Dressing with Modified [place].  LE Dressing with Modified [place].  Grooming while standing at sink with Modified  [place].  Toileting from toilet with Modified [place] for hygiene and clothing management.   Bathing from  shower chair/bench with Set-up Assistance.  Toilet transfer to toilet with Modified [place].                         DME Justifications:   Ruth's mobility limitation cannot be sufficiently resolved by the use of a cane. Her functional mobility deficit can be sufficiently resolved with the use of a Rolling Walker. Patient's mobility limitation significantly impairs their ability to participate in one of more activities of daily living.  The use of a RW will significantly improve the patient's ability to participate in MRADLS and the patient will use it on regular basis in the home.    Time Tracking:     OT Date of Treatment: 02/24/25  OT Start Time: 1323  OT Stop Time: 1347  OT Total Time (min): 24 min    Billable Minutes:Self Care/Home Management 10  Therapeutic Activity 14    2/24/2025

## 2025-02-24 NOTE — PLAN OF CARE
"Plan of care reviewed with the patient and daughter. Patient states "I'm just not feeling good today, I just want to sleep."   "

## 2025-02-24 NOTE — ASSESSMENT & PLAN NOTE
Nutrition consulted. Most recent weight and BMI monitored-     Measurements:  Wt Readings from Last 1 Encounters:   02/19/25 36.3 kg (80 lb 0.4 oz)   Body mass index is 14.18 kg/m².    Patient has been screened and assessed by RD.    Malnutrition Type:  Context: chronic illness  Level: severe    Malnutrition Characteristic Summary:  Energy Intake (Malnutrition): less than 75% for greater than or equal to 3 months  Subcutaneous Fat (Malnutrition): severe depletion  Muscle Mass (Malnutrition): severe depletion    Interventions/Recommendations (treatment strategy):  1. Rec'd Cardiac Diet. - Consistency rec's as per SLP.      2. Rec'd ONS: Ensure Enlive TID to provide 1050kcal and 60g of protein.      3. Rec'd appetite stimulant.    4. Rec'd Beneprotein TID. 5. Rec'd Moseh BID to promote wound healing and to provide additional nutrition.  6. Rec'd encourage PO intake and compliance with drinking ONS. 7. Rec'd daily weights.      8. Rec'd daily multivitamin.      9. RD to follow and make rec's accordingly.

## 2025-02-24 NOTE — ASSESSMENT & PLAN NOTE
Patient has Abnormal Magnesium: hypomagnesemia. Will continue to monitor electrolytes closely. Will replace the affected electrolytes and repeat labs to be done after interventions completed. The patient's magnesium results have been reviewed and are listed below.  Recent Labs   Lab 02/24/25  0540   MG 1.8        2/21:  Magnesium 1.5 today.  We will replenish.  Continue daily monitoring and replenish as needed.  2/22 FM:  Replete.

## 2025-02-24 NOTE — PLAN OF CARE
Plan of care reviewed and ongoing with patient. Midline to L UA infusing saline locked, room air tolerating well, ambulated to BR with walker and stand by assist; free of falls during the night.  Free of pain and complaints at this time. Call light and personal items within reach of patient.       Problem: Skin Injury Risk Increased  Goal: Skin Health and Integrity  Outcome: Not Progressing     Problem: Adult Inpatient Plan of Care  Goal: Plan of Care Review  Outcome: Not Progressing  Goal: Patient-Specific Goal (Individualized)  Outcome: Not Progressing  Goal: Absence of Hospital-Acquired Illness or Injury  Outcome: Not Progressing  Goal: Optimal Comfort and Wellbeing  Outcome: Not Progressing  Goal: Readiness for Transition of Care  Outcome: Not Progressing     Problem: Infection  Goal: Absence of Infection Signs and Symptoms  Outcome: Not Progressing
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
## 2025-02-24 NOTE — PLAN OF CARE
02/24/25 1521   Post-Acute Status   Post-Acute Authorization E   E Status Referrals Sent     New referral sent to [facility] for bedside commode and rolling walker. Pending clinical review.

## 2025-02-25 LAB
ALBUMIN SERPL BCP-MCNC: 2.1 G/DL (ref 3.5–5.2)
ALP SERPL-CCNC: 67 U/L (ref 55–135)
ALT SERPL W/O P-5'-P-CCNC: 12 U/L (ref 10–44)
ANION GAP SERPL CALC-SCNC: 7 MMOL/L (ref 8–16)
AST SERPL-CCNC: 15 U/L (ref 10–40)
BASOPHILS # BLD AUTO: 0.02 K/UL (ref 0–0.2)
BASOPHILS NFR BLD: 0.2 % (ref 0–1.9)
BILIRUB SERPL-MCNC: 0.1 MG/DL (ref 0.1–1)
BUN SERPL-MCNC: 19 MG/DL (ref 8–23)
CALCIUM SERPL-MCNC: 7.8 MG/DL (ref 8.7–10.5)
CHLORIDE SERPL-SCNC: 99 MMOL/L (ref 95–110)
CO2 SERPL-SCNC: 31 MMOL/L (ref 23–29)
CREAT SERPL-MCNC: 0.5 MG/DL (ref 0.5–1.4)
DIFFERENTIAL METHOD BLD: ABNORMAL
EOSINOPHIL # BLD AUTO: 0 K/UL (ref 0–0.5)
EOSINOPHIL NFR BLD: 0.1 % (ref 0–8)
ERYTHROCYTE [DISTWIDTH] IN BLOOD BY AUTOMATED COUNT: 17.5 % (ref 11.5–14.5)
EST. GFR  (NO RACE VARIABLE): >60 ML/MIN/1.73 M^2
GLUCOSE SERPL-MCNC: 115 MG/DL (ref 70–110)
HCT VFR BLD AUTO: 32.6 % (ref 37–48.5)
HGB BLD-MCNC: 10.3 G/DL (ref 12–16)
IMM GRANULOCYTES # BLD AUTO: 0.14 K/UL (ref 0–0.04)
IMM GRANULOCYTES NFR BLD AUTO: 1.1 % (ref 0–0.5)
LYMPHOCYTES # BLD AUTO: 0.7 K/UL (ref 1–4.8)
LYMPHOCYTES NFR BLD: 5.7 % (ref 18–48)
MAGNESIUM SERPL-MCNC: 1.9 MG/DL (ref 1.6–2.6)
MCH RBC QN AUTO: 25.9 PG (ref 27–31)
MCHC RBC AUTO-ENTMCNC: 31.6 G/DL (ref 32–36)
MCV RBC AUTO: 82 FL (ref 82–98)
MONOCYTES # BLD AUTO: 0.4 K/UL (ref 0.3–1)
MONOCYTES NFR BLD: 3 % (ref 4–15)
NEUTROPHILS # BLD AUTO: 11.4 K/UL (ref 1.8–7.7)
NEUTROPHILS NFR BLD: 89.9 % (ref 38–73)
NRBC BLD-RTO: 0 /100 WBC
PLATELET # BLD AUTO: 485 K/UL (ref 150–450)
PMV BLD AUTO: 9.2 FL (ref 9.2–12.9)
POTASSIUM SERPL-SCNC: 3.1 MMOL/L (ref 3.5–5.1)
PROT SERPL-MCNC: 5.3 G/DL (ref 6–8.4)
RBC # BLD AUTO: 3.98 M/UL (ref 4–5.4)
SODIUM SERPL-SCNC: 137 MMOL/L (ref 136–145)
WBC # BLD AUTO: 12.67 K/UL (ref 3.9–12.7)

## 2025-02-25 PROCEDURE — S4991 NICOTINE PATCH NONLEGEND: HCPCS | Performed by: INTERNAL MEDICINE

## 2025-02-25 PROCEDURE — 85025 COMPLETE CBC W/AUTO DIFF WBC: CPT | Performed by: INTERNAL MEDICINE

## 2025-02-25 PROCEDURE — 25000003 PHARM REV CODE 250: Performed by: EMERGENCY MEDICINE

## 2025-02-25 PROCEDURE — 63600175 PHARM REV CODE 636 W HCPCS

## 2025-02-25 PROCEDURE — 94761 N-INVAS EAR/PLS OXIMETRY MLT: CPT

## 2025-02-25 PROCEDURE — 94640 AIRWAY INHALATION TREATMENT: CPT

## 2025-02-25 PROCEDURE — 99900031 HC PATIENT EDUCATION (STAT)

## 2025-02-25 PROCEDURE — 25000003 PHARM REV CODE 250

## 2025-02-25 PROCEDURE — 97116 GAIT TRAINING THERAPY: CPT

## 2025-02-25 PROCEDURE — 80053 COMPREHEN METABOLIC PANEL: CPT | Performed by: INTERNAL MEDICINE

## 2025-02-25 PROCEDURE — 36415 COLL VENOUS BLD VENIPUNCTURE: CPT | Performed by: INTERNAL MEDICINE

## 2025-02-25 PROCEDURE — S0179 MEGESTROL 20 MG: HCPCS

## 2025-02-25 PROCEDURE — 99900035 HC TECH TIME PER 15 MIN (STAT)

## 2025-02-25 PROCEDURE — 83735 ASSAY OF MAGNESIUM: CPT | Performed by: INTERNAL MEDICINE

## 2025-02-25 PROCEDURE — 25000003 PHARM REV CODE 250: Performed by: INTERNAL MEDICINE

## 2025-02-25 PROCEDURE — 97110 THERAPEUTIC EXERCISES: CPT | Mod: CO

## 2025-02-25 PROCEDURE — 25000242 PHARM REV CODE 250 ALT 637 W/ HCPCS: Performed by: EMERGENCY MEDICINE

## 2025-02-25 PROCEDURE — 63600175 PHARM REV CODE 636 W HCPCS: Performed by: INTERNAL MEDICINE

## 2025-02-25 PROCEDURE — 21400001 HC TELEMETRY ROOM

## 2025-02-25 RX ORDER — HYDRALAZINE HYDROCHLORIDE 20 MG/ML
10 INJECTION INTRAMUSCULAR; INTRAVENOUS EVERY 6 HOURS PRN
Status: DISCONTINUED | OUTPATIENT
Start: 2025-02-25 | End: 2025-03-01 | Stop reason: HOSPADM

## 2025-02-25 RX ORDER — ACETAMINOPHEN 325 MG/1
650 TABLET ORAL EVERY 8 HOURS PRN
Status: DISCONTINUED | OUTPATIENT
Start: 2025-02-25 | End: 2025-03-01 | Stop reason: HOSPADM

## 2025-02-25 RX ORDER — LISINOPRIL 20 MG/1
20 TABLET ORAL DAILY
Status: DISCONTINUED | OUTPATIENT
Start: 2025-02-25 | End: 2025-03-01 | Stop reason: HOSPADM

## 2025-02-25 RX ORDER — POTASSIUM CHLORIDE 7.45 MG/ML
10 INJECTION INTRAVENOUS
Status: COMPLETED | OUTPATIENT
Start: 2025-02-25 | End: 2025-02-25

## 2025-02-25 RX ORDER — NYSTATIN 100000 [USP'U]/ML
500000 SUSPENSION ORAL
Status: DISCONTINUED | OUTPATIENT
Start: 2025-02-25 | End: 2025-03-01 | Stop reason: HOSPADM

## 2025-02-25 RX ADMIN — NYSTATIN 500000 UNITS: 100000 SUSPENSION ORAL at 05:02

## 2025-02-25 RX ADMIN — CARVEDILOL 12.5 MG: 12.5 TABLET, FILM COATED ORAL at 05:02

## 2025-02-25 RX ADMIN — FAMOTIDINE 20 MG: 20 TABLET, FILM COATED ORAL at 08:02

## 2025-02-25 RX ADMIN — POTASSIUM CHLORIDE 20 MEQ: 1500 TABLET, EXTENDED RELEASE ORAL at 09:02

## 2025-02-25 RX ADMIN — NICOTINE 1 PATCH: 14 PATCH, EXTENDED RELEASE TRANSDERMAL at 09:02

## 2025-02-25 RX ADMIN — HYDROCORTISONE: 0.5 CREAM TOPICAL at 08:02

## 2025-02-25 RX ADMIN — PANTOPRAZOLE SODIUM 40 MG: 40 TABLET, DELAYED RELEASE ORAL at 09:02

## 2025-02-25 RX ADMIN — HYDROCORTISONE SODIUM SUCCINATE 50 MG: 100 INJECTION, POWDER, FOR SOLUTION INTRAMUSCULAR; INTRAVENOUS at 08:02

## 2025-02-25 RX ADMIN — BUDESONIDE 0.5 MG: 0.5 INHALANT RESPIRATORY (INHALATION) at 07:02

## 2025-02-25 RX ADMIN — FAMOTIDINE 20 MG: 20 TABLET, FILM COATED ORAL at 09:02

## 2025-02-25 RX ADMIN — MIRTAZAPINE 15 MG: 15 TABLET, FILM COATED ORAL at 08:02

## 2025-02-25 RX ADMIN — NYSTATIN 500000 UNITS: 100000 SUSPENSION ORAL at 11:02

## 2025-02-25 RX ADMIN — POTASSIUM CHLORIDE 10 MEQ: 7.46 INJECTION, SOLUTION INTRAVENOUS at 09:02

## 2025-02-25 RX ADMIN — HYDROCORTISONE SODIUM SUCCINATE 50 MG: 100 INJECTION, POWDER, FOR SOLUTION INTRAMUSCULAR; INTRAVENOUS at 05:02

## 2025-02-25 RX ADMIN — CITALOPRAM HYDROBROMIDE 20 MG: 10 TABLET ORAL at 09:02

## 2025-02-25 RX ADMIN — HYDROCODONE BITARTRATE AND ACETAMINOPHEN 1 TABLET: 5; 325 TABLET ORAL at 09:02

## 2025-02-25 RX ADMIN — HYDRALAZINE HYDROCHLORIDE 10 MG: 20 INJECTION, SOLUTION INTRAMUSCULAR; INTRAVENOUS at 11:02

## 2025-02-25 RX ADMIN — LISINOPRIL 20 MG: 20 TABLET ORAL at 09:02

## 2025-02-25 RX ADMIN — POTASSIUM CHLORIDE 20 MEQ: 1500 TABLET, EXTENDED RELEASE ORAL at 08:02

## 2025-02-25 RX ADMIN — NYSTATIN 500000 UNITS: 100000 SUSPENSION ORAL at 08:02

## 2025-02-25 RX ADMIN — HYDROCORTISONE: 0.5 CREAM TOPICAL at 09:02

## 2025-02-25 RX ADMIN — HYDROCORTISONE SODIUM SUCCINATE 50 MG: 100 INJECTION, POWDER, FOR SOLUTION INTRAMUSCULAR; INTRAVENOUS at 02:02

## 2025-02-25 RX ADMIN — FERROUS SULFATE TAB 325 MG (65 MG ELEMENTAL FE) 1 EACH: 325 (65 FE) TAB at 09:02

## 2025-02-25 RX ADMIN — LEVOTHYROXINE SODIUM 25 MCG: 25 TABLET ORAL at 05:02

## 2025-02-25 RX ADMIN — CARVEDILOL 12.5 MG: 12.5 TABLET, FILM COATED ORAL at 08:02

## 2025-02-25 RX ADMIN — MEGESTROL ACETATE 200 MG: 400 SUSPENSION ORAL at 10:02

## 2025-02-25 RX ADMIN — ONDANSETRON 8 MG: 4 TABLET, ORALLY DISINTEGRATING ORAL at 10:02

## 2025-02-25 RX ADMIN — POTASSIUM CHLORIDE 10 MEQ: 7.46 INJECTION, SOLUTION INTRAVENOUS at 10:02
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
## 2025-02-25 NOTE — PLAN OF CARE
Problem: Physical Therapy  Goal: Physical Therapy Goal  Description: Goals to be met by: 2025     Patient will increase functional independence with mobility by performin. Gait  x 800 feet with Modified [place] using Rolling Walker.     2025 1424 by Gage John, PT  Outcome: Progressing  2025 142 by Gage John, PT  Outcome: Progressing

## 2025-02-25 NOTE — PT/OT/SLP PROGRESS
Occupational Therapy   Treatment    Name: Ruth Gamboa  MRN: [id number]  Admitting Diagnosis:  Hypomagnesemia       Recommendations:     Discharge Recommendations: Low Intensity Therapy  Discharge Equipment Recommendations:  bedside commode, walker, rolling, shower chair  Barriers to discharge:  Other (Comment) (current medical and functional status)    Assessment:     Ruth Gamboa is a 80 y.o. female with a medical diagnosis of Hypomagnesemia.  She presents with fair participation. Performance deficits affecting function are weakness, impaired endurance, impaired self care skills, impaired functional mobility, impaired balance, decreased safety awareness, impaired cardiopulmonary response to activity. Pt reported she did not feel like ambulating at this time, therefore patient completed therapeutic exercise.     Rehab Prognosis:  Fair; patient would benefit from acute skilled OT services to address these deficits and reach maximum level of function.       Plan:     Patient to be seen 3 x/week to address the above listed problems via self-care/home management, therapeutic activities, therapeutic exercises  Plan of Care Expires: 02/26/25  Plan of Care Reviewed with: patient    Subjective     Chief Complaint: I am cold.   Patient/Family Comments/goals: Reported none  Pain/Comfort:  Pain Rating 1: 0/10  Pain Rating Post-Intervention 1: 0/10    Objective:     Communicated with: occupational therapist and nurse prior to session.  Patient found HOB elevated with peripheral IV upon OT entry to room.    General Precautions: Standard, fall    Orthopedic Precautions:N/A  Braces: N/A  Respiratory Status: Room air     Occupational Performance:     Bed Mobility:    Patient completed Rolling/Turning to Left with  independence  Patient completed Rolling/Turning to Right with independence  Patient completed Scooting/Bridging with independence  Patient completed Supine to Sit with modified independence  Patient completed Sit to  Supine with modified independence     [facility] 6 Click ADL: 22    Treatment & Education:  Patient engaged in active range of motion therapeutic exercise for 2 x 15 sitting EOB in the following planes: shoulder flexion/extension, shoulder abduction/adduction, elbow flexion/extension, scapular retractions/protraction, scapular elevation/depression, shoulder rotations (forward and reverse), wrist flexion/extension, wrist radial/ulnar deviation, forearm supination/pronation, and composite fist. Patient needed rest breaks between each set due to impaired muscle endurance and activity tolerance. Patient educated to continue to complete exercise throughout the day to increase strength and endurance to facilitate an increase in ADL/IADLs. Patient verbally understood.       Patient left HOB elevated with all lines intact, call button in reach, and nurse notified    GOALS:   Multidisciplinary Problems       Occupational Therapy Goals          Problem: Occupational Therapy    Goal Priority Disciplines Outcome Interventions   Occupational Therapy Goal     OT, PT/OT Progressing    Description: Goals to be met by: 02/26/25     Patient will increase functional independence with ADLs by performing:    Feeding with Modified [place].  UE Dressing with Modified [place].  LE Dressing with Modified [place].  Grooming while standing at sink with Modified [place].  Toileting from toilet with Modified [place] for hygiene and clothing management.   Bathing from  shower chair/bench with Set-up Assistance.  Toilet transfer to toilet with Modified [place].                         DME Justifications:  Ruth's mobility limitation cannot be sufficiently resolved by the use of a cane. Her functional mobility deficit can be sufficiently resolved with the use of a Rolling Walker. Patient's mobility limitation significantly impairs their ability to participate in one of more activities of daily living.  The use of a RW  will significantly improve the patient's ability to participate in MRADLS and the patient will use it on regular basis in the home.    Time Tracking:     OT Date of Treatment: 02/25/25  OT Start Time: 1346  OT Stop Time: 1359  OT Total Time (min): 13 min    Billable Minutes:Therapeutic Exercise 13 min    OT/SUJIT: SUJIT     Number of SUJIT visits since last OT visit: 1    2/25/2025

## 2025-02-25 NOTE — ASSESSMENT & PLAN NOTE
Patient has Abnormal Magnesium: hypomagnesemia. Will continue to monitor electrolytes closely. Will replace the affected electrolytes and repeat labs to be done after interventions completed. The patient's magnesium results have been reviewed and are listed below.  Recent Labs   Lab 02/25/25  0520   MG 1.9        2/21:  Magnesium 1.5 today.  We will replenish.  Continue daily monitoring and replenish as needed.  2/22 FM:  Replete.

## 2025-02-25 NOTE — PT/OT/SLP PROGRESS
Physical Therapy Treatment    Patient Name:  Ruth Gamboa   MRN:  [id number]    Recommendations:     Discharge Recommendations: Low Intensity Therapy  Discharge Equipment Recommendations: bedside commode, walker, rolling, shower chair  Barriers to discharge: None    Assessment:     Ruth Gamboa is a 80 y.o. female admitted with a medical diagnosis of Hypomagnesemia.  She presents with the following impairments/functional limitations: weakness, impaired endurance, impaired muscle length, impaired self care skills, impaired functional mobility, decreased lower extremity function, gait instability, decreased safety awareness, impaired balance. Patient presents with no pain and good attitude this visit. She is able to perform all bed mobility and transfers with Mod I this visit. She is able to ambulate 150 feet with no AD, but used the IV pole when walking. Patient is progressing well with her physical therapy.     Rehab Prognosis: Good; patient would benefit from acute skilled PT services to address these deficits and reach maximum level of function.    Recent Surgery: * No surgery found *      Plan:     During this hospitalization, patient to be seen 5 x/week to address the identified rehab impairments via gait training, therapeutic activities, therapeutic exercises, neuromuscular re-education and progress toward the following goals:    Plan of Care Expires:  03/03/25    Subjective     Chief Complaint: Weakness   Patient/Family Comments/goals: return to home   Pain/Comfort:  Pain Rating 1: 0/10      Objective:     Communicated with nursing prior to session.  Patient found HOB elevated with peripheral IV upon PT entry to room.     General Precautions: Standard, fall  Orthopedic Precautions: N/A  Braces: N/A  Respiratory Status: Room air     Functional Mobility:  Bed Mobility:     Rolling Left:  modified independence  Rolling Right: modified independence  Scooting: modified independence  Bridging: modified  independence  Supine to Sit: modified independence  Sit to Supine: modified independence  Transfers:     Sit to Stand:  supervision with no AD  Gait: 150 feet with IV pole pushing and no LOB noted. She requires cuing for proper JADYN in order to widen her JADYN and increase her step length   Balance: no LOB noted this visit       AM-PAC 6 CLICK MOBILITY  Turning over in bed (including adjusting bedclothes, sheets and blankets)?: 4  Sitting down on and standing up from a chair with arms (e.g., wheelchair, bedside commode, etc.): 4  Moving from lying on back to sitting on the side of the bed?: 4  Moving to and from a bed to a chair (including a wheelchair)?: 4  Need to walk in hospital room?: 4  Climbing 3-5 steps with a railing?: 3  Basic Mobility Total Score: 23       Treatment & Education:  Patient educated on safety and need to sit EOB throughout the day to avoid further muscular atrophy.   Bed Mobility  Transfers  Gait training     Patient left sitting edge of bed with all lines intact, call button in reach, nursing notified, and daughter present..    GOALS:   Multidisciplinary Problems       Physical Therapy Goals          Problem: Physical Therapy    Goal Priority Disciplines Outcome Interventions   Physical Therapy Goal     PT, PT/OT Progressing    Description: Goals to be met by: 2025     Patient will increase functional independence with mobility by performin. Gait  x 800 feet with Modified [place] using Rolling Walker.                            Time Tracking:     PT Received On: 25  PT Start Time: 1400     PT Stop Time: 1420  PT Total Time (min): 20 min     Billable Minutes: Gait Training      Treatment Type: Treatment  PT/PTA: PT     Number of PTA visits since last PT visit: 2     2025

## 2025-02-25 NOTE — ASSESSMENT & PLAN NOTE
Patient's most recent potassium results are listed below.   Recent Labs     02/23/25  0603 02/24/25  0540 02/25/25  0520   K 3.7 3.7 3.1*       Plan  - Replete potassium per protocol  - Monitor potassium Daily  - Patient's hypokalemia is worsening. Will adjust treatment as follows:  IV replacement ordered    2/21:  Continued hypokalemia noted.  Potassium 2.5 today.  IV replacement ordered.  We will also add daily p.o. potassium.  Pedialyte ordered.

## 2025-02-25 NOTE — ASSESSMENT & PLAN NOTE
Patient with Acute on chronic debility due to age-related physical debility. The patient's latest AMPAC (Activity Measure for Post Acute Care) Score is listed below.    AM-PAC Score - How much help does the patient need for each activity listed  Basic Mobility Total Score: 22  Turning over in bed (including adjusting bedclothes, sheets and blankets)?: None  Sitting down on and standing up from a chair with arms (e.g., wheelchair, bedside commode, etc.): None  Moving from lying on back to sitting on the side of the bed?: None  Moving to and from a bed to a chair (including a wheelchair)?: None  Need to walk in hospital room?: A little  Climbing 3-5 steps with a railing?: A little    Plan  - Progressive mobility protocol initated  - PT/OT consulted  - Fall precautions in place    2/24:  Patient with increased weakness and debility since admission.  Continue PT/OT efforts.

## 2025-02-25 NOTE — PLAN OF CARE
Recommendations  1. Rec'd Cardiac Diet.   - Consistency rec's as per SLP.        2. Rec'd ONS: Ensure Enlive TID to provide 1050kcal and 60g of protein.        3. Rec'd appetite stimulant.      4. Rec'd Beneprotein TID.   5. Rec'd Moshe BID to promote wound healing and to provide additional nutrition.    6. Rec'd encourage PO intake and compliance with drinking ONS.   7. Rec'd daily weights.        8. Rec'd daily multivitamin.        9. RD to follow and make rec's accordingly.  Goals:   1. Pt will consume > 50% of meals by next RD follow up.  Nutrition Goal Status: progressing towards goal

## 2025-02-25 NOTE — PLAN OF CARE
Plan of care reviewed with patient. Left Midline in place and saline locked. Pt tolerated meds well. VSS. NADN. Pt free from falls and injury. Questions and concerns addressed. Pt belongings and call bell within reach.   Problem: Skin Injury Risk Increased  Goal: Skin Health and Integrity  Outcome: Progressing     Problem: Adult Inpatient Plan of Care  Goal: Plan of Care Review  Outcome: Progressing  Goal: Patient-Specific Goal (Individualized)  Outcome: Progressing  Goal: Absence of Hospital-Acquired Illness or Injury  Outcome: Progressing  Goal: Optimal Comfort and Wellbeing  Outcome: Progressing  Goal: Readiness for Transition of Care  Outcome: Progressing     Problem: Infection  Goal: Absence of Infection Signs and Symptoms  Outcome: Progressing

## 2025-02-25 NOTE — PLAN OF CARE
Problem: Physical Therapy  Goal: Physical Therapy Goal  Description: Goals to be met by: 2025     Patient will increase functional independence with mobility by performin. Gait  x 800 feet with Modified [place] using Rolling Walker.     Outcome: Progressing

## 2025-02-25 NOTE — PROGRESS NOTES
[facility]  Adult Nutrition  Progress Note    SUMMARY       Recommendations  1. Rec'd Cardiac Diet.   - Consistency rec's as per SLP.        2. Rec'd ONS: Ensure Enlive TID to provide 1050kcal and 60g of protein.        3. Rec'd appetite stimulant.      4. Rec'd Beneprotein TID.   5. Rec'd Moshe BID to promote wound healing and to provide additional nutrition.    6. Rec'd encourage PO intake and compliance with drinking ONS.   7. Rec'd daily weights.        8. Rec'd daily multivitamin.        9. RD to follow and make rec's accordingly.  Goals:   1. Pt will consume > 50% of meals by next RD follow up.  Nutrition Goal Status: progressing towards goal  Communication of RD Recs: reviewed with RN    Nutrition Discharge Planning  TBD pending Clinical Course    Assessment and Plan    Endocrine  Severe malnutrition  Malnutrition Type:  Context: chronic illness  Level: severe    Related to (etiology):   Inadequate protein energy intake with increased nutrient needs (calories and protein) secondary to hx of COPD    Signs and Symptoms (as evidenced by):   Severe muscle loss, severe fat loss, weight loss > 2% in 1 week, energy intake less than 75% for greater than 3 months, BMI = 14.18     Malnutrition Characteristic Summary:  Energy Intake (Malnutrition): less than 75% for greater than or equal to 3 months  Subcutaneous Fat (Malnutrition): severe depletion  Muscle Mass (Malnutrition): severe depletion      Interventions (treatment strategy):  1. Texture modified diet   2. Commercial beverage medical food supplement therapy     Nutrition Diagnosis Status:   Continues        Malnutrition Assessment  Malnutrition Context: chronic illness  Malnutrition Level: severe  Skin (Micronutrient): thinned, dry, wounds unhealed  Nails (Micronutrient): brittle, thin  Hair/Scalp (Micronutrient): dry, plucked easily  Lips/Mucous Membranes (Micronutrient): pallor  Teeth (Micronutrient): broken dentition  Tongue (Micronutrient): other  "(see comments) (Yeast spots)  Neck/Chest (Micronutrient): muscle wasting, bony prominence, subcutaneous fat loss  Musculoskeletal/Lower Extremities: subcutaneous fat loss, muscle wasting   Micronutrient Evaluation Summary: suspected deficiency   Energy Intake (Malnutrition): less than 75% for greater than or equal to 3 months  Subcutaneous Fat (Malnutrition): severe depletion  Muscle Mass (Malnutrition): severe depletion   Orbital Region (Subcutaneous Fat Loss): severe depletion  Upper Arm Region (Subcutaneous Fat Loss): severe depletion  Thoracic and Lumbar Region: severe depletion   [place] Region (Muscle Loss): severe depletion  Clavicle Bone Region (Muscle Loss): severe depletion  Clavicle and Acromion Bone Region (Muscle Loss): severe depletion  Scapular Bone Region (Muscle Loss): severe depletion  Dorsal Hand (Muscle Loss): severe depletion  Patellar Region (Muscle Loss): severe depletion  Anterior Thigh Region (Muscle Loss): severe depletion  Posterior Calf Region (Muscle Loss): severe depletion     Reason for Assessment    Reason For Assessment: RD follow-up  Diagnosis: other (see comments) (Hypomagnesemia)  General Information Comments: Followed up on pt this morning. Pt's PO intake varries between 25-50%. Encouraged good PO intake and compliance with drinking all ONS as tolerated. Pt voiced uncerstanding. RD to follow and make rec's accordingly.    Nutrition/Diet History    Nutrition Intake History: General diet. Pt reports she eats when she wants to.  Food Preferences: None  Spiritual, Cultural Beliefs, Scientologist Practices, Values that Affect Care: no  Food Allergies: NKFA  Factors Affecting Nutritional Intake: decreased appetite, difficulty/impaired swallowing    Anthropometrics    Height: 5' 3" (160 cm)  Height (inches): 63 in  Height Method: Stated  Weight: 36.3 kg (80 lb 0.4 oz)  Weight (lb): 80.03 lb  Weight Method: Bed Scale  Ideal Body Weight (IBW), Female: 115 lb  % Ideal Body Weight, Female (lb): " 69.59 %  % Ideal Body Weight Malnutrition: less than 70% -severe deficit  BMI (Calculated): 14.2  Weight Loss: unintentional  Usual Body Weight (UBW), k.44 kg  % Usual Body Weight: 73.58  % Weight Change From Usual Weight: -26.58 %    Lab/Procedures/Meds    Pertinent Labs Reviewed: reviewed  Pertinent Medications Reviewed: reviewed    Estimated/Assessed Needs    Weight Used For Calorie Calculations: 36.3 kg (80 lb 0.4 oz)  Energy Calorie Requirements (kcal): 1653-2368 (35-40 kcal/kg)  Energy Need Method: Kcal/kg  Protein Requirements: 54-72 (1.5-2.0g/kg)  Weight Used For Protein Calculations: 36.3 kg (80 lb 0.4 oz)  Fluid Requirements (mL): 0570-6709 (1mL/kcal)  Estimated Fluid Requirement Method: RDA Method  RDA Method (mL): 1270    Nutrition Prescription Ordered    Current Diet Order: Cardiac, IDDSI level 6: soft and bite size  Oral Nutrition Supplement: Ensure Enlive, Moshe    Evaluation of Received Nutrient/Fluid Intake    % Kcal Needs: 25-50%  % Protein Needs: 25-50%  I/O: +480  Energy Calories Required: not meeting needs  Protein Required: not meeting needs  Fluid Required: not meeting needs  Comments: LBM: 25  Tolerance: tolerating  % Intake of Estimated Energy Needs: 0 - 25 %  % Meal Intake: 25 - 50 %    Nutrition Risk    Level of Risk/Frequency of Follow-up: moderate     Monitor and Evaluation    Food and Nutrient Intake: energy intake, food and beverage intake  Food and Nutrient Adminstration: diet order  Knowledge/Beliefs/Attitudes: beliefs and attitudes, food and nutrition knowledge/skill  Physical Activity and Function: nutrition-related ADLs and IADLs  Anthropometric Measurements: height/length, weight, weight change, body mass index  Biochemical Data, Medical Tests and Procedures: electrolyte and renal panel, gastrointestinal profile, glucose/endocrine profile, inflammatory profile, lipid profile  Nutrition-Focused Physical Findings: overall appearance     Nutrition Follow-Up    RD  Follow-up?: Yes

## 2025-02-25 NOTE — PLAN OF CARE
POC reviewed w/ pt and purposeful rounding ongoing. Meds administered per MAR. VSS on RA. Personal belongings and call bell w/ in reach. Pt denies needs at this time.     Problem: Skin Injury Risk Increased  Goal: Skin Health and Integrity  Outcome: Progressing     Problem: Adult Inpatient Plan of Care  Goal: Plan of Care Review  Outcome: Progressing  Goal: Patient-Specific Goal (Individualized)  Outcome: Progressing  Goal: Absence of Hospital-Acquired Illness or Injury  Outcome: Progressing  Goal: Optimal Comfort and Wellbeing  Outcome: Progressing  Goal: Readiness for Transition of Care  Outcome: Progressing     Problem: Infection  Goal: Absence of Infection Signs and Symptoms  Outcome: Progressing
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
## 2025-02-25 NOTE — PLAN OF CARE
Problem: Occupational Therapy  Goal: Occupational Therapy Goal  Description: Goals to be met by: 02/26/25     Patient will increase functional independence with ADLs by performing:    Feeding with Modified [place].  UE Dressing with Modified [place].  LE Dressing with Modified [place].  Grooming while standing at sink with Modified [place].  Toileting from toilet with Modified [place] for hygiene and clothing management.   Bathing from  shower chair/bench with Set-up Assistance.  Toilet transfer to toilet with Modified [place].    Outcome: Progressing

## 2025-02-25 NOTE — PLAN OF CARE
02/25/25 1624   Medicare Message   Important Message from Medicare regarding Discharge Appeal Rights Given to patient/caregiver;Explained to patient/caregiver;Signed/date by patient/caregiver   Date IMM was signed 02/25/25   Time IMM was signed [id number]

## 2025-02-25 NOTE — ASSESSMENT & PLAN NOTE
Patient's blood pressure range in the last 24 hours was: BP  Min: 150/90  Max: 190/90.The patient's inpatient anti-hypertensive regimen is listed below:  Current Antihypertensives  carvediloL tablet 12.5 mg, 2 times daily with meals, Oral  lisinopriL tablet 20 mg, Daily, Oral    Plan  - BP is controlled, no changes needed to their regimen

## 2025-02-25 NOTE — ASSESSMENT & PLAN NOTE
Hyponatremia is likely due to Dehydration/hypovolemia. The patient's most recent sodium results are listed below.  Recent Labs     02/23/25  0603 02/24/25  0540 02/25/25  0520    135* 137       Plan  - Correct the sodium by 4-6mEq in 24 hours.   - Will treat the hyponatremia with IV fluids as follows: 127mL/hr  - Monitor sodium Daily.   - Patient hyponatremia is stable    2/21:  Resolved.  Continue monitoring with daily labs.

## 2025-02-26 PROBLEM — J18.9 ATYPICAL PNEUMONIA: Status: ACTIVE | Noted: 2025-02-26

## 2025-02-26 LAB
ALBUMIN SERPL BCP-MCNC: 2.2 G/DL (ref 3.5–5.2)
ALP SERPL-CCNC: 67 U/L (ref 55–135)
ALT SERPL W/O P-5'-P-CCNC: 16 U/L (ref 10–44)
ANION GAP SERPL CALC-SCNC: 8 MMOL/L (ref 8–16)
AST SERPL-CCNC: 20 U/L (ref 10–40)
BASOPHILS # BLD AUTO: 0.02 K/UL (ref 0–0.2)
BASOPHILS NFR BLD: 0.1 % (ref 0–1.9)
BILIRUB SERPL-MCNC: 0.2 MG/DL (ref 0.1–1)
BUN SERPL-MCNC: 22 MG/DL (ref 8–23)
CALCIUM SERPL-MCNC: 7.9 MG/DL (ref 8.7–10.5)
CHLORIDE SERPL-SCNC: 100 MMOL/L (ref 95–110)
CO2 SERPL-SCNC: 29 MMOL/L (ref 23–29)
CREAT SERPL-MCNC: 0.5 MG/DL (ref 0.5–1.4)
DIFFERENTIAL METHOD BLD: ABNORMAL
EOSINOPHIL # BLD AUTO: 0 K/UL (ref 0–0.5)
EOSINOPHIL NFR BLD: 0.1 % (ref 0–8)
ERYTHROCYTE [DISTWIDTH] IN BLOOD BY AUTOMATED COUNT: 18.5 % (ref 11.5–14.5)
EST. GFR  (NO RACE VARIABLE): >60 ML/MIN/1.73 M^2
GLUCOSE SERPL-MCNC: 118 MG/DL (ref 70–110)
HCT VFR BLD AUTO: 33.5 % (ref 37–48.5)
HGB BLD-MCNC: 10.9 G/DL (ref 12–16)
IMM GRANULOCYTES # BLD AUTO: 0.21 K/UL (ref 0–0.04)
IMM GRANULOCYTES NFR BLD AUTO: 1.5 % (ref 0–0.5)
INFLUENZA A, MOLECULAR: NEGATIVE
INFLUENZA B, MOLECULAR: NEGATIVE
LYMPHOCYTES # BLD AUTO: 0.7 K/UL (ref 1–4.8)
LYMPHOCYTES NFR BLD: 5.2 % (ref 18–48)
MAGNESIUM SERPL-MCNC: 1.8 MG/DL (ref 1.6–2.6)
MCH RBC QN AUTO: 26.7 PG (ref 27–31)
MCHC RBC AUTO-ENTMCNC: 32.5 G/DL (ref 32–36)
MCV RBC AUTO: 82 FL (ref 82–98)
MONOCYTES # BLD AUTO: 0.5 K/UL (ref 0.3–1)
MONOCYTES NFR BLD: 3.3 % (ref 4–15)
NEUTROPHILS # BLD AUTO: 12.6 K/UL (ref 1.8–7.7)
NEUTROPHILS NFR BLD: 89.8 % (ref 38–73)
NRBC BLD-RTO: 0 /100 WBC
PLATELET # BLD AUTO: 464 K/UL (ref 150–450)
PMV BLD AUTO: 8.7 FL (ref 9.2–12.9)
POTASSIUM SERPL-SCNC: 3.7 MMOL/L (ref 3.5–5.1)
PROT SERPL-MCNC: 5.2 G/DL (ref 6–8.4)
RBC # BLD AUTO: 4.08 M/UL (ref 4–5.4)
SODIUM SERPL-SCNC: 137 MMOL/L (ref 136–145)
SPECIMEN SOURCE: NORMAL
WBC # BLD AUTO: 14.07 K/UL (ref 3.9–12.7)

## 2025-02-26 PROCEDURE — 63600175 PHARM REV CODE 636 W HCPCS

## 2025-02-26 PROCEDURE — 25000003 PHARM REV CODE 250

## 2025-02-26 PROCEDURE — 36415 COLL VENOUS BLD VENIPUNCTURE: CPT | Performed by: INTERNAL MEDICINE

## 2025-02-26 PROCEDURE — 63700000 PHARM REV CODE 250 ALT 637 W/O HCPCS

## 2025-02-26 PROCEDURE — 99900035 HC TECH TIME PER 15 MIN (STAT)

## 2025-02-26 PROCEDURE — 25000003 PHARM REV CODE 250: Performed by: EMERGENCY MEDICINE

## 2025-02-26 PROCEDURE — 94761 N-INVAS EAR/PLS OXIMETRY MLT: CPT

## 2025-02-26 PROCEDURE — 21400001 HC TELEMETRY ROOM

## 2025-02-26 PROCEDURE — 25000242 PHARM REV CODE 250 ALT 637 W/ HCPCS

## 2025-02-26 PROCEDURE — 85025 COMPLETE CBC W/AUTO DIFF WBC: CPT | Performed by: INTERNAL MEDICINE

## 2025-02-26 PROCEDURE — 94640 AIRWAY INHALATION TREATMENT: CPT

## 2025-02-26 PROCEDURE — 25000003 PHARM REV CODE 250: Performed by: INTERNAL MEDICINE

## 2025-02-26 PROCEDURE — 80053 COMPREHEN METABOLIC PANEL: CPT | Performed by: INTERNAL MEDICINE

## 2025-02-26 PROCEDURE — S0179 MEGESTROL 20 MG: HCPCS

## 2025-02-26 PROCEDURE — 99900031 HC PATIENT EDUCATION (STAT)

## 2025-02-26 PROCEDURE — 83735 ASSAY OF MAGNESIUM: CPT | Performed by: INTERNAL MEDICINE

## 2025-02-26 PROCEDURE — 25000242 PHARM REV CODE 250 ALT 637 W/ HCPCS: Performed by: EMERGENCY MEDICINE

## 2025-02-26 PROCEDURE — 87502 INFLUENZA DNA AMP PROBE: CPT

## 2025-02-26 PROCEDURE — S4991 NICOTINE PATCH NONLEGEND: HCPCS | Performed by: INTERNAL MEDICINE

## 2025-02-26 PROCEDURE — 97116 GAIT TRAINING THERAPY: CPT

## 2025-02-26 PROCEDURE — 63600175 PHARM REV CODE 636 W HCPCS: Performed by: INTERNAL MEDICINE

## 2025-02-26 RX ORDER — AZITHROMYCIN 250 MG/1
500 TABLET, FILM COATED ORAL ONCE
Status: COMPLETED | OUTPATIENT
Start: 2025-02-26 | End: 2025-02-26

## 2025-02-26 RX ORDER — AZITHROMYCIN 250 MG/1
250 TABLET, FILM COATED ORAL DAILY
Status: DISCONTINUED | OUTPATIENT
Start: 2025-02-27 | End: 2025-03-01 | Stop reason: HOSPADM

## 2025-02-26 RX ORDER — IPRATROPIUM BROMIDE AND ALBUTEROL SULFATE 2.5; .5 MG/3ML; MG/3ML
3 SOLUTION RESPIRATORY (INHALATION)
Status: DISCONTINUED | OUTPATIENT
Start: 2025-02-26 | End: 2025-03-01 | Stop reason: HOSPADM

## 2025-02-26 RX ADMIN — FERROUS SULFATE TAB 325 MG (65 MG ELEMENTAL FE) 1 EACH: 325 (65 FE) TAB at 08:02

## 2025-02-26 RX ADMIN — CITALOPRAM HYDROBROMIDE 20 MG: 10 TABLET ORAL at 08:02

## 2025-02-26 RX ADMIN — MIRTAZAPINE 15 MG: 15 TABLET, FILM COATED ORAL at 09:02

## 2025-02-26 RX ADMIN — PANTOPRAZOLE SODIUM 40 MG: 40 TABLET, DELAYED RELEASE ORAL at 08:02

## 2025-02-26 RX ADMIN — ONDANSETRON 8 MG: 4 TABLET, ORALLY DISINTEGRATING ORAL at 04:02

## 2025-02-26 RX ADMIN — IPRATROPIUM BROMIDE AND ALBUTEROL SULFATE 3 ML: 2.5; .5 SOLUTION RESPIRATORY (INHALATION) at 03:02

## 2025-02-26 RX ADMIN — HYDROCORTISONE: 0.5 CREAM TOPICAL at 09:02

## 2025-02-26 RX ADMIN — CARVEDILOL 12.5 MG: 12.5 TABLET, FILM COATED ORAL at 07:02

## 2025-02-26 RX ADMIN — NICOTINE 1 PATCH: 14 PATCH, EXTENDED RELEASE TRANSDERMAL at 08:02

## 2025-02-26 RX ADMIN — BUDESONIDE 0.5 MG: 0.5 INHALANT RESPIRATORY (INHALATION) at 07:02

## 2025-02-26 RX ADMIN — CARVEDILOL 12.5 MG: 12.5 TABLET, FILM COATED ORAL at 05:02

## 2025-02-26 RX ADMIN — NYSTATIN 500000 UNITS: 100000 SUSPENSION ORAL at 12:02

## 2025-02-26 RX ADMIN — FAMOTIDINE 20 MG: 20 TABLET, FILM COATED ORAL at 08:02

## 2025-02-26 RX ADMIN — BUDESONIDE 0.5 MG: 0.5 INHALANT RESPIRATORY (INHALATION) at 08:02

## 2025-02-26 RX ADMIN — NYSTATIN 500000 UNITS: 100000 SUSPENSION ORAL at 07:02

## 2025-02-26 RX ADMIN — Medication 200 ML: at 02:02

## 2025-02-26 RX ADMIN — HYDROCORTISONE: 0.5 CREAM TOPICAL at 08:02

## 2025-02-26 RX ADMIN — HYDRALAZINE HYDROCHLORIDE 10 MG: 20 INJECTION, SOLUTION INTRAMUSCULAR; INTRAVENOUS at 04:02

## 2025-02-26 RX ADMIN — HYDROCORTISONE SODIUM SUCCINATE 50 MG: 100 INJECTION, POWDER, FOR SOLUTION INTRAMUSCULAR; INTRAVENOUS at 03:02

## 2025-02-26 RX ADMIN — MEGESTROL ACETATE 200 MG: 400 SUSPENSION ORAL at 08:02

## 2025-02-26 RX ADMIN — HYDROCODONE BITARTRATE AND ACETAMINOPHEN 1 TABLET: 5; 325 TABLET ORAL at 04:02

## 2025-02-26 RX ADMIN — IPRATROPIUM BROMIDE AND ALBUTEROL SULFATE 3 ML: 2.5; .5 SOLUTION RESPIRATORY (INHALATION) at 11:02

## 2025-02-26 RX ADMIN — POTASSIUM CHLORIDE 20 MEQ: 1500 TABLET, EXTENDED RELEASE ORAL at 08:02

## 2025-02-26 RX ADMIN — LISINOPRIL 20 MG: 20 TABLET ORAL at 08:02

## 2025-02-26 RX ADMIN — POTASSIUM CHLORIDE 20 MEQ: 1500 TABLET, EXTENDED RELEASE ORAL at 09:02

## 2025-02-26 RX ADMIN — FAMOTIDINE 20 MG: 20 TABLET, FILM COATED ORAL at 09:02

## 2025-02-26 RX ADMIN — LEVOTHYROXINE SODIUM 25 MCG: 25 TABLET ORAL at 05:02

## 2025-02-26 RX ADMIN — HYDROCORTISONE SODIUM SUCCINATE 50 MG: 100 INJECTION, POWDER, FOR SOLUTION INTRAMUSCULAR; INTRAVENOUS at 09:02

## 2025-02-26 RX ADMIN — Medication 200 ML: at 08:02

## 2025-02-26 RX ADMIN — NYSTATIN 500000 UNITS: 100000 SUSPENSION ORAL at 09:02

## 2025-02-26 RX ADMIN — NYSTATIN 500000 UNITS: 100000 SUSPENSION ORAL at 05:02

## 2025-02-26 RX ADMIN — AZITHROMYCIN DIHYDRATE 500 MG: 250 TABLET ORAL at 11:02

## 2025-02-26 RX ADMIN — Medication 200 ML: at 06:02

## 2025-02-26 RX ADMIN — IPRATROPIUM BROMIDE AND ALBUTEROL SULFATE 3 ML: 2.5; .5 SOLUTION RESPIRATORY (INHALATION) at 08:02

## 2025-02-26 NOTE — PT/OT/SLP PROGRESS
"Physical Therapy Treatment    Patient Name:  Ruth Gamboa   MRN:  [id number]    Recommendations:     Discharge Recommendations: Low Intensity Therapy  Discharge Equipment Recommendations: bedside commode, walker, rolling, shower chair  Barriers to discharge: None    Assessment:     Ruth Gamboa is a 80 y.o. female admitted with a medical diagnosis of Hypomagnesemia.  She presents with the following impairments/functional limitations: weakness, impaired endurance, impaired self care skills, impaired functional mobility, decreased lower extremity function, gait instability, decreased safety awareness. Patient presents with no pain, but she reports she is not feeling "good" this visit. Patient is able to perform all treatment well this visit with no significant complaints. Patient is able to perform all bed mobility with independence this visit. Improved gait pattern is noted this visit with improved JADYN and step length. Patient does continue to require cuing for proper step length in order to increase step length.     Rehab Prognosis: Good; patient would benefit from acute skilled PT services to address these deficits and reach maximum level of function.    Recent Surgery: * No surgery found *      Plan:     During this hospitalization, patient to be seen 5 x/week to address the identified rehab impairments via gait training, therapeutic activities, therapeutic exercises, neuromuscular re-education and progress toward the following goals:    Plan of Care Expires:  03/03/25    Subjective     Chief Complaint: "I do not feel good"   Patient/Family Comments/goals: Return home   Pain/Comfort:  Pain Rating 1: 0/10      Objective:     Communicated with nursing  prior to session.  Patient found HOB elevated with peripheral IV upon PT entry to room.     General Precautions: Standard, fall  Orthopedic Precautions: N/A  Braces: N/A  Respiratory Status: Room air     Functional Mobility:  Bed Mobility:     Rolling Left:  " independence  Rolling Right: independence  Scooting: independence  Bridging: independence  Supine to Sit: independence  Sit to Supine: independence  Transfers:     Sit to Stand:  supervision with rolling walker  Gait: 150 feet with RW and SBA   Balance: No LOB noted this visit       AM-PAC 6 CLICK MOBILITY  Turning over in bed (including adjusting bedclothes, sheets and blankets)?: 4  Sitting down on and standing up from a chair with arms (e.g., wheelchair, bedside commode, etc.): 4  Moving from lying on back to sitting on the side of the bed?: 4  Moving to and from a bed to a chair (including a wheelchair)?: 4  Need to walk in hospital room?: 4  Climbing 3-5 steps with a railing?: 3  Basic Mobility Total Score: 23       Treatment & Education:  Bed mobility  Transfers  Gait training with focus on step length and JADYN   Education on safety and need to sit EOB throughout the day as tolerated    Patient left sitting edge of bed with all lines intact, call button in reach, and nursing staff notified..    GOALS:   Multidisciplinary Problems       Physical Therapy Goals          Problem: Physical Therapy    Goal Priority Disciplines Outcome Interventions   Physical Therapy Goal     PT, PT/OT Progressing    Description: Goals to be met by: 2025     Patient will increase functional independence with mobility by performin. Gait  x 800 feet with Modified [place] using Rolling Walker.                        Time Tracking:     PT Received On: 25  PT Start Time: 910     PT Stop Time: 930  PT Total Time (min): 20 min     Billable Minutes: Gait Training      Treatment Type: Treatment  PT/PTA: PT     Number of PTA visits since last PT visit: 0    2025

## 2025-02-26 NOTE — PLAN OF CARE
Plan of care reviewed with patient. MENDY Midline in place and saline locked. Pt tolerated meds well. BP elevated this morning 187/91. PRN hydralazine given. PRN zofran and norco given for c/o nausea and pain. BP now 179/89. Pt afebrile throughout shift.  NADN. Pt reports free from pain and nausea at this time. Pt free from falls and injury. Questions and concerns addressed. Pt belongings and call bell within reach.   Problem: Skin Injury Risk Increased  Goal: Skin Health and Integrity  Outcome: Progressing     Problem: Adult Inpatient Plan of Care  Goal: Plan of Care Review  Outcome: Progressing  Goal: Patient-Specific Goal (Individualized)  Outcome: Progressing  Goal: Absence of Hospital-Acquired Illness or Injury  Outcome: Progressing  Goal: Optimal Comfort and Wellbeing  Outcome: Progressing  Goal: Readiness for Transition of Care  Outcome: Progressing     Problem: Infection  Goal: Absence of Infection Signs and Symptoms  Outcome: Progressing

## 2025-02-26 NOTE — PROGRESS NOTES
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 monitor respiratory status closely.     2/21:  Smoking cessation encouraged.      VTE Risk Mitigation (From admission, onward)           Ordered     IP VTE HIGH RISK PATIENT  Once         02/18/25 1459     Place sequential compression device  Until discontinued         02/18/25 1459     Place HAVEN hose  Until discontinued         02/18/25 1459                    Discharge Planning   ORALIA:      Code Status: DNR   Medical Readiness for Discharge Date:   Discharge Plan A: Home with family, Home Health                Please place Justification for DME        Roberto Villarreal NP  Department of Hospital Medicine   [facility]
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
## 2025-02-26 NOTE — ASSESSMENT & PLAN NOTE
Patient's most recent potassium results are listed below.   Recent Labs     02/24/25  0540 02/25/25  0520 02/26/25  0535   K 3.7 3.1* 3.7       Plan  - Replete potassium per protocol  - Monitor potassium Daily  - Patient's hypokalemia is worsening. Will adjust treatment as follows:  IV replacement ordered    2/21:  Continued hypokalemia noted.  Potassium 2.5 today.  IV replacement ordered.  We will also add daily p.o. potassium.  Pedialyte ordered.

## 2025-02-26 NOTE — ASSESSMENT & PLAN NOTE
Patient has Abnormal Magnesium: hypomagnesemia. Will continue to monitor electrolytes closely. Will replace the affected electrolytes and repeat labs to be done after interventions completed. The patient's magnesium results have been reviewed and are listed below.  Recent Labs   Lab 02/26/25  0535   MG 1.8        2/21:  Magnesium 1.5 today.  We will replenish.  Continue daily monitoring and replenish as needed.  2/22 FM:  Replete.

## 2025-02-26 NOTE — PLAN OF CARE
Problem: Physical Therapy  Goal: Physical Therapy Goal  Description: Goals to be met by: 3/3/2025     Patient will increase functional independence with mobility by performin. Gait  x 800 feet with Modified [place] using Rolling Walker.     2025 09 by Gage John, PT  Outcome: Progressing  2025 by Gage John, PT  Outcome: Progressing

## 2025-02-26 NOTE — ASSESSMENT & PLAN NOTE
Patient with Acute on chronic debility due to age-related physical debility. The patient's latest AMPAC (Activity Measure for Post Acute Care) Score is listed below.    AM-PAC Score - How much help does the patient need for each activity listed  Basic Mobility Total Score: 23  Turning over in bed (including adjusting bedclothes, sheets and blankets)?: None  Sitting down on and standing up from a chair with arms (e.g., wheelchair, bedside commode, etc.): None  Moving from lying on back to sitting on the side of the bed?: None  Moving to and from a bed to a chair (including a wheelchair)?: None  Need to walk in hospital room?: None  Climbing 3-5 steps with a railing?: A little    Plan  - Progressive mobility protocol initated  - PT/OT consulted  - Fall precautions in place    2/24:  Patient with increased weakness and debility since admission.  Continue PT/OT efforts.

## 2025-02-26 NOTE — PLAN OF CARE
POC reviewed with pt, VSS. IV site clean/Dry/Intact. Pt denies pain/needs at this time,CB in reach, bed in lowest position, lighting adjusted to pt's preference will continue to monitor.   Problem: Skin Injury Risk Increased  Goal: Skin Health and Integrity  Outcome: Progressing     Problem: Adult Inpatient Plan of Care  Goal: Plan of Care Review  Outcome: Progressing  Goal: Patient-Specific Goal (Individualized)  Outcome: Progressing  Goal: Absence of Hospital-Acquired Illness or Injury  Outcome: Progressing  Goal: Optimal Comfort and Wellbeing  Outcome: Progressing  Goal: Readiness for Transition of Care  Outcome: Progressing     Problem: Infection  Goal: Absence of Infection Signs and Symptoms  Outcome: Progressing

## 2025-02-26 NOTE — ASSESSMENT & PLAN NOTE
Patient has a diagnosis of pneumonia. The cause of the pneumonia is unknown at this time. The pneumonia is stable. The patient has the following signs/symptoms of pneumonia: cough and shortness of breath. The patient does not have a current oxygen requirement and the patient does not have a home oxygen requirement. I have reviewed the pertinent imaging.     Current antimicrobial regimen consists of the antibiotics listed below. Will monitor patient closely and  initiate antibiotic, steroid, scheduled nebulizer treatments    Antibiotics (From admission, onward)      Start     Stop Route Frequency Ordered    02/27/25 0900  azithromycin tablet 250 mg         03/03/25 0859 Oral Daily 02/26/25 0932    02/26/25 1045  azithromycin tablet 500 mg         -- Oral Once 02/26/25 0932            Microbiology Results (last 7 days)       Procedure Component Value Units Date/Time    Influenza A & B by Molecular [[id number]]     Order Status: No result Specimen: Nasopharyngeal Swab     Blood culture x two cultures. Draw prior to antibiotics. [[id number]] Collected: 02/18/25 1133    Order Status: Completed Specimen: Blood from Peripheral, Forearm, Left Updated: 02/23/25 2222     Blood Culture, Routine No growth after 5 days.    Narrative:      Aerobic and anaerobic    Blood culture x two cultures. Draw prior to antibiotics. [[id number]] Collected: 02/18/25 1142    Order Status: Completed Specimen: Blood from Peripheral, Forearm, Right Updated: 02/23/25 2222     Blood Culture, Routine No growth after 5 days.    Narrative:      Aerobic and anaerobic

## 2025-02-26 NOTE — ASSESSMENT & PLAN NOTE
Initial SW/CM Assessment/Plan of Care Note     Baseline Assessment  80 year old admitted 12/20/2022 as Observation with a diagnosis of Hypoglycemia.   Prior to admission patient was living with Spouse and residing in a House.   Patient does not have a Power of  for Healthcare. Patient’s Primary Care Provider is Sascha Bauer MD.       Prior to Admission Status  Functional Status  Ambulation: Cane  Bathing: Independent/Self  Dressing: Independent/Self  Toileting: Independent/Self  Meal Preparation: Independent/Self  Shopping: Independent/Self  Medication Preparation: Independent/Self  Medication Administration: Independent/Self  Housekeeping: Independent/Self, Significant Other  Laundry: Independent/Self  Transportation: Independent/Self    Agency/Support Services  Type of Services Prior to Hospitalization: None  Agency Service Comments                              Support Systems: Spouse/Significant other  Home Devices/Equipment: None  Mobility Assist Devices: Cane  Sensory Support Devices: Eyeglasses  Outpatient CM/CTNN:       Current Status  Physical Therapy: Not ordered  Occupational Therapy: Not ordered.    Insurance  Primary: [employer] ADVANTAGE  Secondary: N/A      Barriers to Discharge  Identified Barriers to Discharge/Transition Planning:   Medical Clearance      Progress Note  CM spoke with spouse for this assessment.  Patient lives with spouse.  Patient occasionally uses a cane and does drive.  Patient on insulin and monitors blood sugars at home per spouse.  Denies any inhome services.    Neuro Consult, urine culture pending, on room air, blood sugar 151      Plan  SW/CM - Recommendations for Discharge:   Home.  No anticipated needs.      Refer to SW/CM Flowsheet for Goals and objective data.    Nutrition consulted. Most recent weight and BMI monitored-     Measurements:  Wt Readings from Last 1 Encounters:   02/19/25 36.3 kg (80 lb 0.4 oz)   Body mass index is 14.18 kg/m².    Patient has been screened and assessed by RD.    Malnutrition Type:  Context: chronic illness  Level: severe    Malnutrition Characteristic Summary:  Energy Intake (Malnutrition): less than 75% for greater than or equal to 3 months  Subcutaneous Fat (Malnutrition): severe depletion  Muscle Mass (Malnutrition): severe depletion    Interventions/Recommendations (treatment strategy):  1. Rec'd Cardiac Diet. - Consistency rec's as per SLP.      2. Rec'd ONS: Ensure Enlive TID to provide 1050kcal and 60g of protein.      3. Rec'd appetite stimulant.    4. Rec'd Beneprotein TID. 5. Rec'd Moshe BID to promote wound healing and to provide additional nutrition.  6. Rec'd encourage PO intake and compliance with drinking ONS. 7. Rec'd daily weights.      8. Rec'd daily multivitamin.      9. RD to follow and make rec's accordingly.

## 2025-02-26 NOTE — ASSESSMENT & PLAN NOTE
Hyponatremia is likely due to Dehydration/hypovolemia. The patient's most recent sodium results are listed below.  Recent Labs     02/24/25  0540 02/25/25  0520 02/26/25  0535   * 137 137       Plan  - Correct the sodium by 4-6mEq in 24 hours.   - Will treat the hyponatremia with IV fluids as follows: 127mL/hr  - Monitor sodium Daily.   - Patient hyponatremia is stable    2/21:  Resolved.  Continue monitoring with daily labs.

## 2025-02-26 NOTE — ASSESSMENT & PLAN NOTE
Patient's blood pressure range in the last 24 hours was: BP  Min: 135/78  Max: 218/106.The patient's inpatient anti-hypertensive regimen is listed below:  Current Antihypertensives  carvediloL tablet 12.5 mg, 2 times daily with meals, Oral  lisinopriL tablet 20 mg, Daily, Oral  hydrALAZINE injection 10 mg, Every 6 hours PRN, Intravenous    Plan  - BP is controlled, no changes needed to their regimen

## 2025-02-26 NOTE — PLAN OF CARE
Problem: Physical Therapy  Goal: Physical Therapy Goal  Description: Goals to be met by: 3/3/2025     Patient will increase functional independence with mobility by performin. Gait  x 800 feet with Modified [place] using Rolling Walker.     Outcome: Progressing

## 2025-02-27 LAB
ALBUMIN SERPL BCP-MCNC: 2.3 G/DL (ref 3.5–5.2)
ALP SERPL-CCNC: 70 U/L (ref 55–135)
ALT SERPL W/O P-5'-P-CCNC: 13 U/L (ref 10–44)
ANION GAP SERPL CALC-SCNC: 11 MMOL/L (ref 8–16)
AST SERPL-CCNC: 15 U/L (ref 10–40)
BASOPHILS # BLD AUTO: 0.03 K/UL (ref 0–0.2)
BASOPHILS NFR BLD: 0.2 % (ref 0–1.9)
BILIRUB SERPL-MCNC: 0.2 MG/DL (ref 0.1–1)
BUN SERPL-MCNC: 26 MG/DL (ref 8–23)
CALCIUM SERPL-MCNC: 7.8 MG/DL (ref 8.7–10.5)
CHLORIDE SERPL-SCNC: 99 MMOL/L (ref 95–110)
CO2 SERPL-SCNC: 25 MMOL/L (ref 23–29)
CREAT SERPL-MCNC: 0.5 MG/DL (ref 0.5–1.4)
DIFFERENTIAL METHOD BLD: ABNORMAL
EOSINOPHIL # BLD AUTO: 0 K/UL (ref 0–0.5)
EOSINOPHIL NFR BLD: 0.1 % (ref 0–8)
ERYTHROCYTE [DISTWIDTH] IN BLOOD BY AUTOMATED COUNT: 18.5 % (ref 11.5–14.5)
EST. GFR  (NO RACE VARIABLE): >60 ML/MIN/1.73 M^2
GLUCOSE SERPL-MCNC: 188 MG/DL (ref 70–110)
HCT VFR BLD AUTO: 33.9 % (ref 37–48.5)
HGB BLD-MCNC: 10.8 G/DL (ref 12–16)
IMM GRANULOCYTES # BLD AUTO: 0.25 K/UL (ref 0–0.04)
IMM GRANULOCYTES NFR BLD AUTO: 1.9 % (ref 0–0.5)
LYMPHOCYTES # BLD AUTO: 0.6 K/UL (ref 1–4.8)
LYMPHOCYTES NFR BLD: 5 % (ref 18–48)
MAGNESIUM SERPL-MCNC: 1.8 MG/DL (ref 1.6–2.6)
MCH RBC QN AUTO: 26.4 PG (ref 27–31)
MCHC RBC AUTO-ENTMCNC: 31.9 G/DL (ref 32–36)
MCV RBC AUTO: 83 FL (ref 82–98)
MONOCYTES # BLD AUTO: 0.4 K/UL (ref 0.3–1)
MONOCYTES NFR BLD: 3.2 % (ref 4–15)
NEUTROPHILS # BLD AUTO: 11.5 K/UL (ref 1.8–7.7)
NEUTROPHILS NFR BLD: 89.6 % (ref 38–73)
NRBC BLD-RTO: 0 /100 WBC
PLATELET # BLD AUTO: 492 K/UL (ref 150–450)
PMV BLD AUTO: 9.8 FL (ref 9.2–12.9)
POTASSIUM SERPL-SCNC: 3.9 MMOL/L (ref 3.5–5.1)
PROT SERPL-MCNC: 5.3 G/DL (ref 6–8.4)
RBC # BLD AUTO: 4.09 M/UL (ref 4–5.4)
SODIUM SERPL-SCNC: 135 MMOL/L (ref 136–145)
WBC # BLD AUTO: 12.84 K/UL (ref 3.9–12.7)

## 2025-02-27 PROCEDURE — 85025 COMPLETE CBC W/AUTO DIFF WBC: CPT | Performed by: INTERNAL MEDICINE

## 2025-02-27 PROCEDURE — 80053 COMPREHEN METABOLIC PANEL: CPT | Performed by: INTERNAL MEDICINE

## 2025-02-27 PROCEDURE — 83735 ASSAY OF MAGNESIUM: CPT | Performed by: INTERNAL MEDICINE

## 2025-02-27 PROCEDURE — 36415 COLL VENOUS BLD VENIPUNCTURE: CPT | Performed by: INTERNAL MEDICINE

## 2025-02-27 PROCEDURE — 25000003 PHARM REV CODE 250: Performed by: EMERGENCY MEDICINE

## 2025-02-27 PROCEDURE — 63600175 PHARM REV CODE 636 W HCPCS: Performed by: INTERNAL MEDICINE

## 2025-02-27 PROCEDURE — 99900035 HC TECH TIME PER 15 MIN (STAT)

## 2025-02-27 PROCEDURE — 25000242 PHARM REV CODE 250 ALT 637 W/ HCPCS

## 2025-02-27 PROCEDURE — 63600175 PHARM REV CODE 636 W HCPCS

## 2025-02-27 PROCEDURE — 99900031 HC PATIENT EDUCATION (STAT)

## 2025-02-27 PROCEDURE — 21400001 HC TELEMETRY ROOM

## 2025-02-27 PROCEDURE — 97110 THERAPEUTIC EXERCISES: CPT | Mod: CO

## 2025-02-27 PROCEDURE — 97530 THERAPEUTIC ACTIVITIES: CPT | Mod: CO

## 2025-02-27 PROCEDURE — 25000242 PHARM REV CODE 250 ALT 637 W/ HCPCS: Performed by: EMERGENCY MEDICINE

## 2025-02-27 PROCEDURE — 63700000 PHARM REV CODE 250 ALT 637 W/O HCPCS

## 2025-02-27 PROCEDURE — 94640 AIRWAY INHALATION TREATMENT: CPT

## 2025-02-27 PROCEDURE — S0179 MEGESTROL 20 MG: HCPCS

## 2025-02-27 PROCEDURE — 94761 N-INVAS EAR/PLS OXIMETRY MLT: CPT

## 2025-02-27 PROCEDURE — S4991 NICOTINE PATCH NONLEGEND: HCPCS | Performed by: INTERNAL MEDICINE

## 2025-02-27 PROCEDURE — 25000003 PHARM REV CODE 250

## 2025-02-27 PROCEDURE — 25000003 PHARM REV CODE 250: Performed by: INTERNAL MEDICINE

## 2025-02-27 RX ORDER — CARVEDILOL 12.5 MG/1
25 TABLET ORAL 2 TIMES DAILY WITH MEALS
Status: DISCONTINUED | OUTPATIENT
Start: 2025-02-27 | End: 2025-03-01 | Stop reason: HOSPADM

## 2025-02-27 RX ADMIN — Medication 200 ML: at 02:02

## 2025-02-27 RX ADMIN — NYSTATIN 500000 UNITS: 100000 SUSPENSION ORAL at 12:02

## 2025-02-27 RX ADMIN — LISINOPRIL 20 MG: 20 TABLET ORAL at 08:02

## 2025-02-27 RX ADMIN — CARVEDILOL 25 MG: 12.5 TABLET, FILM COATED ORAL at 05:02

## 2025-02-27 RX ADMIN — PANTOPRAZOLE SODIUM 40 MG: 40 TABLET, DELAYED RELEASE ORAL at 08:02

## 2025-02-27 RX ADMIN — HYDROCORTISONE SODIUM SUCCINATE 50 MG: 100 INJECTION, POWDER, FOR SOLUTION INTRAMUSCULAR; INTRAVENOUS at 02:02

## 2025-02-27 RX ADMIN — AZITHROMYCIN DIHYDRATE 250 MG: 250 TABLET ORAL at 08:02

## 2025-02-27 RX ADMIN — BUDESONIDE 0.5 MG: 0.5 INHALANT RESPIRATORY (INHALATION) at 08:02

## 2025-02-27 RX ADMIN — MIRTAZAPINE 15 MG: 15 TABLET, FILM COATED ORAL at 11:02

## 2025-02-27 RX ADMIN — IPRATROPIUM BROMIDE AND ALBUTEROL SULFATE 3 ML: 2.5; .5 SOLUTION RESPIRATORY (INHALATION) at 08:02

## 2025-02-27 RX ADMIN — POTASSIUM CHLORIDE 20 MEQ: 1500 TABLET, EXTENDED RELEASE ORAL at 08:02

## 2025-02-27 RX ADMIN — Medication 200 ML: at 06:02

## 2025-02-27 RX ADMIN — FAMOTIDINE 20 MG: 20 TABLET, FILM COATED ORAL at 08:02

## 2025-02-27 RX ADMIN — IPRATROPIUM BROMIDE AND ALBUTEROL SULFATE 3 ML: 2.5; .5 SOLUTION RESPIRATORY (INHALATION) at 03:02

## 2025-02-27 RX ADMIN — HYDROCORTISONE SODIUM SUCCINATE 50 MG: 100 INJECTION, POWDER, FOR SOLUTION INTRAMUSCULAR; INTRAVENOUS at 05:02

## 2025-02-27 RX ADMIN — CITALOPRAM HYDROBROMIDE 20 MG: 10 TABLET ORAL at 08:02

## 2025-02-27 RX ADMIN — CARVEDILOL 12.5 MG: 12.5 TABLET, FILM COATED ORAL at 08:02

## 2025-02-27 RX ADMIN — NICOTINE 1 PATCH: 14 PATCH, EXTENDED RELEASE TRANSDERMAL at 08:02

## 2025-02-27 RX ADMIN — IPRATROPIUM BROMIDE AND ALBUTEROL SULFATE 3 ML: 2.5; .5 SOLUTION RESPIRATORY (INHALATION) at 11:02

## 2025-02-27 RX ADMIN — LEVOTHYROXINE SODIUM 25 MCG: 25 TABLET ORAL at 05:02

## 2025-02-27 RX ADMIN — FERROUS SULFATE TAB 325 MG (65 MG ELEMENTAL FE) 1 EACH: 325 (65 FE) TAB at 08:02

## 2025-02-27 RX ADMIN — MEGESTROL ACETATE 200 MG: 400 SUSPENSION ORAL at 08:02

## 2025-02-27 RX ADMIN — FAMOTIDINE 20 MG: 20 TABLET, FILM COATED ORAL at 11:02

## 2025-02-27 RX ADMIN — HYDROCORTISONE SODIUM SUCCINATE 50 MG: 100 INJECTION, POWDER, FOR SOLUTION INTRAMUSCULAR; INTRAVENOUS at 11:02

## 2025-02-27 RX ADMIN — HYDRALAZINE HYDROCHLORIDE 10 MG: 20 INJECTION, SOLUTION INTRAMUSCULAR; INTRAVENOUS at 05:02

## 2025-02-27 RX ADMIN — POTASSIUM CHLORIDE 20 MEQ: 1500 TABLET, EXTENDED RELEASE ORAL at 11:02

## 2025-02-27 RX ADMIN — NYSTATIN 500000 UNITS: 100000 SUSPENSION ORAL at 08:02

## 2025-02-27 RX ADMIN — NYSTATIN 500000 UNITS: 100000 SUSPENSION ORAL at 05:02

## 2025-02-27 RX ADMIN — HYDROCORTISONE: 0.5 CREAM TOPICAL at 08:02

## 2025-02-27 RX ADMIN — HYDROCORTISONE: 0.5 CREAM TOPICAL at 11:02

## 2025-02-27 RX ADMIN — NYSTATIN 500000 UNITS: 100000 SUSPENSION ORAL at 11:02

## 2025-02-27 NOTE — PLAN OF CARE
Problem: Occupational Therapy  Goal: Occupational Therapy Goal  Description: Goals to be met by: 02/26/25     Patient will increase functional independence with ADLs by performing:    Feeding with Modified [place].  UE Dressing with Modified [place].  LE Dressing with Modified [place].  Grooming while standing at sink with Modified [place].  Toileting from toilet with Modified [place] for hygiene and clothing management.   Bathing from  shower chair/bench with Set-up Assistance.  Toilet transfer to toilet with Modified [place].    Outcome: Progressing

## 2025-02-27 NOTE — ASSESSMENT & PLAN NOTE
Patient's blood pressure range in the last 24 hours was: BP  Min: 149/68  Max: 181/88.The patient's inpatient anti-hypertensive regimen is listed below:  Current Antihypertensives  lisinopriL tablet 20 mg, Daily, Oral  hydrALAZINE injection 10 mg, Every 6 hours PRN, Intravenous  carvediloL tablet 25 mg, 2 times daily with meals, Oral    Plan  - BP is controlled, no changes needed to their regimen    2/27:  Hypertension noted.  Medications adjusted.  Continue monitoring.  Adjust as needed.

## 2025-02-27 NOTE — ASSESSMENT & PLAN NOTE
Patient has a diagnosis of pneumonia. The cause of the pneumonia is unknown at this time. The pneumonia is stable. The patient has the following signs/symptoms of pneumonia: cough and shortness of breath. The patient does not have a current oxygen requirement and the patient does not have a home oxygen requirement. I have reviewed the pertinent imaging.     Current antimicrobial regimen consists of the antibiotics listed below. Will monitor patient closely and  initiate antibiotic, steroid, scheduled nebulizer treatments    Antibiotics (From admission, onward)      Start     Stop Route Frequency Ordered    02/27/25 0900  azithromycin tablet 250 mg         03/03/25 0859 Oral Daily 02/26/25 0932            Microbiology Results (last 7 days)       Procedure Component Value Units Date/Time    Influenza A & B by Molecular [[id number]] Collected: 02/26/25 1108    Order Status: Completed Specimen: Nasopharyngeal Swab Updated: 02/26/25 1242     Influenza A, Molecular Negative     Influenza B, Molecular Negative     Flu A & B Source Nasal Swab    Blood culture x two cultures. Draw prior to antibiotics. [[id number]] Collected: 02/18/25 1133    Order Status: Completed Specimen: Blood from Peripheral, Forearm, Left Updated: 02/23/25 2222     Blood Culture, Routine No growth after 5 days.    Narrative:      Aerobic and anaerobic    Blood culture x two cultures. Draw prior to antibiotics. [[id number]] Collected: 02/18/25 1142    Order Status: Completed Specimen: Blood from Peripheral, Forearm, Right Updated: 02/23/25 2222     Blood Culture, Routine No growth after 5 days.    Narrative:      Aerobic and anaerobic          2/27:  Patient afebrile without leukocytosis.  Respiratory status improved.  Continue current medications.

## 2025-02-27 NOTE — ASSESSMENT & PLAN NOTE
Patient's most recent potassium results are listed below.   Recent Labs     02/25/25  0520 02/26/25  0535 02/27/25  0542   K 3.1* 3.7 3.9       Plan  - Replete potassium per protocol  - Monitor potassium Daily  - Patient's hypokalemia is worsening. Will adjust treatment as follows:  IV replacement ordered    2/21:  Continued hypokalemia noted.  Potassium 2.5 today.  IV replacement ordered.  We will also add daily p.o. potassium.  Pedialyte ordered.    2/27:  Stable
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
## 2025-02-27 NOTE — PLAN OF CARE
02/27/25 1449   Medicare Message   Important Message from Medicare regarding Discharge Appeal Rights Given to patient/caregiver;Explained to patient/caregiver;Signed/date by patient/caregiver   Date IMM was signed 02/27/25   Time IMM was signed 1445     Patient sleeping.  Daughter at bedside.  Copy of Medicare Important Message given to daughter.  Reviewed IM discharge appeal rights with daughter.  Verbalizes understanding.  Signs for delivery of IM.

## 2025-02-27 NOTE — PT/OT/SLP PROGRESS
"Occupational Therapy   Treatment    Name: Ruth Gamboa  MRN: [id number]  Admitting Diagnosis:  Hypomagnesemia       Recommendations:     Discharge Recommendations: Low Intensity Therapy  Discharge Equipment Recommendations:  bedside commode, walker, rolling, shower chair  Barriers to discharge:  Other (Comment) (current medical and functional status)    Assessment:     Ruth Gamboa is a 80 y.o. female with a medical diagnosis of Hypomagnesemia.  She presents with fair participation today. Pt frustrated today due to not being able to receive a shower since she has been here. Pt daughter also is frustrated as well. Pt daughter reported she is developing bed sores due to not wanting to move as well as not getting cleaned. Performance deficits affecting function are weakness, impaired endurance, impaired self care skills, impaired functional mobility, gait instability, impaired balance, decreased upper extremity function, decreased lower extremity function, decreased safety awareness, pain, impaired skin, edema, impaired cardiopulmonary response to activity, impaired joint extensibility, impaired muscle length.     Rehab Prognosis:  Fair; patient would benefit from acute skilled OT services to address these deficits and reach maximum level of function.       Plan:     Patient to be seen 3 x/week to address the above listed problems via self-care/home management, therapeutic activities, therapeutic exercises  Plan of Care Expires: 03/01/25  Plan of Care Reviewed with: patient    Subjective     Chief Complaint: I dont feel like doing anything  Patient/Family Comments/goals: Reported none  Pain/Comfort:  Pain Rating 1: other (see comments) ("Please dont ask.")  Location - Side 1: Bilateral  Pain Addressed 1: Pre-medicate for activity, Reposition, Nurse notified  Pain Rating Post-Intervention 1: other (see comments) (did not ask due to pt being frustrated)    Objective:     Communicated with: occupational therapist and " nurse prior to session.  Patient found sitting edge of bed with peripheral IV, blood pressure cuff upon OT entry to room.    General Precautions: Standard, fall    Orthopedic Precautions:N/A  Braces: N/A  Respiratory Status: Room air     Occupational Performance:     Bed Mobility:    Patient completed Rolling/Turning to Left with  modified independence  Patient completed Rolling/Turning to Right with modified independence  Patient completed Scooting/Bridging with independence  Patient completed Supine to Sit with modified independence  Patient completed Sit to Supine with modified independence     AMPA 6 Click ADL: 22    Treatment & Education:  Patient engaged in active range of motion therapeutic exercise for 2 x 15 sitting EOB in the following planes: shoulder flexion/extension, shoulder abduction/adduction, elbow flexion/extension, scapular retractions/protraction, scapular elevation/depression, shoulder rotations (forward and reverse), wrist flexion/extension, wrist radial/ulnar deviation, forearm supination/pronation, and composite fist. Patient needed rest breaks between each set due to impaired muscle endurance and activity tolerance. Patient educated to continue to complete exercise throughout the day to increase strength and endurance to facilitate an increase in ADL/IADLs. Patient verbally understood.  Pt was provided education / instruction regarding role of OT and established OT POC.       Patient left sitting edge of bed with all lines intact, call button in reach, and nurse notified    GOALS:   Multidisciplinary Problems       Occupational Therapy Goals          Problem: Occupational Therapy    Goal Priority Disciplines Outcome Interventions   Occupational Therapy Goal     OT, PT/OT Progressing    Description: Goals to be met by: 02/26/25     Patient will increase functional independence with ADLs by performing:    Feeding with Modified [place].  UE Dressing with Modified [place].  LE  Dressing with Modified [place].  Grooming while standing at sink with Modified [place].  Toileting from toilet with Modified [place] for hygiene and clothing management.   Bathing from  shower chair/bench with Set-up Assistance.  Toilet transfer to toilet with Modified [place].                       Time Tracking:     OT Date of Treatment: 02/27/25  OT Start Time: 1138  OT Stop Time: 1208  OT Total Time (min): 30 min    Billable Minutes:Therapeutic Activity 15 min  Therapeutic Exercise 15 min    OT/SUJIT: SUJIT     Number of SUJIT visits since last OT visit: 2    2/27/2025  Shala BURNETT

## 2025-02-27 NOTE — ASSESSMENT & PLAN NOTE
Famotidine ordered.    2/21:  Patient reports increased reflux symptoms.  We will discontinue famotidine and resume home Protonix.    2/24:  Patient reports reflux improved with Protonix and added famotidine.    2/27:  Stable

## 2025-02-27 NOTE — ASSESSMENT & PLAN NOTE
We will discontinue Periactin and add Megace.  Increase Remeron.  Encouraged p.o. intake.    2/21:  Patient with improved appetite and increase p.o. intake.  Continue Megace and Remeron.    2/27:  Patient with continued increase in p.o. intake.  Continue current medication regimen and encouraged continued increasing p.o. intake.

## 2025-02-27 NOTE — ASSESSMENT & PLAN NOTE
Patient has Abnormal Magnesium: hypomagnesemia. Will continue to monitor electrolytes closely. Will replace the affected electrolytes and repeat labs to be done after interventions completed. The patient's magnesium results have been reviewed and are listed below.  Recent Labs   Lab 02/27/25  0542   MG 1.8        2/21:  Magnesium 1.5 today.  We will replenish.  Continue daily monitoring and replenish as needed.  2/22 FM:  Replete.    2/27:  Stable

## 2025-02-27 NOTE — PROGRESS NOTES
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 this time. Patient does not display psychosis at this time. Continue to monitor closely and adjust plan of care as needed.        HTN (hypertension)  Patient's blood pressure range in the last 24 hours was: BP  Min: 149/68  Max: 181/88.The patient's inpatient anti-hypertensive regimen is listed below:  Current Antihypertensives  lisinopriL tablet 20 mg, Daily, Oral  hydrALAZINE injection 10 mg, Every 6 hours PRN, Intravenous  carvediloL tablet 25 mg, 2 times daily with meals, Oral    Plan  - BP is controlled, no changes needed to their regimen    2/27:  Hypertension noted.  Medications adjusted.  Continue monitoring.  Adjust as needed.    Gastroesophageal reflux disease without esophagitis  Famotidine ordered.    2/21:  Patient reports increased reflux symptoms.  We will discontinue famotidine and resume home Protonix.    2/24:  Patient reports reflux improved with Protonix and added famotidine.    2/27:  Stable      COPD (chronic obstructive pulmonary disease)  Patient's COPD is controlled currently.  Patient is currently on COPD Pathway. Continue scheduled inhalers Steroids, Antibiotics, and Supplemental oxygen and monitor respiratory status closely.     2/21:  Smoking cessation encouraged.      VTE Risk Mitigation (From admission, onward)           Ordered     IP  VTE HIGH RISK PATIENT  Once         02/18/25 1459     Place sequential compression device  Until discontinued         02/18/25 1459     Place HAVEN hose  Until discontinued         02/18/25 1459                    Discharge Planning   ORALIA:      Code Status: DNR   Medical Readiness for Discharge Date:   Discharge Plan A: Home with family, Home Health                Please place Justification for DME        Roberto Villarreal NP  Department of Hospital Medicine   [facility]

## 2025-02-27 NOTE — ASSESSMENT & PLAN NOTE
Hyponatremia is likely due to Dehydration/hypovolemia. The patient's most recent sodium results are listed below.  Recent Labs     02/25/25  0520 02/26/25  0535 02/27/25  0542    137 135*       Plan  - Correct the sodium by 4-6mEq in 24 hours.   - Will treat the hyponatremia with IV fluids as follows: 127mL/hr  - Monitor sodium Daily.   - Patient hyponatremia is stable    2/21:  Resolved.  Continue monitoring with daily labs.

## 2025-02-28 LAB
ALBUMIN SERPL BCP-MCNC: 2.2 G/DL (ref 3.5–5.2)
ALP SERPL-CCNC: 73 U/L (ref 55–135)
ALT SERPL W/O P-5'-P-CCNC: 12 U/L (ref 10–44)
ANION GAP SERPL CALC-SCNC: 9 MMOL/L (ref 8–16)
AST SERPL-CCNC: 14 U/L (ref 10–40)
BASOPHILS # BLD AUTO: 0.02 K/UL (ref 0–0.2)
BASOPHILS NFR BLD: 0.1 % (ref 0–1.9)
BILIRUB SERPL-MCNC: 0.1 MG/DL (ref 0.1–1)
BUN SERPL-MCNC: 25 MG/DL (ref 8–23)
CALCIUM SERPL-MCNC: 7.5 MG/DL (ref 8.7–10.5)
CHLORIDE SERPL-SCNC: 103 MMOL/L (ref 95–110)
CO2 SERPL-SCNC: 26 MMOL/L (ref 23–29)
CREAT SERPL-MCNC: 0.5 MG/DL (ref 0.5–1.4)
DIFFERENTIAL METHOD BLD: ABNORMAL
EOSINOPHIL # BLD AUTO: 0 K/UL (ref 0–0.5)
EOSINOPHIL NFR BLD: 0 % (ref 0–8)
ERYTHROCYTE [DISTWIDTH] IN BLOOD BY AUTOMATED COUNT: 19.2 % (ref 11.5–14.5)
EST. GFR  (NO RACE VARIABLE): >60 ML/MIN/1.73 M^2
GLUCOSE SERPL-MCNC: 137 MG/DL (ref 70–110)
HCT VFR BLD AUTO: 32.2 % (ref 37–48.5)
HGB BLD-MCNC: 10.4 G/DL (ref 12–16)
IMM GRANULOCYTES # BLD AUTO: 0.32 K/UL (ref 0–0.04)
IMM GRANULOCYTES NFR BLD AUTO: 2.1 % (ref 0–0.5)
LYMPHOCYTES # BLD AUTO: 0.5 K/UL (ref 1–4.8)
LYMPHOCYTES NFR BLD: 3.6 % (ref 18–48)
MAGNESIUM SERPL-MCNC: 1.9 MG/DL (ref 1.6–2.6)
MCH RBC QN AUTO: 26.6 PG (ref 27–31)
MCHC RBC AUTO-ENTMCNC: 32.3 G/DL (ref 32–36)
MCV RBC AUTO: 82 FL (ref 82–98)
MONOCYTES # BLD AUTO: 0.6 K/UL (ref 0.3–1)
MONOCYTES NFR BLD: 4 % (ref 4–15)
NEUTROPHILS # BLD AUTO: 13.4 K/UL (ref 1.8–7.7)
NEUTROPHILS NFR BLD: 90.2 % (ref 38–73)
NRBC BLD-RTO: 0 /100 WBC
PLATELET # BLD AUTO: 457 K/UL (ref 150–450)
PMV BLD AUTO: 9.1 FL (ref 9.2–12.9)
POTASSIUM SERPL-SCNC: 4 MMOL/L (ref 3.5–5.1)
PROT SERPL-MCNC: 4.9 G/DL (ref 6–8.4)
RBC # BLD AUTO: 3.91 M/UL (ref 4–5.4)
SODIUM SERPL-SCNC: 138 MMOL/L (ref 136–145)
WBC # BLD AUTO: 14.91 K/UL (ref 3.9–12.7)

## 2025-02-28 PROCEDURE — 83735 ASSAY OF MAGNESIUM: CPT | Performed by: INTERNAL MEDICINE

## 2025-02-28 PROCEDURE — 25000003 PHARM REV CODE 250: Performed by: EMERGENCY MEDICINE

## 2025-02-28 PROCEDURE — 25000003 PHARM REV CODE 250: Performed by: INTERNAL MEDICINE

## 2025-02-28 PROCEDURE — 97110 THERAPEUTIC EXERCISES: CPT | Mod: CO

## 2025-02-28 PROCEDURE — 99900031 HC PATIENT EDUCATION (STAT)

## 2025-02-28 PROCEDURE — 63700000 PHARM REV CODE 250 ALT 637 W/O HCPCS

## 2025-02-28 PROCEDURE — 36415 COLL VENOUS BLD VENIPUNCTURE: CPT | Performed by: INTERNAL MEDICINE

## 2025-02-28 PROCEDURE — 63600175 PHARM REV CODE 636 W HCPCS

## 2025-02-28 PROCEDURE — 94761 N-INVAS EAR/PLS OXIMETRY MLT: CPT

## 2025-02-28 PROCEDURE — 94640 AIRWAY INHALATION TREATMENT: CPT

## 2025-02-28 PROCEDURE — 63600175 PHARM REV CODE 636 W HCPCS: Performed by: INTERNAL MEDICINE

## 2025-02-28 PROCEDURE — 25000242 PHARM REV CODE 250 ALT 637 W/ HCPCS: Performed by: EMERGENCY MEDICINE

## 2025-02-28 PROCEDURE — 85025 COMPLETE CBC W/AUTO DIFF WBC: CPT | Performed by: INTERNAL MEDICINE

## 2025-02-28 PROCEDURE — 11000001 HC ACUTE MED/SURG PRIVATE ROOM

## 2025-02-28 PROCEDURE — 99900035 HC TECH TIME PER 15 MIN (STAT)

## 2025-02-28 PROCEDURE — 25000003 PHARM REV CODE 250

## 2025-02-28 PROCEDURE — 25000242 PHARM REV CODE 250 ALT 637 W/ HCPCS

## 2025-02-28 PROCEDURE — S4991 NICOTINE PATCH NONLEGEND: HCPCS | Performed by: INTERNAL MEDICINE

## 2025-02-28 PROCEDURE — 97116 GAIT TRAINING THERAPY: CPT

## 2025-02-28 PROCEDURE — 80053 COMPREHEN METABOLIC PANEL: CPT | Performed by: INTERNAL MEDICINE

## 2025-02-28 PROCEDURE — S0179 MEGESTROL 20 MG: HCPCS

## 2025-02-28 RX ADMIN — CITALOPRAM HYDROBROMIDE 20 MG: 10 TABLET ORAL at 08:02

## 2025-02-28 RX ADMIN — HYDROCORTISONE SODIUM SUCCINATE 50 MG: 100 INJECTION, POWDER, FOR SOLUTION INTRAMUSCULAR; INTRAVENOUS at 06:02

## 2025-02-28 RX ADMIN — HYDROCORTISONE SODIUM SUCCINATE 50 MG: 100 INJECTION, POWDER, FOR SOLUTION INTRAMUSCULAR; INTRAVENOUS at 02:02

## 2025-02-28 RX ADMIN — FAMOTIDINE 20 MG: 20 TABLET, FILM COATED ORAL at 08:02

## 2025-02-28 RX ADMIN — HYDROCORTISONE: 0.5 CREAM TOPICAL at 08:02

## 2025-02-28 RX ADMIN — IPRATROPIUM BROMIDE AND ALBUTEROL SULFATE 3 ML: 2.5; .5 SOLUTION RESPIRATORY (INHALATION) at 11:02

## 2025-02-28 RX ADMIN — MEGESTROL ACETATE 200 MG: 400 SUSPENSION ORAL at 08:02

## 2025-02-28 RX ADMIN — NYSTATIN 500000 UNITS: 100000 SUSPENSION ORAL at 08:02

## 2025-02-28 RX ADMIN — BUDESONIDE 0.5 MG: 0.5 INHALANT RESPIRATORY (INHALATION) at 07:02

## 2025-02-28 RX ADMIN — LEVOTHYROXINE SODIUM 25 MCG: 25 TABLET ORAL at 06:02

## 2025-02-28 RX ADMIN — IPRATROPIUM BROMIDE AND ALBUTEROL SULFATE 3 ML: 2.5; .5 SOLUTION RESPIRATORY (INHALATION) at 03:02

## 2025-02-28 RX ADMIN — POTASSIUM CHLORIDE 20 MEQ: 1500 TABLET, EXTENDED RELEASE ORAL at 08:02

## 2025-02-28 RX ADMIN — MIRTAZAPINE 15 MG: 15 TABLET, FILM COATED ORAL at 08:02

## 2025-02-28 RX ADMIN — HYDROCORTISONE SODIUM SUCCINATE 50 MG: 100 INJECTION, POWDER, FOR SOLUTION INTRAMUSCULAR; INTRAVENOUS at 10:02

## 2025-02-28 RX ADMIN — NYSTATIN 500000 UNITS: 100000 SUSPENSION ORAL at 05:02

## 2025-02-28 RX ADMIN — CARVEDILOL 25 MG: 12.5 TABLET, FILM COATED ORAL at 08:02

## 2025-02-28 RX ADMIN — PANTOPRAZOLE SODIUM 40 MG: 40 TABLET, DELAYED RELEASE ORAL at 08:02

## 2025-02-28 RX ADMIN — LISINOPRIL 20 MG: 20 TABLET ORAL at 08:02

## 2025-02-28 RX ADMIN — AZITHROMYCIN DIHYDRATE 250 MG: 250 TABLET ORAL at 08:02

## 2025-02-28 RX ADMIN — IPRATROPIUM BROMIDE AND ALBUTEROL SULFATE 3 ML: 2.5; .5 SOLUTION RESPIRATORY (INHALATION) at 07:02

## 2025-02-28 RX ADMIN — NICOTINE 1 PATCH: 14 PATCH, EXTENDED RELEASE TRANSDERMAL at 08:02

## 2025-02-28 RX ADMIN — FERROUS SULFATE TAB 325 MG (65 MG ELEMENTAL FE) 1 EACH: 325 (65 FE) TAB at 08:02

## 2025-02-28 RX ADMIN — CARVEDILOL 25 MG: 12.5 TABLET, FILM COATED ORAL at 05:02

## 2025-02-28 RX ADMIN — HYDRALAZINE HYDROCHLORIDE 10 MG: 20 INJECTION, SOLUTION INTRAMUSCULAR; INTRAVENOUS at 05:02

## 2025-02-28 NOTE — PLAN OF CARE
Problem: Skin Injury Risk Increased  Goal: Skin Health and Integrity  Outcome: Progressing     Problem: Adult Inpatient Plan of Care  Goal: Plan of Care Review  Outcome: Progressing  Goal: Patient-Specific Goal (Individualized)  Outcome: Progressing  Goal: Absence of Hospital-Acquired Illness or Injury  Outcome: Progressing  Goal: Optimal Comfort and Wellbeing  Outcome: Progressing  Goal: Readiness for Transition of Care  Outcome: Progressing     Problem: Infection  Goal: Absence of Infection Signs and Symptoms  Outcome: Progressing     Problem: Pneumonia  Goal: Fluid Balance  Outcome: Progressing  Goal: Resolution of Infection Signs and Symptoms  Outcome: Progressing  Goal: Effective Oxygenation and Ventilation  Outcome: Progressing

## 2025-02-28 NOTE — ASSESSMENT & PLAN NOTE
Hyponatremia is likely due to Dehydration/hypovolemia. The patient's most recent sodium results are listed below.  Recent Labs     02/26/25  0535 02/27/25  0542 02/28/25  0517    135* 138       Plan  - Correct the sodium by 4-6mEq in 24 hours.   - Will treat the hyponatremia with IV fluids as follows: 127mL/hr  - Monitor sodium Daily.   - Patient hyponatremia is stable    2/21:  Resolved.  Continue monitoring with daily labs.

## 2025-02-28 NOTE — ASSESSMENT & PLAN NOTE
Patient has a diagnosis of pneumonia. The cause of the pneumonia is unknown at this time. The pneumonia is stable. The patient has the following signs/symptoms of pneumonia: cough and shortness of breath. The patient does not have a current oxygen requirement and the patient does not have a home oxygen requirement. I have reviewed the pertinent imaging.     Current antimicrobial regimen consists of the antibiotics listed below. Will monitor patient closely and  initiate antibiotic, steroid, scheduled nebulizer treatments    Antibiotics (From admission, onward)      Start     Stop Route Frequency Ordered    02/27/25 0900  azithromycin tablet 250 mg         03/03/25 0859 Oral Daily 02/26/25 0932            Microbiology Results (last 7 days)       Procedure Component Value Units Date/Time    Influenza A & B by Molecular [[id number]] Collected: 02/26/25 1108    Order Status: Completed Specimen: Nasopharyngeal Swab Updated: 02/26/25 1242     Influenza A, Molecular Negative     Influenza B, Molecular Negative     Flu A & B Source Nasal Swab    Blood culture x two cultures. Draw prior to antibiotics. [[id number]] Collected: 02/18/25 1133    Order Status: Completed Specimen: Blood from Peripheral, Forearm, Left Updated: 02/23/25 2222     Blood Culture, Routine No growth after 5 days.    Narrative:      Aerobic and anaerobic    Blood culture x two cultures. Draw prior to antibiotics. [[id number]] Collected: 02/18/25 1142    Order Status: Completed Specimen: Blood from Peripheral, Forearm, Right Updated: 02/23/25 2222     Blood Culture, Routine No growth after 5 days.    Narrative:      Aerobic and anaerobic          2/27:  Patient afebrile without leukocytosis.  Respiratory status improved.  Continue current medications.    2/28:  We will repeat chest x-ray today.  Plan for discharge home in the morning with continued p.o. antibiotics as stable.

## 2025-02-28 NOTE — ASSESSMENT & PLAN NOTE
Patient's blood pressure range in the last 24 hours was: BP  Min: 133/67  Max: 199/94.The patient's inpatient anti-hypertensive regimen is listed below:  Current Antihypertensives  lisinopriL tablet 20 mg, Daily, Oral  hydrALAZINE injection 10 mg, Every 6 hours PRN, Intravenous  carvediloL tablet 25 mg, 2 times daily with meals, Oral    Plan  - BP is controlled, no changes needed to their regimen    2/27:  Hypertension noted.  Medications adjusted.  Continue monitoring.  Adjust as needed.

## 2025-02-28 NOTE — PLAN OF CARE
Problem: Occupational Therapy  Goal: Occupational Therapy Goal  Description: Goals to be met by: 02/26/25     Patient will increase functional independence with ADLs by performing:    Feeding with Modified [place].  UE Dressing with Modified [place].  LE Dressing with Modified [place].  Grooming while standing at sink with Modified [place].  Toileting from toilet with Modified [place] for hygiene and clothing management.   Bathing from  shower chair/bench with Set-up Assistance.  Toilet transfer to toilet with Modified [place].    Outcome: Progressing

## 2025-02-28 NOTE — ASSESSMENT & PLAN NOTE
Patient has Abnormal Magnesium: hypomagnesemia. Will continue to monitor electrolytes closely. Will replace the affected electrolytes and repeat labs to be done after interventions completed. The patient's magnesium results have been reviewed and are listed below.  Recent Labs   Lab 02/28/25  0517   MG 1.9        2/21:  Magnesium 1.5 today.  We will replenish.  Continue daily monitoring and replenish as needed.  2/22 FM:  Replete.    2/27:  Stable

## 2025-02-28 NOTE — ASSESSMENT & PLAN NOTE
Patient's most recent potassium results are listed below.   Recent Labs     02/26/25  0535 02/27/25  0542 02/28/25  0517   K 3.7 3.9 4.0       Plan  - Replete potassium per protocol  - Monitor potassium Daily  - Patient's hypokalemia is worsening. Will adjust treatment as follows:  IV replacement ordered    2/21:  Continued hypokalemia noted.  Potassium 2.5 today.  IV replacement ordered.  We will also add daily p.o. potassium.  Pedialyte ordered.    2/27:  Stable

## 2025-02-28 NOTE — PT/OT/SLP PROGRESS
Occupational Therapy   Treatment    Name: Ruth Gamboa  MRN: [id number]  Admitting Diagnosis:  Hypomagnesemia       Recommendations:     Discharge Recommendations: Low Intensity Therapy  Discharge Equipment Recommendations:  bedside commode, walker, rolling, shower chair  Barriers to discharge:  Other (Comment) (current medial and functional status)    Assessment:     Ruth Gamboa is a 80 y.o. female with a medical diagnosis of Hypomagnesemia.  She presents with good participation. Performance deficits affecting function are weakness, impaired endurance, impaired self care skills, impaired functional mobility, gait instability, impaired balance, decreased safety awareness, edema, impaired cardiopulmonary response to activity.     Rehab Prognosis:  Good; patient would benefit from acute skilled OT services to address these deficits and reach maximum level of function.       Plan:     Patient to be seen 3 x/week to address the above listed problems via self-care/home management, therapeutic activities, therapeutic exercises  Plan of Care Expires: 03/01/25  Plan of Care Reviewed with: patient    Subjective     Chief Complaint: I am tired today I did not sleep well.   Patient/Family Comments/goals: Go home  Pain/Comfort:  Pain Rating 1: 0/10  Pain Rating Post-Intervention 1: 0/10    Objective:     Communicated with: occupational therapist and nurse prior to session.  Patient found HOB elevated with peripheral IV, blood pressure cuff upon OT entry to room.    General Precautions: Standard, fall    Orthopedic Precautions:N/A  Braces: N/A  Respiratory Status: Room air     Occupational Performance:     Bed Mobility:    Patient completed Rolling/Turning to Left with  independence  Patient completed Rolling/Turning to Right with independence  Patient completed Scooting/Bridging with independence  Patient completed Supine to Sit with independence  Patient completed Sit to Supine with independence     [facility] 6 Click ADL:  22    Treatment & Education:  Patient engaged in active range of motion therapeutic exercise for 2 x 15 sitting EOB in the following planes: shoulder flexion/extension, shoulder abduction/adduction, elbow flexion/extension, scapular retractions/protraction, scapular elevation/depression, shoulder rotations (forward and reverse), wrist flexion/extension, wrist radial/ulnar deviation, forearm supination/pronation, and composite fist. Patient needed rest breaks between each set due to impaired muscle endurance and activity tolerance. Patient educated to continue to complete exercise throughout the day to increase strength and endurance to facilitate an increase in ADL/IADLs. Patient verbally understood. Pt was provided education / instruction regarding role of OT and established OT POC.      Patient left HOB elevated with all lines intact, call button in reach, and nurse notified    GOALS:   Multidisciplinary Problems       Occupational Therapy Goals          Problem: Occupational Therapy    Goal Priority Disciplines Outcome Interventions   Occupational Therapy Goal     OT, PT/OT Progressing    Description: Goals to be met by: 02/26/25     Patient will increase functional independence with ADLs by performing:    Feeding with Modified [place].  UE Dressing with Modified [place].  LE Dressing with Modified [place].  Grooming while standing at sink with Modified [place].  Toileting from toilet with Modified [place] for hygiene and clothing management.   Bathing from  shower chair/bench with Set-up Assistance.  Toilet transfer to toilet with Modified [place].                         Time Tracking:     OT Date of Treatment: 02/28/25  OT Start Time: 1025  OT Stop Time: 1035  OT Total Time (min): 10 min    Billable Minutes:Therapeutic Exercise 10 min    OT/SUJIT: SUJIT     Number of SUJIT visits since last OT visit: 3    2/28/2025  Shala BURNETT

## 2025-02-28 NOTE — PLAN OF CARE
POC reviewed with pt, VSS. IV site clean/Dry/Intact. Pt denies pain/needs at this time,CB in reach, bed in lowest position, lighting adjusted to pt's preference will continue to monitor.   Problem: Skin Injury Risk Increased  Goal: Skin Health and Integrity  Outcome: Progressing     Problem: Adult Inpatient Plan of Care  Goal: Plan of Care Review  Outcome: Progressing  Goal: Patient-Specific Goal (Individualized)  Outcome: Progressing  Goal: Absence of Hospital-Acquired Illness or Injury  Outcome: Progressing  Goal: Optimal Comfort and Wellbeing  Outcome: Progressing  Goal: Readiness for Transition of Care  Outcome: Progressing     Problem: Infection  Goal: Absence of Infection Signs and Symptoms  Outcome: Progressing     Problem: Pneumonia  Goal: Fluid Balance  Outcome: Progressing  Goal: Resolution of Infection Signs and Symptoms  Outcome: Progressing  Goal: Effective Oxygenation and Ventilation  Outcome: Progressing

## 2025-02-28 NOTE — PROGRESS NOTES
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  Climbing 3-5 steps with a railing?: A little    Plan  - Progressive mobility protocol initated  - PT/OT consulted  - Fall precautions in place    2/24:  Patient with increased weakness and debility since admission.  Continue PT/OT efforts.            Major depressive disorder, recurrent, moderate  Patient has recurrent depression which is moderate and is currently uncontrolled. Will Increase anti-depressant medications. We will not consult psychiatry at this time. Patient does not display psychosis at this time. Continue to monitor closely and adjust plan of care as needed.        HTN (hypertension)  Patient's blood pressure range in the last 24 hours was: BP  Min: 133/67  Max: 199/94.The patient's inpatient anti-hypertensive regimen is listed below:  Current Antihypertensives  lisinopriL tablet 20 mg, Daily, Oral  hydrALAZINE injection 10 mg, Every 6 hours PRN, Intravenous  carvediloL tablet 25 mg, 2 times daily with meals, Oral    Plan  - BP is controlled, no changes needed to their regimen    2/27:  Hypertension noted.  Medications adjusted.  Continue monitoring.  Adjust as needed.    Gastroesophageal reflux disease without esophagitis  Famotidine ordered.    2/21:  Patient reports increased reflux symptoms.  We will discontinue famotidine and resume home  Protonix.    2/24:  Patient reports reflux improved with Protonix and added famotidine.    2/27:  Stable      COPD (chronic obstructive pulmonary disease)  Patient's COPD is controlled currently.  Patient is currently on COPD Pathway. Continue scheduled inhalers Steroids, Antibiotics, and Supplemental oxygen and monitor respiratory status closely.     2/21:  Smoking cessation encouraged.      VTE Risk Mitigation (From admission, onward)           Ordered     IP VTE HIGH RISK PATIENT  Once         02/18/25 1459     Place sequential compression device  Until discontinued         02/18/25 1459     Place HAVEN hose  Until discontinued         02/18/25 1459                    Discharge Planning   ORALIA:      Code Status: DNR   Medical Readiness for Discharge Date:   Discharge Plan A: Home with family, Home Health                Please place Justification for DME        Roberto Villarreal NP  Department of Hospital Medicine   [facility]

## 2025-02-28 NOTE — PROGRESS NOTES
[facility]  Adult Nutrition  Progress Note    SUMMARY       Recommendations  1. Rec'd Cardiac Diet.   - Consistency rec's as per SLP.        2. Rec'd ONS: Ensure Enlive TID to provide 1050kcal and 60g of protein.        3. Rec'd appetite stimulant.      4. Rec'd Beneprotein TID.   5. Rec'd Moshe BID to promote wound healing and to provide additional nutrition.    6. Rec'd encourage PO intake and compliance with drinking ONS.   7. Rec'd daily weights.        8. Rec'd daily multivitamin.        9. RD to follow and make rec's accordingly.  Goals:   1. Pt will consume > 50% of meals by next RD follow up.  Nutrition Goal Status: progressing towards goal  Communication of RD Recs: documented in POC    Nutrition Discharge Planning    Nutrition Discharge Planning: Therapeutic diet (comments), Oral supplement regimen (comments), Diet texture per SLP, Other (comments)  Therapeutic diet (comments): Cardiac Diet  Oral supplement regimen (comments): Ensure Enlive or Boost Plus TID, Moshe BID, Beneprotein TID  Other (comments): Multivitamin, appetite stimulant    Assessment and Plan    Endocrine  Severe malnutrition  Malnutrition Type:  Context: chronic illness  Level: severe    Related to (etiology):   Inadequate protein energy intake  secondary to increased nutrient needs (calories and protein)    Signs and Symptoms (as evidenced by):   Severe muscle loss, severe fat loss, weight loss > 2% in 1 week, energy intake less than 75% for greater than 3 months, BMI = 14.18     Malnutrition Characteristic Summary:  Energy Intake (Malnutrition): less than 75% for greater than or equal to 3 months  Subcutaneous Fat (Malnutrition): severe depletion  Muscle Mass (Malnutrition): severe depletion      Interventions (treatment strategy):  1. Texture modified diet   2. Commercial beverage medical food supplement therapy     Nutrition Diagnosis Status:   Continues       Malnutrition Assessment  Malnutrition Context: chronic  illness  Malnutrition Level: severe  Skin (Micronutrient): thinned, dry, wounds unhealed  Nails (Micronutrient): brittle, thin  Hair/Scalp (Micronutrient): dry, plucked easily  Lips/Mucous Membranes (Micronutrient): pallor  Teeth (Micronutrient): broken dentition  Tongue (Micronutrient): other (see comments) (Yeast spots)  Neck/Chest (Micronutrient): muscle wasting, bony prominence, subcutaneous fat loss  Musculoskeletal/Lower Extremities: subcutaneous fat loss, muscle wasting   Micronutrient Evaluation Summary: suspected deficiency   Energy Intake (Malnutrition): less than 75% for greater than or equal to 3 months  Subcutaneous Fat (Malnutrition): severe depletion  Muscle Mass (Malnutrition): severe depletion   Orbital Region (Subcutaneous Fat Loss): severe depletion  Upper Arm Region (Subcutaneous Fat Loss): severe depletion  Thoracic and Lumbar Region: severe depletion   [place] Region (Muscle Loss): severe depletion  Clavicle Bone Region (Muscle Loss): severe depletion  Clavicle and Acromion Bone Region (Muscle Loss): severe depletion  Scapular Bone Region (Muscle Loss): severe depletion  Dorsal Hand (Muscle Loss): severe depletion  Patellar Region (Muscle Loss): severe depletion  Anterior Thigh Region (Muscle Loss): severe depletion  Posterior Calf Region (Muscle Loss): severe depletion     Reason for Assessment    Reason For Assessment: RD follow-up  Diagnosis: other (see comments) (Hypomagnesemia)  General Information Comments: Followed up on pt this morning. Pt's PO intake varries between 25-75%. Encouraged good PO intake and compliance with drinking all ONS as tolerated. Pt voiced uncerstanding. Pt to continue appetite stimulants as well. RD to follow and make rec's accordingly.    Nutrition/Diet History    Nutrition Intake History: General diet. Pt reports she eats when she wants to.  Food Preferences: None  Spiritual, Cultural Beliefs, Adventist Practices, Values that Affect Care: no  Food Allergies:  "NKFA  Factors Affecting Nutritional Intake: decreased appetite, difficulty/impaired swallowing    Anthropometrics    Height: 5' 3" (160 cm)  Height (inches): 63 in  Height Method: Stated  Weight: 36.3 kg (80 lb 0.4 oz)  Weight (lb): 80.03 lb  Weight Method: Bed Scale  Ideal Body Weight (IBW), Female: 115 lb  % Ideal Body Weight, Female (lb): 69.59 %  % Ideal Body Weight Malnutrition: less than 70% -severe deficit  BMI (Calculated): 14.2  Weight Loss: unintentional  Usual Body Weight (UBW), k.44 kg  % Usual Body Weight: 73.58  % Weight Change From Usual Weight: -26.58 %    Lab/Procedures/Meds    Pertinent Labs Reviewed: reviewed  Pertinent Medications Reviewed: reviewed    Estimated/Assessed Needs    Weight Used For Calorie Calculations: 36.3 kg (80 lb 0.4 oz)  Energy Calorie Requirements (kcal): 8846-6576 (35-40 kcal/kg)  Energy Need Method: Kcal/kg  Protein Requirements: 54-72 (1.5-2.0g/kg)  Weight Used For Protein Calculations: 36.3 kg (80 lb 0.4 oz)  Fluid Requirements (mL): 6653-7215 (1mL/kcal)  Estimated Fluid Requirement Method: RDA Method  RDA Method (mL): 1270    Nutrition Prescription Ordered    Current Diet Order: Cardiac, IDDSI level 6: soft and bite size  Oral Nutrition Supplement: Ensure Enlive, Moshe    Evaluation of Received Nutrient/Fluid Intake    % Kcal Needs: 25-75%  % Protein Needs: 25-75%  I/O: + 380  Energy Calories Required: not meeting needs  Protein Required: not meeting needs  Fluid Required: not meeting needs  Comments: LBM: 25  Tolerance: tolerating  % Intake of Estimated Energy Needs: 25-75% %  % Meal Intake: Other: 25-75%    Nutrition Risk    Level of Risk/Frequency of Follow-up: moderate     Monitor and Evaluation    Monitor and Evaluation: Energy intake, Food and beverage intake, Protein intake, Diet order, Food and nutrition knowledge, Weight, Beliefs and attitudes, Gastrointestinal profile, Electrolyte and renal panel, Glucose/endocrine profile, Inflammatory profile, Lipid " profile, Nutrition focused physical findings, Skin     Nutrition Follow-Up    RD Follow-up?: Yes

## 2025-02-28 NOTE — PT/OT/SLP PROGRESS
Physical Therapy Treatment    Patient Name:  Ruth Gamboa   MRN:  [id number]    Recommendations:     Discharge Recommendations: Low Intensity Therapy  Discharge Equipment Recommendations: bedside commode, walker, rolling, shower chair  Barriers to discharge: None    Assessment:     Ruth Gamboa is a 80 y.o. female admitted with a medical diagnosis of Hypomagnesemia.  She presents with the following impairments/functional limitations: weakness, impaired muscle length, impaired functional mobility, gait instability. Patient is able to perform all treatment well this visit with no assistance required for transfers or bed mobility. Patient is able to ambulate 300 feet with RW and Supervision. Patient is progressing very well with treatment. DC is expected for 2/29/25    Rehab Prognosis: Good; patient would benefit from acute skilled PT services to address these deficits and reach maximum level of function.    Recent Surgery: * No surgery found *      Plan:     During this hospitalization, patient to be seen 5 x/week to address the identified rehab impairments via gait training, therapeutic activities, therapeutic exercises, neuromuscular re-education and progress toward the following goals:    Plan of Care Expires:  03/03/25    Subjective     Chief Complaint: none  Patient/Family Comments/goals: return home   Pain/Comfort:  Pain Rating 1: 0/10      Objective:     Communicated with nursing prior to session.  Patient found HOB elevated with peripheral IV upon PT entry to room.     General Precautions: Standard, fall  Orthopedic Precautions: N/A  Braces: N/A  Respiratory Status: Room air     Functional Mobility:  Bed Mobility:     Rolling Left:  independence  Rolling Right: independence  Scooting: independence  Bridging: independence  Supine to Sit: independence  Sit to Supine: independence  Transfers:     Sit to Stand:  modified independence with rolling walker  Gait: 300 feet with RW and supervision   Balance: no LOB  noted this visit with ambulation and standing and sitting EOB       AM-PAC 6 CLICK MOBILITY  Turning over in bed (including adjusting bedclothes, sheets and blankets)?: 4  Sitting down on and standing up from a chair with arms (e.g., wheelchair, bedside commode, etc.): 4  Moving from lying on back to sitting on the side of the bed?: 4  Moving to and from a bed to a chair (including a wheelchair)?: 4  Need to walk in hospital room?: 4  Climbing 3-5 steps with a railing?: 3  Basic Mobility Total Score: 23       Treatment & Education:  Bed mobility  Transfers   Gait training 300 feet with RW and supervision- cuing for widening JADYN and step length   Static stand with no LOB    Patient left HOB elevated with all lines intact, call button in reach, nursing notified, and daughter present..    GOALS:   Multidisciplinary Problems       Physical Therapy Goals          Problem: Physical Therapy    Goal Priority Disciplines Outcome Interventions   Physical Therapy Goal     PT, PT/OT Progressing    Description: Goals to be met by: 2025     Patient will increase functional independence with mobility by performin. Gait  x 800 feet with Modified [place] using Rolling Walker.                              Time Tracking:     PT Received On: 25  PT Start Time: 930     PT Stop Time: 944  PT Total Time (min): 14 min     Billable Minutes: Gait Training 14    Treatment Type: Treatment  PT/PTA: PT     Number of PTA visits since last PT visit: 2025
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
## 2025-02-28 NOTE — PT/OT/SLP DISCHARGE
Physical Therapy Discharge Summary    Name: Ruth Gamboa  MRN: [id number]   Principal Problem: Hypomagnesemia     Patient Discharged from acute Physical Therapy on 25.  Please refer to prior PT noted date on 25 for functional status.     Assessment:     Patient appropriate for care in another setting.    Objective:     GOALS:   Multidisciplinary Problems       Physical Therapy Goals          Problem: Physical Therapy    Goal Priority Disciplines Outcome Interventions   Physical Therapy Goal     PT, PT/OT Progressing    Description: Goals to be met by: 2025     Patient will increase functional independence with mobility by performin. Gait  x 800 feet with Modified [place] using Rolling Walker.                          Reasons for Discontinuation of Therapy Services  Transfer to alternate level of care.      Plan:     Patient Discharged to: Home with Home Health Service.      2025

## 2025-02-28 NOTE — PLAN OF CARE
Problem: Physical Therapy  Goal: Physical Therapy Goal  Description: Goals to be met by: 2025     Patient will increase functional independence with mobility by performin. Gait  x 800 feet with Modified [place] using Rolling Walker.     Outcome: Progressing

## 2025-03-01 VITALS
TEMPERATURE: 98 F | HEIGHT: 63 IN | SYSTOLIC BLOOD PRESSURE: 107 MMHG | RESPIRATION RATE: 18 BRPM | BODY MASS INDEX: 14.18 KG/M2 | OXYGEN SATURATION: 95 % | HEART RATE: 83 BPM | WEIGHT: 80 LBS | DIASTOLIC BLOOD PRESSURE: 59 MMHG

## 2025-03-01 LAB
ALBUMIN SERPL BCP-MCNC: 2.2 G/DL (ref 3.5–5.2)
ALP SERPL-CCNC: 65 U/L (ref 55–135)
ALT SERPL W/O P-5'-P-CCNC: 13 U/L (ref 10–44)
ANION GAP SERPL CALC-SCNC: 8 MMOL/L (ref 8–16)
AST SERPL-CCNC: 15 U/L (ref 10–40)
BASOPHILS # BLD AUTO: 0.02 K/UL (ref 0–0.2)
BASOPHILS NFR BLD: 0.2 % (ref 0–1.9)
BILIRUB SERPL-MCNC: 0.2 MG/DL (ref 0.1–1)
BUN SERPL-MCNC: 30 MG/DL (ref 8–23)
CALCIUM SERPL-MCNC: 7.7 MG/DL (ref 8.7–10.5)
CHLORIDE SERPL-SCNC: 103 MMOL/L (ref 95–110)
CO2 SERPL-SCNC: 25 MMOL/L (ref 23–29)
CREAT SERPL-MCNC: 0.5 MG/DL (ref 0.5–1.4)
DIFFERENTIAL METHOD BLD: ABNORMAL
EOSINOPHIL # BLD AUTO: 0 K/UL (ref 0–0.5)
EOSINOPHIL NFR BLD: 0 % (ref 0–8)
ERYTHROCYTE [DISTWIDTH] IN BLOOD BY AUTOMATED COUNT: 20.3 % (ref 11.5–14.5)
EST. GFR  (NO RACE VARIABLE): >60 ML/MIN/1.73 M^2
GLUCOSE SERPL-MCNC: 161 MG/DL (ref 70–110)
HCT VFR BLD AUTO: 34.4 % (ref 37–48.5)
HGB BLD-MCNC: 11.1 G/DL (ref 12–16)
IMM GRANULOCYTES # BLD AUTO: 0.28 K/UL (ref 0–0.04)
IMM GRANULOCYTES NFR BLD AUTO: 2.2 % (ref 0–0.5)
LYMPHOCYTES # BLD AUTO: 0.7 K/UL (ref 1–4.8)
LYMPHOCYTES NFR BLD: 5.7 % (ref 18–48)
MAGNESIUM SERPL-MCNC: 1.9 MG/DL (ref 1.6–2.6)
MCH RBC QN AUTO: 26.6 PG (ref 27–31)
MCHC RBC AUTO-ENTMCNC: 32.3 G/DL (ref 32–36)
MCV RBC AUTO: 83 FL (ref 82–98)
MONOCYTES # BLD AUTO: 0.6 K/UL (ref 0.3–1)
MONOCYTES NFR BLD: 4.4 % (ref 4–15)
NEUTROPHILS # BLD AUTO: 11.3 K/UL (ref 1.8–7.7)
NEUTROPHILS NFR BLD: 87.5 % (ref 38–73)
NRBC BLD-RTO: 0 /100 WBC
PLATELET # BLD AUTO: 431 K/UL (ref 150–450)
PMV BLD AUTO: 8.8 FL (ref 9.2–12.9)
POTASSIUM SERPL-SCNC: 3.4 MMOL/L (ref 3.5–5.1)
PROT SERPL-MCNC: 5.1 G/DL (ref 6–8.4)
RBC # BLD AUTO: 4.17 M/UL (ref 4–5.4)
SODIUM SERPL-SCNC: 136 MMOL/L (ref 136–145)
WBC # BLD AUTO: 12.92 K/UL (ref 3.9–12.7)

## 2025-03-01 PROCEDURE — 25000003 PHARM REV CODE 250: Performed by: INTERNAL MEDICINE

## 2025-03-01 PROCEDURE — 83735 ASSAY OF MAGNESIUM: CPT | Performed by: INTERNAL MEDICINE

## 2025-03-01 PROCEDURE — 63600175 PHARM REV CODE 636 W HCPCS

## 2025-03-01 PROCEDURE — 94640 AIRWAY INHALATION TREATMENT: CPT

## 2025-03-01 PROCEDURE — 63700000 PHARM REV CODE 250 ALT 637 W/O HCPCS

## 2025-03-01 PROCEDURE — 99900031 HC PATIENT EDUCATION (STAT)

## 2025-03-01 PROCEDURE — 25000003 PHARM REV CODE 250: Performed by: EMERGENCY MEDICINE

## 2025-03-01 PROCEDURE — 85025 COMPLETE CBC W/AUTO DIFF WBC: CPT | Performed by: INTERNAL MEDICINE

## 2025-03-01 PROCEDURE — 80053 COMPREHEN METABOLIC PANEL: CPT | Performed by: INTERNAL MEDICINE

## 2025-03-01 PROCEDURE — S0179 MEGESTROL 20 MG: HCPCS

## 2025-03-01 PROCEDURE — 36415 COLL VENOUS BLD VENIPUNCTURE: CPT | Performed by: INTERNAL MEDICINE

## 2025-03-01 PROCEDURE — 99900035 HC TECH TIME PER 15 MIN (STAT)

## 2025-03-01 PROCEDURE — 25000242 PHARM REV CODE 250 ALT 637 W/ HCPCS: Performed by: EMERGENCY MEDICINE

## 2025-03-01 PROCEDURE — S4991 NICOTINE PATCH NONLEGEND: HCPCS | Performed by: INTERNAL MEDICINE

## 2025-03-01 PROCEDURE — 25000003 PHARM REV CODE 250

## 2025-03-01 PROCEDURE — 94761 N-INVAS EAR/PLS OXIMETRY MLT: CPT

## 2025-03-01 PROCEDURE — 25000242 PHARM REV CODE 250 ALT 637 W/ HCPCS

## 2025-03-01 RX ORDER — MIRTAZAPINE 15 MG/1
15 TABLET, FILM COATED ORAL NIGHTLY
Qty: 30 TABLET | Refills: 11 | Status: SHIPPED | OUTPATIENT
Start: 2025-03-01 | End: 2026-03-01

## 2025-03-01 RX ORDER — MEGESTROL ACETATE 40 MG/ML
200 SUSPENSION ORAL DAILY
Qty: 150 ML | Refills: 11 | Status: SHIPPED | OUTPATIENT
Start: 2025-03-01 | End: 2025-03-12

## 2025-03-01 RX ORDER — PREDNISONE 20 MG/1
20 TABLET ORAL DAILY
Qty: 5 TABLET | Refills: 0 | Status: SHIPPED | OUTPATIENT
Start: 2025-03-01

## 2025-03-01 RX ORDER — POTASSIUM CHLORIDE 20 MEQ/1
20 TABLET, EXTENDED RELEASE ORAL 2 TIMES DAILY
Qty: 28 TABLET | Refills: 0 | Status: SHIPPED | OUTPATIENT
Start: 2025-03-01 | End: 2025-03-12 | Stop reason: ALTCHOICE

## 2025-03-01 RX ADMIN — HYDROCORTISONE: 0.5 CREAM TOPICAL at 09:03

## 2025-03-01 RX ADMIN — LISINOPRIL 20 MG: 20 TABLET ORAL at 09:03

## 2025-03-01 RX ADMIN — LEVOTHYROXINE SODIUM 25 MCG: 25 TABLET ORAL at 05:03

## 2025-03-01 RX ADMIN — NICOTINE 1 PATCH: 14 PATCH, EXTENDED RELEASE TRANSDERMAL at 09:03

## 2025-03-01 RX ADMIN — HYDRALAZINE HYDROCHLORIDE 10 MG: 20 INJECTION, SOLUTION INTRAMUSCULAR; INTRAVENOUS at 07:03

## 2025-03-01 RX ADMIN — CARVEDILOL 25 MG: 12.5 TABLET, FILM COATED ORAL at 07:03

## 2025-03-01 RX ADMIN — POTASSIUM CHLORIDE 20 MEQ: 1500 TABLET, EXTENDED RELEASE ORAL at 09:03

## 2025-03-01 RX ADMIN — CITALOPRAM HYDROBROMIDE 20 MG: 10 TABLET ORAL at 09:03

## 2025-03-01 RX ADMIN — PANTOPRAZOLE SODIUM 40 MG: 40 TABLET, DELAYED RELEASE ORAL at 09:03

## 2025-03-01 RX ADMIN — MEGESTROL ACETATE 200 MG: 400 SUSPENSION ORAL at 09:03

## 2025-03-01 RX ADMIN — FERROUS SULFATE TAB 325 MG (65 MG ELEMENTAL FE) 1 EACH: 325 (65 FE) TAB at 09:03

## 2025-03-01 RX ADMIN — FAMOTIDINE 20 MG: 20 TABLET, FILM COATED ORAL at 09:03

## 2025-03-01 RX ADMIN — HYDRALAZINE HYDROCHLORIDE 10 MG: 20 INJECTION, SOLUTION INTRAMUSCULAR; INTRAVENOUS at 04:03

## 2025-03-01 RX ADMIN — NYSTATIN 500000 UNITS: 100000 SUSPENSION ORAL at 07:03

## 2025-03-01 RX ADMIN — IPRATROPIUM BROMIDE AND ALBUTEROL SULFATE 3 ML: 2.5; .5 SOLUTION RESPIRATORY (INHALATION) at 07:03

## 2025-03-01 RX ADMIN — AZITHROMYCIN DIHYDRATE 250 MG: 250 TABLET ORAL at 09:03

## 2025-03-01 RX ADMIN — BUDESONIDE 0.5 MG: 0.5 INHALANT RESPIRATORY (INHALATION) at 07:03

## 2025-03-01 NOTE — ASSESSMENT & PLAN NOTE
Patient's most recent potassium results are listed below.   Recent Labs     02/27/25  0542 02/28/25  0517 03/01/25  0657   K 3.9 4.0 3.4*       Plan  - Replete potassium per protocol  - Monitor potassium Daily  - Patient's hypokalemia is worsening. Will adjust treatment as follows:  IV replacement ordered    2/21:  Continued hypokalemia noted.  Potassium 2.5 today.  IV replacement ordered.  We will also add daily p.o. potassium.  Pedialyte ordered.    2/27:  Stable    3/1:  Potassium 3.4 today.  We will discharge patient home with continued p.o. replacement.  We will recheck labs at follow up visit.

## 2025-03-01 NOTE — ASSESSMENT & PLAN NOTE
Speech therapy consulted to evaluate and treat.    3/1:  Patient tolerating p.o. intake without difficulty.

## 2025-03-01 NOTE — ASSESSMENT & PLAN NOTE
Patient has a diagnosis of pneumonia. The cause of the pneumonia is unknown at this time. The pneumonia is stable. The patient has the following signs/symptoms of pneumonia: cough and shortness of breath. The patient does not have a current oxygen requirement and the patient does not have a home oxygen requirement. I have reviewed the pertinent imaging.     Current antimicrobial regimen consists of the antibiotics listed below. Will monitor patient closely and  initiate antibiotic, steroid, scheduled nebulizer treatments    Antibiotics (From admission, onward)      Start     Stop Route Frequency Ordered    02/27/25 0900  azithromycin tablet 250 mg         03/03/25 0859 Oral Daily 02/26/25 0932            Microbiology Results (last 7 days)       Procedure Component Value Units Date/Time    Influenza A & B by Molecular [[id number]] Collected: 02/26/25 1108    Order Status: Completed Specimen: Nasopharyngeal Swab Updated: 02/26/25 1242     Influenza A, Molecular Negative     Influenza B, Molecular Negative     Flu A & B Source Nasal Swab    Blood culture x two cultures. Draw prior to antibiotics. [[id number]] Collected: 02/18/25 1133    Order Status: Completed Specimen: Blood from Peripheral, Forearm, Left Updated: 02/23/25 2222     Blood Culture, Routine No growth after 5 days.    Narrative:      Aerobic and anaerobic    Blood culture x two cultures. Draw prior to antibiotics. [[id number]] Collected: 02/18/25 1142    Order Status: Completed Specimen: Blood from Peripheral, Forearm, Right Updated: 02/23/25 2222     Blood Culture, Routine No growth after 5 days.    Narrative:      Aerobic and anaerobic          2/27:  Patient afebrile without leukocytosis.  Respiratory status improved.  Continue current medications.    2/28:  We will repeat chest x-ray today.  Plan for discharge home in the morning with continued p.o. antibiotics as stable.    3/1:  Stable.

## 2025-03-01 NOTE — DISCHARGE INSTRUCTIONS
Our goal at [facility] is to always give you outstanding care and exceptional service. You may receive a survey from [employer] by mail, text or e-mail in the next 24-48 hours asking about the care you received with us. The survey should only take 5-10 minutes to complete and is very important to us.     Your feedback provides us with a way to recognize our staff who work tirelessly to provide the best care! Also, your responses help us learn how to improve when your experience was below our aspiration of excellence. We are always looking for ways to improve your stay. We WILL use your feedback to continue making improvements to help us provide the highest quality care. We keep your personal information and feedback confidential. We appreciate your time completing this survey and can't wait to hear from you!!!    We look forward to your continued care with us! Thanks so much for choosing [facility] for your healthcare needs!

## 2025-03-01 NOTE — PLAN OF CARE
03/01/25 0903   Final Note   Anticipated Discharge Disposition Home-Health   Post-Acute Status   Post-Acute Authorization Home Health   Home Health Status Set-up Complete/Auth obtained   Discharge Delays None known at this time     Faxed DC orders to Nursing Care per [facility].  Informed Nurse Blaise to call report to [facility].

## 2025-03-01 NOTE — ASSESSMENT & PLAN NOTE
Patient has Abnormal Magnesium: hypomagnesemia. Will continue to monitor electrolytes closely. Will replace the affected electrolytes and repeat labs to be done after interventions completed. The patient's magnesium results have been reviewed and are listed below.  Recent Labs   Lab 03/01/25  0657   MG 1.9        2/21:  Magnesium 1.5 today.  We will replenish.  Continue daily monitoring and replenish as needed.  2/22 FM:  Replete.    2/27:  Stable

## 2025-03-01 NOTE — PLAN OF CARE
Plan of care reviewed with patient. Midline in place and saline locked. Pt tolerated meds well. BP elevated this shift. PRN hydralazine given per MD orders w/ mild relief. VSS. NADN. Pt free from falls and injury. Questions and concerns addressed. Pt belongings and call bell within reach.   Problem: Skin Injury Risk Increased  Goal: Skin Health and Integrity  Outcome: Progressing     Problem: Adult Inpatient Plan of Care  Goal: Plan of Care Review  Outcome: Progressing  Goal: Patient-Specific Goal (Individualized)  Outcome: Progressing  Goal: Absence of Hospital-Acquired Illness or Injury  Outcome: Progressing  Goal: Optimal Comfort and Wellbeing  Outcome: Progressing  Goal: Readiness for Transition of Care  Outcome: Progressing     Problem: Infection  Goal: Absence of Infection Signs and Symptoms  Outcome: Progressing     Problem: Pneumonia  Goal: Fluid Balance  Outcome: Progressing  Goal: Resolution of Infection Signs and Symptoms  Outcome: Progressing  Goal: Effective Oxygenation and Ventilation  Outcome: Progressing

## 2025-03-01 NOTE — ASSESSMENT & PLAN NOTE
Hyponatremia is likely due to Dehydration/hypovolemia. The patient's most recent sodium results are listed below.  Recent Labs     02/27/25  0542 02/28/25  0517 03/01/25  0657   * 138 136       Plan  - Correct the sodium by 4-6mEq in 24 hours.   - Will treat the hyponatremia with IV fluids as follows: 127mL/hr  - Monitor sodium Daily.   - Patient hyponatremia is stable    2/21:  Resolved.  Continue monitoring with daily labs.

## 2025-03-01 NOTE — ASSESSMENT & PLAN NOTE
Patient's blood pressure range in the last 24 hours was: BP  Min: 140/63  Max: 213/105.The patient's inpatient anti-hypertensive regimen is listed below:  Current Antihypertensives  lisinopriL tablet 20 mg, Daily, Oral  hydrALAZINE injection 10 mg, Every 6 hours PRN, Intravenous  carvediloL tablet 25 mg, 2 times daily with meals, Oral    Plan  - BP is controlled, no changes needed to their regimen    2/27:  Hypertension noted.  Medications adjusted.  Continue monitoring.  Adjust as needed.

## 2025-03-01 NOTE — DISCHARGE SUMMARY
"[facility] Medicine  Discharge Summary      Patient Name: Ruth Gamboa  MRN: [id number]  ALLI: [id number]  Patient Class: IP- Inpatient  Admission Date: 2/18/2025  Hospital Length of Stay: 9 days  Discharge Date and Time: No discharge date for patient encounter.  Attending Physician: Clark Calix III, MD   Discharging Provider: Roberto Villarreal NP  Primary Care Provider: Clark Calix III, MD    Primary Care Team: Networked reference to record PCT     HPI:   ED HPI:    Chief Complaint   Patient presents with    Fever       Family stated that for the past 2 weeks pt has been having worsening generalized weakness / fever - hypotension / low SPO2 - developing wounds. Started on Levaquin on Sunday.       79-year-old female with a history of arthritis, back pain, COPD, GERD, hypertension presents to the emergency room at the direction of "home health".  They state her blood pressure was low, oxygen saturation was low, and she was dehydrated needs to be admitted to the hospital.  Family concerned about a bed sore that has been worsening over the past week or so.  Not eating and drinking well.  Continues to lose weight.  They are requesting that she be admitted to the hospital.  Questionable fever at home.    ED Course as of 02/18/25 1244   Tue Feb 18, 2025   1231 Chest x-ray with chronic changes [SD]   1237 Discussed case with  - will admit for failure to thrive, possible nursing home placement, wound care [SD     IM HPI:  Agree with above HPI.  Patient is lying in bed this morning and is awake, alert, and oriented.  She states she is feeling much better this morning.  Daughter reports patient has had decreased p.o. intake over the past few weeks.  Daughter reports continued unintentional weight loss.  Daughter reports patient has been running fever over the past few days.  Daughter reports patient has O2 levels have been in the mid 80s and patient has been hypotensive for the past " several days.  Patient also noted to have incontinence dermatitis to perineum, buttocks, sacral area.  Wound care consulted.  Continue current treatment plan and monitoring.  Patient's vital signs stable this morning.  Hyponatremia noted.  We will continue IV fluids.  Hypomagnesemia and hypokalemia also noted.  We will replenish today.  I have had long conversation with patient and daughter regarding prognosis and discharge planning.  Patient is of sound mind and body.  She states she just does not feel like taking medication or eating most of the time.  Daughter does report patient has had difficulty swallowing for past few weeks.  We will consult speech therapy.  Patient states she will do it when she wants to.  Discussed with patient that taking medications as prescribed and good nutrition or vital to patient's overall health and wound healing.  Patient states she is aware and is aware of the consequences should she not take medications or should she continue to not eat.  Daughter states they would like patient to remain in the home at this time.  She states they feel they are still able to care for patient at home.  We have discussed discharge options including hospice.  Daughter states she feels this would be beneficial service however patient states she does not feel that is necessary at this time.  Discussed patient's case with  who we will further discuss discharge planning with patient and family.    * No surgery found *      Hospital Course:   2/20 DL:  Patient doing well this morning.  She is ambulating back to bed from restroom in his awake, alert, oriented.  Vital signs stable.  Hypomagnesemia resolved.  Continued hypokalemia noted.  We will replenish.  Sodium levels improving.  Continue IV fluids.  Patient with increased p.o. intake throughout the day yesterday.  Dietary following.  Recommended ensure t.i.d. with meals which we have added.  Continue Megace and Remeron.  Continue discharge  planning.    2/21 DL:  Patient doing well this morning.  She is sitting up in bed eating breakfast in his awake, alert, oriented.  She continues with increased appetite and good p.o. intake.  Vital signs stable.  Magnesium 1.5 today.  We will replenish.  Patient with continued hypokalemia.  Potassium 2.5 today.  IV potassium ordered.  We will also add daily p.o. potassium.  Labs otherwise stable.  Continue discharge planning.  We will plan to discharge home once electrolytes stable.    2/22 FM:  Patient is encouraged to drink her meal supplements and increase protein.  She primarily is motivated to have sugars and sweets.  We will add protein supplements.  Patient's appetite is improved.  Electrolytes still low patient's BMs are normal.  Continue to replace.    2/23 FM:  Electrolytes improved and her p.o. intake has improved.  We will discontinue IV fluids and continue electrolyte monitoring.  We will discontinue telemetry monitor.  We will watch therapy results tomorrow and determine disposition with family.    2/24 DL:  Doing well this morning.  She is sitting up in bed and is awake, alert, oriented.  Patient continues with good p.o. intake.  Electrolytes stable this morning.  Patient with increased weakness and debility since admission.  Continue PT/OT.  Discussed potential rehab at discharge.  We will see how patient works with therapy today and further discuss final disposition is with patient and family in the morning.    2/25 DL:  Patient is sitting up on side of bed eating breakfast.  Patient endorses headache this morning.  Patient also febrile this morning temp 100.7.  She does endorse increased SOB.  No leukocytosis.  We will obtain chest x-ray.  Blood pressure is elevated.  Medications adjusted.  Hypokalemia noted.  We will replenish.  Patient ambulate well with therapy yesterday.  Discussed discharge planning with family.  We will home with home health appropriate.  Continue discharge.    2/26 DL:   Patient lying in bed and is awake and alert.  She reports she does not feel well this morning.  She reports increased SOB, headache.  Chest x-ray obtained yesterday reveals mild bibasilar opacities which may reflect pulmonary edema or atypical pneumonia.  We will start patient on antibiotics today.  Continue steroids.  We will switch nebulizer treatments from as needed to scheduled.  Mild leukocytosis noted this morning.  Patient remains afebrile for the past 24 hours.  Labs otherwise stable.  Hypomagnesemia and hypokalemia improved.  Blood pressures improved since medication adjustment yesterday.  Continue current treatment plan and monitoring.  Continue discharge planning.    2/27 DL:  Patient is sitting on side of bed eating breakfast in his awake, alert, oriented.  Respiratory status improving.  Patient afebrile without leukocytosis.  Continue current treatment atypical pneumonia.  Patient with continued increase in p.o. intake.  Electrolytes stable.  Hypertension noted.  Antihypertensive medications adjusted.  Continue monitoring.  Labs and vital signs otherwise stable.  Continue current treatment plan.  Continue discharge planning.    2/28 DL:  Patient lying in bed in his awake, alert, oriented.  She does endorse continued increased respiratory effort.  On exam, patient with continued rhonchi and wheezes however overall improving.  She remains afebrile without leukocytosis.  We will repeat chest x-ray today.  Electrolytes stable.  Patient with good p.o. intake.  Continue appetite stimulants.  Discharge planning discussed with patient family.  Plan for discharge in the morning on oral antibiotics, appetite stimulants, and continued potassium replacement.    3/1 DL:  Patient is sitting up in bed this morning eating breakfast in his awake, alert, oriented.  Family at bedside.  Patient reports respiratory status improved.  She continues improved p.o. intake.  Repeat chest x-ray obtained yesterday stable.  Patient  remains afebrile without leukocytosis.  She has completed p.o. antibiotic therapy.  We will discharge patient home today.  We will continue potassium replacement appetite stimulants at discharge.  Resume home health.  Patient advised to follow up in office in 1 week.  We will repeat labs at follow up visit.     Goals of Care Treatment Preferences:  Code Status: DNR      SDOH Screening:  The patient was screened for utility difficulties, food insecurity, transport difficulties, housing insecurity, and interpersonal safety and there were no concerns identified this admission.     Consults:   Consults (From admission, onward)          Status Ordering Provider     Inpatient consult to Social Work/Case Management  Once        Provider:  (Not yet assigned)    Acknowledged ARIANE WHYTE III     Inpatient consult to Social Work/Case Management  Once        Provider:  (Not yet assigned)    Acknowledged ARIANE WHYTE III     Inpatient consult to Midline team  Once        Provider:  (Not yet assigned)    Acknowledged MITA SAMUELS     Inpatient consult to Registered Dietitian/Nutritionist  Once        Provider:  (Not yet assigned)    Completed MITA SAMUELS            * Hypomagnesemia  Patient has Abnormal Magnesium: hypomagnesemia. Will continue to monitor electrolytes closely. Will replace the affected electrolytes and repeat labs to be done after interventions completed. The patient's magnesium results have been reviewed and are listed below.  Recent Labs   Lab 03/01/25  0657   MG 1.9        2/21:  Magnesium 1.5 today.  We will replenish.  Continue daily monitoring and replenish as needed.  2/22 FM:  Replete.    2/27:  Stable    Atypical pneumonia  Patient has a diagnosis of pneumonia. The cause of the pneumonia is unknown at this time. The pneumonia is stable. The patient has the following signs/symptoms of pneumonia: cough and shortness of breath. The patient does not have a current oxygen requirement and  the patient does not have a home oxygen requirement. I have reviewed the pertinent imaging.     Current antimicrobial regimen consists of the antibiotics listed below. Will monitor patient closely and  initiate antibiotic, steroid, scheduled nebulizer treatments    Antibiotics (From admission, onward)      Start     Stop Route Frequency Ordered    02/27/25 0900  azithromycin tablet 250 mg         03/03/25 0859 Oral Daily 02/26/25 0932            Microbiology Results (last 7 days)       Procedure Component Value Units Date/Time    Influenza A & B by Molecular [[id number]] Collected: 02/26/25 1108    Order Status: Completed Specimen: Nasopharyngeal Swab Updated: 02/26/25 1242     Influenza A, Molecular Negative     Influenza B, Molecular Negative     Flu A & B Source Nasal Swab    Blood culture x two cultures. Draw prior to antibiotics. [[id number]] Collected: 02/18/25 1133    Order Status: Completed Specimen: Blood from Peripheral, Forearm, Left Updated: 02/23/25 2222     Blood Culture, Routine No growth after 5 days.    Narrative:      Aerobic and anaerobic    Blood culture x two cultures. Draw prior to antibiotics. [[id number]] Collected: 02/18/25 1142    Order Status: Completed Specimen: Blood from Peripheral, Forearm, Right Updated: 02/23/25 2222     Blood Culture, Routine No growth after 5 days.    Narrative:      Aerobic and anaerobic          2/27:  Patient afebrile without leukocytosis.  Respiratory status improved.  Continue current medications.    2/28:  We will repeat chest x-ray today.  Plan for discharge home in the morning with continued p.o. antibiotics as stable.    3/1:  Stable.    Severe malnutrition  Nutrition consulted. Most recent weight and BMI monitored-     Measurements:  Wt Readings from Last 1 Encounters:   02/19/25 36.3 kg (80 lb 0.4 oz)   Body mass index is 14.18 kg/m².    Patient has been screened and assessed by RD.    Malnutrition Type:  Context: chronic illness  Level:  severe    Malnutrition Characteristic Summary:  Energy Intake (Malnutrition): less than 75% for greater than or equal to 3 months  Subcutaneous Fat (Malnutrition): severe depletion  Muscle Mass (Malnutrition): severe depletion    Interventions/Recommendations (treatment strategy):  1. Rec'd Cardiac Diet. - Consistency rec's as per SLP.      2. Rec'd ONS: Ensure Enlive TID to provide 1050kcal and 60g of protein.      3. Rec'd appetite stimulant.    4. Rec'd Beneprotein TID. 5. Rec'd Moshe BID to promote wound healing and to provide additional nutrition.  6. Rec'd encourage PO intake and compliance with drinking ONS. 7. Rec'd daily weights.      8. Rec'd daily multivitamin.      9. RD to follow and make rec's accordingly.      Dysphagia  Speech therapy consulted to evaluate and treat.    3/1:  Patient tolerating p.o. intake without difficulty.      Adult failure to thrive  Appetite stimulant medications adjusted.  P.o. intake encouraged.  Nutrition consulted.    2/21:  Patient with improved p.o. intake.  Continue current regimen.      Dehydration  Continue IV fluids.  Monitor with daily labs.    2/21:  Patient tolerating p.o. fluids.  We will DC IV fluids.      Hyponatremia  Hyponatremia is likely due to Dehydration/hypovolemia. The patient's most recent sodium results are listed below.  Recent Labs     02/27/25  0542 02/28/25  0517 03/01/25  0657   * 138 136       Plan  - Correct the sodium by 4-6mEq in 24 hours.   - Will treat the hyponatremia with IV fluids as follows: 127mL/hr  - Monitor sodium Daily.   - Patient hyponatremia is stable    2/21:  Resolved.  Continue monitoring with daily labs.      Hypokalemia  Patient's most recent potassium results are listed below.   Recent Labs     02/27/25  0542 02/28/25  0517 03/01/25  0657   K 3.9 4.0 3.4*       Plan  - Replete potassium per protocol  - Monitor potassium Daily  - Patient's hypokalemia is worsening. Will adjust treatment as follows:  IV replacement  ordered    2/21:  Continued hypokalemia noted.  Potassium 2.5 today.  IV replacement ordered.  We will also add daily p.o. potassium.  Pedialyte ordered.    2/27:  Stable    3/1:  Potassium 3.4 today.  We will discharge patient home with continued p.o. replacement.  We will recheck labs at follow up visit.      Tobacco dependency  Dangers of cigarette smoking were reviewed with patient in detail. Patient was Counseled for 3-10 minutes. Nicotine replacement options were discussed. Nicotine replacement was discussed- prescribed    Hypothyroidism  Resume home THR.      Weight loss, unintentional  Nutrition consulted. Most recent weight and BMI monitored-     Measurements:  Wt Readings from Last 1 Encounters:   02/19/25 36.3 kg (80 lb 0.4 oz)   Body mass index is 14.18 kg/m².    Patient has been screened and assessed by RD.    Malnutrition Type:  Context: chronic illness  Level: severe    Malnutrition Characteristic Summary:  Energy Intake (Malnutrition): less than 75% for greater than or equal to 3 months  Subcutaneous Fat (Malnutrition): severe depletion  Muscle Mass (Malnutrition): severe depletion    Interventions/Recommendations (treatment strategy):  1. Rec'd Cardiac Diet. - Consistency rec's as per SLP.      2. Rec'd ONS: Ensure Enlive TID to provide 1050kcal and 60g of protein.      3. Rec'd appetite stimulant.    4. Rec'd Beneprotein TID. 5. Rec'd Moshe BID to promote wound healing and to provide additional nutrition.  6. Rec'd encourage PO intake and compliance with drinking ONS. 7. Rec'd daily weights.      8. Rec'd daily multivitamin.      9. RD to follow and make rec's accordingly.    2/21:  Ensure t.i.d. with meals ordered.  Patient with improved p.o. intake.  2/22 FM:  Adding protein supplements.      Anorexia  We will discontinue Periactin and add Megace.  Increase Remeron.  Encouraged p.o. intake.    2/21:  Patient with improved appetite and increase p.o. intake.  Continue Megace and Remeron.    2/27:   Patient with continued increase in p.o. intake.  Continue current medication regimen and encouraged continued increasing p.o. intake.      Debility  Patient with Acute on chronic debility due to age-related physical debility. The patient's latest AMPAC (Activity Measure for Post Acute Care) Score is listed below.    AM-PAC Score - How much help does the patient need for each activity listed  Basic Mobility Total Score: 23  Turning over in bed (including adjusting bedclothes, sheets and blankets)?: None  Sitting down on and standing up from a chair with arms (e.g., wheelchair, bedside commode, etc.): None  Moving from lying on back to sitting on the side of the bed?: None  Moving to and from a bed to a chair (including a wheelchair)?: None  Need to walk in hospital room?: None  Climbing 3-5 steps with a railing?: A little    Plan  - Progressive mobility protocol initated  - PT/OT consulted  - Fall precautions in place    2/24:  Patient with increased weakness and debility since admission.  Continue PT/OT efforts.            Major depressive disorder, recurrent, moderate  Patient has recurrent depression which is moderate and is currently uncontrolled. Will Increase anti-depressant medications. We will not consult psychiatry at this time. Patient does not display psychosis at this time. Continue to monitor closely and adjust plan of care as needed.        HTN (hypertension)  Patient's blood pressure range in the last 24 hours was: BP  Min: 140/63  Max: 213/105.The patient's inpatient anti-hypertensive regimen is listed below:  Current Antihypertensives  lisinopriL tablet 20 mg, Daily, Oral  hydrALAZINE injection 10 mg, Every 6 hours PRN, Intravenous  carvediloL tablet 25 mg, 2 times daily with meals, Oral    Plan  - BP is controlled, no changes needed to their regimen    2/27:  Hypertension noted.  Medications adjusted.  Continue monitoring.  Adjust as needed.    Gastroesophageal reflux disease without  esophagitis  Famotidine ordered.    2/21:  Patient reports increased reflux symptoms.  We will discontinue famotidine and resume home Protonix.    2/24:  Patient reports reflux improved with Protonix and added famotidine.    2/27:  Stable      COPD (chronic obstructive pulmonary disease)  Patient's COPD is controlled currently.  Patient is currently on COPD Pathway. Continue scheduled inhalers Steroids, Antibiotics, and Supplemental oxygen and monitor respiratory status closely.     2/21:  Smoking cessation encouraged.      Final Active Diagnoses:    Diagnosis Date Noted POA    PRINCIPAL PROBLEM:  Hypomagnesemia [E83.42] 02/19/2025 No    Atypical pneumonia [J18.9] 02/26/2025 No    Severe malnutrition [E43] 02/21/2025 Yes    Hypokalemia [E87.6] 02/19/2025 No    Hyponatremia [E87.1] 02/19/2025 Yes    Dehydration [E86.0] 02/19/2025 Yes    Adult failure to thrive [R62.7] 02/19/2025 Yes    Dysphagia [R13.10] 02/19/2025 Yes    Tobacco dependency [F17.200] 02/18/2025 Yes    Hypothyroidism [E03.9] 01/09/2025 Yes    Anorexia [R63.0] 11/27/2024 Yes    Weight loss, unintentional [R63.4] 11/27/2024 Yes    Debility [R53.81] 09/18/2024 Yes    Major depressive disorder, recurrent, moderate [F33.1] 05/16/2024 Yes    COPD (chronic obstructive pulmonary disease) [J44.9] 07/13/2022 Yes    Gastroesophageal reflux disease without esophagitis [K21.9] 07/13/2022 Yes    HTN (hypertension) [I10] 07/13/2022 Yes      Problems Resolved During this Admission:       Discharged Condition: stable    Disposition: Home-Health Care [facility]    Follow Up:   Contact information for follow-up providers       Roberto Bojorquez NP. Schedule an appointment as soon as possible for a visit in 9 day(s).    Specialty: Internal Medicine  Contact information:  [address] [place] [id number]  [phone]                       Contact information for after-discharge care       Home Medical Care       [facility] .    Services: Home  "Rehabilitation, Home Nursing  Contact information:  Navi Irvin  [facility] [id number]  [phone]                                 Patient Instructions:      WALKER FOR HOME USE     Order Specific Question Answer Comments   Type of Walker: Marcus (4'4"-5'6")    With wheels? Yes    Height: 5' 3" (1.6 m)    Weight: 36.3 kg (80 lb 0.4 oz)    Length of need (1-99 months): 99    Does patient have medical equipment at home? none    Please check all that apply: Patient is unable to safely ambulate without equipment.    Please check all that apply: Patient needs help to get in and out of chair.    Please check all that apply: Walker will be used for gait training.      COMMODE FOR HOME USE     Order Specific Question Answer Comments   Type: Standard    Height: 5' 3" (1.6 m)    Weight: 36.3 kg (80 lb 0.4 oz)    Does patient have medical equipment at home? none    Length of need (1-99 months): 99      Ambulatory referral/consult to Home Health   Standing Status: Future   Referral Priority: Routine Referral Type: Home Health   Referral Reason: Specialty Services Required   Requested Specialty: Home Health Services   Number of Visits Requested: 1     Diet Adult Regular     Reason for not Ordering Smoking Cessation Referral     Order Specific Question Answer Comments   Reason for not ordering: Patient refused      Reason for not Prescribing Nicotine Replacement     Order Specific Question Answer Comments   Reason for not Prescribing: Patient refused      Activity as tolerated       Significant Diagnostic Studies: Labs: All labs within the past 24 hours have been reviewed    Pending Diagnostic Studies:       None           Medications:  Reconciled Home Medications:      Medication List        START taking these medications      megestroL 400 mg/10 mL (10 mL) Susp  Commonly known as: MEGACE  Take 5 mLs (200 mg total) by mouth once daily.     potassium chloride SA 20 MEQ tablet  Commonly known as: K-DUR,KLOR-CON  Take " 1 tablet (20 mEq total) by mouth 2 (two) times daily.     predniSONE 20 MG tablet  Commonly known as: DELTASONE  Take 1 tablet (20 mg total) by mouth once daily.            CHANGE how you take these medications      albuterol 90 mcg/actuation inhaler  Commonly known as: PROVENTIL/VENTOLIN HFA  2 puffs Inhalation every 4 hrs for 90 days  as needed for wheeze, cough or shortness of breath  What changed: Another medication with the same name was removed. Continue taking this medication, and follow the directions you see here.     mirtazapine 15 MG tablet  Commonly known as: REMERON  Take 1 tablet (15 mg total) by mouth every evening.  What changed:   medication strength  how much to take     pantoprazole 40 MG tablet  Commonly known as: PROTONIX  Take 1 tablet (40 mg total) by mouth once daily.  What changed: Another medication with the same name was removed. Continue taking this medication, and follow the directions you see here.            CONTINUE taking these medications      albuterol-ipratropium 2.5 mg-0.5 mg/3 mL nebulizer solution  Commonly known as: DUO-NEB  Take 3 mLs by nebulization every 6 (six) hours as needed for Wheezing. Rescue     alendronate 70 MG tablet  Commonly known as: FOSAMAX  TAKE 1 TABLET(70 MG) BY MOUTH EVERY 7 DAYS     ascorbic acid (vitamin C) 500 MG tablet  Commonly known as: VITAMIN C  Take 1 tablet (500 mg total) by mouth once daily.     azelastine 137 mcg (0.1 %) nasal spray  Commonly known as: ASTELIN  1 spray 2 (two) times daily.     budesonide 0.5 mg/2 mL nebulizer solution  Commonly known as: PULMICORT  Take 2 mLs (0.5 mg total) by nebulization 2 (two) times a day. Controller     carvediloL 25 MG tablet  Commonly known as: COREG  TAKE 1 TABLET(25 MG) BY MOUTH TWICE DAILY WITH MEALS     citalopram 20 MG tablet  Commonly known as: CeleXA  Take 1 tablet (20 mg total) by mouth once daily.     COMP-AIR NEBULIZER COMPRESSOR Kesha  Generic drug: nebulizer and compressor  use as directed      FeroSuL 325 mg (65 mg iron) Tab tablet  Generic drug: ferrous sulfate  TAKE 1 TABLET BY MOUTH DAILY WITH BREAKFAST     Fish OiL 1,200 (144-216) mg Cap  Generic drug: omega 3-dha-epa-fish oil  Take by mouth.     fluticasone propionate 50 mcg/actuation nasal spray  Commonly known as: FLONASE  SHAKE LIQUID AND USE 2 SPRAYS(100 MCG) IN EACH NOSTRIL DAILY     HYDROcodone-acetaminophen 5-325 mg per tablet  Commonly known as: NORCO  Take 1 tablet by mouth 3 (three) times daily as needed for Pain.     levothyroxine 25 MCG tablet  Commonly known as: SYNTHROID  Take 1 tablet (25 mcg total) by mouth before breakfast.     lisinopriL 30 MG tablet  Commonly known as: PRINIVIL,ZESTRIL  Take 1 tablet (30 mg total) by mouth once daily.     miconazole NITRATE 2 % 2 % top powder  Commonly known as: MICOTIN  Apply topically 2 (two) times daily.     multivitamin per tablet  Commonly known as: THERAGRAN  Take 1 tablet by mouth once daily.     nystatin powder  Commonly known as: MYCOSTATIN  Apply topically 4 (four) times daily.     senna-docusate 8.6-50 mg 8.6-50 mg per tablet  Commonly known as: PERICOLACE  Take 1 tablet by mouth 2 (two) times daily.     TRELEGY ELLIPTA 200-62.5-25 mcg inhaler  Generic drug: fluticasone-umeclidin-vilanter  Inhale 1 puff into the lungs once daily.     VITAMIN D3 25 mcg (1,000 unit) capsule  Generic drug: cholecalciferol (vitamin D3)  Take 1,000 Units by mouth once daily.            STOP taking these medications      cyproheptadine 4 mg tablet  Commonly known as: PERIACTIN     fluconazole 150 MG Tab  Commonly known as: DIFLUCAN     guaiFENesin 600 mg 12 hr tablet  Commonly known as: MUCINEX     levoFLOXacin 750 MG tablet  Commonly known as: LEVAQUIN     omeprazole 20 MG capsule  Commonly known as: PRILOSEC              Indwelling Lines/Drains at time of discharge:   Lines/Drains/Airways       None                   Time spent on the discharge of patient: 35 minutes         Roberto Villarreal NP  Department  of [facility] Medicine  [facility]

## 2025-03-01 NOTE — PLAN OF CARE
Problem: Skin Injury Risk Increased  Goal: Skin Health and Integrity  Outcome: Adequate for Care Transition     Problem: Adult Inpatient Plan of Care  Goal: Plan of Care Review  Outcome: Adequate for Care Transition  Goal: Patient-Specific Goal (Individualized)  Outcome: Adequate for Care Transition  Goal: Absence of Hospital-Acquired Illness or Injury  Outcome: Adequate for Care Transition  Goal: Optimal Comfort and Wellbeing  Outcome: Adequate for Care Transition  Goal: Readiness for Transition of Care  Outcome: Adequate for Care Transition     Problem: Infection  Goal: Absence of Infection Signs and Symptoms  Outcome: Adequate for Care Transition     Problem: Pneumonia  Goal: Fluid Balance  Outcome: Adequate for Care Transition  Goal: Resolution of Infection Signs and Symptoms  Outcome: Adequate for Care Transition  Goal: Effective Oxygenation and Ventilation  Outcome: Adequate for Care Transition

## 2025-03-01 NOTE — PROGRESS NOTES
AVS virtually reviewed with Ruth Gamboa and her daughter at the bedside, in its entirety with emphasis on diet, medications, follow-up appointments and reasons to return to the ED or contact the [facility] On Call Nurse Care Line. Patient also encouraged to utilize their patient portal. Ease and convenience of use reiterated. Education complete and patient voiced understanding. All questions answered. Discharge teaching complete.

## 2025-03-12 ENCOUNTER — HOSPITAL ENCOUNTER (EMERGENCY)
Facility: HOSPITAL | Age: 80
Discharge: HOME | End: 2025-03-12
Attending: EMERGENCY MEDICINE
Payer: MEDICARE

## 2025-03-12 VITALS
RESPIRATION RATE: 25 BRPM | TEMPERATURE: 98 F | OXYGEN SATURATION: 100 % | HEIGHT: 63 IN | WEIGHT: 80 LBS | HEART RATE: 108 BPM | BODY MASS INDEX: 14.18 KG/M2 | SYSTOLIC BLOOD PRESSURE: 172 MMHG | DIASTOLIC BLOOD PRESSURE: 82 MMHG

## 2025-03-12 DIAGNOSIS — R06.02 SHORTNESS OF BREATH: ICD-10-CM

## 2025-03-12 DIAGNOSIS — J44.1 COPD EXACERBATION: Primary | ICD-10-CM

## 2025-03-12 DIAGNOSIS — R63.0 DECREASED APPETITE: ICD-10-CM

## 2025-03-12 LAB
ALBUMIN SERPL BCP-MCNC: 2.6 G/DL (ref 3.5–5.2)
ALP SERPL-CCNC: 83 U/L (ref 55–135)
ALT SERPL W/O P-5'-P-CCNC: 12 U/L (ref 10–44)
ANION GAP SERPL CALC-SCNC: 9 MMOL/L (ref 8–16)
AST SERPL-CCNC: 11 U/L (ref 10–40)
BASOPHILS # BLD AUTO: 0.02 K/UL (ref 0–0.2)
BASOPHILS NFR BLD: 0.2 % (ref 0–1.9)
BILIRUB SERPL-MCNC: 0.3 MG/DL (ref 0.1–1)
BUN SERPL-MCNC: 18 MG/DL (ref 8–23)
CALCIUM SERPL-MCNC: 8 MG/DL (ref 8.7–10.5)
CHLORIDE SERPL-SCNC: 106 MMOL/L (ref 95–110)
CO2 SERPL-SCNC: 21 MMOL/L (ref 23–29)
CREAT SERPL-MCNC: 0.5 MG/DL (ref 0.5–1.4)
CTP QC/QA: YES
CTP QC/QA: YES
DIFFERENTIAL METHOD BLD: ABNORMAL
EOSINOPHIL # BLD AUTO: 0.1 K/UL (ref 0–0.5)
EOSINOPHIL NFR BLD: 0.9 % (ref 0–8)
ERYTHROCYTE [DISTWIDTH] IN BLOOD BY AUTOMATED COUNT: 22.6 % (ref 11.5–14.5)
EST. GFR  (NO RACE VARIABLE): >60 ML/MIN/1.73 M^2
GLUCOSE SERPL-MCNC: 103 MG/DL (ref 70–110)
HCT VFR BLD AUTO: 32.2 % (ref 37–48.5)
HGB BLD-MCNC: 10.2 G/DL (ref 12–16)
IMM GRANULOCYTES # BLD AUTO: 0.03 K/UL (ref 0–0.04)
IMM GRANULOCYTES NFR BLD AUTO: 0.3 % (ref 0–0.5)
LYMPHOCYTES # BLD AUTO: 1 K/UL (ref 1–4.8)
LYMPHOCYTES NFR BLD: 8.8 % (ref 18–48)
MCH RBC QN AUTO: 27.5 PG (ref 27–31)
MCHC RBC AUTO-ENTMCNC: 31.7 G/DL (ref 32–36)
MCV RBC AUTO: 87 FL (ref 82–98)
MONOCYTES # BLD AUTO: 0.8 K/UL (ref 0.3–1)
MONOCYTES NFR BLD: 7.2 % (ref 4–15)
NEUTROPHILS # BLD AUTO: 9.2 K/UL (ref 1.8–7.7)
NEUTROPHILS NFR BLD: 82.6 % (ref 38–73)
NRBC BLD-RTO: 0 /100 WBC
PLATELET # BLD AUTO: 228 K/UL (ref 150–450)
PMV BLD AUTO: 8.9 FL (ref 9.2–12.9)
POC MOLECULAR INFLUENZA A AGN: NEGATIVE
POC MOLECULAR INFLUENZA B AGN: NEGATIVE
POTASSIUM SERPL-SCNC: 3.3 MMOL/L (ref 3.5–5.1)
PROT SERPL-MCNC: 6.2 G/DL (ref 6–8.4)
RBC # BLD AUTO: 3.71 M/UL (ref 4–5.4)
SARS-COV-2 RDRP RESP QL NAA+PROBE: NEGATIVE
SODIUM SERPL-SCNC: 136 MMOL/L (ref 136–145)
WBC # BLD AUTO: 11.16 K/UL (ref 3.9–12.7)

## 2025-03-12 PROCEDURE — 80053 COMPREHEN METABOLIC PANEL: CPT | Performed by: CLINICAL NURSE SPECIALIST

## 2025-03-12 PROCEDURE — 87635 SARS-COV-2 COVID-19 AMP PRB: CPT | Performed by: CLINICAL NURSE SPECIALIST

## 2025-03-12 PROCEDURE — 25000003 PHARM REV CODE 250: Performed by: CLINICAL NURSE SPECIALIST

## 2025-03-12 PROCEDURE — 63600175 PHARM REV CODE 636 W HCPCS: Mod: JZ,TB | Performed by: CLINICAL NURSE SPECIALIST

## 2025-03-12 PROCEDURE — 36415 COLL VENOUS BLD VENIPUNCTURE: CPT | Performed by: CLINICAL NURSE SPECIALIST

## 2025-03-12 PROCEDURE — 93005 ELECTROCARDIOGRAM TRACING: CPT

## 2025-03-12 PROCEDURE — 85025 COMPLETE CBC W/AUTO DIFF WBC: CPT | Performed by: CLINICAL NURSE SPECIALIST

## 2025-03-12 PROCEDURE — 96372 THER/PROPH/DIAG INJ SC/IM: CPT | Performed by: CLINICAL NURSE SPECIALIST

## 2025-03-12 PROCEDURE — 93010 ELECTROCARDIOGRAM REPORT: CPT | Mod: ,,, | Performed by: INTERNAL MEDICINE

## 2025-03-12 PROCEDURE — 99285 EMERGENCY DEPT VISIT HI MDM: CPT | Mod: 25

## 2025-03-12 PROCEDURE — 87502 INFLUENZA DNA AMP PROBE: CPT

## 2025-03-12 RX ORDER — MEGESTROL ACETATE 40 MG/ML
200 SUSPENSION ORAL DAILY
Qty: 150 ML | Refills: 11 | Status: SHIPPED | OUTPATIENT
Start: 2025-03-12

## 2025-03-12 RX ORDER — METHYLPREDNISOLONE SOD SUCC 125 MG
125 VIAL (EA) INJECTION ONCE
Status: COMPLETED | OUTPATIENT
Start: 2025-03-12 | End: 2025-03-12

## 2025-03-12 RX ADMIN — METHYLPREDNISOLONE SODIUM SUCCINATE 125 MG: 125 INJECTION, POWDER, FOR SOLUTION INTRAMUSCULAR; INTRAVENOUS at 04:03

## 2025-03-12 RX ADMIN — POTASSIUM BICARBONATE 20 MEQ: 391 TABLET, EFFERVESCENT ORAL at 05:03
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
## 2025-03-13 LAB
OHS QRS DURATION: 124 MS
OHS QTC CALCULATION: 454 MS

## 2025-03-17 ENCOUNTER — LAB VISIT (OUTPATIENT)
Dept: LAB | Facility: HOSPITAL | Age: 80
End: 2025-03-17
Attending: NURSE PRACTITIONER
Payer: MEDICARE

## 2025-03-17 DIAGNOSIS — E87.6 HYPOKALEMIA: ICD-10-CM

## 2025-03-17 LAB
ALBUMIN SERPL BCP-MCNC: 2.6 G/DL (ref 3.5–5.2)
ALP SERPL-CCNC: 73 U/L (ref 55–135)
ALT SERPL W/O P-5'-P-CCNC: 15 U/L (ref 10–44)
ANION GAP SERPL CALC-SCNC: 7 MMOL/L (ref 8–16)
AST SERPL-CCNC: 31 U/L (ref 10–40)
BILIRUB SERPL-MCNC: 0.3 MG/DL (ref 0.1–1)
BUN SERPL-MCNC: 17 MG/DL (ref 8–23)
CALCIUM SERPL-MCNC: 8.7 MG/DL (ref 8.7–10.5)
CHLORIDE SERPL-SCNC: 103 MMOL/L (ref 95–110)
CO2 SERPL-SCNC: 24 MMOL/L (ref 23–29)
CREAT SERPL-MCNC: 0.6 MG/DL (ref 0.5–1.4)
EST. GFR  (NO RACE VARIABLE): >60 ML/MIN/1.73 M^2
GLUCOSE SERPL-MCNC: 70 MG/DL (ref 70–110)
POTASSIUM SERPL-SCNC: 4.4 MMOL/L (ref 3.5–5.1)
PROT SERPL-MCNC: 6.6 G/DL (ref 6–8.4)
SODIUM SERPL-SCNC: 134 MMOL/L (ref 136–145)

## 2025-03-17 PROCEDURE — 36415 COLL VENOUS BLD VENIPUNCTURE: CPT

## 2025-03-17 PROCEDURE — 80053 COMPREHEN METABOLIC PANEL: CPT

## 2025-03-31 ENCOUNTER — HOSPITAL ENCOUNTER (INPATIENT)
Facility: HOSPITAL | Age: 80
LOS: 14 days | Discharge: NURSING, REHAB OR LONG-TERM CARE | DRG: 689 | End: 2025-04-15
Attending: EMERGENCY MEDICINE | Admitting: INTERNAL MEDICINE
Payer: MEDICARE

## 2025-03-31 DIAGNOSIS — E86.0 DEHYDRATION: Primary | ICD-10-CM

## 2025-03-31 DIAGNOSIS — N39.0 UTI (URINARY TRACT INFECTION): ICD-10-CM

## 2025-03-31 DIAGNOSIS — S32.050A CLOSED COMPRESSION FRACTURE OF L5 LUMBAR VERTEBRA, INITIAL ENCOUNTER: ICD-10-CM

## 2025-03-31 DIAGNOSIS — J44.9 CHRONIC OBSTRUCTIVE PULMONARY DISEASE, UNSPECIFIED COPD TYPE: ICD-10-CM

## 2025-03-31 DIAGNOSIS — E87.6 HYPOKALEMIA: ICD-10-CM

## 2025-03-31 DIAGNOSIS — R62.7 FAILURE TO THRIVE IN ADULT: ICD-10-CM

## 2025-03-31 DIAGNOSIS — D64.9 MILD ANEMIA: ICD-10-CM

## 2025-03-31 DIAGNOSIS — R53.1 WEAKNESS: ICD-10-CM

## 2025-03-31 LAB
ABSOLUTE EOSINOPHIL (OHS): 0.03 K/UL
ABSOLUTE MONOCYTE (OHS): 0.82 K/UL (ref 0.3–1)
ABSOLUTE NEUTROPHIL COUNT (OHS): 9.41 K/UL (ref 1.8–7.7)
BASOPHILS # BLD AUTO: 0.08 K/UL
BASOPHILS NFR BLD AUTO: 0.6 %
ERYTHROCYTE [DISTWIDTH] IN BLOOD BY AUTOMATED COUNT: 21.4 % (ref 11.5–14.5)
HCT VFR BLD AUTO: 35.5 % (ref 37–48.5)
HGB BLD-MCNC: 11.5 GM/DL (ref 12–16)
IMM GRANULOCYTES # BLD AUTO: 0.16 K/UL (ref 0–0.04)
IMM GRANULOCYTES NFR BLD AUTO: 1.3 % (ref 0–0.5)
LYMPHOCYTES # BLD AUTO: 2.06 K/UL (ref 1–4.8)
MAGNESIUM SERPL-MCNC: 1.8 MG/DL (ref 1.6–2.6)
MCH RBC QN AUTO: 28 PG (ref 27–31)
MCHC RBC AUTO-ENTMCNC: 32.4 G/DL (ref 32–36)
MCV RBC AUTO: 87 FL (ref 82–98)
NUCLEATED RBC (/100WBC) (OHS): 0 /100 WBC
OHS QRS DURATION: 130 MS
OHS QTC CALCULATION: 469 MS
PLATELET # BLD AUTO: 506 K/UL (ref 150–450)
PMV BLD AUTO: 10.7 FL (ref 9.2–12.9)
RBC # BLD AUTO: 4.1 M/UL (ref 4–5.4)
RELATIVE EOSINOPHIL (OHS): 0.2 %
RELATIVE LYMPHOCYTE (OHS): 16.4 % (ref 18–48)
RELATIVE MONOCYTE (OHS): 6.5 % (ref 4–15)
RELATIVE NEUTROPHIL (OHS): 75 % (ref 38–73)
WBC # BLD AUTO: 12.56 K/UL (ref 3.9–12.7)

## 2025-03-31 PROCEDURE — 96361 HYDRATE IV INFUSION ADD-ON: CPT

## 2025-03-31 PROCEDURE — 94640 AIRWAY INHALATION TREATMENT: CPT | Mod: XB

## 2025-03-31 PROCEDURE — 25000003 PHARM REV CODE 250: Performed by: NURSE PRACTITIONER

## 2025-03-31 PROCEDURE — 93010 ELECTROCARDIOGRAM REPORT: CPT | Mod: ,,, | Performed by: INTERNAL MEDICINE

## 2025-03-31 PROCEDURE — 80053 COMPREHEN METABOLIC PANEL: CPT | Performed by: NURSE PRACTITIONER

## 2025-03-31 PROCEDURE — 99900035 HC TECH TIME PER 15 MIN (STAT)

## 2025-03-31 PROCEDURE — 99285 EMERGENCY DEPT VISIT HI MDM: CPT | Mod: 25

## 2025-03-31 PROCEDURE — 25000242 PHARM REV CODE 250 ALT 637 W/ HCPCS: Performed by: NURSE PRACTITIONER

## 2025-03-31 PROCEDURE — 85025 COMPLETE CBC W/AUTO DIFF WBC: CPT | Performed by: NURSE PRACTITIONER

## 2025-03-31 PROCEDURE — 96360 HYDRATION IV INFUSION INIT: CPT

## 2025-03-31 PROCEDURE — 94640 AIRWAY INHALATION TREATMENT: CPT

## 2025-03-31 PROCEDURE — G0378 HOSPITAL OBSERVATION PER HR: HCPCS

## 2025-03-31 PROCEDURE — 83735 ASSAY OF MAGNESIUM: CPT | Performed by: NURSE PRACTITIONER

## 2025-03-31 PROCEDURE — 94799 UNLISTED PULMONARY SVC/PX: CPT

## 2025-03-31 PROCEDURE — 99900031 HC PATIENT EDUCATION (STAT)

## 2025-03-31 PROCEDURE — 25000242 PHARM REV CODE 250 ALT 637 W/ HCPCS: Performed by: CLINICAL NURSE SPECIALIST

## 2025-03-31 PROCEDURE — 93005 ELECTROCARDIOGRAM TRACING: CPT

## 2025-03-31 PROCEDURE — 81001 URINALYSIS AUTO W/SCOPE: CPT | Performed by: NURSE PRACTITIONER

## 2025-03-31 PROCEDURE — 87186 SC STD MICRODIL/AGAR DIL: CPT | Performed by: NURSE PRACTITIONER

## 2025-03-31 PROCEDURE — 94761 N-INVAS EAR/PLS OXIMETRY MLT: CPT

## 2025-03-31 PROCEDURE — 94760 N-INVAS EAR/PLS OXIMETRY 1: CPT

## 2025-03-31 PROCEDURE — 36415 COLL VENOUS BLD VENIPUNCTURE: CPT | Performed by: NURSE PRACTITIONER

## 2025-03-31 RX ORDER — IPRATROPIUM BROMIDE AND ALBUTEROL SULFATE 2.5; .5 MG/3ML; MG/3ML
3 SOLUTION RESPIRATORY (INHALATION) EVERY 6 HOURS PRN
Status: DISCONTINUED | OUTPATIENT
Start: 2025-03-31 | End: 2025-04-01

## 2025-03-31 RX ORDER — SUCRALFATE 1 G/10ML
1 SUSPENSION ORAL EVERY 6 HOURS
Status: DISCONTINUED | OUTPATIENT
Start: 2025-04-01 | End: 2025-04-15 | Stop reason: HOSPADM

## 2025-03-31 RX ORDER — IPRATROPIUM BROMIDE AND ALBUTEROL SULFATE 2.5; .5 MG/3ML; MG/3ML
3 SOLUTION RESPIRATORY (INHALATION) ONCE
Status: COMPLETED | OUTPATIENT
Start: 2025-03-31 | End: 2025-03-31

## 2025-03-31 RX ORDER — ALBUTEROL SULFATE 90 UG/1
2 INHALANT RESPIRATORY (INHALATION) EVERY 6 HOURS PRN
Status: DISCONTINUED | OUTPATIENT
Start: 2025-03-31 | End: 2025-04-15 | Stop reason: HOSPADM

## 2025-03-31 RX ORDER — LANOLIN ALCOHOL/MO/W.PET/CERES
1 CREAM (GRAM) TOPICAL DAILY
Status: DISCONTINUED | OUTPATIENT
Start: 2025-04-01 | End: 2025-04-02

## 2025-03-31 RX ORDER — SODIUM CHLORIDE 9 MG/ML
INJECTION, SOLUTION INTRAVENOUS CONTINUOUS
Status: DISCONTINUED | OUTPATIENT
Start: 2025-03-31 | End: 2025-04-02

## 2025-03-31 RX ORDER — MEGESTROL ACETATE 40 MG/ML
200 SUSPENSION ORAL DAILY
Status: DISCONTINUED | OUTPATIENT
Start: 2025-04-01 | End: 2025-04-15 | Stop reason: HOSPADM

## 2025-03-31 RX ORDER — BUDESONIDE 0.5 MG/2ML
0.5 INHALANT ORAL 2 TIMES DAILY
Status: DISCONTINUED | OUTPATIENT
Start: 2025-03-31 | End: 2025-04-01

## 2025-03-31 RX ORDER — TALC
6 POWDER (GRAM) TOPICAL NIGHTLY PRN
Status: DISCONTINUED | OUTPATIENT
Start: 2025-03-31 | End: 2025-04-15 | Stop reason: HOSPADM

## 2025-03-31 RX ORDER — FLUOXETINE HYDROCHLORIDE 20 MG/1
20 CAPSULE ORAL DAILY
Status: DISCONTINUED | OUTPATIENT
Start: 2025-04-01 | End: 2025-04-02

## 2025-03-31 RX ORDER — BUPROPION HYDROCHLORIDE 150 MG/1
150 TABLET ORAL DAILY
Status: DISCONTINUED | OUTPATIENT
Start: 2025-04-01 | End: 2025-04-02

## 2025-03-31 RX ORDER — ONDANSETRON HYDROCHLORIDE 2 MG/ML
4 INJECTION, SOLUTION INTRAVENOUS EVERY 8 HOURS PRN
Status: DISCONTINUED | OUTPATIENT
Start: 2025-03-31 | End: 2025-04-15 | Stop reason: HOSPADM

## 2025-03-31 RX ORDER — ACETAMINOPHEN 325 MG/1
650 TABLET ORAL EVERY 8 HOURS PRN
Status: DISCONTINUED | OUTPATIENT
Start: 2025-03-31 | End: 2025-04-15 | Stop reason: HOSPADM

## 2025-03-31 RX ORDER — CARVEDILOL 12.5 MG/1
25 TABLET ORAL 2 TIMES DAILY WITH MEALS
Status: DISCONTINUED | OUTPATIENT
Start: 2025-04-01 | End: 2025-04-15 | Stop reason: HOSPADM

## 2025-03-31 RX ORDER — ALUMINUM HYDROXIDE, MAGNESIUM HYDROXIDE, AND SIMETHICONE 1200; 120; 1200 MG/30ML; MG/30ML; MG/30ML
30 SUSPENSION ORAL
Status: DISCONTINUED | OUTPATIENT
Start: 2025-03-31 | End: 2025-04-15 | Stop reason: HOSPADM

## 2025-03-31 RX ORDER — MIRTAZAPINE 15 MG/1
15 TABLET, FILM COATED ORAL NIGHTLY
Status: DISCONTINUED | OUTPATIENT
Start: 2025-03-31 | End: 2025-04-02

## 2025-03-31 RX ORDER — SODIUM CHLORIDE 0.9 % (FLUSH) 0.9 %
10 SYRINGE (ML) INJECTION
Status: DISCONTINUED | OUTPATIENT
Start: 2025-03-31 | End: 2025-04-15 | Stop reason: HOSPADM

## 2025-03-31 RX ORDER — MICONAZOLE NITRATE 2 G/100G
POWDER TOPICAL 2 TIMES DAILY
Status: DISCONTINUED | OUTPATIENT
Start: 2025-03-31 | End: 2025-04-15 | Stop reason: HOSPADM

## 2025-03-31 RX ORDER — SODIUM CHLORIDE 9 MG/ML
1000 INJECTION, SOLUTION INTRAVENOUS
Status: COMPLETED | OUTPATIENT
Start: 2025-03-31 | End: 2025-03-31

## 2025-03-31 RX ORDER — CHOLECALCIFEROL (VITAMIN D3) 25 MCG
1000 TABLET ORAL DAILY
Status: DISCONTINUED | OUTPATIENT
Start: 2025-04-01 | End: 2025-04-15 | Stop reason: HOSPADM

## 2025-03-31 RX ORDER — LANOLIN ALCOHOL/MO/W.PET/CERES
1000 CREAM (GRAM) TOPICAL DAILY
Status: DISCONTINUED | OUTPATIENT
Start: 2025-04-01 | End: 2025-04-15 | Stop reason: HOSPADM

## 2025-03-31 RX ORDER — LEVOTHYROXINE SODIUM 25 UG/1
25 TABLET ORAL
Status: DISCONTINUED | OUTPATIENT
Start: 2025-04-01 | End: 2025-04-15 | Stop reason: HOSPADM

## 2025-03-31 RX ORDER — HYDROCODONE BITARTRATE AND ACETAMINOPHEN 5; 325 MG/1; MG/1
1 TABLET ORAL EVERY 8 HOURS PRN
Refills: 0 | Status: DISCONTINUED | OUTPATIENT
Start: 2025-03-31 | End: 2025-04-15 | Stop reason: HOSPADM

## 2025-03-31 RX ORDER — PANTOPRAZOLE SODIUM 40 MG/1
40 TABLET, DELAYED RELEASE ORAL DAILY
Status: DISCONTINUED | OUTPATIENT
Start: 2025-04-01 | End: 2025-04-15 | Stop reason: HOSPADM

## 2025-03-31 RX ORDER — ASCORBIC ACID 500 MG
500 TABLET ORAL DAILY
Status: DISCONTINUED | OUTPATIENT
Start: 2025-04-01 | End: 2025-04-15 | Stop reason: HOSPADM

## 2025-03-31 RX ADMIN — MIRTAZAPINE 15 MG: 15 TABLET, FILM COATED ORAL at 11:03

## 2025-03-31 RX ADMIN — BUDESONIDE 0.5 MG: 0.5 INHALANT RESPIRATORY (INHALATION) at 10:03

## 2025-03-31 RX ADMIN — ALBUTEROL SULFATE 2 PUFF: 90 AEROSOL, METERED RESPIRATORY (INHALATION) at 11:03

## 2025-03-31 RX ADMIN — ALUMINUM HYDROXIDE, MAGNESIUM HYDROXIDE, AND DIMETHICONE 30 ML: 200; 20; 200 SUSPENSION ORAL at 10:03

## 2025-03-31 RX ADMIN — POTASSIUM BICARBONATE 25 MEQ: 977.5 TABLET, EFFERVESCENT ORAL at 08:03

## 2025-03-31 RX ADMIN — SODIUM CHLORIDE 1000 ML: 9 INJECTION, SOLUTION INTRAVENOUS at 06:03

## 2025-03-31 RX ADMIN — IPRATROPIUM BROMIDE AND ALBUTEROL SULFATE 3 ML: 2.5; .5 SOLUTION RESPIRATORY (INHALATION) at 05:03
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
## 2025-03-31 NOTE — Clinical Note
Diagnosis: Dehydration [276.51.ICD-9-CM]   Future Attending Provider: ARIANE WHYTE III [[id number]]   Special Needs:: No Special Needs [1]

## 2025-04-01 PROBLEM — N39.0 UTI (URINARY TRACT INFECTION): Status: ACTIVE | Noted: 2025-04-01

## 2025-04-01 LAB
ABSOLUTE EOSINOPHIL (OHS): 0.03 K/UL
ABSOLUTE MONOCYTE (OHS): 1.08 K/UL (ref 0.3–1)
ABSOLUTE NEUTROPHIL COUNT (OHS): 10.06 K/UL (ref 1.8–7.7)
ALBUMIN SERPL BCP-MCNC: 2.3 G/DL (ref 3.5–5.2)
ALP SERPL-CCNC: 89 UNIT/L (ref 40–150)
ALT SERPL W/O P-5'-P-CCNC: 8 UNIT/L (ref 10–44)
ANION GAP (OHS): 12 MMOL/L (ref 8–16)
ANION GAP (OHS): 7 MMOL/L (ref 8–16)
AST SERPL-CCNC: 16 UNIT/L (ref 11–45)
BACTERIA #/AREA URNS AUTO: ABNORMAL /HPF
BASOPHILS # BLD AUTO: 0.06 K/UL
BASOPHILS NFR BLD AUTO: 0.4 %
BILIRUB SERPL-MCNC: 0.3 MG/DL (ref 0.1–1)
BILIRUB UR QL STRIP.AUTO: ABNORMAL
BUN SERPL-MCNC: 10 MG/DL (ref 8–23)
BUN SERPL-MCNC: 13 MG/DL (ref 8–23)
CALCIUM SERPL-MCNC: 8.3 MG/DL (ref 8.7–10.5)
CALCIUM SERPL-MCNC: 8.8 MG/DL (ref 8.7–10.5)
CHLORIDE SERPL-SCNC: 101 MMOL/L (ref 95–110)
CHLORIDE SERPL-SCNC: 106 MMOL/L (ref 95–110)
CLARITY UR: ABNORMAL
CO2 SERPL-SCNC: 22 MMOL/L (ref 23–29)
CO2 SERPL-SCNC: 24 MMOL/L (ref 23–29)
COLOR UR AUTO: YELLOW
CREAT SERPL-MCNC: 0.5 MG/DL (ref 0.5–1.4)
CREAT SERPL-MCNC: 0.6 MG/DL (ref 0.5–1.4)
ERYTHROCYTE [DISTWIDTH] IN BLOOD BY AUTOMATED COUNT: 21.2 % (ref 11.5–14.5)
GFR SERPLBLD CREATININE-BSD FMLA CKD-EPI: >60 ML/MIN/1.73/M2
GFR SERPLBLD CREATININE-BSD FMLA CKD-EPI: >60 ML/MIN/1.73/M2
GLUCOSE SERPL-MCNC: 169 MG/DL (ref 70–110)
GLUCOSE SERPL-MCNC: 95 MG/DL (ref 70–110)
GLUCOSE UR QL STRIP: NEGATIVE
HCT VFR BLD AUTO: 33.2 % (ref 37–48.5)
HGB BLD-MCNC: 10.5 GM/DL (ref 12–16)
HGB UR QL STRIP: NEGATIVE
HYALINE CASTS UR QL AUTO: 1 /LPF (ref 0–1)
IMM GRANULOCYTES # BLD AUTO: 0.24 K/UL (ref 0–0.04)
IMM GRANULOCYTES NFR BLD AUTO: 1.7 % (ref 0–0.5)
KETONES UR QL STRIP: ABNORMAL
LEUKOCYTE ESTERASE UR QL STRIP: ABNORMAL
LYMPHOCYTES # BLD AUTO: 2.73 K/UL (ref 1–4.8)
MCH RBC QN AUTO: 27 PG (ref 27–31)
MCHC RBC AUTO-ENTMCNC: 31.6 G/DL (ref 32–36)
MCV RBC AUTO: 85 FL (ref 82–98)
MICROSCOPIC COMMENT: ABNORMAL
NITRITE UR QL STRIP: POSITIVE
NUCLEATED RBC (/100WBC) (OHS): 0 /100 WBC
PH UR STRIP: 6 [PH]
PLATELET # BLD AUTO: 474 K/UL (ref 150–450)
PMV BLD AUTO: 9.7 FL (ref 9.2–12.9)
POTASSIUM SERPL-SCNC: 3.4 MMOL/L (ref 3.5–5.1)
POTASSIUM SERPL-SCNC: 3.9 MMOL/L (ref 3.5–5.1)
PROT SERPL-MCNC: 6.9 GM/DL (ref 6–8.4)
PROT UR QL STRIP: ABNORMAL
RBC # BLD AUTO: 3.89 M/UL (ref 4–5.4)
RBC #/AREA URNS AUTO: 4 /HPF (ref 0–4)
RELATIVE EOSINOPHIL (OHS): 0.2 %
RELATIVE LYMPHOCYTE (OHS): 19.2 % (ref 18–48)
RELATIVE MONOCYTE (OHS): 7.6 % (ref 4–15)
RELATIVE NEUTROPHIL (OHS): 70.9 % (ref 38–73)
SODIUM SERPL-SCNC: 135 MMOL/L (ref 136–145)
SODIUM SERPL-SCNC: 137 MMOL/L (ref 136–145)
SP GR UR STRIP: 1.02
SQUAMOUS #/AREA URNS AUTO: 1 /HPF
UROBILINOGEN UR STRIP-ACNC: >=8 EU/DL
WBC # BLD AUTO: 14.2 K/UL (ref 3.9–12.7)
WBC #/AREA URNS AUTO: >100 /HPF (ref 0–5)

## 2025-04-01 PROCEDURE — 36415 COLL VENOUS BLD VENIPUNCTURE: CPT | Performed by: NURSE PRACTITIONER

## 2025-04-01 PROCEDURE — 94640 AIRWAY INHALATION TREATMENT: CPT | Mod: XB

## 2025-04-01 PROCEDURE — 85025 COMPLETE CBC W/AUTO DIFF WBC: CPT | Performed by: NURSE PRACTITIONER

## 2025-04-01 PROCEDURE — 63600175 PHARM REV CODE 636 W HCPCS: Performed by: INTERNAL MEDICINE

## 2025-04-01 PROCEDURE — 25000242 PHARM REV CODE 250 ALT 637 W/ HCPCS: Performed by: INTERNAL MEDICINE

## 2025-04-01 PROCEDURE — 25000003 PHARM REV CODE 250: Performed by: NURSE PRACTITIONER

## 2025-04-01 PROCEDURE — 25000242 PHARM REV CODE 250 ALT 637 W/ HCPCS: Performed by: NURSE PRACTITIONER

## 2025-04-01 PROCEDURE — 11000001 HC ACUTE MED/SURG PRIVATE ROOM

## 2025-04-01 PROCEDURE — 82310 ASSAY OF CALCIUM: CPT | Performed by: NURSE PRACTITIONER

## 2025-04-01 PROCEDURE — 25000003 PHARM REV CODE 250: Performed by: INTERNAL MEDICINE

## 2025-04-01 PROCEDURE — 94761 N-INVAS EAR/PLS OXIMETRY MLT: CPT

## 2025-04-01 PROCEDURE — 99900031 HC PATIENT EDUCATION (STAT)

## 2025-04-01 PROCEDURE — S0179 MEGESTROL 20 MG: HCPCS | Performed by: NURSE PRACTITIONER

## 2025-04-01 PROCEDURE — 99900035 HC TECH TIME PER 15 MIN (STAT)

## 2025-04-01 RX ORDER — IPRATROPIUM BROMIDE 0.5 MG/2.5ML
0.5 SOLUTION RESPIRATORY (INHALATION) EVERY 6 HOURS
Status: DISCONTINUED | OUTPATIENT
Start: 2025-04-01 | End: 2025-04-01

## 2025-04-01 RX ORDER — ARFORMOTEROL TARTRATE 15 UG/2ML
15 SOLUTION RESPIRATORY (INHALATION) 2 TIMES DAILY
Status: DISCONTINUED | OUTPATIENT
Start: 2025-04-01 | End: 2025-04-15 | Stop reason: HOSPADM

## 2025-04-01 RX ORDER — NYSTATIN AND TRIAMCINOLONE ACETONIDE 100000; 1 [USP'U]/G; MG/G
CREAM TOPICAL 3 TIMES DAILY
Status: DISCONTINUED | OUTPATIENT
Start: 2025-04-01 | End: 2025-04-15 | Stop reason: HOSPADM

## 2025-04-01 RX ORDER — BUDESONIDE 0.5 MG/2ML
0.5 INHALANT ORAL EVERY 12 HOURS
Status: DISCONTINUED | OUTPATIENT
Start: 2025-04-01 | End: 2025-04-15 | Stop reason: HOSPADM

## 2025-04-01 RX ORDER — CEFTRIAXONE 1 G/1
1 INJECTION, POWDER, FOR SOLUTION INTRAMUSCULAR; INTRAVENOUS
Status: DISCONTINUED | OUTPATIENT
Start: 2025-04-01 | End: 2025-04-02

## 2025-04-01 RX ORDER — IPRATROPIUM BROMIDE 0.5 MG/2.5ML
0.5 SOLUTION RESPIRATORY (INHALATION)
Status: DISCONTINUED | OUTPATIENT
Start: 2025-04-01 | End: 2025-04-15 | Stop reason: HOSPADM

## 2025-04-01 RX ADMIN — BUDESONIDE 0.5 MG: 0.5 INHALANT RESPIRATORY (INHALATION) at 06:04

## 2025-04-01 RX ADMIN — IPRATROPIUM BROMIDE 0.5 MG: 0.5 SOLUTION RESPIRATORY (INHALATION) at 09:04

## 2025-04-01 RX ADMIN — Medication 6 MG: at 08:04

## 2025-04-01 RX ADMIN — BUDESONIDE 0.5 MG: 0.5 INHALANT RESPIRATORY (INHALATION) at 08:04

## 2025-04-01 RX ADMIN — ALUMINUM HYDROXIDE, MAGNESIUM HYDROXIDE, AND DIMETHICONE 30 ML: 200; 20; 200 SUSPENSION ORAL at 11:04

## 2025-04-01 RX ADMIN — SUCRALFATE 1 G: 1 SUSPENSION ORAL at 06:04

## 2025-04-01 RX ADMIN — BUPROPION HYDROCHLORIDE 150 MG: 150 TABLET, EXTENDED RELEASE ORAL at 08:04

## 2025-04-01 RX ADMIN — ALUMINUM HYDROXIDE, MAGNESIUM HYDROXIDE, AND DIMETHICONE 30 ML: 200; 20; 200 SUSPENSION ORAL at 06:04

## 2025-04-01 RX ADMIN — Medication 1000 UNITS: at 08:04

## 2025-04-01 RX ADMIN — MICONAZOLE NITRATE: 20 POWDER TOPICAL at 08:04

## 2025-04-01 RX ADMIN — FERROUS SULFATE TAB 325 MG (65 MG ELEMENTAL FE) 1 EACH: 325 (65 FE) TAB at 08:04

## 2025-04-01 RX ADMIN — POTASSIUM BICARBONATE 25 MEQ: 977.5 TABLET, EFFERVESCENT ORAL at 08:04

## 2025-04-01 RX ADMIN — IPRATROPIUM BROMIDE 0.5 MG: 0.5 SOLUTION RESPIRATORY (INHALATION) at 01:04

## 2025-04-01 RX ADMIN — ALUMINUM HYDROXIDE, MAGNESIUM HYDROXIDE, AND DIMETHICONE 30 ML: 200; 20; 200 SUSPENSION ORAL at 08:04

## 2025-04-01 RX ADMIN — ALUMINUM HYDROXIDE, MAGNESIUM HYDROXIDE, AND DIMETHICONE 30 ML: 200; 20; 200 SUSPENSION ORAL at 03:04

## 2025-04-01 RX ADMIN — CYANOCOBALAMIN TAB 1000 MCG 1000 MCG: 1000 TAB at 08:04

## 2025-04-01 RX ADMIN — ARFORMOTEROL TARTRATE 15 MCG: 15 SOLUTION RESPIRATORY (INHALATION) at 09:04

## 2025-04-01 RX ADMIN — SODIUM CHLORIDE: 9 INJECTION, SOLUTION INTRAVENOUS at 10:04

## 2025-04-01 RX ADMIN — SODIUM CHLORIDE: 9 INJECTION, SOLUTION INTRAVENOUS at 07:04

## 2025-04-01 RX ADMIN — PANTOPRAZOLE SODIUM 40 MG: 40 TABLET, DELAYED RELEASE ORAL at 08:04

## 2025-04-01 RX ADMIN — SUCRALFATE 1 G: 1 SUSPENSION ORAL at 12:04

## 2025-04-01 RX ADMIN — ARFORMOTEROL TARTRATE 15 MCG: 15 SOLUTION RESPIRATORY (INHALATION) at 07:04

## 2025-04-01 RX ADMIN — FLUOXETINE HYDROCHLORIDE 20 MG: 20 CAPSULE ORAL at 08:04

## 2025-04-01 RX ADMIN — IPRATROPIUM BROMIDE 0.5 MG: 0.5 SOLUTION RESPIRATORY (INHALATION) at 06:04

## 2025-04-01 RX ADMIN — CARVEDILOL 25 MG: 12.5 TABLET, FILM COATED ORAL at 08:04

## 2025-04-01 RX ADMIN — HYDROCODONE BITARTRATE AND ACETAMINOPHEN 1 TABLET: 5; 325 TABLET ORAL at 08:04

## 2025-04-01 RX ADMIN — IPRATROPIUM BROMIDE AND ALBUTEROL SULFATE 3 ML: 2.5; .5 SOLUTION RESPIRATORY (INHALATION) at 02:04

## 2025-04-01 RX ADMIN — CEFTRIAXONE SODIUM 1 G: 1 INJECTION, POWDER, FOR SOLUTION INTRAMUSCULAR; INTRAVENOUS at 09:04

## 2025-04-01 RX ADMIN — CARVEDILOL 25 MG: 12.5 TABLET, FILM COATED ORAL at 04:04

## 2025-04-01 RX ADMIN — LEVOTHYROXINE SODIUM 25 MCG: 25 TABLET ORAL at 06:04

## 2025-04-01 RX ADMIN — LISINOPRIL 30 MG: 20 TABLET ORAL at 08:04

## 2025-04-01 RX ADMIN — MEGESTROL ACETATE 200 MG: 400 SUSPENSION ORAL at 08:04

## 2025-04-01 RX ADMIN — CEFTRIAXONE SODIUM 1 G: 1 INJECTION, POWDER, FOR SOLUTION INTRAMUSCULAR; INTRAVENOUS at 08:04

## 2025-04-01 RX ADMIN — SUCRALFATE 1 G: 1 SUSPENSION ORAL at 11:04

## 2025-04-01 RX ADMIN — MIRTAZAPINE 15 MG: 15 TABLET, FILM COATED ORAL at 08:04

## 2025-04-01 RX ADMIN — NYSTATIN AND TRIAMCINOLONE ACETONIDE: 100000; 1 CREAM TOPICAL at 03:04

## 2025-04-01 RX ADMIN — OXYCODONE HYDROCHLORIDE AND ACETAMINOPHEN 500 MG: 500 TABLET ORAL at 08:04

## 2025-04-01 NOTE — ASSESSMENT & PLAN NOTE
Patient's most recent potassium results are listed below.   Recent Labs     03/31/25  1646 04/01/25  0640   K 3.4* 3.9     Plan  - Replete potassium per protocol  - Monitor potassium Daily  - Patient's hypokalemia is improving

## 2025-04-01 NOTE — ASSESSMENT & PLAN NOTE
Patient has Abnormal Magnesium: hypomagnesemia. Will continue to monitor electrolytes closely. Will replace the affected electrolytes and repeat labs to be done after interventions completed. The patient's magnesium results have been reviewed and are listed below.  Recent Labs   Lab 03/31/25  1646   MG 1.8

## 2025-04-01 NOTE — HPI
"ED HPI:  80-year-old female with significant history hypertension, hypothyroidism, COPD and depression who reports to the emergency room for evaluation of failure to thrive picture. Patient's family reports little to no activity and patient's appetite has decreased. Increased weakness and fatigue. Family and pt has discussed with PCP and pending nursing home/SNF placement as requested per pt. Recent changes in depression medications and pt reports just "not hungry". No abdominal pain or other acute complaints.     IM HPI:  Patient is well known to us and had a recent prolonged hospitalization for multiple electrolyte abnormalities weight loss atypical pneumonia COPD and wounds who during that admission had a stabilization of her condition and the family attempted to take her home and care for her.  It has been a challenge and ultimately the patient stopped eating had no appetite and she declined again.  The patient and family were in the process of reaching out to local nursing facilities and getting authorization for admission when she had a further decline with tachycardia and other symptoms of dehydration and weakness.  I have evaluated the patient with family at bedside this morning she is coughing and co anorexia.  "

## 2025-04-01 NOTE — CONSULTS
"  [facility]  Adult Nutrition  Consult Note    SUMMARY     Recommendations    Continue regular diet as tolerated.   Ensure Enlive ONS or alternative TID.  RD to monitor and follow up.     Goals: 1. Meet % EEN/EPN by RD follow up. 2. Maintain weight during admission.  Nutrition Goal Status: new  Communication of RD Recs:  (POC)    Nutrition Discharge Planning     Nutrition Discharge Planning: Too early to determine, pending clinical course    Assessment and Plan    Nutrition Problem  Underweight    Related to (etiology):   Increased nutrient needs 2/2 COPD    Signs and Symptoms (as evidenced by):   Estimated intake of nutrients < estimated nutrient needs, BMI 15.86 kg/m2     Interventions/Recommendations (treatment strategy):  Collaboration of nutrition care with other providers  ONS TID    Nutrition Diagnosis Status:   New      Malnutrition Assessment  NFPE to be completed at f/u.     Reason for Assessment    Reason For Assessment: consult  Diagnosis:  (UTI)  General Information Comments: Consulted for malnutrition. PMHx of HTN, hypothyroidism, COPD and depression. Pt states she had a decreased PTA, but is currently reporting a good appetite. Pt states she drinks Boost & Ensure (1 of each daily at home). Reports no N/V/C/D. No difficulty chewing/swallowing. No significant weight loss noted. NFPE not appropriate to be completed at this time due to pt eating lunch - to be completed at f/u.    Nutrition Related Social Determinants of Health: SDOH: Adequate food in home environment      Nutrition/Diet History    Nutrition Intake History: Decreased appetite PTA, 1 Boost & Ensure per day  Food Preferences: None  Spiritual, Cultural Beliefs, Latter-day Practices, Values that Affect Care: no  Food Allergies: NKFA  Factors Affecting Nutritional Intake: None identified at this time    Anthropometrics    Height: 5' 3" (160 cm)  Height (inches): 63 in  Height Method: Stated  Weight: 40.6 kg (89 lb 8.1 oz)  Weight " (lb): 89.51 lb  Weight Method: Bed Scale  Ideal Body Weight (IBW), Female: 115 lb  % Ideal Body Weight, Female (lb): 77.83 %  BMI (Calculated): 15.9  BMI Grade: less than 23 (older than 65 years) - underweight       Lab/Procedures/Meds    Pertinent Labs Reviewed: reviewed  Pertinent Labs Comments: Calcium 8.3  Pertinent Medications Reviewed: reviewed    Estimated/Assessed Needs    Weight Used For Calorie Calculations: 40.6 kg (89 lb 8.1 oz)  Energy Calorie Requirements (kcal): 3149-8870  Energy Need Method: Kcal/kg (30-35 kcal/kg ABW)  Protein Requirements: 61-81 (1.5-2 g/kg ABW)  Weight Used For Protein Calculations: 40.6 kg (89 lb 8.1 oz)     Estimated Fluid Requirement Method: RDA Method  RDA Method (mL): 1218       Scheduled Meds:   aluminum-magnesium hydroxide-simethicone  30 mL Oral QID (AC & HS)    arformoteroL  15 mcg Nebulization BID    ascorbic acid (vitamin C)  500 mg Oral Daily    budesonide  0.5 mg Nebulization Q12H    buPROPion  150 mg Oral Daily    carvediloL  25 mg Oral BID WM    cefTRIAXone (Rocephin) IV (PEDS and ADULTS)  1 g Intravenous Q12H    cyanocobalamin  1,000 mcg Oral Daily    ferrous sulfate  1 tablet Oral Daily    FLUoxetine  20 mg Oral Daily    ipratropium  0.5 mg Nebulization Q6H WAKE    levothyroxine  25 mcg Oral Before breakfast    lisinopriL  30 mg Oral Daily    megestroL  200 mg Oral Daily    miconazole NITRATE 2 %   Topical (Top) BID    mirtazapine  15 mg Oral QHS    nystatin-triamcinolone   Topical (Top) TID    pantoprazole  40 mg Oral Daily    potassium bicarbonate  25 mEq Oral BID    sucralfate  1 g Oral Q6H    vitamin D  1,000 Units Oral Daily     Continuous Infusions:   0.9% NaCl   Intravenous Continuous 75 mL/hr at 04/01/25 0758 New Bag at 04/01/25 0758       Nutrition Prescription Ordered    Current Diet Order: Regular diet    Evaluation of Received Nutrient/Fluid Intake    I/O: + 602.3  Comments: LBM unspecified  % Intake of Estimated Energy Needs: 25 - 50 %  % Meal Intake:  25 - 50 %    Nutrition Risk    Level of Risk/Frequency of Follow-up: high       Monitor and Evaluation    Monitor and Evaluation: Energy intake, Food and beverage intake, Protein intake, Weight, Nutrition focused physical findings       Nutrition Follow-Up    RD Follow-up?: Yes    Jl Jaime, Registration Eligible, Provisional LDN

## 2025-04-01 NOTE — PLAN OF CARE
Recommendations    Continue regular diet as tolerated.   Ensure Enlive ONS or alternative TID.  RD to monitor and follow up.     Goals: 1. Meet % EEN/EPN by RD follow up. 2. Maintain weight during admission.  Nutrition Goal Status: new  Communication of RD Recs:  (POC)

## 2025-04-01 NOTE — ASSESSMENT & PLAN NOTE
Patient's blood pressure range in the last 24 hours was: BP  Min: 108/64  Max: 191/88.The patient's inpatient anti-hypertensive regimen is listed below:  Current Antihypertensives  carvediloL tablet 25 mg, 2 times daily with meals, Oral  lisinopriL tablet 30 mg, Daily, Oral    Plan  - BP is controlled, no changes needed to their regimen
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
## 2025-04-01 NOTE — PLAN OF CARE
Plan of care discussed with patient and family member at bedside. Upon admit patient was withdrawn however she has attempted to eat throughout the rest of the day with no complaints. Patient tolerated diet and medications well. Mepilex placed on sacrum for protection. Nystatin applied to groin.   Problem: Skin Injury Risk Increased  Goal: Skin Health and Integrity  Outcome: Progressing     Problem: Adult Inpatient Plan of Care  Goal: Plan of Care Review  Outcome: Progressing  Goal: Patient-Specific Goal (Individualized)  Outcome: Progressing  Goal: Absence of Hospital-Acquired Illness or Injury  Outcome: Progressing  Goal: Optimal Comfort and Wellbeing  Outcome: Progressing  Goal: Readiness for Transition of Care  Outcome: Progressing     Problem: Pneumonia  Goal: Fluid Balance  Outcome: Progressing  Goal: Resolution of Infection Signs and Symptoms  Outcome: Progressing  Goal: Effective Oxygenation and Ventilation  Outcome: Progressing     Problem: Infection  Goal: Absence of Infection Signs and Symptoms  Outcome: Progressing     Problem: Fall Injury Risk  Goal: Absence of Fall and Fall-Related Injury  Outcome: Progressing
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
## 2025-04-01 NOTE — ASSESSMENT & PLAN NOTE
Hyponatremia is likely due to Dehydration/hypovolemia. The patient's most recent sodium results are listed below.  Recent Labs     03/31/25  1646 04/01/25  0640   * 137     Plan  - Correct the sodium by 4-6mEq in 24 hours.

## 2025-04-01 NOTE — PT/OT/SLP PROGRESS
"Physical Therapy      Patient Name:  Ruth Gamboa   MRN:  [id number]    Patient not seen today secondary to patient is unwilling to participate in therapy at this time even with encouragement.  Patient was found supine in bed, grandson present at bedside.  Attempted to evaluate patient  per MD consult but patient wants to be left alone, states "Leave me alone, may be tomorrow."  Attending nurse Jennifer Wheeler RN made aware.   . Will follow-up as appropriate.    "

## 2025-04-01 NOTE — PLAN OF CARE
04/01/25 1409   Medicare Message   Important Message from Medicare regarding Discharge Appeal Rights Given to patient/caregiver;Explained to patient/caregiver;Signed/date by patient/caregiver;Other (comments)  (Signed by bryan's grandson at the bedside.)   Date IMM was signed 04/01/25   Time IMM was signed 1990

## 2025-04-01 NOTE — PLAN OF CARE
[place] - Emergency Department  Initial Discharge Assessment       Primary Care Provider: Clark Calix III, MD    Admission Diagnosis: Dehydration [E86.0]    Admission Date: 3/31/2025  Expected Discharge Date:     Transition of Care Barriers: None    Payor: [employer] [facility] / Plan: [employer] / Product Type: Medicare Advantage /     Extended Emergency Contact Information  Primary Emergency Contact: MARGOT MCMAHON  Address: [address] [place] [address]  Mobile Phone: [phone]  Relation: Daughter  Preferred language: English   needed? No    Discharge Plan A: Skilled Nursing Facility  Discharge Plan B: Skilled Nursing Facility      [id number] - [place], [place] - [address] AT [facility] [address]  [address]  [facility] [address]  Phone: [phone] Fax: [phone]      Initial Assessment (most recent)       Adult Discharge Assessment - 04/01/25 0741          Discharge Assessment    Assessment Type Discharge Planning Assessment     Source of Information family     When was your last doctors appointment? 03/17/25     Communicated ORALIA with patient/caregiver Yes     Reason For Admission Loss of weight and failure to thrive     People in Home grandchild(imelda)     Prior to hospitilization cognitive status: Alert/Oriented     Current cognitive status: Unable to Assess     Walking or Climbing Stairs Difficulty yes     Walking or Climbing Stairs ambulation difficulty, requires equipment     Mobility Management Utilizes RW     Dressing/Bathing Difficulty no     Home Accessibility stairs to enter home     Number of Stairs, Main Entrance five     Home Layout Able to live on 1st floor     Equipment Currently Used at Home walker, rolling     Readmission within 30 days? No     Patient currently being followed by outpatient case management? No     Do you currently have service(s) that help you manage  your care at home? Yes     Name and Contact number of [facility] [phone]     Is the pt/caregiver preference to resume services with current agency Yes   Plans to dc to SNF    Do you take prescription medications? Yes     Do you have prescription coverage? Yes     Coverage [employer]     Do you have any problems affording any of your prescribed medications? No     Is the patient taking medications as prescribed? no     If no, which medications is patient not taking? All meds. Patient refuses meds when family gives     Who is going to help you get home at discharge? adult grandsons Cornelius and Jose     How do you get to doctors appointments? family or friend will provide     Are you on dialysis? No     Do you take coumadin? No     Discharge Plan A Skilled Nursing Facility     Discharge Plan B Skilled Nursing Facility     DME Needed Upon Discharge  none     Discharge Plan discussed with: POA;Patient     Name(s) and Number(s) MARGOT MCMAHON (Daughter)  [phone]     Transition of Care Barriers None        Physical Activity    On average, how many days per week do you engage in moderate to strenuous exercise (like a brisk walk)? 0 days        Financial Resource Strain    How hard is it for you to pay for the very basics like food, housing, medical care, and heating? Not hard at all        Housing Stability    In the last 12 months, was there a time when you were not able to pay the mortgage or rent on time? No     In the past 12 months, how many times have you moved where you were living? 0     At any time in the past 12 months, were you homeless or living in a shelter (including now)? No        Transportation Needs    In the past 12 months, has lack of transportation kept you from medical appointments or from getting medications? No     In the past 12 months, has lack of transportation kept you from meetings, work, or from getting things needed for daily living? No        Food Insecurity     "Within the past 12 months, you worried that your food would run out before you got the money to buy more. Never true     Within the past 12 months, the food you bought just didn't last and you didn't have money to get more. Never true        Stress    Do you feel stress - tense, restless, nervous, or anxious, or unable to sleep at night because your mind is troubled all the time - these days? Not at all        Social Isolation    How often do you feel lonely or isolated from those around you?  Never        Alcohol Use    Q1: How often do you have a drink containing alcohol? Never     Q2: How many drinks containing alcohol do you have on a typical day when you are drinking? Patient does not drink     Q3: How often do you have six or more drinks on one occasion? Never        Utilities    In the past 12 months has the electric, gas, oil, or water company threatened to shut off services in your home? No        Health Literacy    How often do you need to have someone help you when you read instructions, pamphlets, or other written material from your doctor or pharmacy? Never        OTHER    Name(s) of People in Home Dorys                     Readmission Assessment (most recent)       Readmission Assessment - 04/01/25 0821          Readmission    Was this a planned readmission? Yes     Why were you hospitalized in the last 30 days? [facility]     Why were you readmitted? Alarmed about signs/symptoms     When you left the hospital how did you feel? She was feeling well     When you left the hospital where did you go? Home with Home Health     Did patient/caregiver refused recommended DC plan? No     Tell me about what happened between when you left the hospital and the day you returned. Eating less and not feeling well     When did you start not feeling well? "about a month ago after a friend she cared for passed away."     Did you try to manage your symptoms your self? Yes     What did you do? Encouraged " eats and go on outings     Did you call anyone? Yes     Who did you call? Home Health Nurse     Did you try to see or did see a doctor or nurse before you came? Yes     Were you seen? Yes     Did you have  a follow-up appointment on discharge? Yes     Did you go? Yes                    DCP completed with patient's daughter via phone, patient and grandson at bedside. Patient is AAO x 3. Family and patient states that patient has been declining at home since a friend she cared for, passed away. Patient refusing meds and care from family at home. She states she would do better at nursing home and state that home health started the process on getting her to [facility] but patient had to be admitted prior. Patient and family still requesting discharge plan be to [facility].     No other social concerns noted. Encouraged to contact CM/SW with questions or concerns. CM/SW to remain available for any needs.

## 2025-04-01 NOTE — ASSESSMENT & PLAN NOTE
Patient has persistent depression which is severe and is currently uncontrolled. Will Continue anti-depressant medications. We will not consult psychiatry at this time. Patient does not display psychosis at this time. Continue to monitor closely and adjust plan of care as needed.

## 2025-04-01 NOTE — ASSESSMENT & PLAN NOTE
Nutrition consulted. Most recent weight and BMI monitored-     Measurements:  Wt Readings from Last 1 Encounters:   03/31/25 40.6 kg (89 lb 8.1 oz)   Body mass index is 15.86 kg/m².    Patient has been screened and assessed by RD.    Malnutrition Type:  Context:    Level:      Malnutrition Characteristic Summary:       Interventions/Recommendations (treatment strategy):

## 2025-04-02 LAB
ABSOLUTE EOSINOPHIL (OHS): 0.05 K/UL
ABSOLUTE MONOCYTE (OHS): 0.94 K/UL (ref 0.3–1)
ABSOLUTE NEUTROPHIL COUNT (OHS): 8.18 K/UL (ref 1.8–7.7)
ALBUMIN SERPL BCP-MCNC: 1.9 G/DL (ref 3.5–5.2)
ALP SERPL-CCNC: 70 UNIT/L (ref 40–150)
ALT SERPL W/O P-5'-P-CCNC: <8 UNIT/L (ref 10–44)
ANION GAP (OHS): 5 MMOL/L (ref 8–16)
AST SERPL-CCNC: 15 UNIT/L (ref 11–45)
BASOPHILS # BLD AUTO: 0.05 K/UL
BASOPHILS NFR BLD AUTO: 0.4 %
BILIRUB SERPL-MCNC: 0.2 MG/DL (ref 0.1–1)
BUN SERPL-MCNC: 11 MG/DL (ref 8–23)
CALCIUM SERPL-MCNC: 8 MG/DL (ref 8.7–10.5)
CHLORIDE SERPL-SCNC: 108 MMOL/L (ref 95–110)
CO2 SERPL-SCNC: 23 MMOL/L (ref 23–29)
CREAT SERPL-MCNC: 0.6 MG/DL (ref 0.5–1.4)
ERYTHROCYTE [DISTWIDTH] IN BLOOD BY AUTOMATED COUNT: 22 % (ref 11.5–14.5)
GFR SERPLBLD CREATININE-BSD FMLA CKD-EPI: >60 ML/MIN/1.73/M2
GLUCOSE SERPL-MCNC: 149 MG/DL (ref 70–110)
HCT VFR BLD AUTO: 29.7 % (ref 37–48.5)
HGB BLD-MCNC: 9.3 GM/DL (ref 12–16)
IMM GRANULOCYTES # BLD AUTO: 0.19 K/UL (ref 0–0.04)
IMM GRANULOCYTES NFR BLD AUTO: 1.6 % (ref 0–0.5)
LYMPHOCYTES # BLD AUTO: 2.21 K/UL (ref 1–4.8)
MAGNESIUM SERPL-MCNC: 1.9 MG/DL (ref 1.6–2.6)
MCH RBC QN AUTO: 27.4 PG (ref 27–31)
MCHC RBC AUTO-ENTMCNC: 31.3 G/DL (ref 32–36)
MCV RBC AUTO: 88 FL (ref 82–98)
NUCLEATED RBC (/100WBC) (OHS): 0 /100 WBC
PLATELET # BLD AUTO: 429 K/UL (ref 150–450)
PMV BLD AUTO: 10.2 FL (ref 9.2–12.9)
POTASSIUM SERPL-SCNC: 4.4 MMOL/L (ref 3.5–5.1)
PROT SERPL-MCNC: 5.8 GM/DL (ref 6–8.4)
RBC # BLD AUTO: 3.39 M/UL (ref 4–5.4)
RELATIVE EOSINOPHIL (OHS): 0.4 %
RELATIVE LYMPHOCYTE (OHS): 19 % (ref 18–48)
RELATIVE MONOCYTE (OHS): 8.1 % (ref 4–15)
RELATIVE NEUTROPHIL (OHS): 70.5 % (ref 38–73)
SODIUM SERPL-SCNC: 136 MMOL/L (ref 136–145)
WBC # BLD AUTO: 11.62 K/UL (ref 3.9–12.7)

## 2025-04-02 PROCEDURE — 27000221 HC OXYGEN, UP TO 24 HOURS

## 2025-04-02 PROCEDURE — 85025 COMPLETE CBC W/AUTO DIFF WBC: CPT | Performed by: INTERNAL MEDICINE

## 2025-04-02 PROCEDURE — 11000001 HC ACUTE MED/SURG PRIVATE ROOM

## 2025-04-02 PROCEDURE — 90836 PSYTX W PT W E/M 45 MIN: CPT | Mod: ,,, | Performed by: PSYCHIATRY & NEUROLOGY

## 2025-04-02 PROCEDURE — 97165 OT EVAL LOW COMPLEX 30 MIN: CPT

## 2025-04-02 PROCEDURE — 36415 COLL VENOUS BLD VENIPUNCTURE: CPT | Performed by: INTERNAL MEDICINE

## 2025-04-02 PROCEDURE — 25000242 PHARM REV CODE 250 ALT 637 W/ HCPCS: Performed by: INTERNAL MEDICINE

## 2025-04-02 PROCEDURE — 99900031 HC PATIENT EDUCATION (STAT)

## 2025-04-02 PROCEDURE — 92610 EVALUATE SWALLOWING FUNCTION: CPT

## 2025-04-02 PROCEDURE — 99900035 HC TECH TIME PER 15 MIN (STAT)

## 2025-04-02 PROCEDURE — 25000003 PHARM REV CODE 250: Performed by: PSYCHIATRY & NEUROLOGY

## 2025-04-02 PROCEDURE — 25000003 PHARM REV CODE 250: Performed by: INTERNAL MEDICINE

## 2025-04-02 PROCEDURE — 80053 COMPREHEN METABOLIC PANEL: CPT | Performed by: INTERNAL MEDICINE

## 2025-04-02 PROCEDURE — 94761 N-INVAS EAR/PLS OXIMETRY MLT: CPT

## 2025-04-02 PROCEDURE — 94640 AIRWAY INHALATION TREATMENT: CPT

## 2025-04-02 PROCEDURE — 99223 1ST HOSP IP/OBS HIGH 75: CPT | Mod: ,,, | Performed by: PSYCHIATRY & NEUROLOGY

## 2025-04-02 PROCEDURE — 83735 ASSAY OF MAGNESIUM: CPT | Performed by: INTERNAL MEDICINE

## 2025-04-02 PROCEDURE — S0179 MEGESTROL 20 MG: HCPCS | Performed by: NURSE PRACTITIONER

## 2025-04-02 PROCEDURE — 97161 PT EVAL LOW COMPLEX 20 MIN: CPT

## 2025-04-02 PROCEDURE — 25000003 PHARM REV CODE 250: Performed by: NURSE PRACTITIONER

## 2025-04-02 RX ORDER — ELECTROLYTES/DEXTROSE
300 SOLUTION, ORAL ORAL
Status: DISCONTINUED | OUTPATIENT
Start: 2025-04-02 | End: 2025-04-15 | Stop reason: HOSPADM

## 2025-04-02 RX ORDER — FLUOXETINE HYDROCHLORIDE 20 MG/1
40 CAPSULE ORAL DAILY
Status: DISCONTINUED | OUTPATIENT
Start: 2025-04-03 | End: 2025-04-15 | Stop reason: HOSPADM

## 2025-04-02 RX ORDER — CEFTRIAXONE 1 G/1
1 INJECTION, POWDER, FOR SOLUTION INTRAMUSCULAR; INTRAVENOUS
Status: DISCONTINUED | OUTPATIENT
Start: 2025-04-03 | End: 2025-04-04

## 2025-04-02 RX ORDER — LANOLIN ALCOHOL/MO/W.PET/CERES
1 CREAM (GRAM) TOPICAL EVERY OTHER DAY
Status: DISCONTINUED | OUTPATIENT
Start: 2025-04-04 | End: 2025-04-15 | Stop reason: HOSPADM

## 2025-04-02 RX ADMIN — IPRATROPIUM BROMIDE 0.5 MG: 0.5 SOLUTION RESPIRATORY (INHALATION) at 01:04

## 2025-04-02 RX ADMIN — BUPROPION HYDROCHLORIDE 150 MG: 150 TABLET, EXTENDED RELEASE ORAL at 09:04

## 2025-04-02 RX ADMIN — Medication 300 ML: at 05:04

## 2025-04-02 RX ADMIN — LEVOTHYROXINE SODIUM 25 MCG: 25 TABLET ORAL at 05:04

## 2025-04-02 RX ADMIN — BUDESONIDE 0.5 MG: 0.5 INHALANT RESPIRATORY (INHALATION) at 07:04

## 2025-04-02 RX ADMIN — MIRTAZAPINE 22.5 MG: 7.5 TABLET ORAL at 10:04

## 2025-04-02 RX ADMIN — ALBUTEROL SULFATE 2 PUFF: 90 AEROSOL, METERED RESPIRATORY (INHALATION) at 10:04

## 2025-04-02 RX ADMIN — LISINOPRIL 30 MG: 20 TABLET ORAL at 09:04

## 2025-04-02 RX ADMIN — CARVEDILOL 25 MG: 12.5 TABLET, FILM COATED ORAL at 07:04

## 2025-04-02 RX ADMIN — ARFORMOTEROL TARTRATE 15 MCG: 15 SOLUTION RESPIRATORY (INHALATION) at 07:04

## 2025-04-02 RX ADMIN — MICONAZOLE NITRATE: 20 POWDER TOPICAL at 10:04

## 2025-04-02 RX ADMIN — SUCRALFATE 1 G: 1 SUSPENSION ORAL at 05:04

## 2025-04-02 RX ADMIN — Medication 300 ML: at 09:04

## 2025-04-02 RX ADMIN — NYSTATIN AND TRIAMCINOLONE ACETONIDE: 100000; 1 CREAM TOPICAL at 02:04

## 2025-04-02 RX ADMIN — SUCRALFATE 1 G: 1 SUSPENSION ORAL at 11:04

## 2025-04-02 RX ADMIN — POTASSIUM BICARBONATE 25 MEQ: 977.5 TABLET, EFFERVESCENT ORAL at 09:04

## 2025-04-02 RX ADMIN — POTASSIUM BICARBONATE 25 MEQ: 977.5 TABLET, EFFERVESCENT ORAL at 10:04

## 2025-04-02 RX ADMIN — PANTOPRAZOLE SODIUM 40 MG: 40 TABLET, DELAYED RELEASE ORAL at 09:04

## 2025-04-02 RX ADMIN — IPRATROPIUM BROMIDE 0.5 MG: 0.5 SOLUTION RESPIRATORY (INHALATION) at 07:04

## 2025-04-02 RX ADMIN — NYSTATIN AND TRIAMCINOLONE ACETONIDE: 100000; 1 CREAM TOPICAL at 10:04

## 2025-04-02 RX ADMIN — IPRATROPIUM BROMIDE 0.5 MG: 0.5 SOLUTION RESPIRATORY (INHALATION) at 10:04

## 2025-04-02 RX ADMIN — NYSTATIN AND TRIAMCINOLONE ACETONIDE: 100000; 1 CREAM TOPICAL at 09:04

## 2025-04-02 RX ADMIN — Medication 300 ML: at 01:04

## 2025-04-02 RX ADMIN — CARVEDILOL 25 MG: 12.5 TABLET, FILM COATED ORAL at 05:04

## 2025-04-02 RX ADMIN — FLUOXETINE HYDROCHLORIDE 20 MG: 20 CAPSULE ORAL at 09:04

## 2025-04-02 RX ADMIN — ALUMINUM HYDROXIDE, MAGNESIUM HYDROXIDE, AND DIMETHICONE 30 ML: 200; 20; 200 SUSPENSION ORAL at 05:04

## 2025-04-02 RX ADMIN — ARFORMOTEROL TARTRATE 15 MCG: 15 SOLUTION RESPIRATORY (INHALATION) at 10:04

## 2025-04-02 RX ADMIN — BUDESONIDE 0.5 MG: 0.5 INHALANT RESPIRATORY (INHALATION) at 10:04

## 2025-04-02 RX ADMIN — MEGESTROL ACETATE 200 MG: 400 SUSPENSION ORAL at 09:04

## 2025-04-02 RX ADMIN — ALUMINUM HYDROXIDE, MAGNESIUM HYDROXIDE, AND DIMETHICONE 30 ML: 200; 20; 200 SUSPENSION ORAL at 11:04

## 2025-04-02 RX ADMIN — MICONAZOLE NITRATE: 20 POWDER TOPICAL at 09:04

## 2025-04-02 RX ADMIN — Medication 1000 UNITS: at 09:04

## 2025-04-02 RX ADMIN — OXYCODONE HYDROCHLORIDE AND ACETAMINOPHEN 500 MG: 500 TABLET ORAL at 09:04

## 2025-04-02 RX ADMIN — CYANOCOBALAMIN TAB 1000 MCG 1000 MCG: 1000 TAB at 09:04

## 2025-04-02 NOTE — HOSPITAL COURSE
4/2 FM:  Patient's family at bedside, today patient is coughing and having difficulty with her breakfast.  Family states no trouble with liquids but mainly with solids and chewing.  I have done a head neck exam today and do not see any gross abnormalities.  We will have speech evaluate and do MBS.  Patient's family states she is able to take supplements at home we will order ensure.  Medications reconciled case management consulted family would like skilled nursing placement.  4/3 FM:  Patient had improved p.o. intake and has stabilized this morning.  Patient's testing noted and reviewed and I have spoken with physical therapy and speech therapy.  Speech therapy states that as long as patient's positioning is correct with eating she has no aspiration.  She also needs bite sized and a slightly modified diet.  Patient's medications all reviewed patient has seen Psychiatry notes and recommendations noted.  Awaiting skilled nursing placement.  4/4 FM:  Upon entering the room patient was lying supine attempting to drink coffee.  I have re-educated her the importance of sitting upright and aspiration and choking precautions.  Patient's family present all questions answered awaiting skilled nursing home placement and approval via the state and her insurance company.  4/5 AA, weekend crosscover, UTI, antibiotics on board, urine culture with Klebsiella oxytocia, Rocephin on board, white count trending down, afebrile, waiting on nursing home placement  4/6 AA, weekend crosscover, no acute events overnight reported, noted fluctuation blood pressure, meds adjusted, renal function stable, white count slight increased from yesterday, afebrile, continue to monitor  4/7 FM:  Patient has been drowsy somnolent and difficult to arise at times during the day per family.  We will decrease the dosing of her mirtazapine and Megace.  Otherwise we are awaiting on state approval patient states her mood is improved no changes in other  medications today.  Labs noted and reviewed.  4/8 FM:  Patient is awake alert this morning and ate breakfast with family at bedside.  Labs noted and reviewed.  Awaiting on states psychiatric determination before patient could be moved.  We will stop drawing daily labs as we wait.  4/9 FM:  Patient has developed a annular nodular rash and the palms of the hands suprapubic and inguinal area and upper buttocks.  Unsure if this is a drug eruption versus other.  We will check RPR (no recent intercourse or known exposure) and RADHA.  Patient's appetite is improved she is tolerating supplements and this morning is awake and alert.  We are awaiting state approval for skilled nursing home transfer.  4/10 FM:  Patient's daughter is at bedside, patient's mood seems to be improved she is smiling some today.  Patient is ambulating and walking with therapy.  Discussed that we are still waiting on state approval.  4/11 FM:  Continues to work with therapy, ate 100% of bfast this am, waiting on state approval.  4/12 AA, weekend crosscover, patient on prednisone therapy, RADHA positive, positive anti Sm/RNP antibody, mild leukocytosis, likely related to prednisone therapy, H&H stable, continue PT OT efforts  4/13 AA, weekend crosscover, continue prednisone therapy, H&H stable, white count now within normal limits, afebrile, hyponatremia with small improvement, decreased p.o. intake reported, gentle hydration  4/14 FM:  Events over the weekend noted.  Patient's essentially unchanged.  Patient's appetite is good her RADHA is positive will arrange outpatient rheumatology follow-up unsure if significant.  Patient has no fevers sodium has trended downward she is on fluoxetine reluctant to change dose as her mood has been good.  Continue to follow and await nursing home admission.    Discharge Note:  At the time of discharge patient was tolerating her diet her appetite improved her nourishment improved.  Patient must sit upright with p.o. intake  as there is a high-risk of aspiration if in alternate positioning.  Patient's COPD and other chronic illnesses are significant barriers but she seems to be motivated and is moving into a skilled nursing unit for further care.  We have motivated and encouraged her if the patient continues to improve she can follow-up positive RADHA and arthralgias in rheumatology clinic.  Patient's stay was prolonged because of state approval and insurance company delays.

## 2025-04-02 NOTE — PLAN OF CARE
Problem: Infection  Goal: Absence of Infection Signs and Symptoms  4/2/2025 0732 by Adrienne Castillo RN  Outcome: Progressing  4/2/2025 0615 by Adrienne Castillo RN  Outcome: Progressing     Problem: Fall Injury Risk  Goal: Absence of Fall and Fall-Related Injury  4/2/2025 0732 by Adrienne Castillo RN  Outcome: Progressing  4/2/2025 0615 by Adrienne Castillo RN  Outcome: Progressing     Problem: Pneumonia  Goal: Fluid Balance  4/2/2025 0732 by Adrienne Castillo RN  Outcome: Progressing  4/2/2025 0615 by Adrienne Castillo RN  Outcome: Progressing  Goal: Resolution of Infection Signs and Symptoms  4/2/2025 0732 by Adrienne Castillo RN  Outcome: Progressing  4/2/2025 0615 by Adrienne Castillo RN  Outcome: Progressing  Goal: Effective Oxygenation and Ventilation  4/2/2025 0732 by Adrienne Castillo RN  Outcome: Progressing  4/2/2025 0615 by Adrienne Castillo RN  Outcome: Progressing     Problem: Adult Inpatient Plan of Care  Goal: Plan of Care Review  4/2/2025 0732 by Adrienne Castillo RN  Outcome: Progressing  4/2/2025 0615 by Adrienne Castillo RN  Outcome: Progressing  Goal: Patient-Specific Goal (Individualized)  4/2/2025 0732 by Adrienne Castillo RN  Outcome: Progressing  4/2/2025 0615 by Adrienne Castillo RN  Outcome: Progressing  Goal: Absence of Hospital-Acquired Illness or Injury  4/2/2025 0732 by Adrienne Castillo RN  Outcome: Progressing  4/2/2025 0615 by Adrienne Castillo RN  Outcome: Progressing  Goal: Optimal Comfort and Wellbeing  4/2/2025 0732 by Adrienne Castillo RN  Outcome: Progressing  4/2/2025 0615 by Adrienne Castillo RN  Outcome: Progressing  Goal: Readiness for Transition of Care  4/2/2025 0732 by Adrienne Castillo RN  Outcome: Progressing  4/2/2025 0615 by Adrienne Castillo RN  Outcome: Progressing     Problem: Skin Injury Risk Increased  Goal: Skin Health and Integrity  4/2/2025 0732 by Adrienne Castillo RN  Outcome: Progressing  4/2/2025 0615 by Adrienne Castillo RN  Outcome: Progressing

## 2025-04-02 NOTE — PLAN OF CARE
Plan of care discussed with pt. And family at bedside, pt. Takes meds whole, continuous IV fluids as ordered, IV antibiotics as scheduled, able to make needs known, denies needs at this time, call bell in reach, encouraging to call for needs/assistance, will continue to monitor.

## 2025-04-02 NOTE — ASSESSMENT & PLAN NOTE
Hyponatremia is likely due to Dehydration/hypovolemia. The patient's most recent sodium results are listed below.  Recent Labs     03/31/25  1646 04/01/25  0640 04/02/25  0435   * 137 136     Plan  - Correct the sodium by 4-6mEq in 24 hours.

## 2025-04-02 NOTE — CARE UPDATE
Pt's daughter informed me that the patient is taking her home trelegy medication and that her pulmonologist Dr Ivey said she is NOT  to take budesonide (pulmicort) and trelegy together, either one or the other but he prefers her to take the trelegy.  Nurse Wharton was notified and is aware. Pharmacy was also made aware per nurse wharton

## 2025-04-02 NOTE — PROGRESS NOTES
Occupational Therapy  Evaluation Attempt    Patient Name:  Ruth Gamboa   MRN:  [id number]    Patient not seen today secondary to (P) Other (Comment) (Pt in room with MD). Will follow-up at a later time today if possible or 4/3/2025.    4/2/2025

## 2025-04-02 NOTE — PLAN OF CARE
Problem: Physical Therapy  Goal: Physical Therapy Goal  Description: Patient will increase functional independence with mobility by performin. Bed to chair transfer with Modified Independentwith or without rolling walker using Stand Pivot TECHNIQUE  2. Gait  x 150  feet with Supervision or Set-up Assistance with or without rolling walker  3. Lower extremity exercise program x10 reps per handout, with assistance as needed   Outcome: Progressing
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
## 2025-04-02 NOTE — ASSESSMENT & PLAN NOTE
Nutrition consulted. Most recent weight and BMI monitored-     Measurements:  Wt Readings from Last 1 Encounters:   04/01/25 40.6 kg (89 lb 8.1 oz)   Body mass index is 15.86 kg/m².    Patient has been screened and assessed by RD.    Malnutrition Type:  Context:    Level:      Malnutrition Characteristic Summary:       Interventions/Recommendations (treatment strategy):  -

## 2025-04-02 NOTE — NURSING
Pt. Daughter on phone with family in room, Jerri (daughter) would rather the patient be on Trelegy verses budesonide, daughter says that pt. Pulmonologist recommended the Trelegy over the budesonide, explained that Dr. Calix and group will be able to address the concern in the am when they make rounds, Respiratory therapy initially thought pt. Had taken the trelegy and was inquiring about pt. Home medication, pt. Did not receive trelegy today according to family but has received the budesonide as ordered here, explained that according to our pharmacy  that both of those medications should not be administered together and that home medications should not be taken in the hospital for possible reactions with other medications, Jerri informed me that she definitely understands that and  that patient has not taken any home medications as for as she is aware, explained that this could pose a safety risk for reactions with the patient and should be avoided, the doctor would be able to change the order in the am if they see fit, encouraging pt. And family to refrain from taking home medications in the hospital, verbalizes understanding.
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
## 2025-04-02 NOTE — ASSESSMENT & PLAN NOTE
Patient has Abnormal Magnesium: hypomagnesemia. Will continue to monitor electrolytes closely. Will replace the affected electrolytes and repeat labs to be done after interventions completed. The patient's magnesium results have been reviewed and are listed below.  Recent Labs   Lab 04/02/25  0435   MG 1.9

## 2025-04-02 NOTE — ASSESSMENT & PLAN NOTE
Patient's blood pressure range in the last 24 hours was: BP  Min: 111/57  Max: 140/64.The patient's inpatient anti-hypertensive regimen is listed below:  Current Antihypertensives  carvediloL tablet 25 mg, 2 times daily with meals, Oral  lisinopriL tablet 30 mg, Daily, Oral    Plan  - BP is controlled, no changes needed to their regimen
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
## 2025-04-02 NOTE — CONSULTS
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 HI, and no delusions  Perceptions: denies AH, denies  VH, and not RIS  Memory: Able to recall past events, Remote intact, and Recent intact  Attention:Attends to interview without distraction  Fund of Knowledge: Aware of current events and Vocabulary appropriate   Estimate if Intelligence:  Average based on work/education history, vocabulary and mental status exam  Insight: has awareness of illness  Judgment: behavior is adequate to circumstances      PSYCHOSOCIAL    PSYCHOSOCIAL STRESSORS   family and health    FUNCTIONING RELATIONSHIPS   Strained relationship with family members as documented above    STRENGTHS AND LIABILITIES   Patient is intelligent, hopeful for change. Patient has a fair support network. Patient suffers from chronic health conditions.     Is the patient aware of the biomedical complications associated with substance abuse and mental illness? yes    Does the patient have an Advance Directive for Mental Health treatment? no  (If yes, inform patient to bring copy.)        MEDICAL DECISION MAKING        ASSESSMENT       Major depressive disorder, recurrent, moderate  Bereavement  Sleep disturbance  Appetite disturbance      PROBLEM LIST AND MANAGEMENT PLANS    Major depressive disorder, recurrent, moderate  -Patient " counseled  -Discontinue wellbutrin (ineffective and may be contributing to poor appetite and irritability)  -Increase remeron to 30 mg qhs  -Increase prozac to 40 mg tomorrow    Bereavement  -Patient counseled  -Strongly recommend grief counseling if available    Sleep disturbance  -Patient counseled  -Multifactorial  -Increase remeron to 22.5 mg qhs  -Continue to encourage regular sleep/wake cycle     Appetite disturbance  -Patient counseled  -DC Wellbutrin  -Remeron off-label as above    PRESCRIPTION DRUG MANAGEMENT    Discussed diagnosis, risks and benefits of proposed treatment vs alternative treatments vs no treatment, potential side effects of these treatments and the inherent unpredictability of treatment. The patient expresses understanding of the above and displays the capacity to agree with this treatment given said understanding. Patient also agrees that, currently, the benefits outweigh the risks and would like to pursue/continue treatment at this time.    Any medications being used off-label were discussed with the patient inclusive of the evidence base for the use of the medications and consent was obtained for the off-label use of the medication.         DIAGNOSTIC TESTING  Labs reviewed with patient; follow up pending labs    Disposition:   Inpatient psychiatric hospitalization not indicated at this time.     Thank you for consulting our services. Please feel free to reach out with any additional questions/concerns.     Micheal Bajwa, KODI-BC
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
## 2025-04-02 NOTE — PLAN OF CARE
Problem: Skin Injury Risk Increased  Goal: Skin Health and Integrity  Outcome: Progressing     Problem: Adult Inpatient Plan of Care  Goal: Plan of Care Review  Outcome: Progressing  Goal: Patient-Specific Goal (Individualized)  Outcome: Progressing  Goal: Absence of Hospital-Acquired Illness or Injury  Outcome: Progressing  Goal: Optimal Comfort and Wellbeing  Outcome: Progressing  Goal: Readiness for Transition of Care  Outcome: Progressing     Problem: Pneumonia  Goal: Fluid Balance  Outcome: Progressing  Goal: Resolution of Infection Signs and Symptoms  Outcome: Progressing  Goal: Effective Oxygenation and Ventilation  Outcome: Progressing     Problem: Infection  Goal: Absence of Infection Signs and Symptoms  Outcome: Progressing     Problem: Fall Injury Risk  Goal: Absence of Fall and Fall-Related Injury  Outcome: Progressing     Problem: Wound  Goal: Optimal Coping  Outcome: Progressing  Goal: Optimal Functional Ability  Outcome: Progressing  Goal: Absence of Infection Signs and Symptoms  Outcome: Progressing  Goal: Improved Oral Intake  Outcome: Progressing  Goal: Optimal Pain Control and Function  Outcome: Progressing  Goal: Skin Health and Integrity  Outcome: Progressing  Goal: Optimal Wound Healing  Outcome: Progressing     Problem: Fatigue  Goal: Improved Activity Tolerance  Outcome: Progressing

## 2025-04-02 NOTE — ASSESSMENT & PLAN NOTE
Patient's most recent potassium results are listed below.   Recent Labs     03/31/25  1646 04/01/25  0640 04/02/25  0435   K 3.4* 3.9 4.4     Plan  - Replete potassium per protocol  - Monitor potassium Daily  - Patient's hypokalemia is improving

## 2025-04-02 NOTE — PLAN OF CARE
Problem: Occupational Therapy  Goal: Occupational Therapy Goal  Description: Goals to be met by: 04/11/25     Patient will increase functional independence with ADLs by performing:    Feeding with [place].  UE Dressing with Modified [place].  LE Dressing with Set-up Assistance.  Grooming while standing at sink with Modified [place].  Toileting from toilet with Set-up Assistance for hygiene and clothing management.   Bathing from  shower chair/bench with Supervision.  Toilet transfer to toilet with Modified [place].    Outcome: Established OT POC

## 2025-04-03 LAB
ABSOLUTE EOSINOPHIL (OHS): 0.09 K/UL
ABSOLUTE MONOCYTE (OHS): 1.01 K/UL (ref 0.3–1)
ABSOLUTE NEUTROPHIL COUNT (OHS): 10.29 K/UL (ref 1.8–7.7)
ALBUMIN SERPL BCP-MCNC: 2.1 G/DL (ref 3.5–5.2)
ALP SERPL-CCNC: 79 UNIT/L (ref 40–150)
ALT SERPL W/O P-5'-P-CCNC: <8 UNIT/L (ref 10–44)
ANION GAP (OHS): 7 MMOL/L (ref 8–16)
AST SERPL-CCNC: 12 UNIT/L (ref 11–45)
BACTERIA UR CULT: ABNORMAL
BASOPHILS # BLD AUTO: 0.04 K/UL
BASOPHILS NFR BLD AUTO: 0.3 %
BILIRUB SERPL-MCNC: 0.2 MG/DL (ref 0.1–1)
BUN SERPL-MCNC: 10 MG/DL (ref 8–23)
CALCIUM SERPL-MCNC: 8.7 MG/DL (ref 8.7–10.5)
CHLORIDE SERPL-SCNC: 105 MMOL/L (ref 95–110)
CO2 SERPL-SCNC: 25 MMOL/L (ref 23–29)
CREAT SERPL-MCNC: 0.5 MG/DL (ref 0.5–1.4)
ERYTHROCYTE [DISTWIDTH] IN BLOOD BY AUTOMATED COUNT: 21.5 % (ref 11.5–14.5)
GFR SERPLBLD CREATININE-BSD FMLA CKD-EPI: >60 ML/MIN/1.73/M2
GLUCOSE SERPL-MCNC: 123 MG/DL (ref 70–110)
HCT VFR BLD AUTO: 31.1 % (ref 37–48.5)
HGB BLD-MCNC: 9.9 GM/DL (ref 12–16)
IMM GRANULOCYTES # BLD AUTO: 0.17 K/UL (ref 0–0.04)
IMM GRANULOCYTES NFR BLD AUTO: 1.2 % (ref 0–0.5)
LYMPHOCYTES # BLD AUTO: 2.09 K/UL (ref 1–4.8)
MAGNESIUM SERPL-MCNC: 2.1 MG/DL (ref 1.6–2.6)
MCH RBC QN AUTO: 27.7 PG (ref 27–31)
MCHC RBC AUTO-ENTMCNC: 31.8 G/DL (ref 32–36)
MCV RBC AUTO: 87 FL (ref 82–98)
NUCLEATED RBC (/100WBC) (OHS): 0 /100 WBC
PLATELET # BLD AUTO: 463 K/UL (ref 150–450)
PMV BLD AUTO: 10.3 FL (ref 9.2–12.9)
POTASSIUM SERPL-SCNC: 4.7 MMOL/L (ref 3.5–5.1)
PROT SERPL-MCNC: 6.3 GM/DL (ref 6–8.4)
RBC # BLD AUTO: 3.58 M/UL (ref 4–5.4)
RELATIVE EOSINOPHIL (OHS): 0.7 %
RELATIVE LYMPHOCYTE (OHS): 15.3 % (ref 18–48)
RELATIVE MONOCYTE (OHS): 7.4 % (ref 4–15)
RELATIVE NEUTROPHIL (OHS): 75.1 % (ref 38–73)
SODIUM SERPL-SCNC: 137 MMOL/L (ref 136–145)
WBC # BLD AUTO: 13.69 K/UL (ref 3.9–12.7)

## 2025-04-03 PROCEDURE — 25000003 PHARM REV CODE 250: Performed by: PSYCHIATRY & NEUROLOGY

## 2025-04-03 PROCEDURE — 27000221 HC OXYGEN, UP TO 24 HOURS

## 2025-04-03 PROCEDURE — 11000001 HC ACUTE MED/SURG PRIVATE ROOM

## 2025-04-03 PROCEDURE — 80053 COMPREHEN METABOLIC PANEL: CPT | Performed by: INTERNAL MEDICINE

## 2025-04-03 PROCEDURE — 97535 SELF CARE MNGMENT TRAINING: CPT

## 2025-04-03 PROCEDURE — 25000242 PHARM REV CODE 250 ALT 637 W/ HCPCS: Performed by: INTERNAL MEDICINE

## 2025-04-03 PROCEDURE — 36415 COLL VENOUS BLD VENIPUNCTURE: CPT | Performed by: INTERNAL MEDICINE

## 2025-04-03 PROCEDURE — 94640 AIRWAY INHALATION TREATMENT: CPT

## 2025-04-03 PROCEDURE — 85025 COMPLETE CBC W/AUTO DIFF WBC: CPT | Performed by: INTERNAL MEDICINE

## 2025-04-03 PROCEDURE — 63600175 PHARM REV CODE 636 W HCPCS: Performed by: INTERNAL MEDICINE

## 2025-04-03 PROCEDURE — 25000003 PHARM REV CODE 250: Performed by: INTERNAL MEDICINE

## 2025-04-03 PROCEDURE — 94761 N-INVAS EAR/PLS OXIMETRY MLT: CPT

## 2025-04-03 PROCEDURE — 99900035 HC TECH TIME PER 15 MIN (STAT)

## 2025-04-03 PROCEDURE — 25000003 PHARM REV CODE 250: Performed by: NURSE PRACTITIONER

## 2025-04-03 PROCEDURE — 97530 THERAPEUTIC ACTIVITIES: CPT

## 2025-04-03 PROCEDURE — S0179 MEGESTROL 20 MG: HCPCS | Performed by: NURSE PRACTITIONER

## 2025-04-03 PROCEDURE — 83735 ASSAY OF MAGNESIUM: CPT | Performed by: INTERNAL MEDICINE

## 2025-04-03 PROCEDURE — 99900031 HC PATIENT EDUCATION (STAT)

## 2025-04-03 RX ADMIN — NYSTATIN AND TRIAMCINOLONE ACETONIDE: 100000; 1 CREAM TOPICAL at 09:04

## 2025-04-03 RX ADMIN — Medication 300 ML: at 05:04

## 2025-04-03 RX ADMIN — Medication 300 ML: at 01:04

## 2025-04-03 RX ADMIN — LEVOTHYROXINE SODIUM 25 MCG: 25 TABLET ORAL at 05:04

## 2025-04-03 RX ADMIN — BUDESONIDE 0.5 MG: 0.5 INHALANT RESPIRATORY (INHALATION) at 07:04

## 2025-04-03 RX ADMIN — SUCRALFATE 1 G: 1 SUSPENSION ORAL at 12:04

## 2025-04-03 RX ADMIN — NYSTATIN AND TRIAMCINOLONE ACETONIDE: 100000; 1 CREAM TOPICAL at 02:04

## 2025-04-03 RX ADMIN — MIRTAZAPINE 22.5 MG: 7.5 TABLET ORAL at 09:04

## 2025-04-03 RX ADMIN — CEFTRIAXONE SODIUM 1 G: 1 INJECTION, POWDER, FOR SOLUTION INTRAMUSCULAR; INTRAVENOUS at 08:04

## 2025-04-03 RX ADMIN — CARVEDILOL 25 MG: 12.5 TABLET, FILM COATED ORAL at 07:04

## 2025-04-03 RX ADMIN — FLUOXETINE HYDROCHLORIDE 40 MG: 20 CAPSULE ORAL at 08:04

## 2025-04-03 RX ADMIN — MICONAZOLE NITRATE: 20 POWDER TOPICAL at 09:04

## 2025-04-03 RX ADMIN — MEGESTROL ACETATE 200 MG: 400 SUSPENSION ORAL at 08:04

## 2025-04-03 RX ADMIN — LISINOPRIL 30 MG: 20 TABLET ORAL at 08:04

## 2025-04-03 RX ADMIN — CYANOCOBALAMIN TAB 1000 MCG 1000 MCG: 1000 TAB at 08:04

## 2025-04-03 RX ADMIN — ARFORMOTEROL TARTRATE 15 MCG: 15 SOLUTION RESPIRATORY (INHALATION) at 07:04

## 2025-04-03 RX ADMIN — OXYCODONE HYDROCHLORIDE AND ACETAMINOPHEN 500 MG: 500 TABLET ORAL at 08:04

## 2025-04-03 RX ADMIN — IPRATROPIUM BROMIDE 0.5 MG: 0.5 SOLUTION RESPIRATORY (INHALATION) at 07:04

## 2025-04-03 RX ADMIN — IPRATROPIUM BROMIDE 0.5 MG: 0.5 SOLUTION RESPIRATORY (INHALATION) at 01:04

## 2025-04-03 RX ADMIN — SUCRALFATE 1 G: 1 SUSPENSION ORAL at 05:04

## 2025-04-03 RX ADMIN — IPRATROPIUM BROMIDE 0.5 MG: 0.5 SOLUTION RESPIRATORY (INHALATION) at 08:04

## 2025-04-03 RX ADMIN — PANTOPRAZOLE SODIUM 40 MG: 40 TABLET, DELAYED RELEASE ORAL at 08:04

## 2025-04-03 RX ADMIN — Medication 1000 UNITS: at 08:04

## 2025-04-03 RX ADMIN — ARFORMOTEROL TARTRATE 15 MCG: 15 SOLUTION RESPIRATORY (INHALATION) at 08:04

## 2025-04-03 RX ADMIN — POTASSIUM BICARBONATE 25 MEQ: 977.5 TABLET, EFFERVESCENT ORAL at 09:04

## 2025-04-03 RX ADMIN — CARVEDILOL 25 MG: 12.5 TABLET, FILM COATED ORAL at 04:04

## 2025-04-03 RX ADMIN — POTASSIUM BICARBONATE 25 MEQ: 977.5 TABLET, EFFERVESCENT ORAL at 08:04

## 2025-04-03 RX ADMIN — BUDESONIDE 0.5 MG: 0.5 INHALANT RESPIRATORY (INHALATION) at 08:04

## 2025-04-03 RX ADMIN — HYDROCODONE BITARTRATE AND ACETAMINOPHEN 1 TABLET: 5; 325 TABLET ORAL at 08:04

## 2025-04-03 NOTE — NURSING
Called in room per GURJIT Montano saying that pt. Got up to toilet and now she is SOB, pt. Requesting emergency inhaler, this nurse assisted pt.back to bed and gave inhaler as ordered, pt still short of breath, O2 per nasal cannula at 2L provided, pt. Says she is feeling a lot better, assisted pt. With manjit care, depends changed, nystatin powder prn with changes to rash to groin and manjit area, mepilex  applied to sacrum for sacral excoriation, stage 2 noted to sacrum. Mepilex now in place

## 2025-04-03 NOTE — PROGRESS NOTES
Pharmacist Renal Dose Adjustment Note    Ruth Gamboa is a 80 y.o. female being treated with the medication ceftriaxone    Patient Data:    Vital Signs (Most Recent):  Temp: 98.2 °F (36.8 °C) (04/02/25 1627)  Pulse: 99 (04/02/25 1627)  Resp: 17 (04/02/25 1627)  BP: (!) 131/58 (04/02/25 1627)  SpO2: 96 % (04/02/25 1627) Vital Signs (72h Range):  Temp:  [97.9 °F (36.6 °C)-98.3 °F (36.8 °C)]   Pulse:  []   Resp:  [15-24]   BP: (108-191)/()   SpO2:  [92 %-100 %]      Recent Labs   Lab 03/31/25  1646 04/01/25  0640 04/02/25  0435   CREATININE 0.6 0.5 0.6     Serum creatinine: 0.6 mg/dL 04/02/25 0435  Estimated creatinine clearance: 48.7 mL/min    Medication:ceftriaxone dose: 1g frequency bid will be changed to medication:ceftriaxone dose:1g frequency:q24h    Pharmacist's Name: Krupa Arcos  Pharmacist's Extension:

## 2025-04-03 NOTE — PLAN OF CARE
The patient is requiring a Level II screening with the [facility]. Required documents will be sent to the Office of Behavioral Health for review.

## 2025-04-03 NOTE — PT/OT/SLP PROGRESS
"Physical Therapy      Patient Name:  Ruth Gamboa   MRN:  [id number]    Patient not seen today (second attempt for today) secondary to patient is unwilling to participate in therapy despite encouragement.  Patient was found sitting at edge of bed eating pie, family present.  She states "No therapy, not today".  Attending nurse, Divya Arauz RN made aware. . Will follow-up as appropriate. .    "

## 2025-04-03 NOTE — PT/OT/SLP PROGRESS
Physical Therapy      Patient Name:  Ruth Gamboa   MRN:  [id number]    Patient not seen today secondary to Patient unwilling to participate. Pt appeared to have anxiety (heavy breathing) . Stated she just doesn't feel good. Attempted twice.     Roxann Schwarz, PTA, CI, MS, MFDc  4/3/2025

## 2025-04-03 NOTE — PT/OT/SLP PROGRESS
Occupational Therapy   Treatment    Name: Ruth Gamboa  MRN: [id number]  Admitting Diagnosis:  UTI (urinary tract infection)       Recommendations:     Discharge Recommendations: Moderate Intensity Therapy  Discharge Equipment Recommendations:  none  Barriers to discharge:  Other (Comment) (Medical status)    Assessment:     Ruth Gamboa is a 80 y.o. female with a medical diagnosis of UTI (urinary tract infection).  She presents with functional deficits impacting independence with ADL's including functional mobility . Performance deficits affecting function are weakness, impaired endurance, impaired self care skills, impaired functional mobility, impaired balance, impaired cognition, decreased upper extremity function, decreased lower extremity function, decreased safety awareness, pain, decreased ROM, impaired cardiopulmonary response to activity, impaired joint extensibility, impaired muscle length.     Rehab Prognosis:  Fair; patient would benefit from acute skilled OT services to address these deficits and reach maximum level of function.       Plan:     Patient to be seen 4 x/week to address the above listed problems via self-care/home management, therapeutic activities, therapeutic exercises, cognitive retraining  Plan of Care Expires: 04/11/25  Plan of Care Reviewed with: patient    Subjective     Chief Complaint: generalized weakness  Patient/Family Comments/goals: Pt would like to increase her strength while regain independence with ADL's including functional mobility.  Pain/Comfort:  Pain Rating 1: 0/10  Pain Rating Post-Intervention 1: 0/10    Objective:     Communicated with: nurse prior to session.  Patient found HOB elevated with peripheral IV, PureWick upon OT entry to room.    General Precautions: Standard, fall    Orthopedic Precautions:N/A  Braces: N/A  Respiratory Status: Room air     Occupational Performance:     Bed Mobility:    Patient completed Rolling/Turning to Left with   independence  Patient completed Rolling/Turning to Right with independence  Patient completed Scooting/Bridging with independence  Patient completed Supine to Sit with modified independence  Patient completed Sit to Supine with modified independence     Functional Mobility/Transfers:  Patient completed Sit <> Stand Transfer with SBA  with  rolling walker   Patient completed Toilet Transfer Step Transfer technique with SBA with  rolling walker  Functional Mobility: Pt ambulated greater than 200' between surfaces requiring CGA-SBA utilizing RW.     Activities of Daily Living:  Lower Body Dressing: contact guard assistance        [facility] 6 Click ADL:      Treatment & Education:  Pt was cooperative and motivated with minimal verbal encouragement while exhibiting positive affect. She performed bed mobility demonstrating modified independence to independent with and without use of bedrail. Pt then participated in ADL retraining regarding LB dressing emphasizing fall prevention and use of compensatory strategies providing extra time requiring CGA for safety secondary to intermittent steadying assist or tactile cueing for technique. Pt then participated in functional transfer retraining to / from toilet emphasizing fall prevention requiring SBA secondary to verbal cueing for safety awareness and technique. Pt ambulated greater than 200' between surfaces requiring CGA-SBA utilizing RW.     Patient left HOB elevated with all lines intact, call button in reach, and nurse notified    GOALS:   Multidisciplinary Problems       Occupational Therapy Goals          Problem: Occupational Therapy    Goal Priority Disciplines Outcome Interventions   Occupational Therapy Goal     OT, PT/OT Progressing    Description: Goals to be met by: 04/11/25     Patient will increase functional independence with ADLs by performing:    Feeding with [place].  UE Dressing with Modified [place].  LE Dressing with Set-up Assistance.  Grooming  while standing at sink with Modified [place].  Toileting from toilet with Set-up Assistance for hygiene and clothing management.   Bathing from  shower chair/bench with Supervision.  Toilet transfer to toilet with Modified [place].                         DME Justifications:  No DME recommended requiring DME justifications    Time Tracking:     OT Date of Treatment: 04/03/25  OT Start Time: 1619  OT Stop Time: 1648  OT Total Time (min): 29 min    Billable Minutes:Self Care/Home Management 10  Therapeutic Activity 19    OT/SUJIT: OT          4/3/2025

## 2025-04-03 NOTE — CONSULTS
Wound care consult received.  Patient has Stage 1 to coccyx and incontinence-associated dermatitis sacrum/buttocks.  Redness to bilateral groins being treated with Miconazole powder.  Provider notified and orders obtained to cleanse perineum/buttocks 2 X daily and prn soiling with perineal cleanser, pat dry & apply Coloplast Triad Hydrophilic wound dressing.  Moisture management per female external catheter. Dietary has evaluated patient and recommendations made for Ensure Enlive ONS 3 X daily due to patient being underweight.  Continue turning every 2 hours.

## 2025-04-03 NOTE — PLAN OF CARE
Plan of care discussed with pt., SOB with exertion at times, able to make needs known, takes meds whole, new IV this am, pt. Accidentally pulled out IV, call bell in reach, encouraging to call for needs/assistance, denies needs at current, will continue to monitor.     Problem: Pneumonia  Goal: Fluid Balance  Outcome: Progressing  Goal: Resolution of Infection Signs and Symptoms  Outcome: Progressing  Goal: Effective Oxygenation and Ventilation  Outcome: Progressing     Problem: Fall Injury Risk  Goal: Absence of Fall and Fall-Related Injury  Outcome: Progressing     Problem: Infection  Goal: Absence of Infection Signs and Symptoms  Outcome: Progressing     Problem: Fatigue  Goal: Improved Activity Tolerance  Outcome: Progressing

## 2025-04-04 LAB
ABSOLUTE EOSINOPHIL (OHS): 0.12 K/UL
ABSOLUTE MONOCYTE (OHS): 0.93 K/UL (ref 0.3–1)
ABSOLUTE NEUTROPHIL COUNT (OHS): 11 K/UL (ref 1.8–7.7)
ALBUMIN SERPL BCP-MCNC: 2.1 G/DL (ref 3.5–5.2)
ALP SERPL-CCNC: 76 UNIT/L (ref 40–150)
ALT SERPL W/O P-5'-P-CCNC: <8 UNIT/L (ref 10–44)
ANION GAP (OHS): 9 MMOL/L (ref 8–16)
AST SERPL-CCNC: 13 UNIT/L (ref 11–45)
BASOPHILS # BLD AUTO: 0.07 K/UL
BASOPHILS NFR BLD AUTO: 0.5 %
BILIRUB SERPL-MCNC: 0.2 MG/DL (ref 0.1–1)
BUN SERPL-MCNC: 15 MG/DL (ref 8–23)
CALCIUM SERPL-MCNC: 9.1 MG/DL (ref 8.7–10.5)
CHLORIDE SERPL-SCNC: 102 MMOL/L (ref 95–110)
CO2 SERPL-SCNC: 25 MMOL/L (ref 23–29)
CREAT SERPL-MCNC: 0.7 MG/DL (ref 0.5–1.4)
ERYTHROCYTE [DISTWIDTH] IN BLOOD BY AUTOMATED COUNT: 24.8 % (ref 11.5–14.5)
GFR SERPLBLD CREATININE-BSD FMLA CKD-EPI: >60 ML/MIN/1.73/M2
GLUCOSE SERPL-MCNC: 103 MG/DL (ref 70–110)
HCT VFR BLD AUTO: 30.1 % (ref 37–48.5)
HGB BLD-MCNC: 10 GM/DL (ref 12–16)
IMM GRANULOCYTES # BLD AUTO: 0.18 K/UL (ref 0–0.04)
IMM GRANULOCYTES NFR BLD AUTO: 1.2 % (ref 0–0.5)
LYMPHOCYTES # BLD AUTO: 3 K/UL (ref 1–4.8)
MAGNESIUM SERPL-MCNC: 2 MG/DL (ref 1.6–2.6)
MCH RBC QN AUTO: 31.1 PG (ref 27–31)
MCHC RBC AUTO-ENTMCNC: 33.2 G/DL (ref 32–36)
MCV RBC AUTO: 94 FL (ref 82–98)
NUCLEATED RBC (/100WBC) (OHS): 0 /100 WBC
PLATELET # BLD AUTO: 479 K/UL (ref 150–450)
PLATELET BLD QL SMEAR: ABNORMAL
PMV BLD AUTO: 10 FL (ref 9.2–12.9)
POTASSIUM SERPL-SCNC: 4.7 MMOL/L (ref 3.5–5.1)
PROT SERPL-MCNC: 6.6 GM/DL (ref 6–8.4)
RBC # BLD AUTO: 3.22 M/UL (ref 4–5.4)
RELATIVE EOSINOPHIL (OHS): 0.8 %
RELATIVE LYMPHOCYTE (OHS): 19.6 % (ref 18–48)
RELATIVE MONOCYTE (OHS): 6.1 % (ref 4–15)
RELATIVE NEUTROPHIL (OHS): 71.8 % (ref 38–73)
SODIUM SERPL-SCNC: 136 MMOL/L (ref 136–145)
WBC # BLD AUTO: 15.3 K/UL (ref 3.9–12.7)

## 2025-04-04 PROCEDURE — 25000003 PHARM REV CODE 250: Performed by: NURSE PRACTITIONER

## 2025-04-04 PROCEDURE — 83735 ASSAY OF MAGNESIUM: CPT | Performed by: INTERNAL MEDICINE

## 2025-04-04 PROCEDURE — 97110 THERAPEUTIC EXERCISES: CPT

## 2025-04-04 PROCEDURE — 25000242 PHARM REV CODE 250 ALT 637 W/ HCPCS: Performed by: INTERNAL MEDICINE

## 2025-04-04 PROCEDURE — 80053 COMPREHEN METABOLIC PANEL: CPT | Performed by: INTERNAL MEDICINE

## 2025-04-04 PROCEDURE — 99900035 HC TECH TIME PER 15 MIN (STAT)

## 2025-04-04 PROCEDURE — 36415 COLL VENOUS BLD VENIPUNCTURE: CPT | Performed by: INTERNAL MEDICINE

## 2025-04-04 PROCEDURE — 11000001 HC ACUTE MED/SURG PRIVATE ROOM

## 2025-04-04 PROCEDURE — 27000221 HC OXYGEN, UP TO 24 HOURS

## 2025-04-04 PROCEDURE — 97530 THERAPEUTIC ACTIVITIES: CPT

## 2025-04-04 PROCEDURE — 94640 AIRWAY INHALATION TREATMENT: CPT

## 2025-04-04 PROCEDURE — 63600175 PHARM REV CODE 636 W HCPCS: Performed by: INTERNAL MEDICINE

## 2025-04-04 PROCEDURE — 25000003 PHARM REV CODE 250: Performed by: PSYCHIATRY & NEUROLOGY

## 2025-04-04 PROCEDURE — 85025 COMPLETE CBC W/AUTO DIFF WBC: CPT | Performed by: INTERNAL MEDICINE

## 2025-04-04 PROCEDURE — 94761 N-INVAS EAR/PLS OXIMETRY MLT: CPT

## 2025-04-04 PROCEDURE — 99900031 HC PATIENT EDUCATION (STAT)

## 2025-04-04 PROCEDURE — 97535 SELF CARE MNGMENT TRAINING: CPT

## 2025-04-04 PROCEDURE — 25000003 PHARM REV CODE 250: Performed by: INTERNAL MEDICINE

## 2025-04-04 RX ORDER — CEFTRIAXONE 1 G/1
1 INJECTION, POWDER, FOR SOLUTION INTRAMUSCULAR; INTRAVENOUS
Status: DISCONTINUED | OUTPATIENT
Start: 2025-04-04 | End: 2025-04-05

## 2025-04-04 RX ADMIN — LEVOTHYROXINE SODIUM 25 MCG: 25 TABLET ORAL at 06:04

## 2025-04-04 RX ADMIN — MIRTAZAPINE 22.5 MG: 7.5 TABLET ORAL at 08:04

## 2025-04-04 RX ADMIN — OXYCODONE HYDROCHLORIDE AND ACETAMINOPHEN 500 MG: 500 TABLET ORAL at 09:04

## 2025-04-04 RX ADMIN — ALUMINUM HYDROXIDE, MAGNESIUM HYDROXIDE, AND DIMETHICONE 30 ML: 200; 20; 200 SUSPENSION ORAL at 06:04

## 2025-04-04 RX ADMIN — Medication 1000 UNITS: at 09:04

## 2025-04-04 RX ADMIN — ARFORMOTEROL TARTRATE 15 MCG: 15 SOLUTION RESPIRATORY (INHALATION) at 07:04

## 2025-04-04 RX ADMIN — NYSTATIN AND TRIAMCINOLONE ACETONIDE: 100000; 1 CREAM TOPICAL at 03:04

## 2025-04-04 RX ADMIN — IPRATROPIUM BROMIDE 0.5 MG: 0.5 SOLUTION RESPIRATORY (INHALATION) at 08:04

## 2025-04-04 RX ADMIN — CEFTRIAXONE SODIUM 1 G: 1 INJECTION, POWDER, FOR SOLUTION INTRAMUSCULAR; INTRAVENOUS at 09:04

## 2025-04-04 RX ADMIN — NYSTATIN AND TRIAMCINOLONE ACETONIDE: 100000; 1 CREAM TOPICAL at 08:04

## 2025-04-04 RX ADMIN — MICONAZOLE NITRATE: 20 POWDER TOPICAL at 08:04

## 2025-04-04 RX ADMIN — FLUOXETINE HYDROCHLORIDE 40 MG: 20 CAPSULE ORAL at 09:04

## 2025-04-04 RX ADMIN — LISINOPRIL 30 MG: 20 TABLET ORAL at 09:04

## 2025-04-04 RX ADMIN — PANTOPRAZOLE SODIUM 40 MG: 40 TABLET, DELAYED RELEASE ORAL at 09:04

## 2025-04-04 RX ADMIN — CYANOCOBALAMIN TAB 1000 MCG 1000 MCG: 1000 TAB at 09:04

## 2025-04-04 RX ADMIN — BUDESONIDE 0.5 MG: 0.5 INHALANT RESPIRATORY (INHALATION) at 08:04

## 2025-04-04 RX ADMIN — IPRATROPIUM BROMIDE 0.5 MG: 0.5 SOLUTION RESPIRATORY (INHALATION) at 01:04

## 2025-04-04 RX ADMIN — ALUMINUM HYDROXIDE, MAGNESIUM HYDROXIDE, AND DIMETHICONE 30 ML: 200; 20; 200 SUSPENSION ORAL at 08:04

## 2025-04-04 RX ADMIN — BUDESONIDE 0.5 MG: 0.5 INHALANT RESPIRATORY (INHALATION) at 07:04

## 2025-04-04 RX ADMIN — SUCRALFATE 1 G: 1 SUSPENSION ORAL at 06:04

## 2025-04-04 RX ADMIN — FERROUS SULFATE TAB 325 MG (65 MG ELEMENTAL FE) 1 EACH: 325 (65 FE) TAB at 09:04

## 2025-04-04 RX ADMIN — IPRATROPIUM BROMIDE 0.5 MG: 0.5 SOLUTION RESPIRATORY (INHALATION) at 07:04

## 2025-04-04 RX ADMIN — MICONAZOLE NITRATE: 20 POWDER TOPICAL at 10:04

## 2025-04-04 RX ADMIN — ARFORMOTEROL TARTRATE 15 MCG: 15 SOLUTION RESPIRATORY (INHALATION) at 08:04

## 2025-04-04 RX ADMIN — NYSTATIN AND TRIAMCINOLONE ACETONIDE: 100000; 1 CREAM TOPICAL at 10:04

## 2025-04-04 RX ADMIN — CARVEDILOL 25 MG: 12.5 TABLET, FILM COATED ORAL at 09:04

## 2025-04-04 RX ADMIN — POTASSIUM BICARBONATE 25 MEQ: 977.5 TABLET, EFFERVESCENT ORAL at 09:04

## 2025-04-04 NOTE — PLAN OF CARE
Problem: Occupational Therapy  Goal: Occupational Therapy Goal  Description: Goals to be met by: 04/11/25     Patient will increase functional independence with ADLs by performing:    Feeding with [place].  UE Dressing with Modified [place].  LE Dressing with Set-up Assistance.  Grooming while standing at sink with Modified [place].  Toileting from toilet with Set-up Assistance for hygiene and clothing management.   Bathing from  shower chair/bench with Supervision.  Toilet transfer to toilet with Modified [place].    Outcome: Progressing

## 2025-04-04 NOTE — ASSESSMENT & PLAN NOTE
Hyponatremia is likely due to Dehydration/hypovolemia. The patient's most recent sodium results are listed below.  Recent Labs     04/02/25  0435 04/03/25  0324 04/04/25  0332    137 136     Plan  - Correct the sodium by 4-6mEq in 24 hours.

## 2025-04-04 NOTE — PLAN OF CARE
Plan of care discussed with pt. And family at bedside, up with assistance, nystatin and barrier cream applied with incontinent diaper changes, fungal rash to perineum, takes meds whole, denies pain, no SOB at this time, becomes SOB with exertion at times, turns self in bed, denies needs, call bell in reach, encouraging to call for needs/assistance, will continue to monitor.      Problem: Pneumonia  Goal: Fluid Balance  Outcome: Progressing  Goal: Resolution of Infection Signs and Symptoms  Outcome: Progressing  Goal: Effective Oxygenation and Ventilation  Outcome: Progressing     Problem: Skin Injury Risk Increased  Goal: Skin Health and Integrity  Outcome: Progressing     Problem: Infection  Goal: Absence of Infection Signs and Symptoms  Outcome: Progressing     Problem: Fall Injury Risk  Goal: Absence of Fall and Fall-Related Injury  Outcome: Progressing     Problem: Wound  Goal: Optimal Coping  Outcome: Progressing  Goal: Optimal Functional Ability  Outcome: Progressing  Goal: Absence of Infection Signs and Symptoms  Outcome: Progressing  Goal: Improved Oral Intake  Outcome: Progressing  Goal: Optimal Pain Control and Function  Outcome: Progressing  Goal: Skin Health and Integrity  Outcome: Progressing  Goal: Optimal Wound Healing  Outcome: Progressing     Problem: Fatigue  Goal: Improved Activity Tolerance  Outcome: Progressing

## 2025-04-04 NOTE — PLAN OF CARE
Recommendations  1. Continue regular diet as tolerated.     2. Ensure Enlive ONS or alternative TID.    3. Rec'd Moshe BID to promote wound healing and to provide additional nutrtiion.   4. RD to monitor and follow up.  Goals:   1. Meet % EEN/EPN by RD follow up.   2. Maintain weight during admission.  Nutrition Goal Status: progressing towards goal

## 2025-04-04 NOTE — PT/OT/SLP PROGRESS
Physical Therapy      Patient Name:  Rtuh Gamboa   MRN:  [id number]    Patient not seen today secondary to Patient unwilling to participate. Offered to walk outdoors on two different times,  but patient wanted to stay in bed. Her friend strongly encouraged her to go but she declined.       Roxann Schwarz, PTA, CI, MS, MFDc

## 2025-04-04 NOTE — ASSESSMENT & PLAN NOTE
Nutrition consulted. Most recent weight and BMI monitored-     Measurements:  Wt Readings from Last 1 Encounters:   04/01/25 40.6 kg (89 lb 8.1 oz)   Body mass index is 15.86 kg/m².    Patient has been screened and assessed by RD.    Malnutrition Type:  Context:    Level:      Malnutrition Characteristic Summary:       Interventions/Recommendations (treatment strategy):  1. Continue regular diet as tolerated.   2. Ensure Enlive ONS or alternative TID.  3. Rec'd Moshe BID to promote wound healing and to provide additional nutrtiion. 4. RD to monitor and follow up.

## 2025-04-04 NOTE — ASSESSMENT & PLAN NOTE
Patient has Abnormal Magnesium: hypomagnesemia. Will continue to monitor electrolytes closely. Will replace the affected electrolytes and repeat labs to be done after interventions completed. The patient's magnesium results have been reviewed and are listed below.  Recent Labs   Lab 04/04/25  0332   MG 2.0

## 2025-04-04 NOTE — PT/OT/SLP PROGRESS
Occupational Therapy   Treatment    Name: Ruth Gamboa  MRN: [id number]  Admitting Diagnosis:  UTI (urinary tract infection)       Recommendations:     Discharge Recommendations: Moderate Intensity Therapy  Discharge Equipment Recommendations:  none  Barriers to discharge:  Other (Comment) (Medical status)    Assessment:     Ruth Gamboa is a 80 y.o. female with a medical diagnosis of UTI (urinary tract infection).  She presents with improved functional performance with LB dressing as noted by supervision for safety. Performance deficits affecting function are weakness, impaired endurance, impaired self care skills, impaired functional mobility, impaired balance, impaired cognition, decreased upper extremity function, decreased lower extremity function, decreased safety awareness, pain, decreased ROM, impaired cardiopulmonary response to activity, impaired joint extensibility, impaired muscle length.     Rehab Prognosis:  Fair; patient would benefit from acute skilled OT services to address these deficits and reach maximum level of function.       Plan:     Patient to be seen 4 x/week to address the above listed problems via self-care/home management, therapeutic activities, therapeutic exercises, cognitive retraining  Plan of Care Expires: 04/11/25  Plan of Care Reviewed with: patient    Subjective     Chief Complaint: generalized weakness   Patient/Family Comments/goals: Pt would like to increase her strength while regain independence with ADL's including functional mobility.   Pain/Comfort:  Pain Rating 1: 0/10  Pain Rating Post-Intervention 1: 0/10    Objective:     Communicated with: nurse prior to session.  Patient found HOB elevated with peripheral IV, PureWick upon OT entry to room.    General Precautions: Standard, fall    Orthopedic Precautions:N/A  Braces: N/A  Respiratory Status: Room air     Occupational Performance:     Bed Mobility:    Patient completed Rolling/Turning to Left with  modified  independence  Patient completed Rolling/Turning to Right with modified independence  Patient completed Scooting/Bridging with modified independence  Patient completed Supine to Sit with modified independence  Patient completed Sit to Supine with modified independence     Functional Mobility/Transfers:  Patient completed Sit <> Stand Transfer with stand by assistance and contact guard assistance  with  rolling walker   Patient completed Toilet Transfer Step Transfer technique with stand by assistance and contact guard assistance with  grab bars  Functional Mobility: Pt ambulated 165' between surfaces requiring CGA utilizing RW.     Activities of Daily Living:  Lower Body Dressing: supervision        [facility] 6 Click ADL:      Treatment & Education:  Pt was cooperative and motivated with increased verbal encouragement while reporting increased fatigue. She performed bed mobility demonstrating modified independence secondary to use of bedrail. Next, she participated in ADL retraining regarding LB dressing emphasizing fall prevention and use of compensatory strategies providing extra time requiring supervision for safety secondary to verbal cueing for safety and technique. Pt then participated in functional transfer retraining to / from toilet emphasizing fall prevention requiring supervision secondary to min verbal cues for technique while seated EOB unsupported. Pt ambulated 165' between surfaces requiring CGA utilizing RW.     Patient left sitting edge of bed with all lines intact, call button in reach, and nurse notified    GOALS:   Multidisciplinary Problems       Occupational Therapy Goals          Problem: Occupational Therapy    Goal Priority Disciplines Outcome Interventions   Occupational Therapy Goal     OT, PT/OT Progressing    Description: Goals to be met by: 04/11/25     Patient will increase functional independence with ADLs by performing:    Feeding with [place].  UE Dressing with Modified  [place].  LE Dressing with Set-up Assistance.  Grooming while standing at sink with Modified [place].  Toileting from toilet with Set-up Assistance for hygiene and clothing management.   Bathing from  shower chair/bench with Supervision.  Toilet transfer to toilet with Modified [place].                         DME Justifications:  No DME recommended requiring DME justifications    Time Tracking:     OT Date of Treatment: 04/04/25  OT Start Time: 1145  OT Stop Time: 1217  OT Total Time (min): 32 min    Billable Minutes:Self Care/Home Management 10  Therapeutic Activity 22    OT/SUJIT: OT          4/4/2025

## 2025-04-04 NOTE — ASSESSMENT & PLAN NOTE
Nutrition consulted. Most recent weight and BMI monitored-     Measurements:  Wt Readings from Last 1 Encounters:   04/01/25 40.6 kg (89 lb 8.1 oz)   Body mass index is 15.86 kg/m².    Patient has been screened and assessed by RD.    Malnutrition Type:  Context:    Level:      Malnutrition Characteristic Summary:       Interventions/Recommendations (treatment strategy):  1. Continue regular diet as tolerated.   2. Ensure Enlive ONS or alternative TID.  3. Rec'd Moshe BID to promote wound healing and to provide additional nutrtiion. 4. RD to monitor and follow up.    4/4 FM:  PO intake improved with txt.
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
## 2025-04-04 NOTE — ASSESSMENT & PLAN NOTE
Patient's blood pressure range in the last 24 hours was: BP  Min: 113/57  Max: 152/69.The patient's inpatient anti-hypertensive regimen is listed below:  Current Antihypertensives  carvediloL tablet 25 mg, 2 times daily with meals, Oral  lisinopriL tablet 30 mg, Daily, Oral    Plan  - BP is controlled, no changes needed to their regimen
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
## 2025-04-04 NOTE — PT/OT/SLP PROGRESS
"Physical Therapy Treatment    Patient Name:  Ruth Gamboa   MRN:  [id number]    Recommendations:     Discharge Recommendations: Low Intensity Therapy  Discharge Equipment Recommendations: none  Barriers to discharge: None    Assessment:     Ruth Gamboa is a 80 y.o. female admitted with a medical diagnosis of UTI (urinary tract infection).  She presents with the following impairments/functional limitations: weakness, impaired functional mobility, decreased lower extremity function, decreased upper extremity function, impaired cardiopulmonary response to activity, impaired balance, impaired endurance, impaired muscle length Patient agreed to participate in therapy this session with encouragement.  She is modified independent with supine <> sit, independent with sit <> stand with no A.D.; ambulated ~35 feet x 2 trials with SBA on room air, no A.D. ; noted wheezing. Patient asked for her inhaler in between walks and  attending nurse gave the patient her inhaler and wheezing decrease tremendously.  Awaiting nursing home placement.     Rehab Prognosis: Fair; patient would benefit from acute skilled PT services to address these deficits and reach maximum level of function.    Recent Surgery: * No surgery found *      Plan:     During this hospitalization, patient to be seen  (3-5x a week) to address the identified rehab impairments via gait training, therapeutic activities, therapeutic exercises, neuromuscular re-education and progress toward the following goals:    Plan of Care Expires:  04/11/25    Subjective     Chief Complaint: "I guess I have to walk."   Patient/Family Comments/goals: unstated   Pain/Comfort:  Pain Rating 1: 0/10  Pain Rating Post-Intervention 1: 0/10      Objective:     Communicated with nurse and patient  prior to session.  Patient found right sidelying with peripheral IV, PureWick upon PT entry to room.     General Precautions: Standard, fall, respiratory  Orthopedic Precautions: N/A  Braces: " N/A  Respiratory Status: Room air     Functional Mobility:  Bed Mobility:     Rolling Left:  independence  Rolling Right: independence  Scooting: independence  Bridging: independence  Supine to Sit: independence  Sit to Supine: independence  Transfers:     Sit to Stand:  independence with no AD  Gait:  ~35 feet x 2 trials with SBA on room air, no A.D. ; noted wheezing., sitting rest breaks in between walks   Balance: independent with static sitting, SBA with static standing with no A.D.       AM-PAC 6 CLICK MOBILITY  Turning over in bed (including adjusting bedclothes, sheets and blankets)?: 4  Sitting down on and standing up from a chair with arms (e.g., wheelchair, bedside commode, etc.): 4  Moving from lying on back to sitting on the side of the bed?: 4  Moving to and from a bed to a chair (including a wheelchair)?: 3  Need to walk in hospital room?: 3  Climbing 3-5 steps with a railing?: 3 (based on clinical judgement)  Basic Mobility Total Score: 21       Treatment & Education:  Rolling side <> side  Supine <> sit  Scooting to the edge  of the bed   Sitting balance/tolerance  Sit <> stand with no A.D.   Standing balance/tolerance   Out of bed   Gait with no A.D.   Bed organization to reduce risk of skin breakdowns       Patient left sitting edge of bed with call button in reach and nurse  notified..    GOALS:   Multidisciplinary Problems       Physical Therapy Goals          Problem: Physical Therapy    Goal Priority Disciplines Outcome Interventions   Physical Therapy Goal     PT, PT/OT Progressing    Description: Goals to be met by 2025   Patient will increase functional independence with mobility by performin. Bed to chair transfer with Modified Independent with or without rolling walker using Stand step TECHNIQUE  2. Gait  x 150  feet with Supervision or Set-up Assistance with or without rolling walker  3. Lower extremity exercise program x10 reps with assistance as needed.                         DME Justifications:  No DME recommended requiring DME justifications    Time Tracking:     PT Received On: 04/04/25  PT Start Time: 1445     PT Stop Time: 1508  PT Total Time (min): 23 min     Billable Minutes: Therapeutic Activity 15 and Therapeutic Exercise 8    Treatment Type: Treatment  PT/PTA: PT           04/04/2025
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
## 2025-04-04 NOTE — PLAN OF CARE
Problem: Physical Therapy  Goal: Physical Therapy Goal  Description: Goals to be met by 2025   Patient will increase functional independence with mobility by performin. Bed to chair transfer with Modified Independent with or without rolling walker using Stand step TECHNIQUE  2. Gait  x 150  feet with Supervision or Set-up Assistance with or without rolling walker  3. Lower extremity exercise program x10 reps with assistance as needed.   Outcome: Progressing, awaiting nursing home placement

## 2025-04-04 NOTE — ASSESSMENT & PLAN NOTE
Patient's most recent potassium results are listed below.   Recent Labs     04/02/25  0435 04/03/25  0324 04/04/25  0332   K 4.4 4.7 4.7     Plan  - Replete potassium per protocol  - Monitor potassium Daily  - Patient's hypokalemia is improving

## 2025-04-04 NOTE — PLAN OF CARE
Plan of care reviewed with the patient and family. Patient able to work with PT/OT during shift. Patient able to eat food/snacks brought by family.

## 2025-04-04 NOTE — PROGRESS NOTES
[facility] Surg  Adult Nutrition  Progress Note    SUMMARY       Recommendations  1. Continue regular diet as tolerated.     2. Ensure Enlive ONS or alternative TID.    3. Rec'd Moshe BID to promote wound healing and to provide additional nutrtiion.   4. RD to monitor and follow up.  Goals:   1. Meet % EEN/EPN by RD follow up.   2. Maintain weight during admission.  Nutrition Goal Status: progressing towards goal  Communication of RD Recs:  (POC)    Nutrition Discharge Planning    Nutrition Discharge Planning: Too early to determine, pending clinical course    Assessment and Plan    Nutrition Problem  Underweight     Related to (etiology):   Increased nutrient needs 2/2 COPD     Signs and Symptoms (as evidenced by):   Estimated intake of nutrients < estimated nutrient needs, BMI 15.86 kg/m2      Interventions/Recommendations (treatment strategy):  Collaboration of nutrition care with other providers  ONS TID     Nutrition Diagnosis Status:   Continues, Improving       Malnutrition Assessment  NFPE not appropriate at this time. Pt refused consent.    Nutrition-Related Social Determinants of Health:  Unable to assess.    Reason for Assessment    Reason For Assessment: RD follow-up  Diagnosis:  (UTI)  General Information Comments: Followed up on pt this morning. Pt was pretending to sleep. Participated minimally in answering questions. Pt had a famiily member/friend at bedside. Pt with a cherry pie, chocolate sprinkled covered donut, and chocolate cake on bedside table, all mostly eaten. Pt with a good appetite. NFPE not appropriate at this time. Pt refused consent. RD to follow and make rec's accordingly.    Nutrition/Diet History    Nutrition Intake History: Decreased appetite PTA, 1 Boost & Ensure per day  Food Preferences: None  Spiritual, Cultural Beliefs, Buddhist Practices, Values that Affect Care: no  Food Allergies: NKFA  Factors Affecting Nutritional Intake: None identified at this  "time    Anthropometrics    Height: 5' 3" (160 cm)  Height (inches): 63 in  Height Method: Stated  Weight: 40.6 kg (89 lb 8.1 oz)  Weight (lb): 89.51 lb  Weight Method: Bed Scale  Ideal Body Weight (IBW), Female: 115 lb  % Ideal Body Weight, Female (lb): 77.83 %  BMI (Calculated): 15.9  BMI Grade: less than 23 (older than 65 years) - underweight    Lab/Procedures/Meds    Pertinent Labs Reviewed: reviewed  Pertinent Labs Comments: Calcium 8.3  Pertinent Medications Reviewed: reviewed    Estimated/Assessed Needs    Weight Used For Calorie Calculations: 40.6 kg (89 lb 8.1 oz)  Energy Calorie Requirements (kcal): 4702-8366  Energy Need Method: Kcal/kg (30-35 kcal/kg ABW)  Protein Requirements: 61-81 (1.5-2 g/kg ABW)  Weight Used For Protein Calculations: 40.6 kg (89 lb 8.1 oz)     Estimated Fluid Requirement Method: RDA Method  RDA Method (mL): 1218    Nutrition Prescription Ordered    Current Diet Order: Regular diet  Oral Nutrition Supplement: Ensure Enlive (TID)    Evaluation of Received Nutrient/Fluid Intake    I/O: -500  Comments: LBM unspecified  % Intake of Estimated Energy Needs: 25 - 50 %  % Meal Intake: 25 - 50 %    Nutrition Risk    Level of Risk/Frequency of Follow-up: high     Monitor and Evaluation    Monitor and Evaluation: Energy intake, Food and beverage intake, Protein intake, Weight, Nutrition focused physical findings     Nutrition Follow-Up    RD Follow-up?: Yes      "

## 2025-04-05 LAB
ABSOLUTE EOSINOPHIL (OHS): 0.14 K/UL
ABSOLUTE MONOCYTE (OHS): 0.99 K/UL (ref 0.3–1)
ABSOLUTE NEUTROPHIL COUNT (OHS): 10.55 K/UL (ref 1.8–7.7)
ALBUMIN SERPL BCP-MCNC: 2 G/DL (ref 3.5–5.2)
ALP SERPL-CCNC: 73 UNIT/L (ref 40–150)
ALT SERPL W/O P-5'-P-CCNC: <8 UNIT/L (ref 10–44)
ANION GAP (OHS): 9 MMOL/L (ref 8–16)
AST SERPL-CCNC: 14 UNIT/L (ref 11–45)
BASOPHILS # BLD AUTO: 0.07 K/UL
BASOPHILS NFR BLD AUTO: 0.5 %
BILIRUB SERPL-MCNC: 0.1 MG/DL (ref 0.1–1)
BUN SERPL-MCNC: 15 MG/DL (ref 8–23)
CALCIUM SERPL-MCNC: 9 MG/DL (ref 8.7–10.5)
CHLORIDE SERPL-SCNC: 99 MMOL/L (ref 95–110)
CO2 SERPL-SCNC: 25 MMOL/L (ref 23–29)
CREAT SERPL-MCNC: 0.5 MG/DL (ref 0.5–1.4)
ERYTHROCYTE [DISTWIDTH] IN BLOOD BY AUTOMATED COUNT: 21.3 % (ref 11.5–14.5)
GFR SERPLBLD CREATININE-BSD FMLA CKD-EPI: >60 ML/MIN/1.73/M2
GLUCOSE SERPL-MCNC: 115 MG/DL (ref 70–110)
HCT VFR BLD AUTO: 29.8 % (ref 37–48.5)
HGB BLD-MCNC: 9.5 GM/DL (ref 12–16)
IMM GRANULOCYTES # BLD AUTO: 0.21 K/UL (ref 0–0.04)
IMM GRANULOCYTES NFR BLD AUTO: 1.5 % (ref 0–0.5)
LYMPHOCYTES # BLD AUTO: 2.43 K/UL (ref 1–4.8)
MAGNESIUM SERPL-MCNC: 1.8 MG/DL (ref 1.6–2.6)
MCH RBC QN AUTO: 27.5 PG (ref 27–31)
MCHC RBC AUTO-ENTMCNC: 31.9 G/DL (ref 32–36)
MCV RBC AUTO: 86 FL (ref 82–98)
NUCLEATED RBC (/100WBC) (OHS): 0 /100 WBC
PLATELET # BLD AUTO: 508 K/UL (ref 150–450)
PMV BLD AUTO: 10.4 FL (ref 9.2–12.9)
POTASSIUM SERPL-SCNC: 4.5 MMOL/L (ref 3.5–5.1)
PROT SERPL-MCNC: 6.2 GM/DL (ref 6–8.4)
RBC # BLD AUTO: 3.46 M/UL (ref 4–5.4)
RELATIVE EOSINOPHIL (OHS): 1 %
RELATIVE LYMPHOCYTE (OHS): 16.9 % (ref 18–48)
RELATIVE MONOCYTE (OHS): 6.9 % (ref 4–15)
RELATIVE NEUTROPHIL (OHS): 73.2 % (ref 38–73)
SODIUM SERPL-SCNC: 133 MMOL/L (ref 136–145)
WBC # BLD AUTO: 14.39 K/UL (ref 3.9–12.7)

## 2025-04-05 PROCEDURE — 63600175 PHARM REV CODE 636 W HCPCS: Performed by: INTERNAL MEDICINE

## 2025-04-05 PROCEDURE — 99900031 HC PATIENT EDUCATION (STAT)

## 2025-04-05 PROCEDURE — 25000003 PHARM REV CODE 250: Performed by: INTERNAL MEDICINE

## 2025-04-05 PROCEDURE — 25000003 PHARM REV CODE 250: Performed by: PSYCHIATRY & NEUROLOGY

## 2025-04-05 PROCEDURE — 83735 ASSAY OF MAGNESIUM: CPT | Performed by: INTERNAL MEDICINE

## 2025-04-05 PROCEDURE — 36415 COLL VENOUS BLD VENIPUNCTURE: CPT | Performed by: INTERNAL MEDICINE

## 2025-04-05 PROCEDURE — 80053 COMPREHEN METABOLIC PANEL: CPT | Performed by: INTERNAL MEDICINE

## 2025-04-05 PROCEDURE — S0179 MEGESTROL 20 MG: HCPCS | Performed by: NURSE PRACTITIONER

## 2025-04-05 PROCEDURE — 99900035 HC TECH TIME PER 15 MIN (STAT)

## 2025-04-05 PROCEDURE — 25000242 PHARM REV CODE 250 ALT 637 W/ HCPCS: Performed by: INTERNAL MEDICINE

## 2025-04-05 PROCEDURE — 11000001 HC ACUTE MED/SURG PRIVATE ROOM

## 2025-04-05 PROCEDURE — 25000003 PHARM REV CODE 250: Performed by: NURSE PRACTITIONER

## 2025-04-05 PROCEDURE — 94640 AIRWAY INHALATION TREATMENT: CPT

## 2025-04-05 PROCEDURE — 94761 N-INVAS EAR/PLS OXIMETRY MLT: CPT

## 2025-04-05 PROCEDURE — 85025 COMPLETE CBC W/AUTO DIFF WBC: CPT | Performed by: INTERNAL MEDICINE

## 2025-04-05 RX ORDER — CEFTRIAXONE 1 G/1
1 INJECTION, POWDER, FOR SOLUTION INTRAMUSCULAR; INTRAVENOUS
Status: COMPLETED | OUTPATIENT
Start: 2025-04-05 | End: 2025-04-06

## 2025-04-05 RX ADMIN — ALUMINUM HYDROXIDE, MAGNESIUM HYDROXIDE, AND DIMETHICONE 30 ML: 200; 20; 200 SUSPENSION ORAL at 08:04

## 2025-04-05 RX ADMIN — POTASSIUM BICARBONATE 25 MEQ: 977.5 TABLET, EFFERVESCENT ORAL at 09:04

## 2025-04-05 RX ADMIN — CYANOCOBALAMIN TAB 1000 MCG 1000 MCG: 1000 TAB at 10:04

## 2025-04-05 RX ADMIN — OXYCODONE HYDROCHLORIDE AND ACETAMINOPHEN 500 MG: 500 TABLET ORAL at 10:04

## 2025-04-05 RX ADMIN — NYSTATIN AND TRIAMCINOLONE ACETONIDE: 100000; 1 CREAM TOPICAL at 09:04

## 2025-04-05 RX ADMIN — MICONAZOLE NITRATE: 20 POWDER TOPICAL at 09:04

## 2025-04-05 RX ADMIN — Medication 1000 UNITS: at 10:04

## 2025-04-05 RX ADMIN — BUDESONIDE 0.5 MG: 0.5 INHALANT RESPIRATORY (INHALATION) at 07:04

## 2025-04-05 RX ADMIN — POTASSIUM BICARBONATE 25 MEQ: 977.5 TABLET, EFFERVESCENT ORAL at 08:04

## 2025-04-05 RX ADMIN — NYSTATIN AND TRIAMCINOLONE ACETONIDE: 100000; 1 CREAM TOPICAL at 08:04

## 2025-04-05 RX ADMIN — Medication 300 ML: at 09:04

## 2025-04-05 RX ADMIN — NYSTATIN AND TRIAMCINOLONE ACETONIDE: 100000; 1 CREAM TOPICAL at 03:04

## 2025-04-05 RX ADMIN — ARFORMOTEROL TARTRATE 15 MCG: 15 SOLUTION RESPIRATORY (INHALATION) at 07:04

## 2025-04-05 RX ADMIN — IPRATROPIUM BROMIDE 0.5 MG: 0.5 SOLUTION RESPIRATORY (INHALATION) at 01:04

## 2025-04-05 RX ADMIN — CARVEDILOL 25 MG: 12.5 TABLET, FILM COATED ORAL at 10:04

## 2025-04-05 RX ADMIN — IPRATROPIUM BROMIDE 0.5 MG: 0.5 SOLUTION RESPIRATORY (INHALATION) at 07:04

## 2025-04-05 RX ADMIN — MICONAZOLE NITRATE: 20 POWDER TOPICAL at 08:04

## 2025-04-05 RX ADMIN — MEGESTROL ACETATE 200 MG: 400 SUSPENSION ORAL at 09:04

## 2025-04-05 RX ADMIN — FLUOXETINE HYDROCHLORIDE 40 MG: 20 CAPSULE ORAL at 10:04

## 2025-04-05 RX ADMIN — LISINOPRIL 30 MG: 20 TABLET ORAL at 10:04

## 2025-04-05 RX ADMIN — MIRTAZAPINE 22.5 MG: 7.5 TABLET ORAL at 08:04

## 2025-04-05 RX ADMIN — CEFTRIAXONE SODIUM 1 G: 1 INJECTION, POWDER, FOR SOLUTION INTRAMUSCULAR; INTRAVENOUS at 09:04

## 2025-04-05 RX ADMIN — PANTOPRAZOLE SODIUM 40 MG: 40 TABLET, DELAYED RELEASE ORAL at 10:04

## 2025-04-05 NOTE — PLAN OF CARE
Plan of care reviewed with the patient. Patient able to ambulate to bedside commode with assist, bed alarm on when daughter is not in the room.

## 2025-04-05 NOTE — SUBJECTIVE & OBJECTIVE
Interval History: Patient seen and examined.    Review of Systems   Unable to perform ROS: Acuity of condition     Objective:     Vital Signs (Most Recent):  Temp: 97.9 °F (36.6 °C) (04/05/25 1122)  Pulse: 78 (04/05/25 1347)  Resp: 20 (04/05/25 1347)  BP: 130/65 (04/05/25 1122)  SpO2: (!) 92 % (04/05/25 1347) Vital Signs (24h Range):  Temp:  [97.9 °F (36.6 °C)-98.6 °F (37 °C)] 97.9 °F (36.6 °C)  Pulse:  [78-99] 78  Resp:  [14-32] 20  SpO2:  [90 %-98 %] 92 %  BP: (105-152)/(56-72) 130/65     Weight: 40.6 kg (89 lb 8.1 oz)  Body mass index is 15.86 kg/m².    Intake/Output Summary (Last 24 hours) at 4/5/2025 1527  Last data filed at 4/5/2025 1221  Gross per 24 hour   Intake 960 ml   Output 600 ml   Net 360 ml         Physical Exam  Vitals and nursing note reviewed.   Constitutional:       General: She is not in acute distress.     Appearance: She is ill-appearing. She is not toxic-appearing.   HENT:      Head: Atraumatic.      Comments: Temporal wasting     Right Ear: External ear normal.      Left Ear: External ear normal.      Nose: No congestion or rhinorrhea.      Mouth/Throat:      Mouth: Mucous membranes are moist.      Pharynx: No oropharyngeal exudate.   Eyes:      General: No scleral icterus.     Extraocular Movements: Extraocular movements intact.   Neck:      Vascular: No carotid bruit.   Cardiovascular:      Rate and Rhythm: Normal rate and regular rhythm.      Heart sounds: No murmur heard.  Pulmonary:      Effort: No respiratory distress.      Breath sounds: No wheezing, rhonchi or rales.   Abdominal:      General: Bowel sounds are normal. There is no distension.      Palpations: Abdomen is soft.      Tenderness: There is no abdominal tenderness. There is no guarding or rebound.   Musculoskeletal:      Cervical back: No rigidity.      Right lower leg: No edema.      Left lower leg: No edema.      Comments: Thin, malnurished   Lymphadenopathy:      Cervical: No cervical adenopathy.   Skin:     Capillary  Refill: Capillary refill takes less than 2 seconds.      Coloration: Skin is not jaundiced or pale.   Neurological:      Motor: Weakness present.   Psychiatric:      Comments: Flat, depressed               Significant Labs: All pertinent labs within the past 24 hours have been reviewed.  Recent Lab Results         04/05/25  0516        Albumin 2.0       ALP 73       ALT <8       Anion Gap 9       AST 14       Baso # 0.07       Basophil % 0.5       BILIRUBIN TOTAL 0.1  Comment: For infants and newborns, interpretation of results should be based   on gestational age, weight and in agreement with clinical   observations.    Premature Infant recommended reference ranges:   0-24 hours:  <8.0 mg/dL   24-48 hours: <12.0 mg/dL   3-5 days:    <15.0 mg/dL   6-29 days:   <15.0 mg/dL       BUN 15       Calcium 9.0       Chloride 99       CO2 25       Creatinine 0.5       eGFR >60  Comment: Estimated GFR calculated using the CKD-EPI creatinine (2021) equation.       Eos # 0.14       Eos % 1.0       Glucose 115       Gran # (ANC) 10.55       Hematocrit 29.8       Hemoglobin 9.5       Immature Grans (Abs) 0.21  Comment: Mild elevation in immature granulocytes is non specific and can be seen in a variety of conditions including stress response, acute inflammation, trauma and pregnancy. Correlation with other laboratory and clinical findings is essential.       Immature Granulocytes 1.5       Lymph # 2.43       Lymph % 16.9       Magnesium  1.8       MCH 27.5       MCHC 31.9       MCV 86       Mono # 0.99       Mono % 6.9       MPV 10.4       Neut % 73.2       nRBC 0       Platelet Count 508       Potassium 4.5       PROTEIN TOTAL 6.2       RBC 3.46       RDW 21.3       Sodium 133       WBC 14.39               Significant Imaging: I have reviewed all pertinent imaging results/findings within the past 24 hours.

## 2025-04-05 NOTE — ASSESSMENT & PLAN NOTE
Abx, CS pending.  4/5 urine culture with Klebsiella oxytocia, Rocephin on board, continue antibiotics white count trending down, afebrile

## 2025-04-05 NOTE — PLAN OF CARE
Plan of care reviewed and ongoing with patient. 22 gauge IV to R wrist saline locked, 1L NC tolerating well, tolerated medications well with sips of water, purewick connections intact. Bed alarm on. Call light and personal items within reach of patient.       Problem: Skin Injury Risk Increased  Goal: Skin Health and Integrity  Outcome: Not Progressing     Problem: Adult Inpatient Plan of Care  Goal: Plan of Care Review  Outcome: Not Progressing  Goal: Patient-Specific Goal (Individualized)  Outcome: Not Progressing  Goal: Absence of Hospital-Acquired Illness or Injury  Outcome: Not Progressing  Goal: Optimal Comfort and Wellbeing  Outcome: Not Progressing  Goal: Readiness for Transition of Care  Outcome: Not Progressing     Problem: Pneumonia  Goal: Fluid Balance  Outcome: Not Progressing  Goal: Resolution of Infection Signs and Symptoms  Outcome: Not Progressing  Goal: Effective Oxygenation and Ventilation  Outcome: Not Progressing     Problem: Infection  Goal: Absence of Infection Signs and Symptoms  Outcome: Not Progressing     Problem: Fall Injury Risk  Goal: Absence of Fall and Fall-Related Injury  Outcome: Not Progressing     Problem: Wound  Goal: Optimal Coping  Outcome: Not Progressing  Goal: Optimal Functional Ability  Outcome: Not Progressing  Goal: Absence of Infection Signs and Symptoms  Outcome: Not Progressing  Goal: Improved Oral Intake  Outcome: Not Progressing  Goal: Optimal Pain Control and Function  Outcome: Not Progressing  Goal: Skin Health and Integrity  Outcome: Not Progressing  Goal: Optimal Wound Healing  Outcome: Not Progressing     Problem: Fatigue  Goal: Improved Activity Tolerance  Outcome: Not Progressing

## 2025-04-05 NOTE — PROGRESS NOTES
Pharmacist Renal Dose Adjustment Note    Ruth Gamboa is a 80 y.o. female being treated with the medication rocephin    Patient Data:    Vital Signs (Most Recent):  Temp: 98.4 °F (36.9 °C) (04/05/25 0823)  Pulse: 93 (04/05/25 0823)  Resp: 16 (04/05/25 0823)  BP: 139/72 (04/05/25 0823)  SpO2: (!) 93 % (04/05/25 0823) Vital Signs (72h Range):  Temp:  [97.4 °F (36.3 °C)-99.2 °F (37.3 °C)]   Pulse:  []   Resp:  [14-32]   BP: (105-155)/(56-83)   SpO2:  [90 %-100 %]      Recent Labs   Lab 04/03/25  0324 04/04/25  0332 04/05/25  0516   CREATININE 0.5 0.7 0.5     Serum creatinine: 0.5 mg/dL 04/05/25 0516  Estimated creatinine clearance: 58.5 mL/min    Ceftriaxone 1 g BID will be changed to 1 gram qd per indication     Pharmacist's Name: ABIDA MACIAS  Pharmacist's Extension: [phone]

## 2025-04-05 NOTE — ASSESSMENT & PLAN NOTE
Hyponatremia is likely due to Dehydration/hypovolemia. The patient's most recent sodium results are listed below.  Recent Labs     04/03/25  0324 04/04/25  0332 04/05/25  0516    136 133*     Plan  - Correct the sodium by 4-6mEq in 24 hours.

## 2025-04-05 NOTE — ASSESSMENT & PLAN NOTE
Patient's most recent potassium results are listed below.   Recent Labs     04/03/25  0324 04/04/25  0332 04/05/25  0516   K 4.7 4.7 4.5     Plan  - Replete potassium per protocol  - Monitor potassium Daily  - Patient's hypokalemia is improving

## 2025-04-05 NOTE — ASSESSMENT & PLAN NOTE
Patient's blood pressure range in the last 24 hours was: BP  Min: 105/56  Max: 152/68.The patient's inpatient anti-hypertensive regimen is listed below:  Current Antihypertensives  carvediloL tablet 25 mg, 2 times daily with meals, Oral  lisinopriL tablet 30 mg, Daily, Oral    Plan  - BP is controlled, no changes needed to their regimen

## 2025-04-05 NOTE — ASSESSMENT & PLAN NOTE
Patient has Abnormal Magnesium: hypomagnesemia. Will continue to monitor electrolytes closely. Will replace the affected electrolytes and repeat labs to be done after interventions completed. The patient's magnesium results have been reviewed and are listed below.  Recent Labs   Lab 04/05/25  0516   MG 1.8

## 2025-04-05 NOTE — PROGRESS NOTES
"[facility] Medicine  Progress Note    Patient Name: Ruth Gamboa  MRN: [id number]  Patient Class: IP- Inpatient   Admission Date: 3/31/2025  Length of Stay: 4 days  Attending Physician: Clark Calix III, MD  Primary Care Provider: Clark Calix III, MD        Subjective     Principal Problem:UTI (urinary tract infection)        HPI:  ED HPI:  80-year-old female with significant history hypertension, hypothyroidism, COPD and depression who reports to the emergency room for evaluation of failure to thrive picture. Patient's family reports little to no activity and patient's appetite has decreased. Increased weakness and fatigue. Family and pt has discussed with PCP and pending nursing home/SNF placement as requested per pt. Recent changes in depression medications and pt reports just "not hungry". No abdominal pain or other acute complaints.     IM HPI:  Patient is well known to us and had a recent prolonged hospitalization for multiple electrolyte abnormalities weight loss atypical pneumonia COPD and wounds who during that admission had a stabilization of her condition and the family attempted to take her home and care for her.  It has been a challenge and ultimately the patient stopped eating had no appetite and she declined again.  The patient and family were in the process of reaching out to local nursing facilities and getting authorization for admission when she had a further decline with tachycardia and other symptoms of dehydration and weakness.  I have evaluated the patient with family at bedside this morning she is coughing and co anorexia.    Overview/Hospital Course:  4/2 FM:  Patient's family at bedside, today patient is coughing and having difficulty with her breakfast.  Family states no trouble with liquids but mainly with solids and chewing.  I have done a head neck exam today and do not see any gross abnormalities.  We will have speech evaluate and do MBS.  Patient's family " states she is able to take supplements at home we will order ensure.  Medications reconciled case management consulted family would like skilled nursing placement.    4/3 FM:  Patient had improved p.o. intake and has stabilized this morning.  Patient's testing noted and reviewed and I have spoken with physical therapy and speech therapy.  Speech therapy states that as long as patient's positioning is correct with eating she has no aspiration.  She also needs bite sized and a slightly modified diet.  Patient's medications all reviewed patient has seen Psychiatry notes and recommendations noted.  Awaiting skilled nursing placement.    4/4 FM:  Upon entering the room patient was lying supine attempting to drink coffee.  I have re-educated her the importance of sitting upright and aspiration and choking precautions.  Patient's family present all questions answered awaiting skilled nursing home placement and approval via the state and her insurance company.  4/5 AA, weekend crosscover, UTI, antibiotics on board, urine culture with Klebsiella oxytocia, Rocephin on board, white count trending down, afebrile, waiting on nursing home placement    Interval History: Patient seen and examined.    Review of Systems   Unable to perform ROS: Acuity of condition     Objective:     Vital Signs (Most Recent):  Temp: 97.9 °F (36.6 °C) (04/05/25 1122)  Pulse: 78 (04/05/25 1347)  Resp: 20 (04/05/25 1347)  BP: 130/65 (04/05/25 1122)  SpO2: (!) 92 % (04/05/25 1347) Vital Signs (24h Range):  Temp:  [97.9 °F (36.6 °C)-98.6 °F (37 °C)] 97.9 °F (36.6 °C)  Pulse:  [78-99] 78  Resp:  [14-32] 20  SpO2:  [90 %-98 %] 92 %  BP: (105-152)/(56-72) 130/65     Weight: 40.6 kg (89 lb 8.1 oz)  Body mass index is 15.86 kg/m².    Intake/Output Summary (Last 24 hours) at 4/5/2025 1527  Last data filed at 4/5/2025 1221  Gross per 24 hour   Intake 960 ml   Output 600 ml   Net 360 ml         Physical Exam  Vitals and nursing note reviewed.   Constitutional:        General: She is not in acute distress.     Appearance: She is ill-appearing. She is not toxic-appearing.   HENT:      Head: Atraumatic.      Comments: Temporal wasting     Right Ear: External ear normal.      Left Ear: External ear normal.      Nose: No congestion or rhinorrhea.      Mouth/Throat:      Mouth: Mucous membranes are moist.      Pharynx: No oropharyngeal exudate.   Eyes:      General: No scleral icterus.     Extraocular Movements: Extraocular movements intact.   Neck:      Vascular: No carotid bruit.   Cardiovascular:      Rate and Rhythm: Normal rate and regular rhythm.      Heart sounds: No murmur heard.  Pulmonary:      Effort: No respiratory distress.      Breath sounds: No wheezing, rhonchi or rales.   Abdominal:      General: Bowel sounds are normal. There is no distension.      Palpations: Abdomen is soft.      Tenderness: There is no abdominal tenderness. There is no guarding or rebound.   Musculoskeletal:      Cervical back: No rigidity.      Right lower leg: No edema.      Left lower leg: No edema.      Comments: Thin, malnurished   Lymphadenopathy:      Cervical: No cervical adenopathy.   Skin:     Capillary Refill: Capillary refill takes less than 2 seconds.      Coloration: Skin is not jaundiced or pale.   Neurological:      Motor: Weakness present.   Psychiatric:      Comments: Flat, depressed               Significant Labs: All pertinent labs within the past 24 hours have been reviewed.  Recent Lab Results         04/05/25  0516        Albumin 2.0       ALP 73       ALT <8       Anion Gap 9       AST 14       Baso # 0.07       Basophil % 0.5       BILIRUBIN TOTAL 0.1  Comment: For infants and newborns, interpretation of results should be based   on gestational age, weight and in agreement with clinical   observations.    Premature Infant recommended reference ranges:   0-24 hours:  <8.0 mg/dL   24-48 hours: <12.0 mg/dL   3-5 days:    <15.0 mg/dL   6-29 days:   <15.0 mg/dL       BUN  15       Calcium 9.0       Chloride 99       CO2 25       Creatinine 0.5       eGFR >60  Comment: Estimated GFR calculated using the CKD-EPI creatinine (2021) equation.       Eos # 0.14       Eos % 1.0       Glucose 115       Gran # (ANC) 10.55       Hematocrit 29.8       Hemoglobin 9.5       Immature Grans (Abs) 0.21  Comment: Mild elevation in immature granulocytes is non specific and can be seen in a variety of conditions including stress response, acute inflammation, trauma and pregnancy. Correlation with other laboratory and clinical findings is essential.       Immature Granulocytes 1.5       Lymph # 2.43       Lymph % 16.9       Magnesium  1.8       MCH 27.5       MCHC 31.9       MCV 86       Mono # 0.99       Mono % 6.9       MPV 10.4       Neut % 73.2       nRBC 0       Platelet Count 508       Potassium 4.5       PROTEIN TOTAL 6.2       RBC 3.46       RDW 21.3       Sodium 133       WBC 14.39               Significant Imaging: I have reviewed all pertinent imaging results/findings within the past 24 hours.      Assessment & Plan  COPD (chronic obstructive pulmonary disease)  Patient's COPD is controlled currently.  Gastroesophageal reflux disease without esophagitis  Protonix    Atherosclerosis of aorta      HTN (hypertension)  Patient's blood pressure range in the last 24 hours was: BP  Min: 105/56  Max: 152/68.The patient's inpatient anti-hypertensive regimen is listed below:  Current Antihypertensives  carvediloL tablet 25 mg, 2 times daily with meals, Oral  lisinopriL tablet 30 mg, Daily, Oral    Plan  - BP is controlled, no changes needed to their regimen  Tobacco abuse  Continue to .    Major depressive disorder, recurrent, moderate  Patient has persistent depression which is severe and is currently uncontrolled. Will Continue anti-depressant medications. We will not consult psychiatry at this time. Patient does not display psychosis at this time. Continue to monitor closely and adjust plan of  care as needed.      Anorexia  Patient is on Megace therapy    Weight loss, unintentional    Supplements, megace.    4/4 FM:  PO intake improved with txt.  Hypothyroidism  Cont TRH.    Hypomagnesemia  Patient has Abnormal Magnesium: hypomagnesemia. Will continue to monitor electrolytes closely. Will replace the affected electrolytes and repeat labs to be done after interventions completed. The patient's magnesium results have been reviewed and are listed below.  Recent Labs   Lab 04/05/25  0516   MG 1.8      Hypokalemia  Patient's most recent potassium results are listed below.   Recent Labs     04/03/25  0324 04/04/25  0332 04/05/25  0516   K 4.7 4.7 4.5     Plan  - Replete potassium per protocol  - Monitor potassium Daily  - Patient's hypokalemia is improving  Hyponatremia  Hyponatremia is likely due to Dehydration/hypovolemia. The patient's most recent sodium results are listed below.  Recent Labs     04/03/25 0324 04/04/25  0332 04/05/25  0516    136 133*     Plan  - Correct the sodium by 4-6mEq in 24 hours.  Failure to thrive in adult  AS above.    Severe malnutrition  Nutrition consulted. Most recent weight and BMI monitored-     Measurements:  Wt Readings from Last 1 Encounters:   04/01/25 40.6 kg (89 lb 8.1 oz)   Body mass index is 15.86 kg/m².    Patient has been screened and assessed by RD.    Malnutrition Type:  Context:    Level:      Malnutrition Characteristic Summary:       Interventions/Recommendations (treatment strategy):  1. Continue regular diet as tolerated.   2. Ensure Enlive ONS or alternative TID.  3. Rec'd Moshe BID to promote wound healing and to provide additional nutrtiion. 4. RD to monitor and follow up.    4/4 FM:  PO intake improved with txt.    UTI (urinary tract infection)  Abx, CS pending.  4/5 urine culture with Klebsiella oxytocia, Rocephin on board, continue antibiotics white count trending down, afebrile  Dysphagia    ST to evaluate  VTE Risk Mitigation (From admission,  onward)           Ordered     IP VTE HIGH RISK PATIENT  Once         03/31/25 2041     Place sequential compression device  Until discontinued         03/31/25 2041                    Discharge Planning   ORALIA:      Code Status: DNR   Medical Readiness for Discharge Date:   Discharge Plan A: Skilled Nursing Facility                Please place Justification for DME        Federico Chen MD  Department of Hospital Medicine   [facility]

## 2025-04-06 LAB
ABSOLUTE EOSINOPHIL (OHS): 0.17 K/UL
ABSOLUTE MONOCYTE (OHS): 1.09 K/UL (ref 0.3–1)
ABSOLUTE NEUTROPHIL COUNT (OHS): 10.61 K/UL (ref 1.8–7.7)
ALBUMIN SERPL BCP-MCNC: 2.1 G/DL (ref 3.5–5.2)
ALP SERPL-CCNC: 77 UNIT/L (ref 40–150)
ALT SERPL W/O P-5'-P-CCNC: 8 UNIT/L (ref 10–44)
ANION GAP (OHS): 9 MMOL/L (ref 8–16)
AST SERPL-CCNC: 26 UNIT/L (ref 11–45)
BASOPHILS # BLD AUTO: 0.08 K/UL
BASOPHILS NFR BLD AUTO: 0.5 %
BILIRUB SERPL-MCNC: 0.2 MG/DL (ref 0.1–1)
BUN SERPL-MCNC: 17 MG/DL (ref 8–23)
CALCIUM SERPL-MCNC: 9.2 MG/DL (ref 8.7–10.5)
CHLORIDE SERPL-SCNC: 99 MMOL/L (ref 95–110)
CO2 SERPL-SCNC: 26 MMOL/L (ref 23–29)
CREAT SERPL-MCNC: 0.6 MG/DL (ref 0.5–1.4)
ERYTHROCYTE [DISTWIDTH] IN BLOOD BY AUTOMATED COUNT: 21.4 % (ref 11.5–14.5)
GFR SERPLBLD CREATININE-BSD FMLA CKD-EPI: >60 ML/MIN/1.73/M2
GLUCOSE SERPL-MCNC: 123 MG/DL (ref 70–110)
HCT VFR BLD AUTO: 31.8 % (ref 37–48.5)
HGB BLD-MCNC: 10.1 GM/DL (ref 12–16)
IMM GRANULOCYTES # BLD AUTO: 0.19 K/UL (ref 0–0.04)
IMM GRANULOCYTES NFR BLD AUTO: 1.3 % (ref 0–0.5)
LYMPHOCYTES # BLD AUTO: 2.93 K/UL (ref 1–4.8)
MAGNESIUM SERPL-MCNC: 1.8 MG/DL (ref 1.6–2.6)
MCH RBC QN AUTO: 27.4 PG (ref 27–31)
MCHC RBC AUTO-ENTMCNC: 31.8 G/DL (ref 32–36)
MCV RBC AUTO: 86 FL (ref 82–98)
NUCLEATED RBC (/100WBC) (OHS): 0 /100 WBC
PLATELET # BLD AUTO: 440 K/UL (ref 150–450)
PMV BLD AUTO: 10.7 FL (ref 9.2–12.9)
POTASSIUM SERPL-SCNC: 4.6 MMOL/L (ref 3.5–5.1)
PROT SERPL-MCNC: 6.8 GM/DL (ref 6–8.4)
RBC # BLD AUTO: 3.68 M/UL (ref 4–5.4)
RELATIVE EOSINOPHIL (OHS): 1.1 %
RELATIVE LYMPHOCYTE (OHS): 19.4 % (ref 18–48)
RELATIVE MONOCYTE (OHS): 7.2 % (ref 4–15)
RELATIVE NEUTROPHIL (OHS): 70.5 % (ref 38–73)
SODIUM SERPL-SCNC: 134 MMOL/L (ref 136–145)
WBC # BLD AUTO: 15.07 K/UL (ref 3.9–12.7)

## 2025-04-06 PROCEDURE — 25000003 PHARM REV CODE 250: Performed by: PSYCHIATRY & NEUROLOGY

## 2025-04-06 PROCEDURE — 25000003 PHARM REV CODE 250: Performed by: NURSE PRACTITIONER

## 2025-04-06 PROCEDURE — 85025 COMPLETE CBC W/AUTO DIFF WBC: CPT | Performed by: INTERNAL MEDICINE

## 2025-04-06 PROCEDURE — 99900031 HC PATIENT EDUCATION (STAT)

## 2025-04-06 PROCEDURE — 94761 N-INVAS EAR/PLS OXIMETRY MLT: CPT

## 2025-04-06 PROCEDURE — 63600175 PHARM REV CODE 636 W HCPCS: Performed by: INTERNAL MEDICINE

## 2025-04-06 PROCEDURE — 25000242 PHARM REV CODE 250 ALT 637 W/ HCPCS: Performed by: INTERNAL MEDICINE

## 2025-04-06 PROCEDURE — 94640 AIRWAY INHALATION TREATMENT: CPT

## 2025-04-06 PROCEDURE — 36415 COLL VENOUS BLD VENIPUNCTURE: CPT | Performed by: INTERNAL MEDICINE

## 2025-04-06 PROCEDURE — 83735 ASSAY OF MAGNESIUM: CPT | Performed by: INTERNAL MEDICINE

## 2025-04-06 PROCEDURE — 11000001 HC ACUTE MED/SURG PRIVATE ROOM

## 2025-04-06 PROCEDURE — 25000003 PHARM REV CODE 250: Performed by: INTERNAL MEDICINE

## 2025-04-06 PROCEDURE — 84075 ASSAY ALKALINE PHOSPHATASE: CPT | Performed by: INTERNAL MEDICINE

## 2025-04-06 PROCEDURE — 27000221 HC OXYGEN, UP TO 24 HOURS

## 2025-04-06 PROCEDURE — 99900035 HC TECH TIME PER 15 MIN (STAT)

## 2025-04-06 RX ORDER — LISINOPRIL 20 MG/1
40 TABLET ORAL DAILY
Status: DISCONTINUED | OUTPATIENT
Start: 2025-04-07 | End: 2025-04-15 | Stop reason: HOSPADM

## 2025-04-06 RX ADMIN — CEFTRIAXONE SODIUM 1 G: 1 INJECTION, POWDER, FOR SOLUTION INTRAMUSCULAR; INTRAVENOUS at 08:04

## 2025-04-06 RX ADMIN — MICONAZOLE NITRATE: 20 POWDER TOPICAL at 08:04

## 2025-04-06 RX ADMIN — FERROUS SULFATE TAB 325 MG (65 MG ELEMENTAL FE) 1 EACH: 325 (65 FE) TAB at 08:04

## 2025-04-06 RX ADMIN — PANTOPRAZOLE SODIUM 40 MG: 40 TABLET, DELAYED RELEASE ORAL at 08:04

## 2025-04-06 RX ADMIN — MIRTAZAPINE 22.5 MG: 7.5 TABLET ORAL at 08:04

## 2025-04-06 RX ADMIN — ALUMINUM HYDROXIDE, MAGNESIUM HYDROXIDE, AND DIMETHICONE 30 ML: 200; 20; 200 SUSPENSION ORAL at 08:04

## 2025-04-06 RX ADMIN — FLUOXETINE HYDROCHLORIDE 40 MG: 20 CAPSULE ORAL at 08:04

## 2025-04-06 RX ADMIN — OXYCODONE HYDROCHLORIDE AND ACETAMINOPHEN 500 MG: 500 TABLET ORAL at 08:04

## 2025-04-06 RX ADMIN — NYSTATIN AND TRIAMCINOLONE ACETONIDE: 100000; 1 CREAM TOPICAL at 03:04

## 2025-04-06 RX ADMIN — BUDESONIDE 0.5 MG: 0.5 INHALANT RESPIRATORY (INHALATION) at 07:04

## 2025-04-06 RX ADMIN — ARFORMOTEROL TARTRATE 15 MCG: 15 SOLUTION RESPIRATORY (INHALATION) at 07:04

## 2025-04-06 RX ADMIN — IPRATROPIUM BROMIDE 0.5 MG: 0.5 SOLUTION RESPIRATORY (INHALATION) at 01:04

## 2025-04-06 RX ADMIN — Medication 1000 UNITS: at 08:04

## 2025-04-06 RX ADMIN — IPRATROPIUM BROMIDE 0.5 MG: 0.5 SOLUTION RESPIRATORY (INHALATION) at 07:04

## 2025-04-06 RX ADMIN — NYSTATIN AND TRIAMCINOLONE ACETONIDE: 100000; 1 CREAM TOPICAL at 08:04

## 2025-04-06 RX ADMIN — LISINOPRIL 30 MG: 20 TABLET ORAL at 08:04

## 2025-04-06 RX ADMIN — Medication 300 ML: at 08:04

## 2025-04-06 RX ADMIN — CYANOCOBALAMIN TAB 1000 MCG 1000 MCG: 1000 TAB at 08:04

## 2025-04-06 RX ADMIN — POTASSIUM BICARBONATE 25 MEQ: 977.5 TABLET, EFFERVESCENT ORAL at 08:04

## 2025-04-06 RX ADMIN — CARVEDILOL 25 MG: 12.5 TABLET, FILM COATED ORAL at 08:04

## 2025-04-06 NOTE — PROGRESS NOTES
"[facility] Medicine  Progress Note    Patient Name: Ruth Gamboa  MRN: [id number]  Patient Class: IP- Inpatient   Admission Date: 3/31/2025  Length of Stay: 5 days  Attending Physician: Clark Calix III, MD  Primary Care Provider: Clark Cailx III, MD        Subjective     Principal Problem:UTI (urinary tract infection)        HPI:  ED HPI:  80-year-old female with significant history hypertension, hypothyroidism, COPD and depression who reports to the emergency room for evaluation of failure to thrive picture. Patient's family reports little to no activity and patient's appetite has decreased. Increased weakness and fatigue. Family and pt has discussed with PCP and pending nursing home/SNF placement as requested per pt. Recent changes in depression medications and pt reports just "not hungry". No abdominal pain or other acute complaints.     IM HPI:  Patient is well known to us and had a recent prolonged hospitalization for multiple electrolyte abnormalities weight loss atypical pneumonia COPD and wounds who during that admission had a stabilization of her condition and the family attempted to take her home and care for her.  It has been a challenge and ultimately the patient stopped eating had no appetite and she declined again.  The patient and family were in the process of reaching out to local nursing facilities and getting authorization for admission when she had a further decline with tachycardia and other symptoms of dehydration and weakness.  I have evaluated the patient with family at bedside this morning she is coughing and co anorexia.    Overview/Hospital Course:  4/2 FM:  Patient's family at bedside, today patient is coughing and having difficulty with her breakfast.  Family states no trouble with liquids but mainly with solids and chewing.  I have done a head neck exam today and do not see any gross abnormalities.  We will have speech evaluate and do MBS.  Patient's family " states she is able to take supplements at home we will order ensure.  Medications reconciled case management consulted family would like skilled nursing placement.    4/3 FM:  Patient had improved p.o. intake and has stabilized this morning.  Patient's testing noted and reviewed and I have spoken with physical therapy and speech therapy.  Speech therapy states that as long as patient's positioning is correct with eating she has no aspiration.  She also needs bite sized and a slightly modified diet.  Patient's medications all reviewed patient has seen Psychiatry notes and recommendations noted.  Awaiting skilled nursing placement.    4/4 FM:  Upon entering the room patient was lying supine attempting to drink coffee.  I have re-educated her the importance of sitting upright and aspiration and choking precautions.  Patient's family present all questions answered awaiting skilled nursing home placement and approval via the state and her insurance company.  4/5 AA, weekend crosscover, UTI, antibiotics on board, urine culture with Klebsiella oxytocia, Rocephin on board, white count trending down, afebrile, waiting on nursing home placement  4/6 AA, weekend crosscover, no acute events overnight reported, noted fluctuation blood pressure, meds adjusted, renal function stable, white count slight increased from yesterday, afebrile, continue to monitor    Interval History: Patient seen and examined.    Review of Systems   Unable to perform ROS: Acuity of condition     Objective:     Vital Signs (Most Recent):  Temp: 98 °F (36.7 °C) (04/06/25 0728)  Pulse: 100 (04/06/25 0750)  Resp: (!) 30 (04/06/25 0728)  BP: (!) 166/83 (04/06/25 0728)  SpO2: 100 % (04/06/25 0750) Vital Signs (24h Range):  Temp:  [97.7 °F (36.5 °C)-98 °F (36.7 °C)] 98 °F (36.7 °C)  Pulse:  [] 100  Resp:  [14-30] 30  SpO2:  [87 %-100 %] 100 %  BP: (106-180)/(57-85) 166/83     Weight: 40.6 kg (89 lb 8.1 oz)  Body mass index is 15.86  kg/m².    Intake/Output Summary (Last 24 hours) at 4/6/2025 1043  Last data filed at 4/6/2025 0507  Gross per 24 hour   Intake 1080 ml   Output 700 ml   Net 380 ml         Physical Exam  Vitals and nursing note reviewed.   Constitutional:       General: She is not in acute distress.     Appearance: She is ill-appearing. She is not toxic-appearing.   HENT:      Head: Atraumatic.      Comments: Temporal wasting     Right Ear: External ear normal.      Left Ear: External ear normal.      Nose: No congestion or rhinorrhea.      Mouth/Throat:      Mouth: Mucous membranes are moist.      Pharynx: No oropharyngeal exudate.   Eyes:      General: No scleral icterus.     Extraocular Movements: Extraocular movements intact.   Neck:      Vascular: No carotid bruit.   Cardiovascular:      Rate and Rhythm: Normal rate and regular rhythm.      Heart sounds: No murmur heard.  Pulmonary:      Effort: No respiratory distress.      Breath sounds: No wheezing, rhonchi or rales.   Abdominal:      General: Bowel sounds are normal. There is no distension.      Palpations: Abdomen is soft.      Tenderness: There is no abdominal tenderness. There is no guarding or rebound.   Musculoskeletal:      Cervical back: No rigidity.      Right lower leg: No edema.      Left lower leg: No edema.      Comments: Thin, malnurished   Lymphadenopathy:      Cervical: No cervical adenopathy.   Skin:     Capillary Refill: Capillary refill takes less than 2 seconds.      Coloration: Skin is not jaundiced or pale.   Neurological:      Motor: Weakness present.   Psychiatric:      Comments: Flat, depressed               Significant Labs: All pertinent labs within the past 24 hours have been reviewed.  Recent Lab Results         04/06/25  0512   04/06/25  0511        Albumin 2.1         ALP 77         ALT 8         Anion Gap 9         AST 26         Baso #   0.08       Basophil %   0.5       BILIRUBIN TOTAL 0.2  Comment: For infants and newborns, interpretation  of results should be based   on gestational age, weight and in agreement with clinical   observations.    Premature Infant recommended reference ranges:   0-24 hours:  <8.0 mg/dL   24-48 hours: <12.0 mg/dL   3-5 days:    <15.0 mg/dL   6-29 days:   <15.0 mg/dL         BUN 17         Calcium 9.2         Chloride 99         CO2 26         Creatinine 0.6         eGFR >60  Comment: Estimated GFR calculated using the CKD-EPI creatinine (2021) equation.         Eos #   0.17       Eos %   1.1       Glucose 123         Gran # (ANC)   10.61       Hematocrit   31.8       Hemoglobin   10.1       Immature Grans (Abs)   0.19  Comment: Mild elevation in immature granulocytes is non specific and can be seen in a variety of conditions including stress response, acute inflammation, trauma and pregnancy. Correlation with other laboratory and clinical findings is essential.       Immature Granulocytes   1.3       Lymph #   2.93       Lymph %   19.4       Magnesium  1.8         MCH   27.4       MCHC   31.8       MCV   86       Mono #   1.09       Mono %   7.2       MPV   10.7       Neut %   70.5       nRBC   0       Platelet Count   440       Potassium 4.6         PROTEIN TOTAL 6.8         RBC   3.68       RDW   21.4       Sodium 134         WBC   15.07               Significant Imaging: I have reviewed all pertinent imaging results/findings within the past 24 hours.      Assessment & Plan  COPD (chronic obstructive pulmonary disease)  Patient's COPD is controlled currently.  Gastroesophageal reflux disease without esophagitis  Protonix    Atherosclerosis of aorta      HTN (hypertension)  Patient's blood pressure range in the last 24 hours was: BP  Min: 106/59  Max: 180/85.The patient's inpatient anti-hypertensive regimen is listed below:  Current Antihypertensives  carvediloL tablet 25 mg, 2 times daily with meals, Oral  lisinopriL tablet 40 mg, Daily, Oral    Plan  - BP is uncontrolled, will adjust as follows:  Lisinopril  increased  Tobacco abuse  Continue to .    Major depressive disorder, recurrent, moderate  Patient has persistent depression which is severe and is currently uncontrolled. Will Continue anti-depressant medications. We will not consult psychiatry at this time. Patient does not display psychosis at this time. Continue to monitor closely and adjust plan of care as needed.      Anorexia  Patient is on Megace therapy    Weight loss, unintentional    Supplements, megace.    4/4 FM:  PO intake improved with txt.  Hypothyroidism  Cont TRH.    Hypomagnesemia  Patient has Abnormal Magnesium: hypomagnesemia. Will continue to monitor electrolytes closely. Will replace the affected electrolytes and repeat labs to be done after interventions completed. The patient's magnesium results have been reviewed and are listed below.  Recent Labs   Lab 04/06/25  0512   MG 1.8      Hypokalemia  Patient's most recent potassium results are listed below.   Recent Labs     04/04/25  0332 04/05/25  0516 04/06/25  0512   K 4.7 4.5 4.6     Plan  - Replete potassium per protocol  - Monitor potassium Daily  - Patient's hypokalemia is improving  Hyponatremia  Hyponatremia is likely due to Dehydration/hypovolemia. The patient's most recent sodium results are listed below.  Recent Labs     04/04/25  0332 04/05/25  0516 04/06/25  0512    133* 134*     Plan  - Correct the sodium by 4-6mEq in 24 hours.  Failure to thrive in adult  AS above.    Severe malnutrition  Nutrition consulted. Most recent weight and BMI monitored-     Measurements:  Wt Readings from Last 1 Encounters:   04/01/25 40.6 kg (89 lb 8.1 oz)   Body mass index is 15.86 kg/m².    Patient has been screened and assessed by RD.    Malnutrition Type:  Context:    Level:      Malnutrition Characteristic Summary:       Interventions/Recommendations (treatment strategy):  1. Continue regular diet as tolerated.   2. Ensure Enlive ONS or alternative TID.  3. Rec'd Moshe BID to promote  wound healing and to provide additional nutrtiion. 4. RD to monitor and follow up.    4/4 FM:  PO intake improved with txt.    UTI (urinary tract infection)  Abx, CS pending.  4/5 urine culture with Klebsiella oxytocia, Rocephin on board, continue antibiotics white count trending down, afebrile  4/6 continue Rocephin, white count elevated, afebrile, continue IV antibiotics, fluids  Dysphagia    ST to evaluate  VTE Risk Mitigation (From admission, onward)           Ordered     IP VTE HIGH RISK PATIENT  Once         03/31/25 2041     Place sequential compression device  Until discontinued         03/31/25 2041                    Discharge Planning   ORALIA:      Code Status: DNR   Medical Readiness for Discharge Date:   Discharge Plan A: Skilled Nursing Facility                Please place Justification for DME        Federico Chen MD  Department of Hospital Medicine   [facility]

## 2025-04-06 NOTE — SUBJECTIVE & OBJECTIVE
Interval History: Patient seen and examined.    Review of Systems   Unable to perform ROS: Acuity of condition     Objective:     Vital Signs (Most Recent):  Temp: 98 °F (36.7 °C) (04/06/25 0728)  Pulse: 100 (04/06/25 0750)  Resp: (!) 30 (04/06/25 0728)  BP: (!) 166/83 (04/06/25 0728)  SpO2: 100 % (04/06/25 0750) Vital Signs (24h Range):  Temp:  [97.7 °F (36.5 °C)-98 °F (36.7 °C)] 98 °F (36.7 °C)  Pulse:  [] 100  Resp:  [14-30] 30  SpO2:  [87 %-100 %] 100 %  BP: (106-180)/(57-85) 166/83     Weight: 40.6 kg (89 lb 8.1 oz)  Body mass index is 15.86 kg/m².    Intake/Output Summary (Last 24 hours) at 4/6/2025 1043  Last data filed at 4/6/2025 0507  Gross per 24 hour   Intake 1080 ml   Output 700 ml   Net 380 ml         Physical Exam  Vitals and nursing note reviewed.   Constitutional:       General: She is not in acute distress.     Appearance: She is ill-appearing. She is not toxic-appearing.   HENT:      Head: Atraumatic.      Comments: Temporal wasting     Right Ear: External ear normal.      Left Ear: External ear normal.      Nose: No congestion or rhinorrhea.      Mouth/Throat:      Mouth: Mucous membranes are moist.      Pharynx: No oropharyngeal exudate.   Eyes:      General: No scleral icterus.     Extraocular Movements: Extraocular movements intact.   Neck:      Vascular: No carotid bruit.   Cardiovascular:      Rate and Rhythm: Normal rate and regular rhythm.      Heart sounds: No murmur heard.  Pulmonary:      Effort: No respiratory distress.      Breath sounds: No wheezing, rhonchi or rales.   Abdominal:      General: Bowel sounds are normal. There is no distension.      Palpations: Abdomen is soft.      Tenderness: There is no abdominal tenderness. There is no guarding or rebound.   Musculoskeletal:      Cervical back: No rigidity.      Right lower leg: No edema.      Left lower leg: No edema.      Comments: Thin, malnurished   Lymphadenopathy:      Cervical: No cervical adenopathy.   Skin:      Capillary Refill: Capillary refill takes less than 2 seconds.      Coloration: Skin is not jaundiced or pale.   Neurological:      Motor: Weakness present.   Psychiatric:      Comments: Flat, depressed               Significant Labs: All pertinent labs within the past 24 hours have been reviewed.  Recent Lab Results         04/06/25  0512   04/06/25  0511        Albumin 2.1         ALP 77         ALT 8         Anion Gap 9         AST 26         Baso #   0.08       Basophil %   0.5       BILIRUBIN TOTAL 0.2  Comment: For infants and newborns, interpretation of results should be based   on gestational age, weight and in agreement with clinical   observations.    Premature Infant recommended reference ranges:   0-24 hours:  <8.0 mg/dL   24-48 hours: <12.0 mg/dL   3-5 days:    <15.0 mg/dL   6-29 days:   <15.0 mg/dL         BUN 17         Calcium 9.2         Chloride 99         CO2 26         Creatinine 0.6         eGFR >60  Comment: Estimated GFR calculated using the CKD-EPI creatinine (2021) equation.         Eos #   0.17       Eos %   1.1       Glucose 123         Gran # (ANC)   10.61       Hematocrit   31.8       Hemoglobin   10.1       Immature Grans (Abs)   0.19  Comment: Mild elevation in immature granulocytes is non specific and can be seen in a variety of conditions including stress response, acute inflammation, trauma and pregnancy. Correlation with other laboratory and clinical findings is essential.       Immature Granulocytes   1.3       Lymph #   2.93       Lymph %   19.4       Magnesium  1.8         MCH   27.4       MCHC   31.8       MCV   86       Mono #   1.09       Mono %   7.2       MPV   10.7       Neut %   70.5       nRBC   0       Platelet Count   440       Potassium 4.6         PROTEIN TOTAL 6.8         RBC   3.68       RDW   21.4       Sodium 134         WBC   15.07               Significant Imaging: I have reviewed all pertinent imaging results/findings within the past 24 hours.

## 2025-04-06 NOTE — ASSESSMENT & PLAN NOTE
Hyponatremia is likely due to Dehydration/hypovolemia. The patient's most recent sodium results are listed below.  Recent Labs     04/04/25  0332 04/05/25  0516 04/06/25  0512    133* 134*     Plan  - Correct the sodium by 4-6mEq in 24 hours.

## 2025-04-06 NOTE — ASSESSMENT & PLAN NOTE
Patient has Abnormal Magnesium: hypomagnesemia. Will continue to monitor electrolytes closely. Will replace the affected electrolytes and repeat labs to be done after interventions completed. The patient's magnesium results have been reviewed and are listed below.  Recent Labs   Lab 04/06/25  0512   MG 1.8

## 2025-04-06 NOTE — ASSESSMENT & PLAN NOTE
Patient's blood pressure range in the last 24 hours was: BP  Min: 106/59  Max: 180/85.The patient's inpatient anti-hypertensive regimen is listed below:  Current Antihypertensives  carvediloL tablet 25 mg, 2 times daily with meals, Oral  lisinopriL tablet 40 mg, Daily, Oral    Plan  - BP is uncontrolled, will adjust as follows:  Lisinopril increased

## 2025-04-06 NOTE — CARE UPDATE
04/06/25 0750   PRE-TX-O2   Device (Oxygen Therapy) nasal cannula   $ Is the patient on Low Flow Oxygen? Yes   Flow (L/min) (Oxygen Therapy) 2   Oxygen Concentration (%) 28   SpO2 100 %   Pulse 100

## 2025-04-06 NOTE — ASSESSMENT & PLAN NOTE
Patient's most recent potassium results are listed below.   Recent Labs     04/04/25  0332 04/05/25  0516 04/06/25  0512   K 4.7 4.5 4.6     Plan  - Replete potassium per protocol  - Monitor potassium Daily  - Patient's hypokalemia is improving   cc:

XENA LINARES MD

****

 

 

DATE OF SURGERY

12/26/2017

 

 

PREOPERATIVE DIAGNOSIS

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

SFA.

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

 

POSTOPERATIVE DIAGNOSIS

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

SFA.

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

 

 

PROCEDURE

Right external iliac-common femoral endarterectomy and patch angioplasty and

right femoral-popliteal bypass, PTFE graft.

 

 

SURGEON

MD Dave

 

ANESTHESIA

General.

 

ESTIMATED BLOOD LOSS

About 200 cc.

 

 

OPERATIVE PROCEDURE

The patient is prepped and draped in the usual fashion.  A right groin incision

is made in oblique fashion and common femoral, deep femoral and superficial

femoral arteries were isolated with sharp dissection and vessel loops placed

around each respectively.  A Powell retractor is now used to get incision a

little higher exposed and then the proximal external iliac artery exposed.

There is a large plaque in external iliac artery and common femoral occluded

completely.  Above that there is an area of the external iliac that is fairly

soft.  Therefore decision was made to place clamp here.

 

The patient is given 5000 units of heparin, then a Satinsky clamp is placed

very proximally on the external iliac artery, about 2 inches above the inguinal

ligament in the retroperitoneal space.  The profunda clamp placed on the

femoral artery and then incision made longitudinally with Skelton scissors going

from external iliac artery to common femoral artery.  There is a huge plaque in

there which is decaying, degenerative plaque with soft areas and firm areas

which clearly had been flushing off.  With a [place] dissector in the media

plane, all the debris is dissected including the plaque and then small debris

removed with heparinized saline with cells.  Now flushing the Satinsky clamp,

there is a brisk flow downward.  The Satinsky is now repositioned.

 

The  superficial femoral artery is now attended and Nelda is placed downward.

A large amount of old clot is removed, but the SFA is occluded MCFP in the

thigh and reopens at the popliteal space as noted on CT scan.  Now popliteal

incision is made in medial aspect and popliteal artery is isolated proximally

and distally, a vessel loop placed around it.  This was opened with Skelton

scissors after controlling the flow proximally with a bulldog and distally with

a Yasargil clip.  The vessel is opened;  it appears to be nice and clean.  The

Nelda is passed downward; it goes all the way down to the foot.

 

An 8-mm x 40-cm [place]-Omega ringed graft is now passed from proximal to distal and

then first the distal anastomosis is attended.  The graft is cut at an under

oblique angle with an 11 blade and sewn in with running 5-0 Prolene.  The

popliteal artery is released and graft is bled back, then flushed with

heparinized saline and clamped allowing the natural blood flow to still

persist.  The proximal anastomosis is now created.  Considering there is a long

arteriotomy going from external iliac down through the common femoral artery,

the graft is cut to size under very oblique angle to form patch.  The profunda

artery is once more irrigated with saline and bleeds back nicely.  Now the

graft is sewn in with running 5-0 Prolene and then blood flow was reestablished

in the usual order and fashion.

 

Minor bleeding is controlled with additional 5-0 Prolene and needle holes in

the graft finally stopped bleeding after holding some pressure on it.  Some

FloSeal is placed over the anastomosis and some SNoW and then a LOIS drain is

placed in the right groin.  Incision was closed with 0 Vicryl in layers and 4-0

Monocryl.  The same is done with the popliteal incision.  Prior to closing the

popliteal pulse is checked and is brisk and it is also

 

 

 

detectable in the anterior tibial artery.  The incision is now closed again

with 0 Vicryl and 4-0 Monocryl.

 

The patient tolerated the procedure well.

 

 

 

                              _________________________________

                              Xena JOHNSON/MADIE

D:  12/26/2017/3:45 PM

T:  12/26/2017/4:46 PM

Visit #:  [id number]

Job #:  [id number]

## 2025-04-06 NOTE — PLAN OF CARE
04/06/25 1009   Medicare Message   Important Message from Medicare regarding Discharge Appeal Rights Given to patient/caregiver;Explained to patient/caregiver;Signed/date by patient/caregiver   Date IMM was signed 04/06/25   Time IMM was signed 1008

## 2025-04-06 NOTE — CARE UPDATE
04/06/25 0728   PRE-TX-O2   Device (Oxygen Therapy) room air   SpO2 (!) 87 %  (On room air)   Pulse Oximetry Type Intermittent   $ Pulse Oximetry - Multiple Charge Pulse Oximetry - Multiple   Pulse 100   Resp (!) 30     Spo2 87% on room air at rest.

## 2025-04-06 NOTE — ASSESSMENT & PLAN NOTE
Abx, CS pending.  4/5 urine culture with Klebsiella oxytocia, Rocephin on board, continue antibiotics white count trending down, afebrile  4/6 continue Rocephin, white count elevated, afebrile, continue IV antibiotics, fluids

## 2025-04-06 NOTE — PLAN OF CARE
Problem: Skin Injury Risk Increased  Goal: Skin Health and Integrity  Outcome: Not Progressing     Problem: Adult Inpatient Plan of Care  Goal: Plan of Care Review  Outcome: Not Progressing  Goal: Patient-Specific Goal (Individualized)  Outcome: Not Progressing  Goal: Absence of Hospital-Acquired Illness or Injury  Outcome: Not Progressing  Goal: Optimal Comfort and Wellbeing  Outcome: Not Progressing  Goal: Readiness for Transition of Care  Outcome: Not Progressing     Problem: Pneumonia  Goal: Fluid Balance  Outcome: Not Progressing  Goal: Resolution of Infection Signs and Symptoms  Outcome: Not Progressing  Goal: Effective Oxygenation and Ventilation  Outcome: Not Progressing     Problem: Infection  Goal: Absence of Infection Signs and Symptoms  Outcome: Not Progressing     Problem: Fall Injury Risk  Goal: Absence of Fall and Fall-Related Injury  Outcome: Not Progressing     Problem: Wound  Goal: Optimal Coping  Outcome: Not Progressing  Goal: Optimal Functional Ability  Outcome: Not Progressing  Goal: Absence of Infection Signs and Symptoms  Outcome: Not Progressing  Goal: Improved Oral Intake  Outcome: Not Progressing  Goal: Optimal Pain Control and Function  Outcome: Not Progressing  Goal: Skin Health and Integrity  Outcome: Not Progressing  Goal: Optimal Wound Healing  Outcome: Not Progressing     Problem: Fatigue  Goal: Improved Activity Tolerance  Outcome: Not Progressing

## 2025-04-07 LAB
ABSOLUTE EOSINOPHIL (OHS): 0.22 K/UL
ABSOLUTE MONOCYTE (OHS): 0.94 K/UL (ref 0.3–1)
ABSOLUTE NEUTROPHIL COUNT (OHS): 8.8 K/UL (ref 1.8–7.7)
ALBUMIN SERPL BCP-MCNC: 2.1 G/DL (ref 3.5–5.2)
ALP SERPL-CCNC: 74 UNIT/L (ref 40–150)
ALT SERPL W/O P-5'-P-CCNC: <8 UNIT/L (ref 10–44)
ANION GAP (OHS): 8 MMOL/L (ref 8–16)
AST SERPL-CCNC: 14 UNIT/L (ref 11–45)
BASOPHILS # BLD AUTO: 0.05 K/UL
BASOPHILS NFR BLD AUTO: 0.4 %
BILIRUB SERPL-MCNC: 0.2 MG/DL (ref 0.1–1)
BUN SERPL-MCNC: 20 MG/DL (ref 8–23)
CALCIUM SERPL-MCNC: 9.1 MG/DL (ref 8.7–10.5)
CHLORIDE SERPL-SCNC: 99 MMOL/L (ref 95–110)
CO2 SERPL-SCNC: 24 MMOL/L (ref 23–29)
CREAT SERPL-MCNC: 0.6 MG/DL (ref 0.5–1.4)
ERYTHROCYTE [DISTWIDTH] IN BLOOD BY AUTOMATED COUNT: 21.2 % (ref 11.5–14.5)
GFR SERPLBLD CREATININE-BSD FMLA CKD-EPI: >60 ML/MIN/1.73/M2
GLUCOSE SERPL-MCNC: 109 MG/DL (ref 70–110)
HCT VFR BLD AUTO: 29.9 % (ref 37–48.5)
HGB BLD-MCNC: 9.7 GM/DL (ref 12–16)
IMM GRANULOCYTES # BLD AUTO: 0.18 K/UL (ref 0–0.04)
IMM GRANULOCYTES NFR BLD AUTO: 1.4 % (ref 0–0.5)
LYMPHOCYTES # BLD AUTO: 2.5 K/UL (ref 1–4.8)
MAGNESIUM SERPL-MCNC: 1.8 MG/DL (ref 1.6–2.6)
MCH RBC QN AUTO: 28.6 PG (ref 27–31)
MCHC RBC AUTO-ENTMCNC: 32.4 G/DL (ref 32–36)
MCV RBC AUTO: 88 FL (ref 82–98)
NUCLEATED RBC (/100WBC) (OHS): 0 /100 WBC
PLATELET # BLD AUTO: 504 K/UL (ref 150–450)
PMV BLD AUTO: 10.2 FL (ref 9.2–12.9)
POTASSIUM SERPL-SCNC: 4.7 MMOL/L (ref 3.5–5.1)
PROT SERPL-MCNC: 6.6 GM/DL (ref 6–8.4)
RBC # BLD AUTO: 3.39 M/UL (ref 4–5.4)
RELATIVE EOSINOPHIL (OHS): 1.7 %
RELATIVE LYMPHOCYTE (OHS): 19.7 % (ref 18–48)
RELATIVE MONOCYTE (OHS): 7.4 % (ref 4–15)
RELATIVE NEUTROPHIL (OHS): 69.4 % (ref 38–73)
SODIUM SERPL-SCNC: 131 MMOL/L (ref 136–145)
WBC # BLD AUTO: 12.69 K/UL (ref 3.9–12.7)

## 2025-04-07 PROCEDURE — 25000242 PHARM REV CODE 250 ALT 637 W/ HCPCS: Performed by: INTERNAL MEDICINE

## 2025-04-07 PROCEDURE — 99900031 HC PATIENT EDUCATION (STAT)

## 2025-04-07 PROCEDURE — 94761 N-INVAS EAR/PLS OXIMETRY MLT: CPT

## 2025-04-07 PROCEDURE — 84075 ASSAY ALKALINE PHOSPHATASE: CPT | Performed by: INTERNAL MEDICINE

## 2025-04-07 PROCEDURE — 11000001 HC ACUTE MED/SURG PRIVATE ROOM

## 2025-04-07 PROCEDURE — 83735 ASSAY OF MAGNESIUM: CPT | Performed by: INTERNAL MEDICINE

## 2025-04-07 PROCEDURE — 85025 COMPLETE CBC W/AUTO DIFF WBC: CPT | Performed by: INTERNAL MEDICINE

## 2025-04-07 PROCEDURE — 27000221 HC OXYGEN, UP TO 24 HOURS

## 2025-04-07 PROCEDURE — 99900035 HC TECH TIME PER 15 MIN (STAT)

## 2025-04-07 PROCEDURE — 97535 SELF CARE MNGMENT TRAINING: CPT

## 2025-04-07 PROCEDURE — 97110 THERAPEUTIC EXERCISES: CPT

## 2025-04-07 PROCEDURE — 25000003 PHARM REV CODE 250: Performed by: INTERNAL MEDICINE

## 2025-04-07 PROCEDURE — 36415 COLL VENOUS BLD VENIPUNCTURE: CPT | Performed by: INTERNAL MEDICINE

## 2025-04-07 PROCEDURE — 25000003 PHARM REV CODE 250: Performed by: PSYCHIATRY & NEUROLOGY

## 2025-04-07 PROCEDURE — 94640 AIRWAY INHALATION TREATMENT: CPT

## 2025-04-07 PROCEDURE — 25000003 PHARM REV CODE 250: Performed by: NURSE PRACTITIONER

## 2025-04-07 PROCEDURE — S0179 MEGESTROL 20 MG: HCPCS | Performed by: NURSE PRACTITIONER

## 2025-04-07 PROCEDURE — 97530 THERAPEUTIC ACTIVITIES: CPT

## 2025-04-07 RX ORDER — MIRTAZAPINE 7.5 MG/1
7.5 TABLET, FILM COATED ORAL NIGHTLY
Status: DISCONTINUED | OUTPATIENT
Start: 2025-04-07 | End: 2025-04-15 | Stop reason: HOSPADM

## 2025-04-07 RX ADMIN — POTASSIUM BICARBONATE 25 MEQ: 977.5 TABLET, EFFERVESCENT ORAL at 09:04

## 2025-04-07 RX ADMIN — ALUMINUM HYDROXIDE, MAGNESIUM HYDROXIDE, AND DIMETHICONE 30 ML: 200; 20; 200 SUSPENSION ORAL at 05:04

## 2025-04-07 RX ADMIN — CARVEDILOL 25 MG: 12.5 TABLET, FILM COATED ORAL at 08:04

## 2025-04-07 RX ADMIN — NYSTATIN AND TRIAMCINOLONE ACETONIDE: 100000; 1 CREAM TOPICAL at 09:04

## 2025-04-07 RX ADMIN — IPRATROPIUM BROMIDE 0.5 MG: 0.5 SOLUTION RESPIRATORY (INHALATION) at 02:04

## 2025-04-07 RX ADMIN — PANTOPRAZOLE SODIUM 40 MG: 40 TABLET, DELAYED RELEASE ORAL at 09:04

## 2025-04-07 RX ADMIN — SUCRALFATE 1 G: 1 SUSPENSION ORAL at 05:04

## 2025-04-07 RX ADMIN — Medication 300 ML: at 09:04

## 2025-04-07 RX ADMIN — CARVEDILOL 25 MG: 12.5 TABLET, FILM COATED ORAL at 05:04

## 2025-04-07 RX ADMIN — IPRATROPIUM BROMIDE 0.5 MG: 0.5 SOLUTION RESPIRATORY (INHALATION) at 07:04

## 2025-04-07 RX ADMIN — ARFORMOTEROL TARTRATE 15 MCG: 15 SOLUTION RESPIRATORY (INHALATION) at 07:04

## 2025-04-07 RX ADMIN — LEVOTHYROXINE SODIUM 25 MCG: 25 TABLET ORAL at 06:04

## 2025-04-07 RX ADMIN — Medication 300 ML: at 01:04

## 2025-04-07 RX ADMIN — MICONAZOLE NITRATE: 20 POWDER TOPICAL at 09:04

## 2025-04-07 RX ADMIN — FLUOXETINE HYDROCHLORIDE 40 MG: 20 CAPSULE ORAL at 09:04

## 2025-04-07 RX ADMIN — ALUMINUM HYDROXIDE, MAGNESIUM HYDROXIDE, AND DIMETHICONE 30 ML: 200; 20; 200 SUSPENSION ORAL at 06:04

## 2025-04-07 RX ADMIN — MIRTAZAPINE 7.5 MG: 7.5 TABLET ORAL at 07:04

## 2025-04-07 RX ADMIN — NYSTATIN AND TRIAMCINOLONE ACETONIDE: 100000; 1 CREAM TOPICAL at 07:04

## 2025-04-07 RX ADMIN — Medication 1000 UNITS: at 09:04

## 2025-04-07 RX ADMIN — BUDESONIDE 0.5 MG: 0.5 INHALANT RESPIRATORY (INHALATION) at 07:04

## 2025-04-07 RX ADMIN — OXYCODONE HYDROCHLORIDE AND ACETAMINOPHEN 500 MG: 500 TABLET ORAL at 09:04

## 2025-04-07 RX ADMIN — ALUMINUM HYDROXIDE, MAGNESIUM HYDROXIDE, AND DIMETHICONE 30 ML: 200; 20; 200 SUSPENSION ORAL at 08:04

## 2025-04-07 RX ADMIN — NYSTATIN AND TRIAMCINOLONE ACETONIDE: 100000; 1 CREAM TOPICAL at 03:04

## 2025-04-07 RX ADMIN — MEGESTROL ACETATE 200 MG: 400 SUSPENSION ORAL at 09:04

## 2025-04-07 RX ADMIN — CYANOCOBALAMIN TAB 1000 MCG 1000 MCG: 1000 TAB at 09:04

## 2025-04-07 RX ADMIN — Medication 300 ML: at 05:04

## 2025-04-07 RX ADMIN — MICONAZOLE NITRATE: 20 POWDER TOPICAL at 07:04

## 2025-04-07 RX ADMIN — SUCRALFATE 1 G: 1 SUSPENSION ORAL at 06:04

## 2025-04-07 RX ADMIN — LISINOPRIL 40 MG: 20 TABLET ORAL at 09:04

## 2025-04-07 NOTE — ASSESSMENT & PLAN NOTE
Hyponatremia is likely due to Dehydration/hypovolemia. The patient's most recent sodium results are listed below.  Recent Labs     04/05/25  0516 04/06/25  0512 04/07/25  0512   * 134* 131*     Plan  - Correct the sodium by 4-6mEq in 24 hours.

## 2025-04-07 NOTE — ASSESSMENT & PLAN NOTE
Abx, CS pending.  4/5 urine culture with Klebsiella oxytocia, Rocephin on board, continue antibiotics white count trending down, afebrile  4/6 continue Rocephin, white count elevated, afebrile, continue IV antibiotics, fluids  4/7 FM:  Finishing abx next few days.

## 2025-04-07 NOTE — ASSESSMENT & PLAN NOTE
Patient's blood pressure range in the last 24 hours was: BP  Min: 127/73  Max: 139/73.The patient's inpatient anti-hypertensive regimen is listed below:  Current Antihypertensives  carvediloL tablet 25 mg, 2 times daily with meals, Oral  lisinopriL tablet 40 mg, Daily, Oral    Plan  - BP is uncontrolled, will adjust as follows:  Lisinopril increased

## 2025-04-07 NOTE — PT/OT/SLP PROGRESS
"Physical Therapy Treatment    Patient Name:  Ruth Gamboa   MRN:  [id number]    Recommendations:     Discharge Recommendations: Low Intensity Therapy  Discharge Equipment Recommendations: none  Barriers to discharge: None    Assessment:     Ruth Gamboa is a 80 y.o. female admitted with a medical diagnosis of UTI (urinary tract infection).  She presents with the following impairments/functional limitations: weakness, impaired self care skills, impaired balance, impaired endurance, gait instability, impaired functional mobility, impaired muscle length, impaired cardiopulmonary response to activity, decreased lower extremity function, decreased upper extremity function Awaiting nursing home placement.  Patient  needs encouragement to participate in therapy.  Tolerated ambulation for ~285 feet with RW with O2 supplement with SBA.      Rehab Prognosis: Good and Fair; patient would benefit from acute skilled PT services to address these deficits and reach maximum level of function.    Recent Surgery: * No surgery found *      Plan:     During this hospitalization, patient to be seen  (3-5x a week) to address the identified rehab impairments via gait training, therapeutic activities, therapeutic exercises, neuromuscular re-education and progress toward the following goals:    Plan of Care Expires:  04/11/25    Subjective     Chief Complaint: "I have been sleeping."   Patient/Family Comments/goals: Unstated   Pain/Comfort:  Pain Rating 1: 0/10  Pain Rating Post-Intervention 1: 0/10      Objective:     Communicated with nurse and patient  prior to session.  Patient found left sidelying with peripheral IV, PureWick, oxygen upon PT entry to room.     General Precautions: Standard, fall, respiratory  Orthopedic Precautions: N/A  Braces: N/A  Respiratory Status: Nasal cannula, flow 2 L/min     Functional Mobility:  Bed Mobility:     Rolling Left:  independence  Rolling Right: independence  Scooting: independence  Bridging: " independence  Supine to Sit: independence  Sit to Supine: independence  Transfers:     Sit to Stand:  independence with rolling walker  Gait: ~285 feet with RW with O2 supplement with SBA.    Balance: : independent with static sitting, SBA with static standing with no A.D.       AM-PAC 6 CLICK MOBILITY  Turning over in bed (including adjusting bedclothes, sheets and blankets)?: 4  Sitting down on and standing up from a chair with arms (e.g., wheelchair, bedside commode, etc.): 4  Moving from lying on back to sitting on the side of the bed?: 4  Moving to and from a bed to a chair (including a wheelchair)?: 3  Need to walk in hospital room?: 3  Climbing 3-5 steps with a railing?: 3 (based on clinical judgement)  Basic Mobility Total Score: 21       Treatment & Education:  Rolling side <> side  Supine <> sit  Scooting to the edge  of the bed   Sitting balance/tolerance  Sit <> stand with RW   Standing balance/tolerance   Gait with RW   Bed organization to reduce risk of skin breakdowns     Patient left sitting edge of bed with all lines intact, call button in reach, and nurse  notified..    GOALS:   Multidisciplinary Problems       Physical Therapy Goals          Problem: Physical Therapy    Goal Priority Disciplines Outcome Interventions   Physical Therapy Goal     PT, PT/OT Progressing    Description: Goals to be met by 2025   Patient will increase functional independence with mobility by performin. Bed to chair transfer with Modified Independent with or without rolling walker using Stand step TECHNIQUE  2. Gait  x 350  feet with Supervision or Set-up Assistance with or without rolling walker  3. Lower extremity exercise program x10 reps with assistance as needed.                        DME Justifications:  No DME recommended requiring DME justifications    Time Tracking:     PT Received On: 25  PT Start Time: 915     PT Stop Time: 930  PT Total Time (min): 15 min     Billable Minutes: Therapeutic  Exercise 15    Treatment Type: Treatment  PT/PTA: PT           04/07/2025
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
## 2025-04-07 NOTE — PLAN OF CARE
Problem: Occupational Therapy  Goal: Occupational Therapy Goal  Description: Goals to be met by: 04/11/25     Patient will increase functional independence with ADLs by performing:    Feeding with [place].  UE Dressing with Modified [place].  LE Dressing with Set-up Assistance.  Grooming while standing at sink with Modified [place].  Toileting from toilet with Set-up Assistance for hygiene and clothing management.   Bathing from  shower chair/bench with Supervision.  Toilet transfer to toilet with Modified [place].    Outcome: Progressing

## 2025-04-07 NOTE — PLAN OF CARE
Problem: Physical Therapy  Goal: Physical Therapy Goal  Description: Goals to be met by 2025   Patient will increase functional independence with mobility by performin. Bed to chair transfer with Modified Independent with or without rolling walker using Stand step TECHNIQUE  2. Gait  x 350  feet with Supervision or Set-up Assistance with or without rolling walker  3. Lower extremity exercise program x10 reps with assistance as needed.   Outcome: Progressing, needs encouragement to increase participation in therapy

## 2025-04-07 NOTE — ASSESSMENT & PLAN NOTE
Patient has Abnormal Magnesium: hypomagnesemia. Will continue to monitor electrolytes closely. Will replace the affected electrolytes and repeat labs to be done after interventions completed. The patient's magnesium results have been reviewed and are listed below.  Recent Labs   Lab 04/07/25  0512   MG 1.8
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
## 2025-04-07 NOTE — PT/OT/SLP PROGRESS
Occupational Therapy   Treatment    Name: Ruth Gamboa  MRN: [id number]  Admitting Diagnosis:  UTI (urinary tract infection)       Recommendations:     Discharge Recommendations: Low Intensity Therapy  Discharge Equipment Recommendations:  none  Barriers to discharge:  Other (Comment) (Medical status)    Assessment:     Ruth Gamboa is a 80 y.o. female with a medical diagnosis of UTI (urinary tract infection).  She presents with improved functional performance with toileting as noted by CGA for safety during clothing management. Performance deficits affecting function are weakness, impaired endurance, impaired self care skills, impaired functional mobility, impaired balance, impaired cognition, decreased upper extremity function, decreased lower extremity function, decreased safety awareness, pain, decreased ROM, impaired cardiopulmonary response to activity, impaired joint extensibility, impaired muscle length.     Rehab Prognosis:  Good and Fair; patient would benefit from acute skilled OT services to address these deficits and reach maximum level of function.       Plan:     Patient to be seen 4 x/week to address the above listed problems via self-care/home management, therapeutic activities, therapeutic exercises, cognitive retraining  Plan of Care Expires: 04/11/25  Plan of Care Reviewed with: patient    Subjective     Chief Complaint: fatigue  Patient/Family Comments/goals: Pt would like to increase her strength while regain independence with ADL's including functional mobility.   Pain/Comfort:  Pain Rating 1: 0/10  Pain Rating Post-Intervention 1: 0/10    Objective:     Communicated with: nurse prior to session.  Patient found HOB elevated with peripheral IV, PureWick, oxygen upon OT entry to room.    General Precautions: Standard, fall, respiratory    Orthopedic Precautions:N/A  Braces: N/A  Respiratory Status: Nasal cannula, flow 2 L/min     Occupational Performance:     Bed Mobility:    Patient  completed Rolling/Turning to Left with  independence  Patient completed Rolling/Turning to Right with independence  Patient completed Scooting/Bridging with independence  Patient completed Supine to Sit with independence  Patient completed Sit to Supine with independence     Functional Mobility/Transfers:  Patient completed Sit <> Stand Transfer with stand by assistance  with  rolling walker   Patient completed Toilet Transfer Step Transfer technique with stand by assistance with  grab bars  Functional Mobility: Pt ambulated 185' between surfaces requiring CGA utilizing RW.     Activities of Daily Living:  Toileting: contact guard assistance        [facility] 6 Click ADL:      Treatment & Education:  Pt was cooperative with moderate verbal encouragement while reporting continued fatigue. She performed bed mobility demonstrating independence without use of bedrail. Pt then participated in functional transfer retraining to / from toilet emphasizing fall prevention requiring SBA for safety secondary to verbal cueing for technique utilizing grab bar. Next, she participated in ADL retraining regarding toileting requiring CGA secondary to intermittent steadying assist and tactile cueing for safety awareness and technique. Pt ambulated 185' between surfaces requiring CGA utilizing RW.     Patient left sitting edge of bed with all lines intact, call button in reach, and nurse notified    GOALS:   Multidisciplinary Problems       Occupational Therapy Goals          Problem: Occupational Therapy    Goal Priority Disciplines Outcome Interventions   Occupational Therapy Goal     OT, PT/OT Progressing    Description: Goals to be met by: 04/11/25     Patient will increase functional independence with ADLs by performing:    Feeding with [place].  UE Dressing with Modified [place].  LE Dressing with Set-up Assistance.  Grooming while standing at sink with Modified [place].  Toileting from toilet with Set-up Assistance for  hygiene and clothing management.   Bathing from  shower chair/bench with Supervision.  Toilet transfer to toilet with Modified [place].                         DME Justifications:  No DME recommended requiring DME justifications    Time Tracking:     OT Date of Treatment: 04/07/25  OT Start Time: 1145  OT Stop Time: 1208  OT Total Time (min): 23 min    Billable Minutes:Self Care/Home Management 12  Therapeutic Activity 11    OT/SUJIT: OT          4/7/2025

## 2025-04-07 NOTE — ASSESSMENT & PLAN NOTE
Patient's most recent potassium results are listed below.   Recent Labs     04/05/25  0516 04/06/25  0512 04/07/25  0512   K 4.5 4.6 4.7     Plan  - Replete potassium per protocol  - Monitor potassium Daily  - Patient's hypokalemia is improving

## 2025-04-08 LAB
ABSOLUTE EOSINOPHIL (OHS): 0.17 K/UL
ABSOLUTE MONOCYTE (OHS): 0.88 K/UL (ref 0.3–1)
ABSOLUTE NEUTROPHIL COUNT (OHS): 8.97 K/UL (ref 1.8–7.7)
ALBUMIN SERPL BCP-MCNC: 2.1 G/DL (ref 3.5–5.2)
ALP SERPL-CCNC: 70 UNIT/L (ref 40–150)
ALT SERPL W/O P-5'-P-CCNC: 8 UNIT/L (ref 10–44)
ANION GAP (OHS): 9 MMOL/L (ref 8–16)
AST SERPL-CCNC: 13 UNIT/L (ref 11–45)
BASOPHILS # BLD AUTO: 0.05 K/UL
BASOPHILS NFR BLD AUTO: 0.4 %
BILIRUB SERPL-MCNC: 0.2 MG/DL (ref 0.1–1)
BUN SERPL-MCNC: 19 MG/DL (ref 8–23)
CALCIUM SERPL-MCNC: 9.1 MG/DL (ref 8.7–10.5)
CHLORIDE SERPL-SCNC: 97 MMOL/L (ref 95–110)
CO2 SERPL-SCNC: 24 MMOL/L (ref 23–29)
CREAT SERPL-MCNC: 0.7 MG/DL (ref 0.5–1.4)
ERYTHROCYTE [DISTWIDTH] IN BLOOD BY AUTOMATED COUNT: 20.4 % (ref 11.5–14.5)
GFR SERPLBLD CREATININE-BSD FMLA CKD-EPI: >60 ML/MIN/1.73/M2
GLUCOSE SERPL-MCNC: 114 MG/DL (ref 70–110)
HCT VFR BLD AUTO: 29.7 % (ref 37–48.5)
HGB BLD-MCNC: 9.4 GM/DL (ref 12–16)
IMM GRANULOCYTES # BLD AUTO: 0.12 K/UL (ref 0–0.04)
IMM GRANULOCYTES NFR BLD AUTO: 0.9 % (ref 0–0.5)
LYMPHOCYTES # BLD AUTO: 2.69 K/UL (ref 1–4.8)
MAGNESIUM SERPL-MCNC: 1.8 MG/DL (ref 1.6–2.6)
MCH RBC QN AUTO: 27.6 PG (ref 27–31)
MCHC RBC AUTO-ENTMCNC: 31.6 G/DL (ref 32–36)
MCV RBC AUTO: 87 FL (ref 82–98)
NUCLEATED RBC (/100WBC) (OHS): 0 /100 WBC
PLATELET # BLD AUTO: 415 K/UL (ref 150–450)
PMV BLD AUTO: 10.3 FL (ref 9.2–12.9)
POTASSIUM SERPL-SCNC: 4.5 MMOL/L (ref 3.5–5.1)
PROT SERPL-MCNC: 6.5 GM/DL (ref 6–8.4)
RBC # BLD AUTO: 3.4 M/UL (ref 4–5.4)
RELATIVE EOSINOPHIL (OHS): 1.3 %
RELATIVE LYMPHOCYTE (OHS): 20.9 % (ref 18–48)
RELATIVE MONOCYTE (OHS): 6.8 % (ref 4–15)
RELATIVE NEUTROPHIL (OHS): 69.7 % (ref 38–73)
SODIUM SERPL-SCNC: 130 MMOL/L (ref 136–145)
WBC # BLD AUTO: 12.88 K/UL (ref 3.9–12.7)

## 2025-04-08 PROCEDURE — 11000001 HC ACUTE MED/SURG PRIVATE ROOM

## 2025-04-08 PROCEDURE — 85025 COMPLETE CBC W/AUTO DIFF WBC: CPT | Performed by: INTERNAL MEDICINE

## 2025-04-08 PROCEDURE — 25000003 PHARM REV CODE 250: Performed by: NURSE PRACTITIONER

## 2025-04-08 PROCEDURE — 94761 N-INVAS EAR/PLS OXIMETRY MLT: CPT

## 2025-04-08 PROCEDURE — 25000003 PHARM REV CODE 250: Performed by: PSYCHIATRY & NEUROLOGY

## 2025-04-08 PROCEDURE — 97535 SELF CARE MNGMENT TRAINING: CPT

## 2025-04-08 PROCEDURE — 27000221 HC OXYGEN, UP TO 24 HOURS

## 2025-04-08 PROCEDURE — 83735 ASSAY OF MAGNESIUM: CPT | Performed by: INTERNAL MEDICINE

## 2025-04-08 PROCEDURE — 94640 AIRWAY INHALATION TREATMENT: CPT

## 2025-04-08 PROCEDURE — 97530 THERAPEUTIC ACTIVITIES: CPT

## 2025-04-08 PROCEDURE — 25000003 PHARM REV CODE 250: Performed by: INTERNAL MEDICINE

## 2025-04-08 PROCEDURE — 25000242 PHARM REV CODE 250 ALT 637 W/ HCPCS: Performed by: INTERNAL MEDICINE

## 2025-04-08 PROCEDURE — 99900035 HC TECH TIME PER 15 MIN (STAT)

## 2025-04-08 PROCEDURE — S0179 MEGESTROL 20 MG: HCPCS | Performed by: NURSE PRACTITIONER

## 2025-04-08 PROCEDURE — 97110 THERAPEUTIC EXERCISES: CPT

## 2025-04-08 PROCEDURE — 80053 COMPREHEN METABOLIC PANEL: CPT | Performed by: INTERNAL MEDICINE

## 2025-04-08 PROCEDURE — 99900031 HC PATIENT EDUCATION (STAT)

## 2025-04-08 PROCEDURE — 36415 COLL VENOUS BLD VENIPUNCTURE: CPT | Performed by: INTERNAL MEDICINE

## 2025-04-08 RX ADMIN — FERROUS SULFATE TAB 325 MG (65 MG ELEMENTAL FE) 1 EACH: 325 (65 FE) TAB at 10:04

## 2025-04-08 RX ADMIN — ARFORMOTEROL TARTRATE 15 MCG: 15 SOLUTION RESPIRATORY (INHALATION) at 07:04

## 2025-04-08 RX ADMIN — MICONAZOLE NITRATE: 20 POWDER TOPICAL at 08:04

## 2025-04-08 RX ADMIN — CYANOCOBALAMIN TAB 1000 MCG 1000 MCG: 1000 TAB at 10:04

## 2025-04-08 RX ADMIN — NYSTATIN AND TRIAMCINOLONE ACETONIDE: 100000; 1 CREAM TOPICAL at 08:04

## 2025-04-08 RX ADMIN — OXYCODONE HYDROCHLORIDE AND ACETAMINOPHEN 500 MG: 500 TABLET ORAL at 10:04

## 2025-04-08 RX ADMIN — BUDESONIDE 0.5 MG: 0.5 INHALANT RESPIRATORY (INHALATION) at 07:04

## 2025-04-08 RX ADMIN — SUCRALFATE 1 G: 1 SUSPENSION ORAL at 05:04

## 2025-04-08 RX ADMIN — Medication 300 ML: at 09:04

## 2025-04-08 RX ADMIN — ALUMINUM HYDROXIDE, MAGNESIUM HYDROXIDE, AND DIMETHICONE 30 ML: 200; 20; 200 SUSPENSION ORAL at 10:04

## 2025-04-08 RX ADMIN — ALUMINUM HYDROXIDE, MAGNESIUM HYDROXIDE, AND DIMETHICONE 30 ML: 200; 20; 200 SUSPENSION ORAL at 05:04

## 2025-04-08 RX ADMIN — SUCRALFATE 1 G: 1 SUSPENSION ORAL at 10:04

## 2025-04-08 RX ADMIN — LISINOPRIL 40 MG: 20 TABLET ORAL at 10:04

## 2025-04-08 RX ADMIN — NYSTATIN AND TRIAMCINOLONE ACETONIDE: 100000; 1 CREAM TOPICAL at 03:04

## 2025-04-08 RX ADMIN — Medication 1000 UNITS: at 10:04

## 2025-04-08 RX ADMIN — CARVEDILOL 25 MG: 12.5 TABLET, FILM COATED ORAL at 10:04

## 2025-04-08 RX ADMIN — IPRATROPIUM BROMIDE 0.5 MG: 0.5 SOLUTION RESPIRATORY (INHALATION) at 01:04

## 2025-04-08 RX ADMIN — Medication 300 ML: at 05:04

## 2025-04-08 RX ADMIN — MEGESTROL ACETATE 200 MG: 400 SUSPENSION ORAL at 10:04

## 2025-04-08 RX ADMIN — IPRATROPIUM BROMIDE 0.5 MG: 0.5 SOLUTION RESPIRATORY (INHALATION) at 07:04

## 2025-04-08 RX ADMIN — CARVEDILOL 25 MG: 12.5 TABLET, FILM COATED ORAL at 05:04

## 2025-04-08 RX ADMIN — NYSTATIN AND TRIAMCINOLONE ACETONIDE: 100000; 1 CREAM TOPICAL at 09:04

## 2025-04-08 RX ADMIN — Medication 300 ML: at 01:04

## 2025-04-08 RX ADMIN — PANTOPRAZOLE SODIUM 40 MG: 40 TABLET, DELAYED RELEASE ORAL at 10:04

## 2025-04-08 RX ADMIN — FLUOXETINE HYDROCHLORIDE 40 MG: 20 CAPSULE ORAL at 10:04

## 2025-04-08 RX ADMIN — MICONAZOLE NITRATE: 20 POWDER TOPICAL at 09:04

## 2025-04-08 RX ADMIN — LEVOTHYROXINE SODIUM 25 MCG: 25 TABLET ORAL at 05:04

## 2025-04-08 RX ADMIN — ALUMINUM HYDROXIDE, MAGNESIUM HYDROXIDE, AND DIMETHICONE 30 ML: 200; 20; 200 SUSPENSION ORAL at 08:04

## 2025-04-08 RX ADMIN — MIRTAZAPINE 7.5 MG: 7.5 TABLET ORAL at 08:04

## 2025-04-08 NOTE — PLAN OF CARE
Problem: Occupational Therapy  Goal: Occupational Therapy Goal  Description: Goals to be met by: 04/11/25     Patient will increase functional independence with ADLs by performing:    Feeding with [place].  UE Dressing with Modified [place].  LE Dressing with Set-up Assistance.  Grooming while standing at sink with Modified [place].  Toileting from toilet with Set-up Assistance for hygiene and clothing management.   Bathing from  shower chair/bench with Supervision.  Toilet transfer to toilet with Modified [place].    Outcome: Progressing

## 2025-04-08 NOTE — PLAN OF CARE
Plan of care reviewed with the patient. Vital signs stable. IV saline locked. Patient tolerated medicines well. Patient care is ongoing throughout the shift. Pure wick changed and replaced.

## 2025-04-08 NOTE — ASSESSMENT & PLAN NOTE
Patient has Abnormal Magnesium: hypomagnesemia. Will continue to monitor electrolytes closely. Will replace the affected electrolytes and repeat labs to be done after interventions completed. The patient's magnesium results have been reviewed and are listed below.  Recent Labs   Lab 04/08/25  0506   MG 1.8
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
## 2025-04-08 NOTE — PLAN OF CARE
Problem: Physical Therapy  Goal: Physical Therapy Goal  Description: Goals to be met by 2025   Patient will increase functional independence with mobility by performin. Bed to chair transfer with Modified Independent with or without rolling walker using Stand step TECHNIQUE  2. Gait  x 350  feet with Supervision or Set-up Assistance with or without rolling walker  3. Lower extremity exercise program x10 reps with assistance as needed.   Outcome: Progressing,awaiting nursing home placement
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
## 2025-04-08 NOTE — PT/OT/SLP PROGRESS
Occupational Therapy   Treatment    Name: Ruth Gamboa  MRN: [id number]  Admitting Diagnosis:  UTI (urinary tract infection)       Recommendations:     Discharge Recommendations: Low Intensity Therapy  Discharge Equipment Recommendations:  none  Barriers to discharge:  Other (Comment) (Medical status)    Assessment:     Ruth Gamboa is a 80 y.o. female with a medical diagnosis of UTI (urinary tract infection).  She presents with improved affect and effort impacting activity tolerance. Performance deficits affecting function are weakness, impaired endurance, impaired self care skills, impaired functional mobility, impaired balance, impaired cognition, decreased upper extremity function, decreased lower extremity function, decreased safety awareness, pain, decreased ROM, impaired cardiopulmonary response to activity, impaired joint extensibility, impaired muscle length.     Rehab Prognosis:  Fair; patient would benefit from acute skilled OT services to address these deficits and reach maximum level of function.       Plan:     Patient to be seen 4 x/week to address the above listed problems via self-care/home management, therapeutic activities, therapeutic exercises, cognitive retraining  Plan of Care Expires: 04/11/25  Plan of Care Reviewed with: patient    Subjective     Chief Complaint: none on this date  Patient/Family Comments/goals: Pt would like to increase her strength while regain independence with ADL's including functional mobility.   Pain/Comfort:  Pain Rating 1: 0/10  Pain Rating Post-Intervention 1: 0/10    Objective:     Communicated with: nurse prior to session.  Patient found up in chair with oxygen, PureWick, peripheral IV upon OT entry to room.    General Precautions: Standard, fall, respiratory    Orthopedic Precautions:N/A  Braces: N/A  Respiratory Status: Nasal cannula, flow 2 L/min     Occupational Performance:     Bed Mobility:    Patient completed Rolling/Turning to Left with   independence  Patient completed Rolling/Turning to Right with independence  Patient completed Scooting/Bridging with independence  Patient completed Supine to Sit with independence  Patient completed Sit to Supine with independence     Functional Mobility/Transfers:  Patient completed Sit <> Stand Transfer with supervision  with  rolling walker   Patient completed Toilet Transfer Step Transfer technique with supervision with  grab bars  Functional Mobility: Pt ambulated greater than 200' between surfaces requiring SBA utilizing RW.     Activities of Daily Living:  Upper Body Dressing: setup assistance        [facility] 6 Click ADL:      Treatment & Education:  Pt was cooperative and motivated without verbal encouragement while exhibiting more positive affect. She performed bed mobility demonstrating continued independence. Pt then participated in ADL retraining regarding UB dressing providing extra time requiring setup assistance secondary to setup of gown while seated EOB unsupported. Next, she participated in functional transfer retraining to / from toilet emphasizing fall prevention requiring supervision for safety secondary to min-mod verbal cueing for technique utilizing grab bar and RW for stabilization. Pt ambulated greater than 200' between surfaces requiring SBA utilizing RW.     Patient left HOB elevated with all lines intact, call button in reach, and nurse notified    GOALS:   Multidisciplinary Problems       Occupational Therapy Goals          Problem: Occupational Therapy    Goal Priority Disciplines Outcome Interventions   Occupational Therapy Goal     OT, PT/OT Progressing    Description: Goals to be met by: 04/11/25     Patient will increase functional independence with ADLs by performing:    Feeding with [place].  UE Dressing with Modified [place].  LE Dressing with Set-up Assistance.  Grooming while standing at sink with Modified [place].  Toileting from toilet with Set-up Assistance for  hygiene and clothing management.   Bathing from  shower chair/bench with Supervision.  Toilet transfer to toilet with Modified [place].                         DME Justifications:  No DME recommended requiring DME justifications    Time Tracking:     OT Date of Treatment: 04/08/25  OT Start Time: 1546  OT Stop Time: 1618  OT Total Time (min): 32 min    Billable Minutes:Self Care/Home Management 10  Therapeutic Activity 22    OT/SUJIT: OT          4/8/2025

## 2025-04-08 NOTE — ASSESSMENT & PLAN NOTE
Patient's COPD is controlled currently.   Patient is a 46y old  Male who presents with a chief complaint of increased abd girth and swelling of lower extremities (14 Jan 2020 16:25)      SUBJECTIVE / OVERNIGHT EVENTS:  abd distention/discomfort  MEDICATIONS  (STANDING):  chlorhexidine 4% Liquid 1 Application(s) Topical daily  ciprofloxacin     Tablet 500 milliGRAM(s) Oral daily  folic acid 1 milliGRAM(s) Oral daily  furosemide    Tablet 40 milliGRAM(s) Oral daily  multivitamin 1 Tablet(s) Oral daily  pantoprazole    Tablet 40 milliGRAM(s) Oral before breakfast  polyethylene glycol 3350 17 Gram(s) Oral two times a day  spironolactone 100 milliGRAM(s) Oral daily  thiamine 100 milliGRAM(s) Oral daily    MEDICATIONS  (PRN):              PHYSICAL EXAM:  GENERAL: NAD, well-developed  HEAD:  Atraumatic, Normocephalic  EYES: EOMI, PERRLA, conjunctiva and sclera anicteric  NECK: Supple, No JVD  CHEST/LUNG: Clear to auscultation bilaterally; No wheeze  HEART: Regular rate and rhythm; No murmurs, rubs, or gallops  ABDOMEN: Soft, Nontender, distended; Bowel sounds present, no hepatosplenomegaly, no rebound or guarding  EXTREMITIES: bipedal edema  PSYCH: AAOx3  NEUROLOGY: non-focal, no asterixis  SKIN: No rashes or lesion    LABS:                        7.2    6.90  )-----------( 80       ( 15 Harley 2020 09:18 )             22.3     01-15    134<L>  |  104  |  9   ----------------------------<  90  3.8   |  20<L>  |  0.50    Ca    7.4<L>      15 Harley 2020 05:36  Phos  3.2     01-15  Mg     1.8     01-15    TPro  6.1  /  Alb  2.0<L>  /  TBili  8.0<H>  /  DBili  x   /  AST  52<H>  /  ALT  27  /  AlkPhos  136<H>  01-15    LIVER FUNCTIONS - ( 15 Harley 2020 05:36 )  Alb: 2.0 g/dL / Pro: 6.1 g/dL / ALK PHOS: 136 U/L / ALT: 27 U/L / AST: 52 U/L / GGT: x           PT/INR - ( 15 Harley 2020 09:05 )   PT: 33.2 sec;   INR: 2.82 ratio         PTT - ( 15 Harley 2020 05:36 )  PTT:69.3 sec          RADIOLOGY & ADDITIONAL TESTS:

## 2025-04-08 NOTE — PLAN OF CARE
04/08/25 1446   Medicare Message   Important Message from Medicare regarding Discharge Appeal Rights Given to patient/caregiver;Explained to patient/caregiver;Signed/date by patient/caregiver   Date IMM was signed 04/08/25   Time IMM was signed 1446     Spoke to patient at bedside.  Awake, alert.  Provided copy of IM and explained Medicare Discharge appeal rights. Patient signs for delivery of IM.

## 2025-04-08 NOTE — PLAN OF CARE
Problem: Pneumonia  Goal: Fluid Balance  4/8/2025 0703 by Adrienne Castillo RN  Outcome: Progressing  4/8/2025 0410 by Adrienne Castillo RN  Outcome: Progressing  Goal: Resolution of Infection Signs and Symptoms  4/8/2025 0703 by Adrienne Castillo RN  Outcome: Progressing  4/8/2025 0410 by Adrienne Castillo RN  Outcome: Progressing  Goal: Effective Oxygenation and Ventilation  4/8/2025 0703 by Adrienne Castillo RN  Outcome: Progressing  4/8/2025 0410 by Adrienne Castillo RN  Outcome: Progressing     Problem: Infection  Goal: Absence of Infection Signs and Symptoms  4/8/2025 0703 by Adrienne Castillo RN  Outcome: Progressing  4/8/2025 0410 by Adrienne Castillo RN  Outcome: Progressing     Problem: Fall Injury Risk  Goal: Absence of Fall and Fall-Related Injury  4/8/2025 0703 by Adrienne Castillo RN  Outcome: Progressing  4/8/2025 0410 by Adrienne Castillo RN  Outcome: Progressing     Problem: Wound  Goal: Optimal Coping  4/8/2025 0703 by Adrienne Castillo RN  Outcome: Progressing  4/8/2025 0410 by Adrienne Castillo RN  Outcome: Progressing  Goal: Optimal Functional Ability  4/8/2025 0703 by Adrienne Castillo RN  Outcome: Progressing  4/8/2025 0410 by Adrienne Castillo RN  Outcome: Progressing  Goal: Absence of Infection Signs and Symptoms  4/8/2025 0703 by Adrienne Castillo RN  Outcome: Progressing  4/8/2025 0410 by Adrienne Castillo, RN  Outcome: Progressing  Goal: Improved Oral Intake  4/8/2025 0703 by Adrienne Castillo RN  Outcome: Progressing  4/8/2025 0410 by Adrienne Castillo RN  Outcome: Progressing  Goal: Optimal Pain Control and Function  4/8/2025 0703 by Adrienne Castillo, RN  Outcome: Progressing  4/8/2025 0410 by Adrienne Castillo RN  Outcome: Progressing  Goal: Skin Health and Integrity  4/8/2025 0703 by Adrienne Castillo, RN  Outcome: Progressing  4/8/2025 0410 by Adrienne Castillo, RN  Outcome: Progressing  Goal: Optimal Wound Healing  4/8/2025 0703 by Jonathan, Adrienne, RN  Outcome: Progressing  4/8/2025 0410 by Adrienne Castillo, RN  Outcome: Progressing     Problem: Skin Injury Risk Increased  Goal: Skin Health and  Integrity  4/8/2025 0703 by Adrienne Castillo, RN  Outcome: Progressing  4/8/2025 0410 by Adrienne Castillo, RN  Outcome: Progressing

## 2025-04-08 NOTE — ASSESSMENT & PLAN NOTE
Abx, CS pending.  4/5 urine culture with Klebsiella oxytocia, Rocephin on board, continue antibiotics white count trending down, afebrile  4/6 continue Rocephin, white count elevated, afebrile, continue IV antibiotics, fluids  4/7 FM:  Finishing abx next few days.  4/8 FM:  Completing abx.

## 2025-04-08 NOTE — PT/OT/SLP PROGRESS
"Physical Therapy Treatment    Patient Name:  Ruth Gamboa   MRN:  [id number]    Recommendations:     Discharge Recommendations: Low Intensity Therapy  Discharge Equipment Recommendations: none  Barriers to discharge: None    Assessment:     Ruth Gamboa is a 80 y.o. female admitted with a medical diagnosis of UTI (urinary tract infection).  She presents with the following impairments/functional limitations: weakness, impaired joint extensibility, impaired endurance, impaired muscle length, impaired self care skills, impaired functional mobility, decreased lower extremity function, decreased upper extremity function, gait instability, impaired balance, impaired cardiopulmonary response to activity Still awaiting nursing home placement. Needs encouragement to participate in therapy.  Increase distance  ambulated today compared to yesterday.  .    Rehab Prognosis: Fair; patient would benefit from acute skilled PT services to address these deficits and reach maximum level of function.    Recent Surgery: * No surgery found *      Plan:     During this hospitalization, patient to be seen  (3-5x a week) to address the identified rehab impairments via gait training, therapeutic activities, therapeutic exercises, neuromuscular re-education and progress toward the following goals:    Plan of Care Expires:  04/11/25    Subjective     Chief Complaint: " I need my coffee."   Patient/Family Comments/goals: Unstated   Pain/Comfort:  Pain Rating 1: 0/10  Pain Rating Post-Intervention 1: 0/10      Objective:     Communicated with nurse, family and MD  prior to session.  Patient found supine with peripheral IV, PureWick, oxygen upon PT entry to room.     General Precautions: Standard, fall, respiratory  Orthopedic Precautions: N/A  Braces: N/A  Respiratory Status: Nasal cannula, flow 2 L/min     Functional Mobility:  Bed Mobility:     Rolling Left:  independence  Rolling Right: independence  Scooting: independence  Bridging: " independence  Supine to Sit: independence  Sit to Supine: independence  Transfers:     Sit to Stand:  independence with rolling walker  Gait: ~423  feet with RW with O2 supplement with SBA.    Balance:  independent with static sitting, Set up  with static standing with RW          AM-PAC 6 CLICK MOBILITY  Turning over in bed (including adjusting bedclothes, sheets and blankets)?: 4  Sitting down on and standing up from a chair with arms (e.g., wheelchair, bedside commode, etc.): 4  Moving from lying on back to sitting on the side of the bed?: 4  Moving to and from a bed to a chair (including a wheelchair)?: 3  Need to walk in hospital room?: 3  Climbing 3-5 steps with a railing?: 3 (based on clinical judgement)  Basic Mobility Total Score: 21       Treatment & Education:  Rolling side <> side  Supine <> sit  Scooting to the edge  of the bed   Sitting balance/tolerance  Sit <> stand with RW   Standing balance/tolerance   Gait with RW   Bed organization to reduce risk of skin breakdowns     Patient left sitting edge of bed with all lines intact, call button in reach, and nurse  notified..    GOALS:   Multidisciplinary Problems       Physical Therapy Goals          Problem: Physical Therapy    Goal Priority Disciplines Outcome Interventions   Physical Therapy Goal     PT, PT/OT Progressing    Description: Goals to be met by 2025   Patient will increase functional independence with mobility by performin. Bed to chair transfer with Modified Independent with or without rolling walker using Stand step TECHNIQUE  2. Gait  x 350  feet with Supervision or Set-up Assistance with or without rolling walker  3. Lower extremity exercise program x10 reps with assistance as needed.                        DME Justifications:  No DME recommended requiring DME justifications    Time Tracking:     PT Received On: 25  PT Start Time: 858     PT Stop Time: 916  PT Total Time (min): 18 min     Billable Minutes:  Therapeutic Exercise 18    Treatment Type: Treatment  PT/PTA: PT     Number of PTA visits since last PT visit: 0     04/08/2025

## 2025-04-08 NOTE — ASSESSMENT & PLAN NOTE
Hyponatremia is likely due to Dehydration/hypovolemia. The patient's most recent sodium results are listed below.  Recent Labs     04/06/25  0512 04/07/25  0512 04/08/25  0506   * 131* 130*     Plan  - Correct the sodium by 4-6mEq in 24 hours.

## 2025-04-08 NOTE — ASSESSMENT & PLAN NOTE
Patient's blood pressure range in the last 24 hours was: BP  Min: 101/58  Max: 133/60.The patient's inpatient anti-hypertensive regimen is listed below:  Current Antihypertensives  carvediloL tablet 25 mg, 2 times daily with meals, Oral  lisinopriL tablet 40 mg, Daily, Oral    Plan  - BP is uncontrolled, will adjust as follows:  Lisinopril increased

## 2025-04-09 PROBLEM — R21 RASH: Status: ACTIVE | Noted: 2025-04-09

## 2025-04-09 LAB
LACTATE SERPL-SCNC: 0.8 MMOL/L (ref 0.5–2.2)
LACTATE SERPL-SCNC: 1 MMOL/L (ref 0.5–2.2)

## 2025-04-09 PROCEDURE — 86235 NUCLEAR ANTIGEN ANTIBODY: CPT | Performed by: INTERNAL MEDICINE

## 2025-04-09 PROCEDURE — 11000001 HC ACUTE MED/SURG PRIVATE ROOM

## 2025-04-09 PROCEDURE — 25000242 PHARM REV CODE 250 ALT 637 W/ HCPCS: Performed by: INTERNAL MEDICINE

## 2025-04-09 PROCEDURE — 99900035 HC TECH TIME PER 15 MIN (STAT)

## 2025-04-09 PROCEDURE — 86039 ANTINUCLEAR ANTIBODIES (ANA): CPT | Performed by: INTERNAL MEDICINE

## 2025-04-09 PROCEDURE — 94640 AIRWAY INHALATION TREATMENT: CPT

## 2025-04-09 PROCEDURE — 36415 COLL VENOUS BLD VENIPUNCTURE: CPT | Performed by: INTERNAL MEDICINE

## 2025-04-09 PROCEDURE — 63600175 PHARM REV CODE 636 W HCPCS: Performed by: INTERNAL MEDICINE

## 2025-04-09 PROCEDURE — 25000003 PHARM REV CODE 250: Performed by: INTERNAL MEDICINE

## 2025-04-09 PROCEDURE — S0179 MEGESTROL 20 MG: HCPCS | Performed by: NURSE PRACTITIONER

## 2025-04-09 PROCEDURE — 83605 ASSAY OF LACTIC ACID: CPT | Performed by: INTERNAL MEDICINE

## 2025-04-09 PROCEDURE — 86593 SYPHILIS TEST NON-TREP QUANT: CPT | Performed by: NURSE PRACTITIONER

## 2025-04-09 PROCEDURE — 97530 THERAPEUTIC ACTIVITIES: CPT

## 2025-04-09 PROCEDURE — 99900031 HC PATIENT EDUCATION (STAT)

## 2025-04-09 PROCEDURE — 86225 DNA ANTIBODY NATIVE: CPT | Performed by: INTERNAL MEDICINE

## 2025-04-09 PROCEDURE — 27000221 HC OXYGEN, UP TO 24 HOURS

## 2025-04-09 PROCEDURE — 97110 THERAPEUTIC EXERCISES: CPT

## 2025-04-09 PROCEDURE — 25000003 PHARM REV CODE 250: Performed by: PSYCHIATRY & NEUROLOGY

## 2025-04-09 PROCEDURE — 25000003 PHARM REV CODE 250: Performed by: NURSE PRACTITIONER

## 2025-04-09 PROCEDURE — 86592 SYPHILIS TEST NON-TREP QUAL: CPT | Performed by: INTERNAL MEDICINE

## 2025-04-09 PROCEDURE — 94761 N-INVAS EAR/PLS OXIMETRY MLT: CPT

## 2025-04-09 RX ORDER — PREDNISONE 20 MG/1
20 TABLET ORAL DAILY
Status: DISCONTINUED | OUTPATIENT
Start: 2025-04-09 | End: 2025-04-15 | Stop reason: HOSPADM

## 2025-04-09 RX ADMIN — ALUMINUM HYDROXIDE, MAGNESIUM HYDROXIDE, AND DIMETHICONE 30 ML: 200; 20; 200 SUSPENSION ORAL at 05:04

## 2025-04-09 RX ADMIN — Medication 1000 UNITS: at 10:04

## 2025-04-09 RX ADMIN — CARVEDILOL 25 MG: 12.5 TABLET, FILM COATED ORAL at 10:04

## 2025-04-09 RX ADMIN — Medication 300 ML: at 05:04

## 2025-04-09 RX ADMIN — IPRATROPIUM BROMIDE 0.5 MG: 0.5 SOLUTION RESPIRATORY (INHALATION) at 07:04

## 2025-04-09 RX ADMIN — SUCRALFATE 1 G: 1 SUSPENSION ORAL at 05:04

## 2025-04-09 RX ADMIN — OXYCODONE HYDROCHLORIDE AND ACETAMINOPHEN 500 MG: 500 TABLET ORAL at 10:04

## 2025-04-09 RX ADMIN — MICONAZOLE NITRATE: 20 POWDER TOPICAL at 08:04

## 2025-04-09 RX ADMIN — ARFORMOTEROL TARTRATE 15 MCG: 15 SOLUTION RESPIRATORY (INHALATION) at 07:04

## 2025-04-09 RX ADMIN — SUCRALFATE 1 G: 1 SUSPENSION ORAL at 12:04

## 2025-04-09 RX ADMIN — PANTOPRAZOLE SODIUM 40 MG: 40 TABLET, DELAYED RELEASE ORAL at 10:04

## 2025-04-09 RX ADMIN — IPRATROPIUM BROMIDE 0.5 MG: 0.5 SOLUTION RESPIRATORY (INHALATION) at 01:04

## 2025-04-09 RX ADMIN — NYSTATIN AND TRIAMCINOLONE ACETONIDE: 100000; 1 CREAM TOPICAL at 03:04

## 2025-04-09 RX ADMIN — ALUMINUM HYDROXIDE, MAGNESIUM HYDROXIDE, AND DIMETHICONE 30 ML: 200; 20; 200 SUSPENSION ORAL at 08:04

## 2025-04-09 RX ADMIN — FLUOXETINE HYDROCHLORIDE 40 MG: 20 CAPSULE ORAL at 10:04

## 2025-04-09 RX ADMIN — MEGESTROL ACETATE 200 MG: 400 SUSPENSION ORAL at 10:04

## 2025-04-09 RX ADMIN — BUDESONIDE 0.5 MG: 0.5 INHALANT RESPIRATORY (INHALATION) at 07:04

## 2025-04-09 RX ADMIN — MICONAZOLE NITRATE: 20 POWDER TOPICAL at 09:04

## 2025-04-09 RX ADMIN — MIRTAZAPINE 7.5 MG: 7.5 TABLET ORAL at 08:04

## 2025-04-09 RX ADMIN — BUDESONIDE 0.5 MG: 0.5 INHALANT RESPIRATORY (INHALATION) at 09:04

## 2025-04-09 RX ADMIN — ARFORMOTEROL TARTRATE 15 MCG: 15 SOLUTION RESPIRATORY (INHALATION) at 09:04

## 2025-04-09 RX ADMIN — PREDNISONE 20 MG: 20 TABLET ORAL at 10:04

## 2025-04-09 RX ADMIN — ALUMINUM HYDROXIDE, MAGNESIUM HYDROXIDE, AND DIMETHICONE 30 ML: 200; 20; 200 SUSPENSION ORAL at 10:04

## 2025-04-09 RX ADMIN — Medication 300 ML: at 01:04

## 2025-04-09 RX ADMIN — IPRATROPIUM BROMIDE 0.5 MG: 0.5 SOLUTION RESPIRATORY (INHALATION) at 09:04

## 2025-04-09 RX ADMIN — NYSTATIN AND TRIAMCINOLONE ACETONIDE: 100000; 1 CREAM TOPICAL at 08:04

## 2025-04-09 RX ADMIN — NYSTATIN AND TRIAMCINOLONE ACETONIDE: 100000; 1 CREAM TOPICAL at 09:04

## 2025-04-09 RX ADMIN — CYANOCOBALAMIN TAB 1000 MCG 1000 MCG: 1000 TAB at 10:04

## 2025-04-09 RX ADMIN — LEVOTHYROXINE SODIUM 25 MCG: 25 TABLET ORAL at 05:04
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
## 2025-04-09 NOTE — ASSESSMENT & PLAN NOTE
Nutrition consulted. Most recent weight and BMI monitored-     Measurements:  Wt Readings from Last 1 Encounters:   04/01/25 40.6 kg (89 lb 8.1 oz)   Body mass index is 15.86 kg/m².    Patient has been screened and assessed by RD.    Malnutrition Type:  Context:    Level:      Malnutrition Characteristic Summary:       Interventions/Recommendations (treatment strategy):  1. Continue regular diet as tolerated.   2. Ensure Enlive ONS or alternative TID.  3. Rec'd Moshe BID to promote wound healing and to provide additional nutrtiion. 4. RD to monitor and follow up.    4/4 FM:  PO intake improved with txt.  4/9 FM:  Currently on multiple appetite stimulants and her oral intake is improved.  Patient's tolerating supplements and continue to encourage.  Likely related to depression which is also improved.

## 2025-04-09 NOTE — PLAN OF CARE
Plan of care reviewed with the patient. Patient able to ambulate with PT/OT and tolerated it well. Patient is on 2L nasal cannula with sats remaining above 95%.    contact guard

## 2025-04-09 NOTE — NURSING
Called and spoke with Dr. Calix about pt. Having papules in the palms of her hands and feet, and her abdomen, Dr. Calix says they will take a look at it this am and do testing, read back, will continue to monitor.

## 2025-04-09 NOTE — PLAN OF CARE
Problem: Physical Therapy  Goal: Physical Therapy Goal  Description: Goals to be met by 2025   Patient will increase functional independence with mobility by performin. Bed to chair transfer with Modified Independent with or without rolling walker using Stand step TECHNIQUE  2. Gait  x 350  feet with Supervision or Set-up Assistance with or without rolling walker  3. Lower extremity exercise program x10 reps with assistance as needed.   Outcome: Progressing, awaiting nursing home placement

## 2025-04-09 NOTE — PLAN OF CARE
Problem: Occupational Therapy  Goal: Occupational Therapy Goal  Description: Goals to be met by: 04/11/25     Patient will increase functional independence with ADLs by performing:    Feeding with [place].  UE Dressing with Modified [place].  LE Dressing with Set-up Assistance.  Grooming while standing at sink with Modified [place].  Toileting from toilet with Set-up Assistance for hygiene and clothing management.   Bathing from  shower chair/bench with Supervision.  Toilet transfer to toilet with Modified [place].    Outcome: Progressing

## 2025-04-09 NOTE — ASSESSMENT & PLAN NOTE
ST to evaluate    4/8 FM:  ST has cleared patient, must stay upright when eating.  4/9 FM:  Improved

## 2025-04-09 NOTE — PT/OT/SLP PROGRESS
"Occupational Therapy   Treatment    Name: Ruth Gamboa  MRN: [id number]  Admitting Diagnosis:  UTI (urinary tract infection)       Recommendations:     Discharge Recommendations: Low Intensity Therapy  Discharge Equipment Recommendations:  none  Barriers to discharge:  Other (Comment) (Medical status)    Assessment:     Ruth Gamboa is a 80 y.o. female with a medical diagnosis of UTI (urinary tract infection).  She presents with increased fatigue. Performance deficits affecting function are weakness, impaired endurance, impaired self care skills, impaired functional mobility, impaired balance, impaired cognition, decreased upper extremity function, decreased lower extremity function, decreased safety awareness, pain, decreased ROM, impaired cardiopulmonary response to activity, impaired joint extensibility, impaired muscle length.     Rehab Prognosis:  Fair; patient would benefit from acute skilled OT services to address these deficits and reach maximum level of function.       Plan:     Patient to be seen 4 x/week to address the above listed problems via self-care/home management, therapeutic activities, therapeutic exercises, cognitive retraining  Plan of Care Expires: 04/11/25  Plan of Care Reviewed with: patient    Subjective     Chief Complaint: Pt stated, "I'm too tired to do anything."  Patient/Family Comments/goals: Pt would like to increase her strength while regain independence with ADL's including functional mobility.   Pain/Comfort:  Pain Rating 1: 0/10  Pain Rating Post-Intervention 1: 0/10    Objective:     Communicated with: nurse prior to session.  Patient found HOB elevated with peripheral IV, oxygen, PureWick upon OT entry to room.    General Precautions: Standard, fall, respiratory    Orthopedic Precautions:N/A  Braces: N/A  Respiratory Status: Room air     Occupational Performance:     Bed Mobility:    Patient completed Rolling/Turning to Left with  independence  Patient completed " Rolling/Turning to Right with independence  Patient completed Scooting/Bridging with independence  Patient completed Supine to Sit with independence  Patient completed Sit to Supine with independence     Functional Mobility/Transfers:  Patient completed Toilet Transfer Step Transfer technique with modified independence with  grab bars  Functional Mobility: Pt ambulated 26' between surfaces requiring supervision utilizing RW.       Treatment & Education:  Pt was cooperative with much verbal encouragement secondary to increased fatigue. She performed bed mobility demonstrating independence. Pt participated in functional transfer retraining to / from toilet emphasizing fall prevention demonstrating modified independence without cueing, but use of grab bar and RW for stabilization. Pt ambulated 26' between surfaces requiring supervision utilizing RW before requesting to return back to bed.    Patient left HOB elevated with all lines intact, call button in reach, and nurse notified    GOALS:   Multidisciplinary Problems       Occupational Therapy Goals          Problem: Occupational Therapy    Goal Priority Disciplines Outcome Interventions   Occupational Therapy Goal     OT, PT/OT Progressing    Description: Goals to be met by: 04/11/25     Patient will increase functional independence with ADLs by performing:    Feeding with [place].  UE Dressing with Modified [place].  LE Dressing with Set-up Assistance.  Grooming while standing at sink with Modified [place].  Toileting from toilet with Set-up Assistance for hygiene and clothing management.   Bathing from  shower chair/bench with Supervision.  Toilet transfer to toilet with Modified [place].                         DME Justifications:  No DME recommended requiring DME justifications    Time Tracking:     OT Date of Treatment: 04/09/25  OT Start Time: 1405  OT Stop Time: 1422  OT Total Time (min): 17 min    Billable Minutes:Therapeutic Activity  17    OT/SUJIT: OT          4/9/2025

## 2025-04-09 NOTE — PT/OT/SLP PROGRESS
"Physical Therapy Treatment    Patient Name:  Ruth Gamboa   MRN:  [id number]    Recommendations:     Discharge Recommendations: Low Intensity Therapy  Discharge Equipment Recommendations: none  Barriers to discharge: None    Assessment:     Ruth Gamboa is a 80 y.o. female admitted with a medical diagnosis of UTI (urinary tract infection).  She presents with the following impairments/functional limitations: weakness, impaired endurance, impaired joint extensibility, impaired functional mobility, gait instability, decreased safety awareness, impaired balance, impaired cardiopulmonary response to activity, impaired muscle length Still awaiting nursing home placement.  Noted new rashes on both hands, denies itching or pain. Medical team aware.   Tolerated treatment with no c/o pain or SOB with O2 supplement.      Rehab Prognosis: Fair; patient would benefit from acute skilled PT services to address these deficits and reach maximum level of function.    Recent Surgery: * No surgery found *      Plan:     During this hospitalization, patient to be seen  (3x to 5x a week) to address the identified rehab impairments via gait training, therapeutic activities, therapeutic exercises, neuromuscular re-education and progress toward the following goals:    Plan of Care Expires:  04/11/25    Subjective     Chief Complaint: "I am hungry."   Patient/Family Comments/goals: unstated   Pain/Comfort:  Pain Rating 1: 0/10  Pain Rating Post-Intervention 1: 0/10      Objective:     Communicated with nurse and patient  prior to session.  Patient found supine with peripheral IV, oxygen, PureWick upon PT entry to room.     General Precautions: Standard, fall, respiratory  Orthopedic Precautions: N/A  Braces: N/A  Respiratory Status: Nasal cannula, flow 2 L/min     Functional Mobility:  Rolling Left:  independence  Rolling Right: independence  Scooting: independence  Bridging: independence  Supine to Sit: independence  Sit to Supine: " independence  Transfers:     Sit to Stand:  independence with rolling walker  Gait: ~503   feet with RW with O2 supplement with SBA.    Balance:  independent with static sitting, Set up  with static standing with RW          AM-PAC 6 CLICK MOBILITY  Turning over in bed (including adjusting bedclothes, sheets and blankets)?: 4  Sitting down on and standing up from a chair with arms (e.g., wheelchair, bedside commode, etc.): 4  Moving from lying on back to sitting on the side of the bed?: 4  Moving to and from a bed to a chair (including a wheelchair)?: 3  Need to walk in hospital room?: 3  Climbing 3-5 steps with a railing?: 3 (based on clinical judgement)  Basic Mobility Total Score: 21       Treatment & Education:  Rolling side <> side  Supine <> sit  Scooting to the edge  of the bed   Sitting balance/tolerance  Sit <> stand with RW   Standing balance/tolerance   Gait with RW   Bed organization to reduce risk of skin breakdowns     Patient left sitting edge of bed with all lines intact, call button in reach, and nurse  notified..    GOALS:   Multidisciplinary Problems       Physical Therapy Goals          Problem: Physical Therapy    Goal Priority Disciplines Outcome Interventions   Physical Therapy Goal     PT, PT/OT Progressing    Description: Goals to be met by 2025   Patient will increase functional independence with mobility by performin. Bed to chair transfer with Modified Independent with or without rolling walker using Stand step TECHNIQUE  2. Gait  x 350  feet with Supervision or Set-up Assistance with or without rolling walker  3. Lower extremity exercise program x10 reps with assistance as needed.                        DME Justifications:  No DME recommended requiring DME justifications    Time Tracking:     PT Received On: 25  PT Start Time: 914     PT Stop Time: 931  PT Total Time (min): 17 min     Billable Minutes: Therapeutic Exercise 17    Treatment Type: Treatment  PT/PTA: PT      Number of PTA visits since last PT visit: 0     04/09/2025

## 2025-04-09 NOTE — PLAN OF CARE
Plan of care discussed with pt., pt. Disoriented to time, withdrawn, answers questions and then goes back to sleep, able to make needs known, incontinent and diapered, stage 2 to sacrum, moisture associated damage to perineum and  sacrum, barrier cream with changes,nystatin powder as ordered, takes meds whole, encouraging po fluids, up with assist, SOB with exertion at times, continuous O2 per nasal cannula as ordred, call bell in reach, denies needs at this time, will continue to monitor.      Problem: Skin Injury Risk Increased  Goal: Skin Health and Integrity  Outcome: Not Progressing     Problem: Infection  Goal: Absence of Infection Signs and Symptoms  Outcome: Not Progressing     Problem: Fall Injury Risk  Goal: Absence of Fall and Fall-Related Injury  Outcome: Not Progressing     Problem: Wound  Goal: Optimal Coping  Outcome: Not Progressing  Goal: Optimal Functional Ability  Outcome: Not Progressing  Goal: Absence of Infection Signs and Symptoms  Outcome: Not Progressing  Goal: Improved Oral Intake  Outcome: Not Progressing  Goal: Optimal Pain Control and Function  Outcome: Not Progressing  Goal: Skin Health and Integrity  Outcome: Not Progressing  Goal: Optimal Wound Healing  Outcome: Not Progressing     Problem: Fatigue  Goal: Improved Activity Tolerance  Outcome: Not Progressing

## 2025-04-09 NOTE — ASSESSMENT & PLAN NOTE
Hyponatremia is likely due to Dehydration/hypovolemia. The patient's most recent sodium results are listed below.  Recent Labs     04/07/25  0512 04/08/25  0506   * 130*     Plan  - Correct the sodium by 4-6mEq in 24 hours.

## 2025-04-09 NOTE — PLAN OF CARE
Plan of care discussed with pt., able to make needs known, no SOB, continuous O2 per nasal cannula as ordered, takes meds whole, papules rash to palms of hands, feet, moisture associated damage to perineum, stage II to sacrum, barrier cream with changes, pt. Sleeps unless aroused, encouraging to  take po fluids and Pedialyte, pt. Refusing Pedialyte this shift, call bell in reach, encouraging to call for needs/assistance, will continue to monitor.      Problem: Skin Injury Risk Increased  Goal: Skin Health and Integrity  4/9/2025 0734 by Adrienne Castillo RN  Outcome: Ongoing  4/9/2025 0617 by Adrienne Castillo RN  Outcome: Not Progressing     Problem: Pneumonia  Goal: Fluid Balance  4/9/2025 0734 by Adrienne Castillo RN  Outcome: Ongoing  4/9/2025 0617 by Adrienne Castillo RN  Outcome: Not Progressing  Goal: Resolution of Infection Signs and Symptoms  4/9/2025 0734 by Adrienne Castillo RN  Outcome: Ongoing  4/9/2025 0617 by Adrienne Castillo RN  Outcome: Not Progressing  Goal: Effective Oxygenation and Ventilation  4/9/2025 0734 by Adrienne Castillo RN  Outcome: Ongoing  4/9/2025 0617 by Adrienne Castillo RN  Outcome: Not Progressing     Problem: Infection  Goal: Absence of Infection Signs and Symptoms  4/9/2025 0734 by Adrienne Castillo RN  Outcome: Ongoing  4/9/2025 0617 by Adrienne Castillo RN  Outcome: Not Progressing     Problem: Fall Injury Risk  Goal: Absence of Fall and Fall-Related Injury  4/9/2025 0734 by Adrienne Castillo RN  Outcome: Ongoing  4/9/2025 0617 by Adrienne Castillo RN  Outcome: Not Progressing     Problem: Wound  Goal: Optimal Coping  4/9/2025 0734 by Adrienne Castillo RN  Outcome: Ongoing  4/9/2025 0617 by Adrienne Castillo RN  Outcome: Not Progressing  Goal: Optimal Functional Ability  4/9/2025 0734 by Adrienne Castillo RN  Outcome: Ongoing  4/9/2025 0617 by Adrienne Castillo RN  Outcome: Not Progressing  Goal: Absence of Infection Signs and Symptoms  4/9/2025 0734 by Adrienne Castillo RN  Outcome: Ongoing  4/9/2025 0617 by Adrienne Castillo, RN  Outcome: Not Progressing  Goal:  Improved Oral Intake  4/9/2025 0734 by Adrienne Castillo RN  Outcome: Ongoing  4/9/2025 0617 by Adrienne Castillo RN  Outcome: Not Progressing  Goal: Optimal Pain Control and Function  4/9/2025 0734 by Adrienne Castillo RN  Outcome: Ongoing  4/9/2025 0617 by Adrienne Castillo RN  Outcome: Not Progressing  Goal: Skin Health and Integrity  4/9/2025 0734 by Adrienne Castillo RN  Outcome: Ongoing  4/9/2025 0617 by Adrienne Castillo RN  Outcome: Not Progressing  Goal: Optimal Wound Healing  4/9/2025 0734 by Adrienne Castillo RN  Outcome: Ongoing  4/9/2025 0617 by Adrienne Castillo RN  Outcome: Not Progressing     Problem: Fatigue  Goal: Improved Activity Tolerance  4/9/2025 0734 by Adrienne Castillo, KUN  Outcome: Ongoing  4/9/2025 0617 by Adrienne Castillo RN  Outcome: Not Progressing

## 2025-04-09 NOTE — ASSESSMENT & PLAN NOTE
Patient has persistent depression which is severe and is currently uncontrolled. Will Continue anti-depressant medications. We will not consult psychiatry at this time. Patient does not display psychosis at this time. Continue to monitor closely and adjust plan of care as needed.    4/9 FM:  Improved, more talkative, awake and alert.

## 2025-04-09 NOTE — NURSING
Called and left a message with answering sevice for Dr. Calix to call us back about pt. Having new rash, pt. Has papules to palms of hands, left heel, right sole/side of foot, lower abdomen and leg, pt. Not complaining of itching or discomfort at this time, just wanted to make MD aware. Answering service will let Dr. Calix know to call us back.

## 2025-04-09 NOTE — ASSESSMENT & PLAN NOTE
Patient's blood pressure range in the last 24 hours was: BP  Min: 84/54  Max: 133/60.The patient's inpatient anti-hypertensive regimen is listed below:  Current Antihypertensives  carvediloL tablet 25 mg, 2 times daily with meals, Oral  lisinopriL tablet 40 mg, Daily, Oral    Plan  - BP is uncontrolled, will adjust as follows:  Lisinopril increased

## 2025-04-09 NOTE — ASSESSMENT & PLAN NOTE
Abx, CS pending.  4/5 urine culture with Klebsiella oxytocia, Rocephin on board, continue antibiotics white count trending down, afebrile  4/6 continue Rocephin, white count elevated, afebrile, continue IV antibiotics, fluids  4/7 FM:  Finishing abx next few days.  4/8 FM:  Completing abx.  4/9 FM:  Has completed abx, afebrile, no pain, wbc wnl.

## 2025-04-09 NOTE — ASSESSMENT & PLAN NOTE
Patient's most recent potassium results are listed below.   Recent Labs     04/07/25  0512 04/08/25  0506   K 4.7 4.5     Plan  - Replete potassium per protocol  - Monitor potassium Daily  - Patient's hypokalemia is improving
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
## 2025-04-10 LAB
ANA (OHS): POSITIVE
ANA PATTERN 1 (OHS): ABNORMAL
ANA TITER 1 (OHS): ABNORMAL

## 2025-04-10 PROCEDURE — 25000242 PHARM REV CODE 250 ALT 637 W/ HCPCS: Performed by: INTERNAL MEDICINE

## 2025-04-10 PROCEDURE — 99900035 HC TECH TIME PER 15 MIN (STAT)

## 2025-04-10 PROCEDURE — 25000003 PHARM REV CODE 250: Performed by: INTERNAL MEDICINE

## 2025-04-10 PROCEDURE — 99900031 HC PATIENT EDUCATION (STAT)

## 2025-04-10 PROCEDURE — 94640 AIRWAY INHALATION TREATMENT: CPT

## 2025-04-10 PROCEDURE — 27000221 HC OXYGEN, UP TO 24 HOURS

## 2025-04-10 PROCEDURE — 25000003 PHARM REV CODE 250: Performed by: NURSE PRACTITIONER

## 2025-04-10 PROCEDURE — 25000003 PHARM REV CODE 250: Performed by: PSYCHIATRY & NEUROLOGY

## 2025-04-10 PROCEDURE — 63600175 PHARM REV CODE 636 W HCPCS: Performed by: INTERNAL MEDICINE

## 2025-04-10 PROCEDURE — 97110 THERAPEUTIC EXERCISES: CPT

## 2025-04-10 PROCEDURE — S0179 MEGESTROL 20 MG: HCPCS | Performed by: NURSE PRACTITIONER

## 2025-04-10 PROCEDURE — 94761 N-INVAS EAR/PLS OXIMETRY MLT: CPT

## 2025-04-10 PROCEDURE — 11000001 HC ACUTE MED/SURG PRIVATE ROOM

## 2025-04-10 RX ADMIN — Medication 300 ML: at 05:04

## 2025-04-10 RX ADMIN — ALUMINUM HYDROXIDE, MAGNESIUM HYDROXIDE, AND DIMETHICONE 30 ML: 200; 20; 200 SUSPENSION ORAL at 05:04

## 2025-04-10 RX ADMIN — LISINOPRIL 40 MG: 20 TABLET ORAL at 09:04

## 2025-04-10 RX ADMIN — CARVEDILOL 25 MG: 12.5 TABLET, FILM COATED ORAL at 05:04

## 2025-04-10 RX ADMIN — ARFORMOTEROL TARTRATE 15 MCG: 15 SOLUTION RESPIRATORY (INHALATION) at 08:04

## 2025-04-10 RX ADMIN — NYSTATIN AND TRIAMCINOLONE ACETONIDE: 100000; 1 CREAM TOPICAL at 08:04

## 2025-04-10 RX ADMIN — SUCRALFATE 1 G: 1 SUSPENSION ORAL at 06:04

## 2025-04-10 RX ADMIN — IPRATROPIUM BROMIDE 0.5 MG: 0.5 SOLUTION RESPIRATORY (INHALATION) at 01:04

## 2025-04-10 RX ADMIN — MICONAZOLE NITRATE: 20 POWDER TOPICAL at 08:04

## 2025-04-10 RX ADMIN — OXYCODONE HYDROCHLORIDE AND ACETAMINOPHEN 500 MG: 500 TABLET ORAL at 09:04

## 2025-04-10 RX ADMIN — FLUOXETINE HYDROCHLORIDE 40 MG: 20 CAPSULE ORAL at 09:04

## 2025-04-10 RX ADMIN — Medication 300 ML: at 01:04

## 2025-04-10 RX ADMIN — BUDESONIDE 0.5 MG: 0.5 INHALANT RESPIRATORY (INHALATION) at 07:04

## 2025-04-10 RX ADMIN — NYSTATIN AND TRIAMCINOLONE ACETONIDE: 100000; 1 CREAM TOPICAL at 03:04

## 2025-04-10 RX ADMIN — LEVOTHYROXINE SODIUM 25 MCG: 25 TABLET ORAL at 06:04

## 2025-04-10 RX ADMIN — IPRATROPIUM BROMIDE 0.5 MG: 0.5 SOLUTION RESPIRATORY (INHALATION) at 07:04

## 2025-04-10 RX ADMIN — PANTOPRAZOLE SODIUM 40 MG: 40 TABLET, DELAYED RELEASE ORAL at 09:04

## 2025-04-10 RX ADMIN — Medication 1000 UNITS: at 09:04

## 2025-04-10 RX ADMIN — IPRATROPIUM BROMIDE 0.5 MG: 0.5 SOLUTION RESPIRATORY (INHALATION) at 08:04

## 2025-04-10 RX ADMIN — ALUMINUM HYDROXIDE, MAGNESIUM HYDROXIDE, AND DIMETHICONE 30 ML: 200; 20; 200 SUSPENSION ORAL at 08:04

## 2025-04-10 RX ADMIN — MIRTAZAPINE 7.5 MG: 7.5 TABLET ORAL at 08:04

## 2025-04-10 RX ADMIN — ALUMINUM HYDROXIDE, MAGNESIUM HYDROXIDE, AND DIMETHICONE 30 ML: 200; 20; 200 SUSPENSION ORAL at 06:04

## 2025-04-10 RX ADMIN — MICONAZOLE NITRATE: 20 POWDER TOPICAL at 09:04

## 2025-04-10 RX ADMIN — SUCRALFATE 1 G: 1 SUSPENSION ORAL at 01:04

## 2025-04-10 RX ADMIN — FERROUS SULFATE TAB 325 MG (65 MG ELEMENTAL FE) 1 EACH: 325 (65 FE) TAB at 09:04

## 2025-04-10 RX ADMIN — CARVEDILOL 25 MG: 12.5 TABLET, FILM COATED ORAL at 09:04

## 2025-04-10 RX ADMIN — SUCRALFATE 1 G: 1 SUSPENSION ORAL at 05:04

## 2025-04-10 RX ADMIN — BUDESONIDE 0.5 MG: 0.5 INHALANT RESPIRATORY (INHALATION) at 08:04

## 2025-04-10 RX ADMIN — CYANOCOBALAMIN TAB 1000 MCG 1000 MCG: 1000 TAB at 09:04

## 2025-04-10 RX ADMIN — PREDNISONE 20 MG: 20 TABLET ORAL at 09:04

## 2025-04-10 RX ADMIN — MEGESTROL ACETATE 200 MG: 400 SUSPENSION ORAL at 09:04

## 2025-04-10 RX ADMIN — NYSTATIN AND TRIAMCINOLONE ACETONIDE: 100000; 1 CREAM TOPICAL at 09:04

## 2025-04-10 RX ADMIN — Medication 300 ML: at 09:04

## 2025-04-10 RX ADMIN — ARFORMOTEROL TARTRATE 15 MCG: 15 SOLUTION RESPIRATORY (INHALATION) at 07:04

## 2025-04-10 NOTE — PLAN OF CARE
Spoke to Natasha with the Office of Behavioral Health, and she informed me that the patient's PASRR is still under review.     I had a long discussion with the patient and her daughter about the the process for skilled placement. Etelvina with [facility] plans to reach out to the patient's family regarding the discharge plan of care.       Addendum: The patient's 142 form was approved by the Office of Behavioral Health. Etelvina with [facility] was informed.

## 2025-04-10 NOTE — PT/OT/SLP PROGRESS
"Physical Therapy Treatment    Patient Name:  Ruth Gamboa   MRN:  [id number]    Recommendations:     Discharge Recommendations: Low Intensity Therapy  Discharge Equipment Recommendations: none  Barriers to discharge: None    Assessment:     Ruth Gamboa is a 80 y.o. female admitted with a medical diagnosis of UTI (urinary tract infection).  She presents with the following impairments/functional limitations: weakness, impaired joint extensibility, impaired endurance, decreased ROM, impaired muscle length, impaired self care skills, impaired functional mobility, decreased lower extremity function, decreased upper extremity function, gait instability, and impaired cardiopulmonary response to activity . Still awaiting nursing home placement.  Goals target date extended to 4/18/2025 to prevent functional decline while waiting for placement..  Needs encouragement to participate in therapy .    Rehab Prognosis: Fair; patient would benefit from acute skilled PT services to address these deficits and reach maximum level of function.    Recent Surgery: * No surgery found *      Plan:     During this hospitalization, patient to be seen 5 x/week to address the identified rehab impairments via gait training, therapeutic activities, therapeutic exercises, neuromuscular re-education and progress toward the following goals:    Plan of Care Expires:  04/18/25    Subjective     Chief Complaint: "I just don't want to move."   Patient/Family Comments/goals: unstated   Pain/Comfort:  Pain Rating 1: 0/10  Pain Rating Post-Intervention 1: 0/10      Objective:     Communicated with nurse and patient  prior to session.  Patient found supine with peripheral IV, PureWick, oxygen upon PT entry to room.     General Precautions: Standard, fall, respiratory  Orthopedic Precautions: N/A  Braces: N/A  Respiratory Status: Nasal cannula, flow 2 L/min     Functional Mobility:  Rolling Left:  independence  Rolling Right: independence  Scooting: " independence  Bridging: independence  Supine to Sit: independence  Sit to Supine: independence  Transfers:     Sit to Stand:  independence with rolling walker  Gait: ~413    feet with RW with O2 supplement with SBA.    Balance:  independent with static sitting, Set up  with static standing with RW             AM-PAC 6 CLICK MOBILITY  Turning over in bed (including adjusting bedclothes, sheets and blankets)?: 4  Sitting down on and standing up from a chair with arms (e.g., wheelchair, bedside commode, etc.): 4  Moving from lying on back to sitting on the side of the bed?: 4  Moving to and from a bed to a chair (including a wheelchair)?: 4  Need to walk in hospital room?: 3  Climbing 3-5 steps with a railing?: 3 (based on clinical judgement)  Basic Mobility Total Score: 22       Treatment & Education:  Rolling side <> side  Supine <> sit  Scooting to the edge  of the bed   Sitting balance/tolerance  Sit <> stand with RW   Standing balance/tolerance   Gait with RW   Bed organization to reduce risk of skin breakdowns     Patient left sitting edge of bed with all lines intact, call button in reach, and nurse  notified..    GOALS:   Multidisciplinary Problems       Physical Therapy Goals          Problem: Physical Therapy    Goal Priority Disciplines Outcome Interventions   Physical Therapy Goal     PT, PT/OT Progressing    Description: Goals to be met by 2025   Patient will increase functional independence with mobility by performin. Bed to chair transfer with Modified Independent with or without rolling walker using Stand step TECHNIQUE (on going dated 4/10/2025)   2. Gait  x 350  feet with Supervision or Set-up Assistance with or without rolling walker (on going dated 4/10/2025)   3. Lower extremity exercise program x10 reps with assistance as needed.  (on going dated 4/10/2025)                          DME Justifications:  No DME recommended requiring DME justifications    Time Tracking:     PT Received  On: 04/10/25  PT Start Time: 1124     PT Stop Time: 1140  PT Total Time (min): 16 min     Billable Minutes: Therapeutic Exercise 16    Treatment Type: Treatment  PT/PTA: PT     Number of PTA visits since last PT visit: 0     04/10/2025
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
## 2025-04-10 NOTE — PLAN OF CARE
Plan of care reviewed and ongoing with patient. 20 gauge IV to R wrist saline locked, 2L NC tolerating well, tolerated medications well with sips of water, purewick connections intact. Bed alarm on. Call light and personal items within reach of patient.       Problem: Skin Injury Risk Increased  Goal: Skin Health and Integrity  Outcome: Progressing     Problem: Adult Inpatient Plan of Care  Goal: Plan of Care Review  Outcome: Progressing  Goal: Patient-Specific Goal (Individualized)  Outcome: Progressing  Goal: Absence of Hospital-Acquired Illness or Injury  Outcome: Progressing  Goal: Optimal Comfort and Wellbeing  Outcome: Progressing  Goal: Readiness for Transition of Care  Outcome: Progressing     Problem: Pneumonia  Goal: Fluid Balance  Outcome: Progressing  Goal: Resolution of Infection Signs and Symptoms  Outcome: Progressing  Goal: Effective Oxygenation and Ventilation  Outcome: Progressing     Problem: Infection  Goal: Absence of Infection Signs and Symptoms  Outcome: Progressing     Problem: Fall Injury Risk  Goal: Absence of Fall and Fall-Related Injury  Outcome: Progressing     Problem: Wound  Goal: Optimal Coping  Outcome: Progressing  Goal: Optimal Functional Ability  Outcome: Progressing  Goal: Absence of Infection Signs and Symptoms  Outcome: Progressing  Goal: Improved Oral Intake  Outcome: Progressing  Goal: Optimal Pain Control and Function  Outcome: Progressing  Goal: Skin Health and Integrity  Outcome: Progressing  Goal: Optimal Wound Healing  Outcome: Progressing     Problem: Fatigue  Goal: Improved Activity Tolerance  Outcome: Progressing

## 2025-04-10 NOTE — ASSESSMENT & PLAN NOTE
Hyponatremia is likely due to Dehydration/hypovolemia. The patient's most recent sodium results are listed below.  Recent Labs     04/08/25  0506   *     Plan  - Correct the sodium by 4-6mEq in 24 hours.

## 2025-04-10 NOTE — ASSESSMENT & PLAN NOTE
Patient's most recent potassium results are listed below.   Recent Labs     04/08/25  0506   K 4.5     Plan  - Replete potassium per protocol  - Monitor potassium Daily  - Patient's hypokalemia is improving

## 2025-04-10 NOTE — ASSESSMENT & PLAN NOTE
Nutrition consulted. Most recent weight and BMI monitored-     Measurements:  Wt Readings from Last 1 Encounters:   04/01/25 40.6 kg (89 lb 8.1 oz)   Body mass index is 15.86 kg/m².    Patient has been screened and assessed by RD.    Malnutrition Type:  Context:    Level:      Malnutrition Characteristic Summary:       Interventions/Recommendations (treatment strategy):  1. Continue regular diet as tolerated.     2. Ensure Enlive ONS or alternative TID.    3. Rec'd Moshe BID to promote wound healing and to provide additional nutrtiion.   4. Rec'd Beneprotein TID. 5. RD to monitor and follow up.    4/4 FM:  PO intake improved with txt.  4/9 FM:  Currently on multiple appetite stimulants and her oral intake is improved.  Patient's tolerating supplements and continue to encourage.  Likely related to depression which is also improved.  4/10 FM:  Dietary notes reviewed, agree with plan.

## 2025-04-10 NOTE — PLAN OF CARE
Problem: Physical Therapy  Goal: Physical Therapy Goal  Description: Goals to be met by 2025   Patient will increase functional independence with mobility by performin. Bed to chair transfer with Modified Independent with or without rolling walker using Stand step TECHNIQUE (on going dated 4/10/2025)   2. Gait  x 350  feet with Supervision or Set-up Assistance with or without rolling walker (on going dated 4/10/2025)   3. Lower extremity exercise program x10 reps with assistance as needed.  (on going dated 4/10/2025)     Outcome: Progressing, still awaiting nursing home placement; needs encouragement to participate in therapy
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
## 2025-04-10 NOTE — PROGRESS NOTES
[facility] Surg  Adult Nutrition  Progress Note    SUMMARY       Recommendations  1. Continue regular diet as tolerated.       2. Ensure Enlive ONS or alternative TID.      3. Rec'd Moshe BID to promote wound healing and to provide additional nutrtiion.     4. Rec'd Beneprotein TID.  5. RD to monitor and follow up.  Goals:   1. Meet % EEN/EPN by RD follow up.   2. Maintain weight during admission.  Nutrition Goal Status: progressing towards goal  Communication of RD Recs: reviewed with RN, reviewed with case management    Nutrition Discharge Planning    Nutrition Discharge Planning: General healthy diet, Oral supplement regimen (comments)  Therapeutic diet (comments): General Healthy Diet  Oral supplement regimen (comments): Ensure Enlive or Boost PLus TID; Moshe BID, Beneprotein TID    Assessment and Plan    Nutrition Problem  Underweight     Related to (etiology):   Increased nutrient needs 2/2 COPD     Signs and Symptoms (as evidenced by):   Estimated intake of nutrients < estimated nutrient needs, BMI 15.86 kg/m2      Interventions/Recommendations (treatment strategy):  Collaboration of nutrition care with other providers  ONS TID     Nutrition Diagnosis Status:   Continues, Improving     Malnutrition Assessment  NFPE not appropriate at this time. Pt continues to refuse consent.     Nutrition-Related Social Determinants of Health:  Pt declined to answer.    Reason for Assessment    Reason For Assessment: RD follow-up  Diagnosis:  (UTI)  General Information Comments: Followed up on pt this morning. Pt with continued improved appetite. PO intake ranges from 0-75%. Pt is eating ourside food brought in by family memebers. Pt is maintaining weight so far. Will communicate discharge rec's with case management. RD to follow and make rec's accordingly.    Nutrition/Diet History    Nutrition Intake History: Decreased appetite PTA, 1 Boost & Ensure per day  Food Preferences: None  Spiritual, Cultural Beliefs,  "Islam Practices, Values that Affect Care: no  Food Allergies: NKFA  Factors Affecting Nutritional Intake: None identified at this time    Anthropometrics    Height: 5' 3" (160 cm)  Height (inches): 63 in  Height Method: Stated  Weight: 40.6 kg (89 lb 8.1 oz)  Weight (lb): 89.51 lb  Weight Method: Bed Scale  Ideal Body Weight (IBW), Female: 115 lb  % Ideal Body Weight, Female (lb): 77.83 %  BMI (Calculated): 15.9  BMI Grade: less than 23 (older than 65 years) - underweight    Lab/Procedures/Meds    Pertinent Labs Reviewed: reviewed  Pertinent Medications Reviewed: reviewed    Estimated/Assessed Needs    Weight Used For Calorie Calculations: 40.6 kg (89 lb 8.1 oz)  Energy Calorie Requirements (kcal): 9893-6479  Energy Need Method: Kcal/kg (30-35 kcal/kg ABW)  Protein Requirements: 61-81 (1.5-2 g/kg ABW)  Weight Used For Protein Calculations: 40.6 kg (89 lb 8.1 oz)  Estimated Fluid Requirement Method: RDA Method  RDA Method (mL): 1218    Nutrition Prescription Ordered    Current Diet Order: Regular diet  Oral Nutrition Supplement: Ensure Enlive (TID)    Evaluation of Received Nutrient/Fluid Intake    % Kcal Needs: 0-75%  % Protein Needs: 0-75%  I/O: - 200  Energy Calories Required: not meeting needs  Protein Required: not meeting needs  Comments: LBM unspecified  % Intake of Estimated Energy Needs: Other: 0-75%  % Meal Intake: Other: 0-75%    Nutrition Risk    Level of Risk/Frequency of Follow-up: moderate     Monitor and Evaluation    Monitor and Evaluation: Energy intake, Food and beverage intake, Protein intake, Weight, Nutrition focused physical findings     Nutrition Follow-Up    RD Follow-up?: Yes      "

## 2025-04-10 NOTE — PLAN OF CARE
Late entry: Etelvina informed me that the DON/Regional nurses were concerned about the patient's poor oral intake. Etelvina asked me to follow-up with the patient's healthcare team regarding the plan for her nutrition. I was able to send Etelvina an updated note from the patient's attending and dietary    I spoke to the patient's daughter about the discharge plan of care. I explained to her that we are still waiting on a decision from [facility], and the patient was approved by the [facility] for nursing home placement.     I asked her since we have not received an answer from Etelvina from [facility], can we move forward with another nursing home, and she stated no. She would like for her mother to go to [facility] because she has a son who works there. I attempted to contact Etelvina several times before the end of the day, but-no answer. The patient's daughter is confident that the patient will be accepted by [facility].

## 2025-04-10 NOTE — ASSESSMENT & PLAN NOTE
Patient's blood pressure range in the last 24 hours was: BP  Min: 91/52  Max: 140/64.The patient's inpatient anti-hypertensive regimen is listed below:  Current Antihypertensives  carvediloL tablet 25 mg, 2 times daily with meals, Oral  lisinopriL tablet 40 mg, Daily, Oral    Plan  - BP is uncontrolled, will adjust as follows:  Lisinopril increased

## 2025-04-10 NOTE — PLAN OF CARE
Plan of care reviewed with the patient and family. Patient able to work with PT/OT during shift and consume % of meals provided.   Patient refused IV restart.    Cervical Dilation greater than 3 cm

## 2025-04-11 LAB
DSDNA ANTIBODY (OHS): NORMAL
DSDNA ANTIBODY TITER (OHS): NORMAL
SM  ANTIBODY (OHS): 0.19 RATIO
SM INTERPRETATION (OHS): NEGATIVE
SM/RNP ANTIBODY (OHS): 2.61 RATIO
SM/RNP INTERPRETATION (OHS): POSITIVE
SSA  ANTIBODY (OHS): 0.08 RATIO (ref 0–0.99)
SSA INTERPRETATION (OHS): NEGATIVE
SSB  ANTIBODY (OHS): 0.06 RATIO
SSB INTERPRETATION (OHS): NEGATIVE

## 2025-04-11 PROCEDURE — 97530 THERAPEUTIC ACTIVITIES: CPT

## 2025-04-11 PROCEDURE — 94761 N-INVAS EAR/PLS OXIMETRY MLT: CPT

## 2025-04-11 PROCEDURE — 25000242 PHARM REV CODE 250 ALT 637 W/ HCPCS: Performed by: INTERNAL MEDICINE

## 2025-04-11 PROCEDURE — 97535 SELF CARE MNGMENT TRAINING: CPT

## 2025-04-11 PROCEDURE — 25000003 PHARM REV CODE 250: Performed by: NURSE PRACTITIONER

## 2025-04-11 PROCEDURE — 25000003 PHARM REV CODE 250: Performed by: INTERNAL MEDICINE

## 2025-04-11 PROCEDURE — 63600175 PHARM REV CODE 636 W HCPCS: Performed by: INTERNAL MEDICINE

## 2025-04-11 PROCEDURE — 11000001 HC ACUTE MED/SURG PRIVATE ROOM

## 2025-04-11 PROCEDURE — 27000221 HC OXYGEN, UP TO 24 HOURS

## 2025-04-11 PROCEDURE — 94640 AIRWAY INHALATION TREATMENT: CPT

## 2025-04-11 PROCEDURE — S0179 MEGESTROL 20 MG: HCPCS | Performed by: NURSE PRACTITIONER

## 2025-04-11 PROCEDURE — 99900031 HC PATIENT EDUCATION (STAT)

## 2025-04-11 PROCEDURE — 25000003 PHARM REV CODE 250: Performed by: PSYCHIATRY & NEUROLOGY

## 2025-04-11 PROCEDURE — 25000242 PHARM REV CODE 250 ALT 637 W/ HCPCS: Performed by: NURSE PRACTITIONER

## 2025-04-11 PROCEDURE — 99900035 HC TECH TIME PER 15 MIN (STAT)

## 2025-04-11 RX ORDER — IPRATROPIUM BROMIDE AND ALBUTEROL SULFATE 2.5; .5 MG/3ML; MG/3ML
3 SOLUTION RESPIRATORY (INHALATION) EVERY 4 HOURS PRN
Status: DISCONTINUED | OUTPATIENT
Start: 2025-04-11 | End: 2025-04-15 | Stop reason: HOSPADM

## 2025-04-11 RX ORDER — IPRATROPIUM BROMIDE AND ALBUTEROL SULFATE 2.5; .5 MG/3ML; MG/3ML
3 SOLUTION RESPIRATORY (INHALATION) EVERY 4 HOURS
Status: DISCONTINUED | OUTPATIENT
Start: 2025-04-11 | End: 2025-04-11

## 2025-04-11 RX ADMIN — NYSTATIN AND TRIAMCINOLONE ACETONIDE: 100000; 1 CREAM TOPICAL at 10:04

## 2025-04-11 RX ADMIN — LEVOTHYROXINE SODIUM 25 MCG: 25 TABLET ORAL at 05:04

## 2025-04-11 RX ADMIN — FLUOXETINE HYDROCHLORIDE 40 MG: 20 CAPSULE ORAL at 09:04

## 2025-04-11 RX ADMIN — BUDESONIDE 0.5 MG: 0.5 INHALANT RESPIRATORY (INHALATION) at 08:04

## 2025-04-11 RX ADMIN — ALBUTEROL SULFATE 2 PUFF: 90 AEROSOL, METERED RESPIRATORY (INHALATION) at 05:04

## 2025-04-11 RX ADMIN — SUCRALFATE 1 G: 1 SUSPENSION ORAL at 05:04

## 2025-04-11 RX ADMIN — MICONAZOLE NITRATE: 20 POWDER TOPICAL at 10:04

## 2025-04-11 RX ADMIN — ALUMINUM HYDROXIDE, MAGNESIUM HYDROXIDE, AND DIMETHICONE 30 ML: 200; 20; 200 SUSPENSION ORAL at 05:04

## 2025-04-11 RX ADMIN — Medication 300 ML: at 10:04

## 2025-04-11 RX ADMIN — ARFORMOTEROL TARTRATE 15 MCG: 15 SOLUTION RESPIRATORY (INHALATION) at 08:04

## 2025-04-11 RX ADMIN — NYSTATIN AND TRIAMCINOLONE ACETONIDE: 100000; 1 CREAM TOPICAL at 09:04

## 2025-04-11 RX ADMIN — IPRATROPIUM BROMIDE AND ALBUTEROL SULFATE 3 ML: .5; 3 SOLUTION RESPIRATORY (INHALATION) at 05:04

## 2025-04-11 RX ADMIN — MEGESTROL ACETATE 200 MG: 400 SUSPENSION ORAL at 09:04

## 2025-04-11 RX ADMIN — IPRATROPIUM BROMIDE 0.5 MG: 0.5 SOLUTION RESPIRATORY (INHALATION) at 07:04

## 2025-04-11 RX ADMIN — OXYCODONE HYDROCHLORIDE AND ACETAMINOPHEN 500 MG: 500 TABLET ORAL at 09:04

## 2025-04-11 RX ADMIN — CARVEDILOL 25 MG: 12.5 TABLET, FILM COATED ORAL at 04:04

## 2025-04-11 RX ADMIN — Medication 1000 UNITS: at 09:04

## 2025-04-11 RX ADMIN — MICONAZOLE NITRATE: 20 POWDER TOPICAL at 09:04

## 2025-04-11 RX ADMIN — CARVEDILOL 25 MG: 12.5 TABLET, FILM COATED ORAL at 08:04

## 2025-04-11 RX ADMIN — BUDESONIDE 0.5 MG: 0.5 INHALANT RESPIRATORY (INHALATION) at 07:04

## 2025-04-11 RX ADMIN — SUCRALFATE 1 G: 1 SUSPENSION ORAL at 11:04

## 2025-04-11 RX ADMIN — CYANOCOBALAMIN TAB 1000 MCG 1000 MCG: 1000 TAB at 09:04

## 2025-04-11 RX ADMIN — LISINOPRIL 40 MG: 20 TABLET ORAL at 09:04

## 2025-04-11 RX ADMIN — IPRATROPIUM BROMIDE 0.5 MG: 0.5 SOLUTION RESPIRATORY (INHALATION) at 08:04

## 2025-04-11 RX ADMIN — IPRATROPIUM BROMIDE 0.5 MG: 0.5 SOLUTION RESPIRATORY (INHALATION) at 01:04

## 2025-04-11 RX ADMIN — NYSTATIN AND TRIAMCINOLONE ACETONIDE: 100000; 1 CREAM TOPICAL at 03:04

## 2025-04-11 RX ADMIN — PREDNISONE 20 MG: 20 TABLET ORAL at 09:04

## 2025-04-11 RX ADMIN — MIRTAZAPINE 7.5 MG: 7.5 TABLET ORAL at 10:04

## 2025-04-11 RX ADMIN — ARFORMOTEROL TARTRATE 15 MCG: 15 SOLUTION RESPIRATORY (INHALATION) at 07:04

## 2025-04-11 RX ADMIN — PANTOPRAZOLE SODIUM 40 MG: 40 TABLET, DELAYED RELEASE ORAL at 09:04

## 2025-04-11 RX ADMIN — ALUMINUM HYDROXIDE, MAGNESIUM HYDROXIDE, AND DIMETHICONE 30 ML: 200; 20; 200 SUSPENSION ORAL at 10:04

## 2025-04-11 RX ADMIN — ALUMINUM HYDROXIDE, MAGNESIUM HYDROXIDE, AND DIMETHICONE 30 ML: 200; 20; 200 SUSPENSION ORAL at 11:04

## 2025-04-11 RX ADMIN — ALBUTEROL SULFATE 2 PUFF: 90 AEROSOL, METERED RESPIRATORY (INHALATION) at 04:04

## 2025-04-11 RX ADMIN — ALUMINUM HYDROXIDE, MAGNESIUM HYDROXIDE, AND DIMETHICONE 30 ML: 200; 20; 200 SUSPENSION ORAL at 03:04

## 2025-04-11 NOTE — PT/OT/SLP PROGRESS
"Physical Therapy Treatment    Patient Name:  Ruth Gamboa   MRN:  [id number]    Recommendations:     Discharge Recommendations: Low Intensity Therapy  Discharge Equipment Recommendations: none  Barriers to discharge: None    Assessment:     Ruth Gamboa is a 80 y.o. female admitted with a medical diagnosis of UTI (urinary tract infection).  She presents with the following impairments/functional limitations: weakness, impaired joint extensibility, impaired endurance, impaired muscle length, impaired balance, impaired cardiopulmonary response to activity, impaired self care skills, impaired functional mobility, decreased lower extremity function, decreased upper extremity function,  and gait instability .  Still  awaiting nursing home placement.  .Ambulated ~ 410 feet with RW with O2 supplement; patient had a continent episode on the toilet.     Rehab Prognosis: Fair; patient would benefit from acute skilled PT services to address these deficits and reach maximum level of function.    Recent Surgery: * No surgery found *      Plan:     During this hospitalization, patient to be seen 5 x/week to address the identified rehab impairments via gait training, therapeutic activities, therapeutic exercises, neuromuscular re-education and progress toward the following goals:    Plan of Care Expires:  04/18/25    Subjective     Chief Complaint: "She is going to [facility]."   Patient/Family Comments/goals: "Go to the nursing home."   Pain/Comfort:  Pain Rating 1: 0/10  Pain Rating Post-Intervention 1: 0/10      Objective:     Communicated with nurse and patient  and family  prior to session.  Patient found left sidelying with peripheral IV, oxygen upon PT entry to room.     General Precautions: Standard, fall, respiratory  Orthopedic Precautions: N/A  Braces: N/A  Respiratory Status: Nasal cannula, flow 2 L/min     Functional Mobility:  Rolling Left:  independence  Rolling Right: independence  Scooting: " independence  Bridging: independence  Supine to Sit: independence  Sit to Supine: independence  Transfers:     Sit to Stand:  independence with rolling walker  Toilet transfer: Modified independent   Toileting: modified independent   Gait: ~410    feet with RW with O2 supplement with SBA.    Balance:  independent with static sitting, Set up  with static standing with RW          AM-PAC 6 CLICK MOBILITY  Turning over in bed (including adjusting bedclothes, sheets and blankets)?: 4  Sitting down on and standing up from a chair with arms (e.g., wheelchair, bedside commode, etc.): 4  Moving from lying on back to sitting on the side of the bed?: 4  Moving to and from a bed to a chair (including a wheelchair)?: 4  Need to walk in hospital room?: 3  Climbing 3-5 steps with a railing?: 3  Basic Mobility Total Score: 22       Treatment & Education:  Rolling side <> side  Supine <> sit  Scooting to the edge  of the bed   Sitting balance/tolerance  Sit <> stand with RW   Standing balance/tolerance   Gait with RW   Bed organization to reduce risk of skin breakdowns   Toileting  Toilet transfer         Patient left sitting edge of bed with all lines intact, call button in reach, nurse  notified, and family  present..    GOALS:   Multidisciplinary Problems       Physical Therapy Goals          Problem: Physical Therapy    Goal Priority Disciplines Outcome Interventions   Physical Therapy Goal     PT, PT/OT Progressing    Description: Goals to be met by 2025   Patient will increase functional independence with mobility by performin. Bed to chair transfer with Modified Independent with or without rolling walker using Stand step TECHNIQUE (on going dated 4/10/2025)   2. Gait  x 350  feet with Supervision or Set-up Assistance with or without rolling walker (on going dated 4/10/2025)   3. Lower extremity exercise program x10 reps with assistance as needed.  (on going dated 4/10/2025)                          DME  Justifications:  No DME recommended requiring DME justifications    Time Tracking:     PT Received On: 04/11/25  PT Start Time: 0910     PT Stop Time: 0928  PT Total Time (min): 18 min     Billable Minutes: Therapeutic Activity 18    Treatment Type: Treatment  PT/PTA: PT     Number of PTA visits since last PT visit: 0     04/11/2025

## 2025-04-11 NOTE — PLAN OF CARE
Problem: Physical Therapy  Goal: Physical Therapy Goal  Description: Goals to be met by 2025   Patient will increase functional independence with mobility by performin. Bed to chair transfer with Modified Independent with or without rolling walker using Stand step TECHNIQUE (on going dated 4/10/2025)   2. Gait  x 350  feet with Supervision or Set-up Assistance with or without rolling walker (on going dated 4/10/2025)   3. Lower extremity exercise program x10 reps with assistance as needed.  (on going dated 4/10/2025)     Outcome: Progressing, still awaiting nursing home placement

## 2025-04-11 NOTE — NURSING
Pt took meds whole and ate 100% of breakfast. Pt tolerated well with no issues. Pt walked with therapy and walker, tolerated well. Will continue to monitor for safety.

## 2025-04-11 NOTE — PLAN OF CARE
Problem: Occupational Therapy  Goal: Occupational Therapy Goal  Description: Goals to be met by: 04/11/25     Patient will increase functional independence with ADLs by performing:    Feeding with [place].  UE Dressing with Modified [place].  LE Dressing with Set-up Assistance.  Grooming while standing at sink with Modified [place].  Toileting from toilet with Set-up Assistance for hygiene and clothing management.   Bathing from  shower chair/bench with Supervision.  Toilet transfer to toilet with Modified [place].    Outcome: Progressing

## 2025-04-11 NOTE — PT/OT/SLP PROGRESS
Occupational Therapy   Treatment    Name: Ruth Gamboa  MRN: [id number]  Admitting Diagnosis:  UTI (urinary tract infection)       Recommendations:     Discharge Recommendations: Low Intensity Therapy  Discharge Equipment Recommendations:  none  Barriers to discharge:  Other (Comment) (Medical status)    Assessment:     Ruth Gamboa is a 80 y.o. female with a medical diagnosis of UTI (urinary tract infection).  She presents with improved functional performance with toileting as noted by modified independence. Performance deficits affecting function are weakness, impaired endurance, impaired self care skills, impaired functional mobility, impaired balance, impaired cognition, decreased upper extremity function, decreased lower extremity function, decreased safety awareness, pain, decreased ROM, impaired cardiopulmonary response to activity, impaired joint extensibility, impaired muscle length.     Rehab Prognosis:  Fair; patient would benefit from acute skilled OT services to address these deficits and reach maximum level of function.       Plan:     Patient to be seen 4 x/week to address the above listed problems via self-care/home management, therapeutic activities, therapeutic exercises, cognitive retraining  Plan of Care Expires: 04/11/25  Plan of Care Reviewed with: patient    Subjective     Chief Complaint: weakness  Patient/Family Comments/goals: Pt would like to increase her strength while regain independence with ADL's including functional mobility.   Pain/Comfort:  Pain Rating 1: 0/10  Pain Rating Post-Intervention 1: 0/10    Objective:     Communicated with: nurse prior to session.  Patient found HOB elevated with peripheral IV, oxygen upon OT entry to room.    General Precautions: Standard, fall, respiratory    Orthopedic Precautions:N/A  Braces: N/A  Respiratory Status: Nasal cannula, flow 2 L/min     Occupational Performance:     Bed Mobility:    Patient completed Rolling/Turning to Left with  independence  Patient completed Rolling/Turning to Right with independence  Patient completed Scooting/Bridging with independence  Patient completed Supine to Sit with independence  Patient completed Sit to Supine with independence     Functional Mobility/Transfers:  Patient completed Sit <> Stand Transfer with modified independence  with  rolling walker   Patient completed Toilet Transfer Step Transfer technique with modified independence with  rolling walker  Functional Mobility: Pt ambulated 133' between surfaces utilizing RW on 2 L O2 per NC.    Activities of Daily Living:  Toileting: modified independence        [facility] 6 Click ADL:      Treatment & Education:  Pt was cooperative with verbal encouragement while exhibiting neutral affect. She performed bed mobility demonstrating continued independence without use of bedrail. Pt then participated in functional transfer retraining to / from toilet emphasizing fall prevention demonstrating modified independence with use of RW and grab bar. Next, she participated in ADL retraining regarding toileting demonstrating modified independence secondary to use of grab bar for stabilization during clothing management. Pt ambulated 133' between surfaces utilizing RW on 2 L O2 per NC.    Patient left sitting edge of bed with all lines intact, call button in reach, and nurse notified    GOALS:   Multidisciplinary Problems       Occupational Therapy Goals          Problem: Occupational Therapy    Goal Priority Disciplines Outcome Interventions   Occupational Therapy Goal     OT, PT/OT Progressing    Description: Goals to be met by: 04/11/25     Patient will increase functional independence with ADLs by performing:    Feeding with [place].  UE Dressing with Modified [place].  LE Dressing with Set-up Assistance.  Grooming while standing at sink with Modified [place].  Toileting from toilet with Set-up Assistance for hygiene and clothing management.   Bathing from   shower chair/bench with Supervision.  Toilet transfer to toilet with Modified [place].                         DME Justifications:  No DME recommended requiring DME justifications    Time Tracking:     OT Date of Treatment: 04/11/25  OT Start Time: 1547  OT Stop Time: 1615  OT Total Time (min): 28 min    Billable Minutes:Self Care/Home Management 14  Therapeutic Activity 14    OT/SUJIT: OT          4/11/2025

## 2025-04-12 LAB
ABSOLUTE EOSINOPHIL (OHS): 0.04 K/UL
ABSOLUTE MONOCYTE (OHS): 1.25 K/UL (ref 0.3–1)
ABSOLUTE NEUTROPHIL COUNT (OHS): 10.71 K/UL (ref 1.8–7.7)
ALBUMIN SERPL BCP-MCNC: 2.1 G/DL (ref 3.5–5.2)
ALP SERPL-CCNC: 70 UNIT/L (ref 40–150)
ALT SERPL W/O P-5'-P-CCNC: 9 UNIT/L (ref 10–44)
ANION GAP (OHS): 6 MMOL/L (ref 8–16)
AST SERPL-CCNC: 12 UNIT/L (ref 11–45)
BASOPHILS # BLD AUTO: 0.05 K/UL
BASOPHILS NFR BLD AUTO: 0.3 %
BILIRUB SERPL-MCNC: 0.2 MG/DL (ref 0.1–1)
BUN SERPL-MCNC: 21 MG/DL (ref 8–23)
CALCIUM SERPL-MCNC: 8.9 MG/DL (ref 8.7–10.5)
CHLORIDE SERPL-SCNC: 95 MMOL/L (ref 95–110)
CO2 SERPL-SCNC: 27 MMOL/L (ref 23–29)
CREAT SERPL-MCNC: 0.6 MG/DL (ref 0.5–1.4)
ERYTHROCYTE [DISTWIDTH] IN BLOOD BY AUTOMATED COUNT: 19.5 % (ref 11.5–14.5)
GFR SERPLBLD CREATININE-BSD FMLA CKD-EPI: >60 ML/MIN/1.73/M2
GLUCOSE SERPL-MCNC: 128 MG/DL (ref 70–110)
HCT VFR BLD AUTO: 28.7 % (ref 37–48.5)
HGB BLD-MCNC: 9.2 GM/DL (ref 12–16)
IMM GRANULOCYTES # BLD AUTO: 0.1 K/UL (ref 0–0.04)
IMM GRANULOCYTES NFR BLD AUTO: 0.7 % (ref 0–0.5)
LYMPHOCYTES # BLD AUTO: 2.22 K/UL (ref 1–4.8)
MAGNESIUM SERPL-MCNC: 2 MG/DL (ref 1.6–2.6)
MCH RBC QN AUTO: 27.5 PG (ref 27–31)
MCHC RBC AUTO-ENTMCNC: 32.1 G/DL (ref 32–36)
MCV RBC AUTO: 86 FL (ref 82–98)
NUCLEATED RBC (/100WBC) (OHS): 0 /100 WBC
PLATELET # BLD AUTO: 427 K/UL (ref 150–450)
PMV BLD AUTO: 9.7 FL (ref 9.2–12.9)
POTASSIUM SERPL-SCNC: 4.2 MMOL/L (ref 3.5–5.1)
PROT SERPL-MCNC: 6.1 GM/DL (ref 6–8.4)
RBC # BLD AUTO: 3.34 M/UL (ref 4–5.4)
RELATIVE EOSINOPHIL (OHS): 0.3 %
RELATIVE LYMPHOCYTE (OHS): 15.4 % (ref 18–48)
RELATIVE MONOCYTE (OHS): 8.7 % (ref 4–15)
RELATIVE NEUTROPHIL (OHS): 74.6 % (ref 38–73)
SODIUM SERPL-SCNC: 128 MMOL/L (ref 136–145)
WBC # BLD AUTO: 14.37 K/UL (ref 3.9–12.7)

## 2025-04-12 PROCEDURE — 25000003 PHARM REV CODE 250: Performed by: INTERNAL MEDICINE

## 2025-04-12 PROCEDURE — 63600175 PHARM REV CODE 636 W HCPCS: Performed by: INTERNAL MEDICINE

## 2025-04-12 PROCEDURE — 99900031 HC PATIENT EDUCATION (STAT)

## 2025-04-12 PROCEDURE — 11000001 HC ACUTE MED/SURG PRIVATE ROOM

## 2025-04-12 PROCEDURE — S0179 MEGESTROL 20 MG: HCPCS | Performed by: NURSE PRACTITIONER

## 2025-04-12 PROCEDURE — 83735 ASSAY OF MAGNESIUM: CPT | Performed by: INTERNAL MEDICINE

## 2025-04-12 PROCEDURE — 36415 COLL VENOUS BLD VENIPUNCTURE: CPT | Performed by: INTERNAL MEDICINE

## 2025-04-12 PROCEDURE — 94761 N-INVAS EAR/PLS OXIMETRY MLT: CPT

## 2025-04-12 PROCEDURE — 85025 COMPLETE CBC W/AUTO DIFF WBC: CPT | Performed by: INTERNAL MEDICINE

## 2025-04-12 PROCEDURE — 99900035 HC TECH TIME PER 15 MIN (STAT)

## 2025-04-12 PROCEDURE — 94640 AIRWAY INHALATION TREATMENT: CPT

## 2025-04-12 PROCEDURE — 25000003 PHARM REV CODE 250: Performed by: NURSE PRACTITIONER

## 2025-04-12 PROCEDURE — 25000242 PHARM REV CODE 250 ALT 637 W/ HCPCS: Performed by: INTERNAL MEDICINE

## 2025-04-12 PROCEDURE — 80053 COMPREHEN METABOLIC PANEL: CPT | Performed by: INTERNAL MEDICINE

## 2025-04-12 PROCEDURE — 25000003 PHARM REV CODE 250: Performed by: PSYCHIATRY & NEUROLOGY

## 2025-04-12 PROCEDURE — 27000221 HC OXYGEN, UP TO 24 HOURS

## 2025-04-12 RX ORDER — SODIUM CHLORIDE 9 MG/ML
INJECTION, SOLUTION INTRAVENOUS CONTINUOUS
Status: ACTIVE | OUTPATIENT
Start: 2025-04-12 | End: 2025-04-12

## 2025-04-12 RX ADMIN — Medication 300 ML: at 02:04

## 2025-04-12 RX ADMIN — PANTOPRAZOLE SODIUM 40 MG: 40 TABLET, DELAYED RELEASE ORAL at 10:04

## 2025-04-12 RX ADMIN — LISINOPRIL 40 MG: 20 TABLET ORAL at 10:04

## 2025-04-12 RX ADMIN — CARVEDILOL 25 MG: 12.5 TABLET, FILM COATED ORAL at 10:04

## 2025-04-12 RX ADMIN — ALUMINUM HYDROXIDE, MAGNESIUM HYDROXIDE, AND DIMETHICONE 30 ML: 200; 20; 200 SUSPENSION ORAL at 10:04

## 2025-04-12 RX ADMIN — OXYCODONE HYDROCHLORIDE AND ACETAMINOPHEN 500 MG: 500 TABLET ORAL at 10:04

## 2025-04-12 RX ADMIN — CARVEDILOL 25 MG: 12.5 TABLET, FILM COATED ORAL at 05:04

## 2025-04-12 RX ADMIN — NYSTATIN AND TRIAMCINOLONE ACETONIDE: 100000; 1 CREAM TOPICAL at 10:04

## 2025-04-12 RX ADMIN — Medication 300 ML: at 10:04

## 2025-04-12 RX ADMIN — MICONAZOLE NITRATE: 20 POWDER TOPICAL at 10:04

## 2025-04-12 RX ADMIN — Medication 300 ML: at 11:04

## 2025-04-12 RX ADMIN — Medication 1000 UNITS: at 10:04

## 2025-04-12 RX ADMIN — CYANOCOBALAMIN TAB 1000 MCG 1000 MCG: 1000 TAB at 10:04

## 2025-04-12 RX ADMIN — IPRATROPIUM BROMIDE 0.5 MG: 0.5 SOLUTION RESPIRATORY (INHALATION) at 02:04

## 2025-04-12 RX ADMIN — FLUOXETINE HYDROCHLORIDE 40 MG: 20 CAPSULE ORAL at 10:04

## 2025-04-12 RX ADMIN — IPRATROPIUM BROMIDE 0.5 MG: 0.5 SOLUTION RESPIRATORY (INHALATION) at 07:04

## 2025-04-12 RX ADMIN — Medication 300 ML: at 05:04

## 2025-04-12 RX ADMIN — SUCRALFATE 1 G: 1 SUSPENSION ORAL at 10:04

## 2025-04-12 RX ADMIN — SUCRALFATE 1 G: 1 SUSPENSION ORAL at 05:04

## 2025-04-12 RX ADMIN — NYSTATIN AND TRIAMCINOLONE ACETONIDE: 100000; 1 CREAM TOPICAL at 02:04

## 2025-04-12 RX ADMIN — MEGESTROL ACETATE 200 MG: 400 SUSPENSION ORAL at 10:04

## 2025-04-12 RX ADMIN — LEVOTHYROXINE SODIUM 25 MCG: 25 TABLET ORAL at 06:04

## 2025-04-12 RX ADMIN — IPRATROPIUM BROMIDE 0.5 MG: 0.5 SOLUTION RESPIRATORY (INHALATION) at 08:04

## 2025-04-12 RX ADMIN — ARFORMOTEROL TARTRATE 15 MCG: 15 SOLUTION RESPIRATORY (INHALATION) at 08:04

## 2025-04-12 RX ADMIN — MIRTAZAPINE 7.5 MG: 7.5 TABLET ORAL at 11:04

## 2025-04-12 RX ADMIN — BUDESONIDE 0.5 MG: 0.5 INHALANT RESPIRATORY (INHALATION) at 07:04

## 2025-04-12 RX ADMIN — SODIUM CHLORIDE: 9 INJECTION, SOLUTION INTRAVENOUS at 04:04

## 2025-04-12 RX ADMIN — ARFORMOTEROL TARTRATE 15 MCG: 15 SOLUTION RESPIRATORY (INHALATION) at 07:04

## 2025-04-12 RX ADMIN — ALUMINUM HYDROXIDE, MAGNESIUM HYDROXIDE, AND DIMETHICONE 30 ML: 200; 20; 200 SUSPENSION ORAL at 11:04

## 2025-04-12 RX ADMIN — BUDESONIDE 0.5 MG: 0.5 INHALANT RESPIRATORY (INHALATION) at 08:04

## 2025-04-12 RX ADMIN — SUCRALFATE 1 G: 1 SUSPENSION ORAL at 06:04

## 2025-04-12 RX ADMIN — FERROUS SULFATE TAB 325 MG (65 MG ELEMENTAL FE) 1 EACH: 325 (65 FE) TAB at 10:04

## 2025-04-12 RX ADMIN — PREDNISONE 20 MG: 20 TABLET ORAL at 10:04

## 2025-04-12 NOTE — PLAN OF CARE
Plan of care reviewed and followed. Patient and family at bedside voiced understanding. NADN , VSS, patient remains free from falls or injury. Bed in low position, call light in reach, patient instructed to call for needs not met on rounds.   Problem: Skin Injury Risk Increased  Goal: Skin Health and Integrity  Outcome: Progressing  Intervention: Optimize Skin Protection  Flowsheets (Taken 4/12/2025 1713)  Pressure Reduction Techniques: frequent weight shift encouraged  Pressure Reduction Devices:   foam padding utilized   positioning supports utilized  Skin Protection:   incontinence pads utilized   silicone foam dressing in place  Head of Bed (HOB) Positioning: HOB at 30-45 degrees  Intervention: Promote and Optimize Oral Intake  Flowsheets (Taken 4/12/2025 1713)  Nutrition Interventions: meal set-up provided     Problem: Adult Inpatient Plan of Care  Goal: Plan of Care Review  Outcome: Progressing  Flowsheets (Taken 4/12/2025 1713)  Plan of Care Reviewed With:   patient   family  Goal: Patient-Specific Goal (Individualized)  Outcome: Progressing  Goal: Absence of Hospital-Acquired Illness or Injury  Outcome: Progressing  Intervention: Identify and Manage Fall Risk  Flowsheets (Taken 4/12/2025 1713)  Safety Promotion/Fall Prevention:   assistive device/personal item within reach   bed alarm set   Fall Risk reviewed with patient/family   family to remain at bedside   instructed to call staff for mobility   medications reviewed   nonskid shoes/socks when out of bed   side rails raised x 2  Intervention: Prevent Skin Injury  Flowsheets (Taken 4/12/2025 1713)  Body Position: position changed independently  Skin Protection:   incontinence pads utilized   silicone foam dressing in place  Device Skin Pressure Protection:   absorbent pad utilized/changed   positioning supports utilized   tubing/devices free from skin contact  Intervention: Prevent and Manage VTE (Venous Thromboembolism) Risk  Flowsheets (Taken 4/12/2025  1713)  VTE Prevention/Management:   ambulation promoted   bleeding risk assessed   dorsiflexion/plantar flexion performed   fluids promoted  Intervention: Prevent Infection  Flowsheets (Taken 4/12/2025 1713)  Infection Prevention:   environmental surveillance performed   equipment surfaces disinfected   hand hygiene promoted   single patient room provided  Goal: Optimal Comfort and Wellbeing  Outcome: Progressing  Intervention: Monitor Pain and Promote Comfort  Flowsheets (Taken 4/12/2025 1713)  Pain Management Interventions:   care clustered   pain management plan reviewed with patient/caregiver  Intervention: Provide Person-Centered Care  Flowsheets (Taken 4/12/2025 1713)  Trust Relationship/Rapport:   care explained   choices provided   questions answered   questions encouraged  Goal: Readiness for Transition of Care  Outcome: Progressing     Problem: Pneumonia  Goal: Fluid Balance  Outcome: Progressing  Intervention: Monitor and Manage Fluid Balance  Flowsheets (Taken 4/12/2025 1713)  Fluid/Electrolyte Management: fluids provided  Goal: Resolution of Infection Signs and Symptoms  Outcome: Progressing  Intervention: Prevent Infection Progression  Flowsheets (Taken 4/12/2025 1713)  Fever Reduction/Comfort Measures:   lightweight bedding   lightweight clothing  Infection Management: aseptic technique maintained  Goal: Effective Oxygenation and Ventilation  Outcome: Progressing  Intervention: Promote Airway Secretion Clearance  Flowsheets (Taken 4/12/2025 1713)  Breathing Techniques/Airway Clearance: deep/controlled cough encouraged  Cough And Deep Breathing: done independently per patient  Intervention: Optimize Oxygenation and Ventilation  Flowsheets (Taken 4/12/2025 1713)  Head of Bed (HOB) Positioning: HOB at 30-45 degrees     Problem: Infection  Goal: Absence of Infection Signs and Symptoms  Outcome: Progressing  Intervention: Prevent or Manage Infection  Flowsheets (Taken 4/12/2025 1713)  Fever  Reduction/Comfort Measures:   lightweight bedding   lightweight clothing  Infection Management: aseptic technique maintained     Problem: Fall Injury Risk  Goal: Absence of Fall and Fall-Related Injury  Outcome: Progressing  Intervention: Identify and Manage Contributors  Flowsheets (Taken 4/12/2025 1713)  Self-Care Promotion: independence encouraged  Medication Review/Management: medications reviewed  Intervention: Promote Injury-Free Environment  Flowsheets (Taken 4/12/2025 1713)  Safety Promotion/Fall Prevention:   assistive device/personal item within reach   bed alarm set   Fall Risk reviewed with patient/family   family to remain at bedside   instructed to call staff for mobility   medications reviewed   nonskid shoes/socks when out of bed   side rails raised x 2     Problem: Wound  Goal: Optimal Coping  Outcome: Progressing  Intervention: Support Patient and Family Response  Flowsheets (Taken 4/12/2025 1713)  Supportive Measures: active listening utilized  Family/Support System Care:   caregiver stress acknowledged   involvement promoted   presence promoted   support provided  Goal: Optimal Functional Ability  Outcome: Progressing  Goal: Absence of Infection Signs and Symptoms  Outcome: Progressing  Intervention: Prevent or Manage Infection  Flowsheets (Taken 4/12/2025 1713)  Fever Reduction/Comfort Measures:   lightweight bedding   lightweight clothing  Infection Management: aseptic technique maintained  Goal: Improved Oral Intake  Outcome: Progressing  Intervention: Promote and Optimize Oral Intake  Flowsheets (Taken 4/12/2025 1713)  Nutrition Interventions: meal set-up provided  Goal: Optimal Pain Control and Function  Outcome: Progressing  Intervention: Prevent or Manage Pain  Flowsheets (Taken 4/12/2025 1713)  Sleep/Rest Enhancement:   natural light exposure provided   noise level reduced   relaxation techniques promoted  Pain Management Interventions:   care clustered   pain management plan reviewed  with patient/caregiver  Goal: Skin Health and Integrity  Outcome: Progressing  Intervention: Optimize Skin Protection  Flowsheets (Taken 4/12/2025 1713)  Pressure Reduction Techniques: frequent weight shift encouraged  Pressure Reduction Devices:   foam padding utilized   positioning supports utilized  Skin Protection:   incontinence pads utilized   silicone foam dressing in place  Head of Bed (HOB) Positioning: HOB at 30-45 degrees  Goal: Optimal Wound Healing  Outcome: Progressing  Intervention: Promote Wound Healing  Flowsheets (Taken 4/12/2025 1713)  Sleep/Rest Enhancement:   natural light exposure provided   noise level reduced   relaxation techniques promoted     Problem: Fatigue  Goal: Improved Activity Tolerance  Outcome: Progressing  Intervention: Promote Improved Energy  Flowsheets (Taken 4/12/2025 1713)  Sleep/Rest Enhancement:   natural light exposure provided   noise level reduced   relaxation techniques promoted

## 2025-04-12 NOTE — SUBJECTIVE & OBJECTIVE
Interval History: Patient seen and examined.    Review of Systems   Constitutional:  Positive for activity change and fatigue. Negative for chills and fever.   HENT:  Negative for ear pain, mouth sores, nosebleeds and sore throat.    Eyes:  Negative for visual disturbance.   Respiratory:  Negative for cough, shortness of breath and wheezing.    Cardiovascular:  Negative for chest pain, palpitations and leg swelling.   Gastrointestinal:  Negative for abdominal distention, abdominal pain, blood in stool, constipation, diarrhea, nausea and vomiting.   Endocrine: Negative for polyphagia.   Genitourinary:  Negative for dysuria, flank pain and frequency.   Musculoskeletal:  Positive for arthralgias, gait problem and myalgias.   Skin:  Positive for rash.   Neurological:  Positive for weakness. Negative for seizures, syncope, facial asymmetry, speech difficulty and headaches.   Hematological:  Negative for adenopathy.   Psychiatric/Behavioral:  Negative for agitation and hallucinations. The patient is nervous/anxious.      Objective:     Vital Signs (Most Recent):  Temp: 98.3 °F (36.8 °C) (04/12/25 1146)  Pulse: 80 (04/12/25 1403)  Resp: 18 (04/12/25 1403)  BP: (!) 96/53 (04/12/25 1146)  SpO2: 98 % (04/12/25 1403) Vital Signs (24h Range):  Temp:  [98 °F (36.7 °C)-99.2 °F (37.3 °C)] 98.3 °F (36.8 °C)  Pulse:  [74-83] 80  Resp:  [16-20] 18  SpO2:  [95 %-100 %] 98 %  BP: ()/(41-60) 96/53     Weight: 40.6 kg (89 lb 8.1 oz)  Body mass index is 15.86 kg/m².    Intake/Output Summary (Last 24 hours) at 4/12/2025 1421  Last data filed at 4/12/2025 0600  Gross per 24 hour   Intake 420 ml   Output --   Net 420 ml         Physical Exam  Vitals and nursing note reviewed.   Constitutional:       General: She is not in acute distress.     Appearance: She is ill-appearing. She is not toxic-appearing.   HENT:      Head: Atraumatic.      Comments: Temporal wasting     Nose: No congestion or rhinorrhea.      Mouth/Throat:      Mouth:  Mucous membranes are moist.      Pharynx: No oropharyngeal exudate or posterior oropharyngeal erythema.   Eyes:      General: No scleral icterus.     Extraocular Movements: Extraocular movements intact.   Neck:      Vascular: No carotid bruit.   Cardiovascular:      Rate and Rhythm: Normal rate and regular rhythm.      Heart sounds: No murmur heard.  Pulmonary:      Effort: No respiratory distress.      Breath sounds: Rhonchi present. No wheezing or rales.   Abdominal:      General: There is no distension.      Palpations: Abdomen is soft.      Tenderness: There is no abdominal tenderness. There is no guarding or rebound.   Musculoskeletal:         General: No swelling or tenderness.      Cervical back: No rigidity.      Right lower leg: No edema.      Left lower leg: No edema.      Comments: Thin, malnurished   Lymphadenopathy:      Cervical: No cervical adenopathy.   Skin:     Capillary Refill: Capillary refill takes less than 2 seconds.      Coloration: Skin is not jaundiced or pale.      Comments: + bilateral palms of hands, inguinal area, upper buttoks, round/nodule/rash, 5mm diameter, scattered   Neurological:      Mental Status: She is alert.      Motor: Weakness present.   Psychiatric:      Comments: Appears withdrawn today               Significant Labs: All pertinent labs within the past 24 hours have been reviewed.  Recent Lab Results         04/12/25  1139        Albumin 2.1       ALP 70       ALT 9       Anion Gap 6       AST 12       Baso # 0.05       Basophil % 0.3       BILIRUBIN TOTAL 0.2  Comment: For infants and newborns, interpretation of results should be based   on gestational age, weight and in agreement with clinical   observations.    Premature Infant recommended reference ranges:   0-24 hours:  <8.0 mg/dL   24-48 hours: <12.0 mg/dL   3-5 days:    <15.0 mg/dL   6-29 days:   <15.0 mg/dL       BUN 21       Calcium 8.9       Chloride 95       CO2 27       Creatinine 0.6       eGFR >60  Comment:  Estimated GFR calculated using the CKD-EPI creatinine (2021) equation.       Eos # 0.04       Eos % 0.3       Glucose 128       Gran # (ANC) 10.71       Hematocrit 28.7       Hemoglobin 9.2       Immature Grans (Abs) 0.10  Comment: Mild elevation in immature granulocytes is non specific and can be seen in a variety of conditions including stress response, acute inflammation, trauma and pregnancy. Correlation with other laboratory and clinical findings is essential.       Immature Granulocytes 0.7       Lymph # 2.22       Lymph % 15.4       Magnesium  2.0       MCH 27.5       MCHC 32.1       MCV 86       Mono # 1.25       Mono % 8.7       MPV 9.7       Neut % 74.6       nRBC 0       Platelet Count 427       Potassium 4.2       PROTEIN TOTAL 6.1       RBC 3.34       RDW 19.5       Sodium 128       WBC 14.37               Significant Imaging: I have reviewed all pertinent imaging results/findings within the past 24 hours.

## 2025-04-12 NOTE — ASSESSMENT & PLAN NOTE
"Hyponatremia is likely due to Dehydration/hypovolemia. The patient's most recent sodium results are listed below.  No results for input(s): "NA" in the last 72 hours.    Plan  - Correct the sodium by 4-6mEq in 24 hours.  "

## 2025-04-12 NOTE — PROGRESS NOTES
"[facility] Medicine  Progress Note    Patient Name: Ruth Gamboa  MRN: [id number]  Patient Class: IP- Inpatient   Admission Date: 3/31/2025  Length of Stay: 11 days  Attending Physician: Clark Calix III, MD  Primary Care Provider: Clark Calix III, MD        Subjective     Principal Problem:UTI (urinary tract infection)        HPI:  ED HPI:  80-year-old female with significant history hypertension, hypothyroidism, COPD and depression who reports to the emergency room for evaluation of failure to thrive picture. Patient's family reports little to no activity and patient's appetite has decreased. Increased weakness and fatigue. Family and pt has discussed with PCP and pending nursing home/SNF placement as requested per pt. Recent changes in depression medications and pt reports just "not hungry". No abdominal pain or other acute complaints.     IM HPI:  Patient is well known to us and had a recent prolonged hospitalization for multiple electrolyte abnormalities weight loss atypical pneumonia COPD and wounds who during that admission had a stabilization of her condition and the family attempted to take her home and care for her.  It has been a challenge and ultimately the patient stopped eating had no appetite and she declined again.  The patient and family were in the process of reaching out to local nursing facilities and getting authorization for admission when she had a further decline with tachycardia and other symptoms of dehydration and weakness.  I have evaluated the patient with family at bedside this morning she is coughing and co anorexia.    Overview/Hospital Course:  4/2 FM:  Patient's family at bedside, today patient is coughing and having difficulty with her breakfast.  Family states no trouble with liquids but mainly with solids and chewing.  I have done a head neck exam today and do not see any gross abnormalities.  We will have speech evaluate and do MBS.  Patient's family " states she is able to take supplements at home we will order ensure.  Medications reconciled case management consulted family would like skilled nursing placement.  4/3 FM:  Patient had improved p.o. intake and has stabilized this morning.  Patient's testing noted and reviewed and I have spoken with physical therapy and speech therapy.  Speech therapy states that as long as patient's positioning is correct with eating she has no aspiration.  She also needs bite sized and a slightly modified diet.  Patient's medications all reviewed patient has seen Psychiatry notes and recommendations noted.  Awaiting skilled nursing placement.  4/4 FM:  Upon entering the room patient was lying supine attempting to drink coffee.  I have re-educated her the importance of sitting upright and aspiration and choking precautions.  Patient's family present all questions answered awaiting skilled nursing home placement and approval via the state and her insurance company.  4/5 AA, weekend crosscover, UTI, antibiotics on board, urine culture with Klebsiella oxytocia, Rocephin on board, white count trending down, afebrile, waiting on nursing home placement  4/6 AA, weekend crosscover, no acute events overnight reported, noted fluctuation blood pressure, meds adjusted, renal function stable, white count slight increased from yesterday, afebrile, continue to monitor  4/7 FM:  Patient has been drowsy somnolent and difficult to arise at times during the day per family.  We will decrease the dosing of her mirtazapine and Megace.  Otherwise we are awaiting on state approval patient states her mood is improved no changes in other medications today.  Labs noted and reviewed.  4/8 FM:  Patient is awake alert this morning and ate breakfast with family at bedside.  Labs noted and reviewed.  Awaiting on states psychiatric determination before patient could be moved.  We will stop drawing daily labs as we wait.  4/9 FM:  Patient has developed a annular  nodular rash and the palms of the hands suprapubic and inguinal area and upper buttocks.  Unsure if this is a drug eruption versus other.  We will check RPR (no recent intercourse or known exposure) and RADHA.  Patient's appetite is improved she is tolerating supplements and this morning is awake and alert.  We are awaiting state approval for skilled nursing home transfer.  4/10 FM:  Patient's daughter is at bedside, patient's mood seems to be improved she is smiling some today.  Patient is ambulating and walking with therapy.  Discussed that we are still waiting on state approval.  4/11 FM:  Continues to work with therapy, ate 100% of bfast this am, waiting on state approval.  4/12 AA, weekend crosscover, patient on prednisone therapy, RADHA positive, positive anti Sm/RNP antibody, mild leukocytosis, likely related to prednisone therapy, H&H stable, continue PT OT efforts    Interval History: Patient seen and examined.    Review of Systems   Constitutional:  Positive for activity change and fatigue. Negative for chills and fever.   HENT:  Negative for ear pain, mouth sores, nosebleeds and sore throat.    Eyes:  Negative for visual disturbance.   Respiratory:  Negative for cough, shortness of breath and wheezing.    Cardiovascular:  Negative for chest pain, palpitations and leg swelling.   Gastrointestinal:  Negative for abdominal distention, abdominal pain, blood in stool, constipation, diarrhea, nausea and vomiting.   Endocrine: Negative for polyphagia.   Genitourinary:  Negative for dysuria, flank pain and frequency.   Musculoskeletal:  Positive for arthralgias, gait problem and myalgias.   Skin:  Positive for rash.   Neurological:  Positive for weakness. Negative for seizures, syncope, facial asymmetry, speech difficulty and headaches.   Hematological:  Negative for adenopathy.   Psychiatric/Behavioral:  Negative for agitation and hallucinations. The patient is nervous/anxious.      Objective:     Vital Signs (Most  Recent):  Temp: 98.3 °F (36.8 °C) (04/12/25 1146)  Pulse: 80 (04/12/25 1403)  Resp: 18 (04/12/25 1403)  BP: (!) 96/53 (04/12/25 1146)  SpO2: 98 % (04/12/25 1403) Vital Signs (24h Range):  Temp:  [98 °F (36.7 °C)-99.2 °F (37.3 °C)] 98.3 °F (36.8 °C)  Pulse:  [74-83] 80  Resp:  [16-20] 18  SpO2:  [95 %-100 %] 98 %  BP: ()/(41-60) 96/53     Weight: 40.6 kg (89 lb 8.1 oz)  Body mass index is 15.86 kg/m².    Intake/Output Summary (Last 24 hours) at 4/12/2025 1421  Last data filed at 4/12/2025 0600  Gross per 24 hour   Intake 420 ml   Output --   Net 420 ml         Physical Exam  Vitals and nursing note reviewed.   Constitutional:       General: She is not in acute distress.     Appearance: She is ill-appearing. She is not toxic-appearing.   HENT:      Head: Atraumatic.      Comments: Temporal wasting     Nose: No congestion or rhinorrhea.      Mouth/Throat:      Mouth: Mucous membranes are moist.      Pharynx: No oropharyngeal exudate or posterior oropharyngeal erythema.   Eyes:      General: No scleral icterus.     Extraocular Movements: Extraocular movements intact.   Neck:      Vascular: No carotid bruit.   Cardiovascular:      Rate and Rhythm: Normal rate and regular rhythm.      Heart sounds: No murmur heard.  Pulmonary:      Effort: No respiratory distress.      Breath sounds: Rhonchi present. No wheezing or rales.   Abdominal:      General: There is no distension.      Palpations: Abdomen is soft.      Tenderness: There is no abdominal tenderness. There is no guarding or rebound.   Musculoskeletal:         General: No swelling or tenderness.      Cervical back: No rigidity.      Right lower leg: No edema.      Left lower leg: No edema.      Comments: Thin, malnurished   Lymphadenopathy:      Cervical: No cervical adenopathy.   Skin:     Capillary Refill: Capillary refill takes less than 2 seconds.      Coloration: Skin is not jaundiced or pale.      Comments: + bilateral palms of hands, inguinal area, upper  buttoks, round/nodule/rash, 5mm diameter, scattered   Neurological:      Mental Status: She is alert.      Motor: Weakness present.   Psychiatric:      Comments: Appears withdrawn today               Significant Labs: All pertinent labs within the past 24 hours have been reviewed.  Recent Lab Results         04/12/25  1139        Albumin 2.1       ALP 70       ALT 9       Anion Gap 6       AST 12       Baso # 0.05       Basophil % 0.3       BILIRUBIN TOTAL 0.2  Comment: For infants and newborns, interpretation of results should be based   on gestational age, weight and in agreement with clinical   observations.    Premature Infant recommended reference ranges:   0-24 hours:  <8.0 mg/dL   24-48 hours: <12.0 mg/dL   3-5 days:    <15.0 mg/dL   6-29 days:   <15.0 mg/dL       BUN 21       Calcium 8.9       Chloride 95       CO2 27       Creatinine 0.6       eGFR >60  Comment: Estimated GFR calculated using the CKD-EPI creatinine (2021) equation.       Eos # 0.04       Eos % 0.3       Glucose 128       Gran # (ANC) 10.71       Hematocrit 28.7       Hemoglobin 9.2       Immature Grans (Abs) 0.10  Comment: Mild elevation in immature granulocytes is non specific and can be seen in a variety of conditions including stress response, acute inflammation, trauma and pregnancy. Correlation with other laboratory and clinical findings is essential.       Immature Granulocytes 0.7       Lymph # 2.22       Lymph % 15.4       Magnesium  2.0       MCH 27.5       MCHC 32.1       MCV 86       Mono # 1.25       Mono % 8.7       MPV 9.7       Neut % 74.6       nRBC 0       Platelet Count 427       Potassium 4.2       PROTEIN TOTAL 6.1       RBC 3.34       RDW 19.5       Sodium 128       WBC 14.37               Significant Imaging: I have reviewed all pertinent imaging results/findings within the past 24 hours.      Assessment & Plan  COPD (chronic obstructive pulmonary disease)  Patient's COPD is controlled currently.  Gastroesophageal  reflux disease without esophagitis  Protonix    Atherosclerosis of aorta      HTN (hypertension)  Patient's blood pressure range in the last 24 hours was: BP  Min: 94/41  Max: 123/58.The patient's inpatient anti-hypertensive regimen is listed below:  Current Antihypertensives  carvediloL tablet 25 mg, 2 times daily with meals, Oral  lisinopriL tablet 40 mg, Daily, Oral    Plan  - BP is uncontrolled, will adjust as follows:  Lisinopril increased  Monitor pressure. Resume home meds. Adjust PRN  Tobacco abuse  Continue to .    Major depressive disorder, recurrent, moderate  Patient has persistent depression which is severe and is currently uncontrolled. Will Continue anti-depressant medications. We will not consult psychiatry at this time. Patient does not display psychosis at this time. Continue to monitor closely and adjust plan of care as needed.    4/9 FM:  Improved, more talkative, awake and alert.  4/11 FM:  Improved, smiling during exam.  4/12 patient was more withdrawn today compared to previous encounter with patient       Anorexia  Patient is on Megace therapy    4/9 FM:  Cont Megace, mirtazapine, supplements.  4/11 FM:  Continues to improve with current txt.  Megace on board, monitor intake and weight   Weight loss, unintentional    Supplements, megace.    4/4 FM:  PO intake improved with txt.  Hypothyroidism  Cont TRH.    Hypomagnesemia  Patient has Abnormal Magnesium: hypomagnesemia. Will continue to monitor electrolytes closely. Will replace the affected electrolytes and repeat labs to be done after interventions completed. The patient's magnesium results have been reviewed and are listed below.  Recent Labs   Lab 04/12/25  1139   MG 2.0        Hypokalemia  Patient's most recent potassium results are listed below.   Recent Labs     04/12/25  1139   K 4.2       Plan  - Replete potassium per protocol  - Monitor potassium Daily  - Patient's hypokalemia is improving  Hyponatremia  Hyponatremia is  likely due to Dehydration/hypovolemia. The patient's most recent sodium results are listed below.  Recent Labs     04/12/25  1139   *       Plan  - Correct the sodium by 4-6mEq in 24 hours.  4/12 IV fluids on board  Failure to thrive in adult  AS above.    Severe malnutrition  Nutrition consulted. Most recent weight and BMI monitored-     Measurements:  Wt Readings from Last 1 Encounters:   04/01/25 40.6 kg (89 lb 8.1 oz)   Body mass index is 15.86 kg/m².    Patient has been screened and assessed by RD.    Malnutrition Type:  Context:    Level:      Malnutrition Characteristic Summary:       Interventions/Recommendations (treatment strategy):  1. Continue regular diet as tolerated.     2. Ensure Enlive ONS or alternative TID.    3. Rec'd Moshe BID to promote wound healing and to provide additional nutrtiion.   4. Rec'd Beneprotein TID. 5. RD to monitor and follow up.    4/4 FM:  PO intake improved with txt.  4/9 FM:  Currently on multiple appetite stimulants and her oral intake is improved.  Patient's tolerating supplements and continue to encourage.  Likely related to depression which is also improved.  4/10 FM:  Dietary notes reviewed, agree with plan.  Dietary on board, follow-up with rec  UTI (urinary tract infection)  Abx, CS pending.  4/5 urine culture with Klebsiella oxytocia, Rocephin on board, continue antibiotics white count trending down, afebrile  4/6 continue Rocephin, white count elevated, afebrile, continue IV antibiotics, fluids  4/7 FM:  Finishing abx next few days.  4/8 FM:  Completing abx.  4/9 FM:  Has completed abx, afebrile, no pain, wbc wnl.  Dysphagia  ST to evaluate  4/8 FM:  ST has cleared patient, must stay upright when eating.  4/9 FM:  Improved  Rash  4/9 FM:  ? Drug eruption, now off abx, check VDRL, RADHA.  Granuloma annulare.  4/10 FM:  Improved on steroids.    VTE Risk Mitigation (From admission, onward)           Ordered     IP VTE HIGH RISK PATIENT  Once         03/31/25 2041      Place sequential compression device  Until discontinued         03/31/25 2041                    Discharge Planning   ORALIA:      Code Status: DNR   Medical Readiness for Discharge Date:   Discharge Plan A: Skilled Nursing Facility                Please place Justification for DME        Federico Chen MD  Department of Hospital Medicine   [facility]

## 2025-04-12 NOTE — ASSESSMENT & PLAN NOTE
Patient has persistent depression which is severe and is currently uncontrolled. Will Continue anti-depressant medications. We will not consult psychiatry at this time. Patient does not display psychosis at this time. Continue to monitor closely and adjust plan of care as needed.    4/9 FM:  Improved, more talkative, awake and alert.  4/11 FM:  Improved, smiling during exam.

## 2025-04-12 NOTE — ASSESSMENT & PLAN NOTE
4/9 FM:  ? Drug eruption, now off abx, check VDRL, RADHA.  Granuloma annulare.  4/10 FM:  Improved on steroids.

## 2025-04-12 NOTE — ASSESSMENT & PLAN NOTE
Patient is on Megace therapy    4/9 FM:  Cont Megace, mirtazapine, supplements.  4/11 FM:  Continues to improve with current txt.

## 2025-04-12 NOTE — ASSESSMENT & PLAN NOTE
Patient's blood pressure range in the last 24 hours was: BP  Min: 94/41  Max: 128/106.The patient's inpatient anti-hypertensive regimen is listed below:  Current Antihypertensives  carvediloL tablet 25 mg, 2 times daily with meals, Oral  lisinopriL tablet 40 mg, Daily, Oral    Plan  - BP is uncontrolled, will adjust as follows:  Lisinopril increased

## 2025-04-12 NOTE — ASSESSMENT & PLAN NOTE
"Patient's most recent potassium results are listed below.   No results for input(s): "K" in the last 72 hours.    Plan  - Replete potassium per protocol  - Monitor potassium Daily  - Patient's hypokalemia is improving  "

## 2025-04-12 NOTE — ASSESSMENT & PLAN NOTE
Patient has persistent depression which is severe and is currently uncontrolled. Will Continue anti-depressant medications. We will not consult psychiatry at this time. Patient does not display psychosis at this time. Continue to monitor closely and adjust plan of care as needed.    4/9 FM:  Improved, more talkative, awake and alert.  4/11 FM:  Improved, smiling during exam.  4/12 patient was more withdrawn today compared to previous encounter with patient

## 2025-04-12 NOTE — ASSESSMENT & PLAN NOTE
Patient is on Megace therapy    4/9 FM:  Cont Megace, mirtazapine, supplements.  4/11 FM:  Continues to improve with current txt.  Megace on board, monitor intake and weight

## 2025-04-12 NOTE — ASSESSMENT & PLAN NOTE
Patient's blood pressure range in the last 24 hours was: BP  Min: 94/41  Max: 123/58.The patient's inpatient anti-hypertensive regimen is listed below:  Current Antihypertensives  carvediloL tablet 25 mg, 2 times daily with meals, Oral  lisinopriL tablet 40 mg, Daily, Oral    Plan  - BP is uncontrolled, will adjust as follows:  Lisinopril increased  Monitor pressure. Resume home meds. Adjust PRN
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
## 2025-04-12 NOTE — ASSESSMENT & PLAN NOTE
Patient has Abnormal Magnesium: hypomagnesemia. Will continue to monitor electrolytes closely. Will replace the affected electrolytes and repeat labs to be done after interventions completed. The patient's magnesium results have been reviewed and are listed below.  Recent Labs   Lab 04/12/25  1139   MG 2.0

## 2025-04-12 NOTE — ASSESSMENT & PLAN NOTE
ST to evaluate  4/8 FM:  ST has cleared patient, must stay upright when eating.  4/9 FM:  Improved
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
## 2025-04-12 NOTE — ASSESSMENT & PLAN NOTE
Patient's most recent potassium results are listed below.   Recent Labs     04/12/25  1139   K 4.2       Plan  - Replete potassium per protocol  - Monitor potassium Daily  - Patient's hypokalemia is improving

## 2025-04-12 NOTE — ASSESSMENT & PLAN NOTE
Nutrition consulted. Most recent weight and BMI monitored-     Measurements:  Wt Readings from Last 1 Encounters:   04/01/25 40.6 kg (89 lb 8.1 oz)   Body mass index is 15.86 kg/m².    Patient has been screened and assessed by RD.    Malnutrition Type:  Context:    Level:      Malnutrition Characteristic Summary:       Interventions/Recommendations (treatment strategy):  1. Continue regular diet as tolerated.     2. Ensure Enlive ONS or alternative TID.    3. Rec'd Moshe BID to promote wound healing and to provide additional nutrtiion.   4. Rec'd Beneprotein TID. 5. RD to monitor and follow up.    4/4 FM:  PO intake improved with txt.  4/9 FM:  Currently on multiple appetite stimulants and her oral intake is improved.  Patient's tolerating supplements and continue to encourage.  Likely related to depression which is also improved.  4/10 FM:  Dietary notes reviewed, agree with plan.  Dietary on board, follow-up with rec

## 2025-04-12 NOTE — ASSESSMENT & PLAN NOTE
Hyponatremia is likely due to Dehydration/hypovolemia. The patient's most recent sodium results are listed below.  Recent Labs     04/12/25  1139   *       Plan  - Correct the sodium by 4-6mEq in 24 hours.  4/12 IV fluids on board

## 2025-04-13 PROBLEM — E87.6 HYPOKALEMIA: Status: RESOLVED | Noted: 2025-02-19 | Resolved: 2025-04-13

## 2025-04-13 PROBLEM — E83.42 HYPOMAGNESEMIA: Status: RESOLVED | Noted: 2025-02-19 | Resolved: 2025-04-13

## 2025-04-13 LAB
ABSOLUTE EOSINOPHIL (OHS): 0.08 K/UL
ABSOLUTE MONOCYTE (OHS): 1.08 K/UL (ref 0.3–1)
ABSOLUTE NEUTROPHIL COUNT (OHS): 8.87 K/UL (ref 1.8–7.7)
ALBUMIN SERPL BCP-MCNC: 2 G/DL (ref 3.5–5.2)
ALP SERPL-CCNC: 76 UNIT/L (ref 40–150)
ALT SERPL W/O P-5'-P-CCNC: 8 UNIT/L (ref 10–44)
ANION GAP (OHS): 5 MMOL/L (ref 8–16)
AST SERPL-CCNC: 25 UNIT/L (ref 11–45)
BASOPHILS # BLD AUTO: 0.05 K/UL
BASOPHILS NFR BLD AUTO: 0.4 %
BILIRUB SERPL-MCNC: 0.2 MG/DL (ref 0.1–1)
BUN SERPL-MCNC: 25 MG/DL (ref 8–23)
CALCIUM SERPL-MCNC: 8.6 MG/DL (ref 8.7–10.5)
CHLORIDE SERPL-SCNC: 98 MMOL/L (ref 95–110)
CO2 SERPL-SCNC: 26 MMOL/L (ref 23–29)
CREAT SERPL-MCNC: 0.7 MG/DL (ref 0.5–1.4)
ERYTHROCYTE [DISTWIDTH] IN BLOOD BY AUTOMATED COUNT: 19.7 % (ref 11.5–14.5)
GFR SERPLBLD CREATININE-BSD FMLA CKD-EPI: >60 ML/MIN/1.73/M2
GLUCOSE SERPL-MCNC: 117 MG/DL (ref 70–110)
HCT VFR BLD AUTO: 27.2 % (ref 37–48.5)
HGB BLD-MCNC: 8.5 GM/DL (ref 12–16)
IMM GRANULOCYTES # BLD AUTO: 0.11 K/UL (ref 0–0.04)
IMM GRANULOCYTES NFR BLD AUTO: 0.9 % (ref 0–0.5)
LYMPHOCYTES # BLD AUTO: 2.21 K/UL (ref 1–4.8)
MAGNESIUM SERPL-MCNC: 2 MG/DL (ref 1.6–2.6)
MCH RBC QN AUTO: 27.2 PG (ref 27–31)
MCHC RBC AUTO-ENTMCNC: 31.3 G/DL (ref 32–36)
MCV RBC AUTO: 87 FL (ref 82–98)
NUCLEATED RBC (/100WBC) (OHS): 0 /100 WBC
PLATELET # BLD AUTO: 407 K/UL (ref 150–450)
PMV BLD AUTO: 10.1 FL (ref 9.2–12.9)
POTASSIUM SERPL-SCNC: 4.5 MMOL/L (ref 3.5–5.1)
PROT SERPL-MCNC: 5.7 GM/DL (ref 6–8.4)
RBC # BLD AUTO: 3.13 M/UL (ref 4–5.4)
RELATIVE EOSINOPHIL (OHS): 0.6 %
RELATIVE LYMPHOCYTE (OHS): 17.8 % (ref 18–48)
RELATIVE MONOCYTE (OHS): 8.7 % (ref 4–15)
RELATIVE NEUTROPHIL (OHS): 71.6 % (ref 38–73)
SODIUM SERPL-SCNC: 129 MMOL/L (ref 136–145)
WBC # BLD AUTO: 12.4 K/UL (ref 3.9–12.7)

## 2025-04-13 PROCEDURE — 36415 COLL VENOUS BLD VENIPUNCTURE: CPT | Performed by: INTERNAL MEDICINE

## 2025-04-13 PROCEDURE — S0179 MEGESTROL 20 MG: HCPCS | Performed by: NURSE PRACTITIONER

## 2025-04-13 PROCEDURE — 99900035 HC TECH TIME PER 15 MIN (STAT)

## 2025-04-13 PROCEDURE — 27000221 HC OXYGEN, UP TO 24 HOURS

## 2025-04-13 PROCEDURE — 94640 AIRWAY INHALATION TREATMENT: CPT

## 2025-04-13 PROCEDURE — 25000242 PHARM REV CODE 250 ALT 637 W/ HCPCS: Performed by: INTERNAL MEDICINE

## 2025-04-13 PROCEDURE — 80053 COMPREHEN METABOLIC PANEL: CPT | Performed by: INTERNAL MEDICINE

## 2025-04-13 PROCEDURE — 25000003 PHARM REV CODE 250: Performed by: NURSE PRACTITIONER

## 2025-04-13 PROCEDURE — 85025 COMPLETE CBC W/AUTO DIFF WBC: CPT | Performed by: INTERNAL MEDICINE

## 2025-04-13 PROCEDURE — 25000003 PHARM REV CODE 250: Performed by: INTERNAL MEDICINE

## 2025-04-13 PROCEDURE — 25000003 PHARM REV CODE 250: Performed by: PSYCHIATRY & NEUROLOGY

## 2025-04-13 PROCEDURE — 63600175 PHARM REV CODE 636 W HCPCS: Performed by: INTERNAL MEDICINE

## 2025-04-13 PROCEDURE — 11000001 HC ACUTE MED/SURG PRIVATE ROOM

## 2025-04-13 PROCEDURE — 83735 ASSAY OF MAGNESIUM: CPT | Performed by: INTERNAL MEDICINE

## 2025-04-13 PROCEDURE — 94761 N-INVAS EAR/PLS OXIMETRY MLT: CPT

## 2025-04-13 PROCEDURE — 99900031 HC PATIENT EDUCATION (STAT)

## 2025-04-13 RX ADMIN — ALUMINUM HYDROXIDE, MAGNESIUM HYDROXIDE, AND DIMETHICONE 30 ML: 200; 20; 200 SUSPENSION ORAL at 10:04

## 2025-04-13 RX ADMIN — CARVEDILOL 25 MG: 12.5 TABLET, FILM COATED ORAL at 04:04

## 2025-04-13 RX ADMIN — ARFORMOTEROL TARTRATE 15 MCG: 15 SOLUTION RESPIRATORY (INHALATION) at 07:04

## 2025-04-13 RX ADMIN — SUCRALFATE 1 G: 1 SUSPENSION ORAL at 06:04

## 2025-04-13 RX ADMIN — NYSTATIN AND TRIAMCINOLONE ACETONIDE: 100000; 1 CREAM TOPICAL at 09:04

## 2025-04-13 RX ADMIN — MIRTAZAPINE 7.5 MG: 7.5 TABLET ORAL at 10:04

## 2025-04-13 RX ADMIN — PREDNISONE 20 MG: 20 TABLET ORAL at 09:04

## 2025-04-13 RX ADMIN — Medication 300 ML: at 01:04

## 2025-04-13 RX ADMIN — ARFORMOTEROL TARTRATE 15 MCG: 15 SOLUTION RESPIRATORY (INHALATION) at 08:04

## 2025-04-13 RX ADMIN — Medication 300 ML: at 09:04

## 2025-04-13 RX ADMIN — IPRATROPIUM BROMIDE 0.5 MG: 0.5 SOLUTION RESPIRATORY (INHALATION) at 01:04

## 2025-04-13 RX ADMIN — CARVEDILOL 25 MG: 12.5 TABLET, FILM COATED ORAL at 08:04

## 2025-04-13 RX ADMIN — ALUMINUM HYDROXIDE, MAGNESIUM HYDROXIDE, AND DIMETHICONE 30 ML: 200; 20; 200 SUSPENSION ORAL at 04:04

## 2025-04-13 RX ADMIN — HYDROCODONE BITARTRATE AND ACETAMINOPHEN 1 TABLET: 5; 325 TABLET ORAL at 09:04

## 2025-04-13 RX ADMIN — HYDROCODONE BITARTRATE AND ACETAMINOPHEN 1 TABLET: 5; 325 TABLET ORAL at 04:04

## 2025-04-13 RX ADMIN — LISINOPRIL 40 MG: 20 TABLET ORAL at 09:04

## 2025-04-13 RX ADMIN — Medication 300 ML: at 10:04

## 2025-04-13 RX ADMIN — ALUMINUM HYDROXIDE, MAGNESIUM HYDROXIDE, AND DIMETHICONE 30 ML: 200; 20; 200 SUSPENSION ORAL at 11:04

## 2025-04-13 RX ADMIN — IPRATROPIUM BROMIDE 0.5 MG: 0.5 SOLUTION RESPIRATORY (INHALATION) at 07:04

## 2025-04-13 RX ADMIN — OXYCODONE HYDROCHLORIDE AND ACETAMINOPHEN 500 MG: 500 TABLET ORAL at 09:04

## 2025-04-13 RX ADMIN — Medication 1000 UNITS: at 09:04

## 2025-04-13 RX ADMIN — MICONAZOLE NITRATE: 20 POWDER TOPICAL at 09:04

## 2025-04-13 RX ADMIN — CYANOCOBALAMIN TAB 1000 MCG 1000 MCG: 1000 TAB at 09:04

## 2025-04-13 RX ADMIN — MICONAZOLE NITRATE: 20 POWDER TOPICAL at 10:04

## 2025-04-13 RX ADMIN — FLUOXETINE HYDROCHLORIDE 40 MG: 20 CAPSULE ORAL at 09:04

## 2025-04-13 RX ADMIN — BUDESONIDE 0.5 MG: 0.5 INHALANT RESPIRATORY (INHALATION) at 07:04

## 2025-04-13 RX ADMIN — Medication 300 ML: at 05:04

## 2025-04-13 RX ADMIN — PANTOPRAZOLE SODIUM 40 MG: 40 TABLET, DELAYED RELEASE ORAL at 09:04

## 2025-04-13 RX ADMIN — SUCRALFATE 1 G: 1 SUSPENSION ORAL at 11:04

## 2025-04-13 RX ADMIN — BUDESONIDE 0.5 MG: 0.5 INHALANT RESPIRATORY (INHALATION) at 08:04

## 2025-04-13 RX ADMIN — IPRATROPIUM BROMIDE 0.5 MG: 0.5 SOLUTION RESPIRATORY (INHALATION) at 08:04

## 2025-04-13 RX ADMIN — MEGESTROL ACETATE 200 MG: 400 SUSPENSION ORAL at 09:04

## 2025-04-13 NOTE — PLAN OF CARE
04/13/25 1439   Medicare Message   Important Message from Medicare regarding Discharge Appeal Rights Given to patient/caregiver;Explained to patient/caregiver;Signed/date by patient/caregiver   Date IMM was signed 04/13/25   Time IMM was signed 1400

## 2025-04-13 NOTE — ASSESSMENT & PLAN NOTE
Hyponatremia is likely due to Dehydration/hypovolemia. The patient's most recent sodium results are listed below.  Recent Labs     04/12/25  1139 04/13/25  0402   * 129*       Plan  - Correct the sodium by 4-6mEq in 24 hours.  4/12 IV fluids on board

## 2025-04-13 NOTE — ASSESSMENT & PLAN NOTE
Patient has persistent depression which is severe and is currently uncontrolled. Will Continue anti-depressant medications. We will not consult psychiatry at this time. Patient does not display psychosis at this time. Continue to monitor closely and adjust plan of care as needed.    4/9 FM:  Improved, more talkative, awake and alert.  4/11 FM:  Improved, smiling during exam.  4/12 patient was more withdrawn today compared to previous encounter with patient   4/13 stable, patient on Prozac, Remeron nightly, no suicide ideation reported, continue to monitor

## 2025-04-13 NOTE — ASSESSMENT & PLAN NOTE
Patient has Abnormal Magnesium: hypomagnesemia. Will continue to monitor electrolytes closely. Will replace the affected electrolytes and repeat labs to be done after interventions completed. The patient's magnesium results have been reviewed and are listed below.  Recent Labs   Lab 04/13/25  0402   MG 2.0

## 2025-04-13 NOTE — PROGRESS NOTES
"[facility] Medicine  Progress Note    Patient Name: Ruth Gamboa  MRN: [id number]  Patient Class: IP- Inpatient   Admission Date: 3/31/2025  Length of Stay: 12 days  Attending Physician: Clark Calix III, MD  Primary Care Provider: Clark Calix III, MD        Subjective     Principal Problem:UTI (urinary tract infection)        HPI:  ED HPI:  80-year-old female with significant history hypertension, hypothyroidism, COPD and depression who reports to the emergency room for evaluation of failure to thrive picture. Patient's family reports little to no activity and patient's appetite has decreased. Increased weakness and fatigue. Family and pt has discussed with PCP and pending nursing home/SNF placement as requested per pt. Recent changes in depression medications and pt reports just "not hungry". No abdominal pain or other acute complaints.     IM HPI:  Patient is well known to us and had a recent prolonged hospitalization for multiple electrolyte abnormalities weight loss atypical pneumonia COPD and wounds who during that admission had a stabilization of her condition and the family attempted to take her home and care for her.  It has been a challenge and ultimately the patient stopped eating had no appetite and she declined again.  The patient and family were in the process of reaching out to local nursing facilities and getting authorization for admission when she had a further decline with tachycardia and other symptoms of dehydration and weakness.  I have evaluated the patient with family at bedside this morning she is coughing and co anorexia.    Overview/Hospital Course:  4/2 FM:  Patient's family at bedside, today patient is coughing and having difficulty with her breakfast.  Family states no trouble with liquids but mainly with solids and chewing.  I have done a head neck exam today and do not see any gross abnormalities.  We will have speech evaluate and do MBS.  Patient's family " states she is able to take supplements at home we will order ensure.  Medications reconciled case management consulted family would like skilled nursing placement.  4/3 FM:  Patient had improved p.o. intake and has stabilized this morning.  Patient's testing noted and reviewed and I have spoken with physical therapy and speech therapy.  Speech therapy states that as long as patient's positioning is correct with eating she has no aspiration.  She also needs bite sized and a slightly modified diet.  Patient's medications all reviewed patient has seen Psychiatry notes and recommendations noted.  Awaiting skilled nursing placement.  4/4 FM:  Upon entering the room patient was lying supine attempting to drink coffee.  I have re-educated her the importance of sitting upright and aspiration and choking precautions.  Patient's family present all questions answered awaiting skilled nursing home placement and approval via the state and her insurance company.  4/5 AA, weekend crosscover, UTI, antibiotics on board, urine culture with Klebsiella oxytocia, Rocephin on board, white count trending down, afebrile, waiting on nursing home placement  4/6 AA, weekend crosscover, no acute events overnight reported, noted fluctuation blood pressure, meds adjusted, renal function stable, white count slight increased from yesterday, afebrile, continue to monitor  4/7 FM:  Patient has been drowsy somnolent and difficult to arise at times during the day per family.  We will decrease the dosing of her mirtazapine and Megace.  Otherwise we are awaiting on state approval patient states her mood is improved no changes in other medications today.  Labs noted and reviewed.  4/8 FM:  Patient is awake alert this morning and ate breakfast with family at bedside.  Labs noted and reviewed.  Awaiting on states psychiatric determination before patient could be moved.  We will stop drawing daily labs as we wait.  4/9 FM:  Patient has developed a annular  nodular rash and the palms of the hands suprapubic and inguinal area and upper buttocks.  Unsure if this is a drug eruption versus other.  We will check RPR (no recent intercourse or known exposure) and RADHA.  Patient's appetite is improved she is tolerating supplements and this morning is awake and alert.  We are awaiting state approval for skilled nursing home transfer.  4/10 FM:  Patient's daughter is at bedside, patient's mood seems to be improved she is smiling some today.  Patient is ambulating and walking with therapy.  Discussed that we are still waiting on state approval.  4/11 FM:  Continues to work with therapy, ate 100% of bfast this am, waiting on state approval.  4/12 AA, weekend crosscover, patient on prednisone therapy, ARDHA positive, positive anti Sm/RNP antibody, mild leukocytosis, likely related to prednisone therapy, H&H stable, continue PT OT efforts  4/13 AA, weekend crosscover, continue prednisone therapy, H&H stable, white count now within normal limits, afebrile, hyponatremia with small improvement, decreased p.o. intake reported, gentle hydration    Interval History: Patient seen and examined.    Review of Systems   Constitutional:  Positive for activity change and fatigue. Negative for chills and fever.   HENT:  Negative for ear pain, mouth sores, nosebleeds and sore throat.    Eyes:  Negative for visual disturbance.   Respiratory:  Negative for cough, shortness of breath and wheezing.    Cardiovascular:  Negative for chest pain, palpitations and leg swelling.   Gastrointestinal:  Negative for abdominal distention, abdominal pain, blood in stool, constipation, diarrhea, nausea and vomiting.   Endocrine: Negative for polyphagia.   Genitourinary:  Negative for dysuria, flank pain and frequency.   Musculoskeletal:  Positive for arthralgias, gait problem and myalgias.   Skin:  Positive for rash.   Neurological:  Positive for weakness. Negative for seizures, syncope, facial asymmetry, speech  difficulty and headaches.   Hematological:  Negative for adenopathy.   Psychiatric/Behavioral:  Negative for agitation and hallucinations. The patient is nervous/anxious.      Objective:     Vital Signs (Most Recent):  Temp: 98.6 °F (37 °C) (04/13/25 1121)  Pulse: 84 (04/13/25 1121)  Resp: 18 (04/13/25 1121)  BP: (!) 126/57 (04/13/25 1121)  SpO2: (!) 92 % (04/13/25 1121) Vital Signs (24h Range):  Temp:  [98.4 °F (36.9 °C)-98.6 °F (37 °C)] 98.6 °F (37 °C)  Pulse:  [77-92] 84  Resp:  [16-22] 18  SpO2:  [92 %-99 %] 92 %  BP: ()/(51-62) 126/57     Weight: 40.6 kg (89 lb 8.1 oz)  Body mass index is 15.86 kg/m².    Intake/Output Summary (Last 24 hours) at 4/13/2025 1149  Last data filed at 4/13/2025 0918  Gross per 24 hour   Intake 1351.82 ml   Output --   Net 1351.82 ml         Physical Exam  Vitals and nursing note reviewed.   Constitutional:       General: She is not in acute distress.     Appearance: She is ill-appearing. She is not toxic-appearing.   HENT:      Head: Atraumatic.      Comments: Temporal wasting     Nose: No congestion or rhinorrhea.      Mouth/Throat:      Mouth: Mucous membranes are moist.      Pharynx: No oropharyngeal exudate or posterior oropharyngeal erythema.   Eyes:      General: No scleral icterus.     Extraocular Movements: Extraocular movements intact.   Neck:      Vascular: No carotid bruit.   Cardiovascular:      Rate and Rhythm: Normal rate and regular rhythm.      Heart sounds: No murmur heard.  Pulmonary:      Effort: No respiratory distress.      Breath sounds: Rhonchi present. No wheezing or rales.   Abdominal:      General: There is no distension.      Palpations: Abdomen is soft.      Tenderness: There is no abdominal tenderness. There is no guarding or rebound.   Musculoskeletal:         General: No swelling or tenderness.      Cervical back: No rigidity.      Right lower leg: No edema.      Left lower leg: No edema.      Comments: Thin, malnurished   Lymphadenopathy:       Cervical: No cervical adenopathy.   Skin:     Capillary Refill: Capillary refill takes less than 2 seconds.      Coloration: Skin is not jaundiced or pale.      Comments: + bilateral palms of hands, inguinal area, upper buttoks, round/nodule/rash, 5mm diameter, scattered   Neurological:      Mental Status: She is alert.      Motor: Weakness present.   Psychiatric:      Comments: Appears withdrawn today               Significant Labs: All pertinent labs within the past 24 hours have been reviewed.  Recent Lab Results         04/13/25  0402        Albumin 2.0       ALP 76       ALT 8       Anion Gap 5       AST 25       Baso # 0.05       Basophil % 0.4       BILIRUBIN TOTAL 0.2  Comment: For infants and newborns, interpretation of results should be based   on gestational age, weight and in agreement with clinical   observations.    Premature Infant recommended reference ranges:   0-24 hours:  <8.0 mg/dL   24-48 hours: <12.0 mg/dL   3-5 days:    <15.0 mg/dL   6-29 days:   <15.0 mg/dL       BUN 25       Calcium 8.6       Chloride 98       CO2 26       Creatinine 0.7       eGFR >60  Comment: Estimated GFR calculated using the CKD-EPI creatinine (2021) equation.       Eos # 0.08       Eos % 0.6       Glucose 117       Gran # (ANC) 8.87       Hematocrit 27.2       Hemoglobin 8.5       Immature Grans (Abs) 0.11  Comment: Mild elevation in immature granulocytes is non specific and can be seen in a variety of conditions including stress response, acute inflammation, trauma and pregnancy. Correlation with other laboratory and clinical findings is essential.       Immature Granulocytes 0.9       Lymph # 2.21       Lymph % 17.8       Magnesium  2.0       MCH 27.2       MCHC 31.3       MCV 87       Mono # 1.08       Mono % 8.7       MPV 10.1       Neut % 71.6       nRBC 0       Platelet Count 407       Potassium 4.5       PROTEIN TOTAL 5.7       RBC 3.13       RDW 19.7       Sodium 129       WBC 12.40               Significant  Imaging: I have reviewed all pertinent imaging results/findings within the past 24 hours.      Assessment & Plan  COPD (chronic obstructive pulmonary disease)  Patient's COPD is controlled currently.  Gastroesophageal reflux disease without esophagitis  Protonix    Atherosclerosis of aorta      HTN (hypertension)  Patient's blood pressure range in the last 24 hours was: BP  Min: 99/51  Max: 126/57.The patient's inpatient anti-hypertensive regimen is listed below:  Current Antihypertensives  carvediloL tablet 25 mg, 2 times daily with meals, Oral  lisinopriL tablet 40 mg, Daily, Oral    Plan  - BP is uncontrolled, will adjust as follows:  Lisinopril increased  Monitor pressure. Resume home meds. Adjust PRN  Tobacco abuse  Continue to .    Major depressive disorder, recurrent, moderate  Patient has persistent depression which is severe and is currently uncontrolled. Will Continue anti-depressant medications. We will not consult psychiatry at this time. Patient does not display psychosis at this time. Continue to monitor closely and adjust plan of care as needed.    4/9 FM:  Improved, more talkative, awake and alert.  4/11 FM:  Improved, smiling during exam.  4/12 patient was more withdrawn today compared to previous encounter with patient   4/13 stable, patient on Prozac, Remeron nightly, no suicide ideation reported, continue to monitor      Anorexia  Patient is on Megace therapy    4/9 FM:  Cont Megace, mirtazapine, supplements.  4/11 FM:  Continues to improve with current txt.  Megace on board, monitor intake and weight   Weight loss, unintentional    Supplements, megace.    4/4 FM:  PO intake improved with txt.  Hypothyroidism  Cont TRH.    Hypomagnesemia (Resolved: 4/13/2025)  Patient has Abnormal Magnesium: hypomagnesemia. Will continue to monitor electrolytes closely. Will replace the affected electrolytes and repeat labs to be done after interventions completed. The patient's magnesium results have been  reviewed and are listed below.  Recent Labs   Lab 04/13/25  0402   MG 2.0        Hypokalemia (Resolved: 4/13/2025)  Patient's most recent potassium results are listed below.   Recent Labs     04/12/25  1139 04/13/25  0402   K 4.2 4.5       Plan  - Replete potassium per protocol  - Monitor potassium Daily  - Patient's hypokalemia is improving  Hyponatremia  Hyponatremia is likely due to Dehydration/hypovolemia. The patient's most recent sodium results are listed below.  Recent Labs     04/12/25  1139 04/13/25  0402   * 129*       Plan  - Correct the sodium by 4-6mEq in 24 hours.  4/12 IV fluids on board  Failure to thrive in adult  AS above.    Severe malnutrition  Nutrition consulted. Most recent weight and BMI monitored-     Measurements:  Wt Readings from Last 1 Encounters:   04/01/25 40.6 kg (89 lb 8.1 oz)   Body mass index is 15.86 kg/m².    Patient has been screened and assessed by RD.    Malnutrition Type:  Context:    Level:      Malnutrition Characteristic Summary:       Interventions/Recommendations (treatment strategy):  1. Continue regular diet as tolerated.     2. Ensure Enlive ONS or alternative TID.    3. Rec'd Moshe BID to promote wound healing and to provide additional nutrtiion.   4. Rec'd Beneprotein TID. 5. RD to monitor and follow up.    4/4 FM:  PO intake improved with txt.  4/9 FM:  Currently on multiple appetite stimulants and her oral intake is improved.  Patient's tolerating supplements and continue to encourage.  Likely related to depression which is also improved.  4/10 FM:  Dietary notes reviewed, agree with plan.  Dietary on board, follow-up with rec  UTI (urinary tract infection)  Abx, CS pending.  4/5 urine culture with Klebsiella oxytocia, Rocephin on board, continue antibiotics white count trending down, afebrile  4/6 continue Rocephin, white count elevated, afebrile, continue IV antibiotics, fluids  4/7 FM:  Finishing abx next few days.  4/8 FM:  Completing abx.  4/9 FM:  Has  completed abx, afebrile, no pain, wbc wnl.  Dysphagia  ST to evaluate  4/8 FM:  ST has cleared patient, must stay upright when eating.  4/9 FM:  Improved  Rash  4/9 FM:  ? Drug eruption, now off abx, check VDRL, RADHA.  Granuloma annulare.  4/10 FM:  Improved on steroids.    VTE Risk Mitigation (From admission, onward)           Ordered     IP VTE HIGH RISK PATIENT  Once         03/31/25 2041     Place sequential compression device  Until discontinued         03/31/25 2041                    Discharge Planning   ORALIA:      Code Status: DNR   Medical Readiness for Discharge Date:   Discharge Plan A: Skilled Nursing Facility                Please place Justification for DME        Federico Chen MD  Department of Hospital Medicine   [facility]

## 2025-04-13 NOTE — SUBJECTIVE & OBJECTIVE
Interval History: Patient seen and examined.    Review of Systems   Constitutional:  Positive for activity change and fatigue. Negative for chills and fever.   HENT:  Negative for ear pain, mouth sores, nosebleeds and sore throat.    Eyes:  Negative for visual disturbance.   Respiratory:  Negative for cough, shortness of breath and wheezing.    Cardiovascular:  Negative for chest pain, palpitations and leg swelling.   Gastrointestinal:  Negative for abdominal distention, abdominal pain, blood in stool, constipation, diarrhea, nausea and vomiting.   Endocrine: Negative for polyphagia.   Genitourinary:  Negative for dysuria, flank pain and frequency.   Musculoskeletal:  Positive for arthralgias, gait problem and myalgias.   Skin:  Positive for rash.   Neurological:  Positive for weakness. Negative for seizures, syncope, facial asymmetry, speech difficulty and headaches.   Hematological:  Negative for adenopathy.   Psychiatric/Behavioral:  Negative for agitation and hallucinations. The patient is nervous/anxious.      Objective:     Vital Signs (Most Recent):  Temp: 98.6 °F (37 °C) (04/13/25 1121)  Pulse: 84 (04/13/25 1121)  Resp: 18 (04/13/25 1121)  BP: (!) 126/57 (04/13/25 1121)  SpO2: (!) 92 % (04/13/25 1121) Vital Signs (24h Range):  Temp:  [98.4 °F (36.9 °C)-98.6 °F (37 °C)] 98.6 °F (37 °C)  Pulse:  [77-92] 84  Resp:  [16-22] 18  SpO2:  [92 %-99 %] 92 %  BP: ()/(51-62) 126/57     Weight: 40.6 kg (89 lb 8.1 oz)  Body mass index is 15.86 kg/m².    Intake/Output Summary (Last 24 hours) at 4/13/2025 1149  Last data filed at 4/13/2025 0918  Gross per 24 hour   Intake 1351.82 ml   Output --   Net 1351.82 ml         Physical Exam  Vitals and nursing note reviewed.   Constitutional:       General: She is not in acute distress.     Appearance: She is ill-appearing. She is not toxic-appearing.   HENT:      Head: Atraumatic.      Comments: Temporal wasting     Nose: No congestion or rhinorrhea.      Mouth/Throat:       Mouth: Mucous membranes are moist.      Pharynx: No oropharyngeal exudate or posterior oropharyngeal erythema.   Eyes:      General: No scleral icterus.     Extraocular Movements: Extraocular movements intact.   Neck:      Vascular: No carotid bruit.   Cardiovascular:      Rate and Rhythm: Normal rate and regular rhythm.      Heart sounds: No murmur heard.  Pulmonary:      Effort: No respiratory distress.      Breath sounds: Rhonchi present. No wheezing or rales.   Abdominal:      General: There is no distension.      Palpations: Abdomen is soft.      Tenderness: There is no abdominal tenderness. There is no guarding or rebound.   Musculoskeletal:         General: No swelling or tenderness.      Cervical back: No rigidity.      Right lower leg: No edema.      Left lower leg: No edema.      Comments: Thin, malnurished   Lymphadenopathy:      Cervical: No cervical adenopathy.   Skin:     Capillary Refill: Capillary refill takes less than 2 seconds.      Coloration: Skin is not jaundiced or pale.      Comments: + bilateral palms of hands, inguinal area, upper buttoks, round/nodule/rash, 5mm diameter, scattered   Neurological:      Mental Status: She is alert.      Motor: Weakness present.   Psychiatric:      Comments: Appears withdrawn today               Significant Labs: All pertinent labs within the past 24 hours have been reviewed.  Recent Lab Results         04/13/25  0402        Albumin 2.0       ALP 76       ALT 8       Anion Gap 5       AST 25       Baso # 0.05       Basophil % 0.4       BILIRUBIN TOTAL 0.2  Comment: For infants and newborns, interpretation of results should be based   on gestational age, weight and in agreement with clinical   observations.    Premature Infant recommended reference ranges:   0-24 hours:  <8.0 mg/dL   24-48 hours: <12.0 mg/dL   3-5 days:    <15.0 mg/dL   6-29 days:   <15.0 mg/dL       BUN 25       Calcium 8.6       Chloride 98       CO2 26       Creatinine 0.7       eGFR  >60  Comment: Estimated GFR calculated using the CKD-EPI creatinine (2021) equation.       Eos # 0.08       Eos % 0.6       Glucose 117       Gran # (ANC) 8.87       Hematocrit 27.2       Hemoglobin 8.5       Immature Grans (Abs) 0.11  Comment: Mild elevation in immature granulocytes is non specific and can be seen in a variety of conditions including stress response, acute inflammation, trauma and pregnancy. Correlation with other laboratory and clinical findings is essential.       Immature Granulocytes 0.9       Lymph # 2.21       Lymph % 17.8       Magnesium  2.0       MCH 27.2       MCHC 31.3       MCV 87       Mono # 1.08       Mono % 8.7       MPV 10.1       Neut % 71.6       nRBC 0       Platelet Count 407       Potassium 4.5       PROTEIN TOTAL 5.7       RBC 3.13       RDW 19.7       Sodium 129       WBC 12.40               Significant Imaging: I have reviewed all pertinent imaging results/findings within the past 24 hours.

## 2025-04-13 NOTE — ASSESSMENT & PLAN NOTE
Patient's blood pressure range in the last 24 hours was: BP  Min: 99/51  Max: 126/57.The patient's inpatient anti-hypertensive regimen is listed below:  Current Antihypertensives  carvediloL tablet 25 mg, 2 times daily with meals, Oral  lisinopriL tablet 40 mg, Daily, Oral    Plan  - BP is uncontrolled, will adjust as follows:  Lisinopril increased  Monitor pressure. Resume home meds. Adjust PRN

## 2025-04-13 NOTE — ASSESSMENT & PLAN NOTE
Patient's most recent potassium results are listed below.   Recent Labs     04/12/25  1139 04/13/25  0402   K 4.2 4.5       Plan  - Replete potassium per protocol  - Monitor potassium Daily  - Patient's hypokalemia is improving

## 2025-04-14 LAB
ABSOLUTE EOSINOPHIL (OHS): 0.03 K/UL
ABSOLUTE MONOCYTE (OHS): 0.74 K/UL (ref 0.3–1)
ABSOLUTE NEUTROPHIL COUNT (OHS): 8.92 K/UL (ref 1.8–7.7)
ALBUMIN SERPL BCP-MCNC: 2 G/DL (ref 3.5–5.2)
ALP SERPL-CCNC: 61 UNIT/L (ref 40–150)
ALT SERPL W/O P-5'-P-CCNC: <8 UNIT/L (ref 10–44)
ANION GAP (OHS): 4 MMOL/L (ref 8–16)
AST SERPL-CCNC: 12 UNIT/L (ref 11–45)
BASOPHILS # BLD AUTO: 0.02 K/UL
BASOPHILS NFR BLD AUTO: 0.2 %
BILIRUB SERPL-MCNC: 0.2 MG/DL (ref 0.1–1)
BUN SERPL-MCNC: 21 MG/DL (ref 8–23)
CALCIUM SERPL-MCNC: 8.7 MG/DL (ref 8.7–10.5)
CHLORIDE SERPL-SCNC: 94 MMOL/L (ref 95–110)
CO2 SERPL-SCNC: 28 MMOL/L (ref 23–29)
CREAT SERPL-MCNC: 0.6 MG/DL (ref 0.5–1.4)
ERYTHROCYTE [DISTWIDTH] IN BLOOD BY AUTOMATED COUNT: 19.4 % (ref 11.5–14.5)
GFR SERPLBLD CREATININE-BSD FMLA CKD-EPI: >60 ML/MIN/1.73/M2
GLUCOSE SERPL-MCNC: 100 MG/DL (ref 70–110)
HCT VFR BLD AUTO: 26.6 % (ref 37–48.5)
HGB BLD-MCNC: 8.4 GM/DL (ref 12–16)
IMM GRANULOCYTES # BLD AUTO: 0.09 K/UL (ref 0–0.04)
IMM GRANULOCYTES NFR BLD AUTO: 0.8 % (ref 0–0.5)
LYMPHOCYTES # BLD AUTO: 1.92 K/UL (ref 1–4.8)
MAGNESIUM SERPL-MCNC: 2 MG/DL (ref 1.6–2.6)
MCH RBC QN AUTO: 27.5 PG (ref 27–31)
MCHC RBC AUTO-ENTMCNC: 31.6 G/DL (ref 32–36)
MCV RBC AUTO: 87 FL (ref 82–98)
NUCLEATED RBC (/100WBC) (OHS): 0 /100 WBC
PLATELET # BLD AUTO: 423 K/UL (ref 150–450)
PMV BLD AUTO: 10.2 FL (ref 9.2–12.9)
POTASSIUM SERPL-SCNC: 4.8 MMOL/L (ref 3.5–5.1)
PROT SERPL-MCNC: 5.6 GM/DL (ref 6–8.4)
RBC # BLD AUTO: 3.05 M/UL (ref 4–5.4)
RELATIVE EOSINOPHIL (OHS): 0.3 %
RELATIVE LYMPHOCYTE (OHS): 16.4 % (ref 18–48)
RELATIVE MONOCYTE (OHS): 6.3 % (ref 4–15)
RELATIVE NEUTROPHIL (OHS): 76 % (ref 38–73)
SODIUM SERPL-SCNC: 126 MMOL/L (ref 136–145)
WBC # BLD AUTO: 11.72 K/UL (ref 3.9–12.7)

## 2025-04-14 PROCEDURE — 11000001 HC ACUTE MED/SURG PRIVATE ROOM

## 2025-04-14 PROCEDURE — S0179 MEGESTROL 20 MG: HCPCS | Performed by: NURSE PRACTITIONER

## 2025-04-14 PROCEDURE — 25000003 PHARM REV CODE 250: Performed by: NURSE PRACTITIONER

## 2025-04-14 PROCEDURE — 80053 COMPREHEN METABOLIC PANEL: CPT | Performed by: INTERNAL MEDICINE

## 2025-04-14 PROCEDURE — 94640 AIRWAY INHALATION TREATMENT: CPT

## 2025-04-14 PROCEDURE — 99900035 HC TECH TIME PER 15 MIN (STAT)

## 2025-04-14 PROCEDURE — 63600175 PHARM REV CODE 636 W HCPCS: Performed by: INTERNAL MEDICINE

## 2025-04-14 PROCEDURE — 25000242 PHARM REV CODE 250 ALT 637 W/ HCPCS: Performed by: INTERNAL MEDICINE

## 2025-04-14 PROCEDURE — 97110 THERAPEUTIC EXERCISES: CPT

## 2025-04-14 PROCEDURE — 27000221 HC OXYGEN, UP TO 24 HOURS

## 2025-04-14 PROCEDURE — 36415 COLL VENOUS BLD VENIPUNCTURE: CPT | Performed by: INTERNAL MEDICINE

## 2025-04-14 PROCEDURE — 99900031 HC PATIENT EDUCATION (STAT)

## 2025-04-14 PROCEDURE — 25000003 PHARM REV CODE 250: Performed by: PSYCHIATRY & NEUROLOGY

## 2025-04-14 PROCEDURE — 85025 COMPLETE CBC W/AUTO DIFF WBC: CPT | Performed by: INTERNAL MEDICINE

## 2025-04-14 PROCEDURE — 25000003 PHARM REV CODE 250: Performed by: INTERNAL MEDICINE

## 2025-04-14 PROCEDURE — 94761 N-INVAS EAR/PLS OXIMETRY MLT: CPT

## 2025-04-14 PROCEDURE — 83735 ASSAY OF MAGNESIUM: CPT | Performed by: INTERNAL MEDICINE

## 2025-04-14 RX ADMIN — SUCRALFATE 1 G: 1 SUSPENSION ORAL at 05:04

## 2025-04-14 RX ADMIN — IPRATROPIUM BROMIDE 0.5 MG: 0.5 SOLUTION RESPIRATORY (INHALATION) at 06:04

## 2025-04-14 RX ADMIN — SUCRALFATE 1 G: 1 SUSPENSION ORAL at 11:04

## 2025-04-14 RX ADMIN — MEGESTROL ACETATE 200 MG: 400 SUSPENSION ORAL at 08:04

## 2025-04-14 RX ADMIN — BUDESONIDE 0.5 MG: 0.5 INHALANT RESPIRATORY (INHALATION) at 06:04

## 2025-04-14 RX ADMIN — Medication 300 ML: at 09:04

## 2025-04-14 RX ADMIN — SUCRALFATE 1 G: 1 SUSPENSION ORAL at 12:04

## 2025-04-14 RX ADMIN — PANTOPRAZOLE SODIUM 40 MG: 40 TABLET, DELAYED RELEASE ORAL at 08:04

## 2025-04-14 RX ADMIN — OXYCODONE HYDROCHLORIDE AND ACETAMINOPHEN 500 MG: 500 TABLET ORAL at 08:04

## 2025-04-14 RX ADMIN — Medication 300 ML: at 02:04

## 2025-04-14 RX ADMIN — IPRATROPIUM BROMIDE 0.5 MG: 0.5 SOLUTION RESPIRATORY (INHALATION) at 01:04

## 2025-04-14 RX ADMIN — FLUOXETINE HYDROCHLORIDE 40 MG: 20 CAPSULE ORAL at 08:04

## 2025-04-14 RX ADMIN — NYSTATIN AND TRIAMCINOLONE ACETONIDE: 100000; 1 CREAM TOPICAL at 02:04

## 2025-04-14 RX ADMIN — FERROUS SULFATE TAB 325 MG (65 MG ELEMENTAL FE) 1 EACH: 325 (65 FE) TAB at 08:04

## 2025-04-14 RX ADMIN — Medication 1000 UNITS: at 08:04

## 2025-04-14 RX ADMIN — ALUMINUM HYDROXIDE, MAGNESIUM HYDROXIDE, AND DIMETHICONE 30 ML: 200; 20; 200 SUSPENSION ORAL at 08:04

## 2025-04-14 RX ADMIN — ARFORMOTEROL TARTRATE 15 MCG: 15 SOLUTION RESPIRATORY (INHALATION) at 07:04

## 2025-04-14 RX ADMIN — MICONAZOLE NITRATE: 20 POWDER TOPICAL at 08:04

## 2025-04-14 RX ADMIN — NYSTATIN AND TRIAMCINOLONE ACETONIDE: 100000; 1 CREAM TOPICAL at 08:04

## 2025-04-14 RX ADMIN — LEVOTHYROXINE SODIUM 25 MCG: 25 TABLET ORAL at 06:04

## 2025-04-14 RX ADMIN — Medication 300 ML: at 06:04

## 2025-04-14 RX ADMIN — MIRTAZAPINE 7.5 MG: 7.5 TABLET ORAL at 08:04

## 2025-04-14 RX ADMIN — ARFORMOTEROL TARTRATE 15 MCG: 15 SOLUTION RESPIRATORY (INHALATION) at 06:04

## 2025-04-14 RX ADMIN — LISINOPRIL 40 MG: 20 TABLET ORAL at 08:04

## 2025-04-14 RX ADMIN — IPRATROPIUM BROMIDE 0.5 MG: 0.5 SOLUTION RESPIRATORY (INHALATION) at 07:04

## 2025-04-14 RX ADMIN — CYANOCOBALAMIN TAB 1000 MCG 1000 MCG: 1000 TAB at 08:04

## 2025-04-14 RX ADMIN — CARVEDILOL 25 MG: 12.5 TABLET, FILM COATED ORAL at 08:04

## 2025-04-14 RX ADMIN — ALUMINUM HYDROXIDE, MAGNESIUM HYDROXIDE, AND DIMETHICONE 30 ML: 200; 20; 200 SUSPENSION ORAL at 06:04

## 2025-04-14 RX ADMIN — PREDNISONE 20 MG: 20 TABLET ORAL at 08:04

## 2025-04-14 RX ADMIN — ALUMINUM HYDROXIDE, MAGNESIUM HYDROXIDE, AND DIMETHICONE 30 ML: 200; 20; 200 SUSPENSION ORAL at 10:04

## 2025-04-14 RX ADMIN — SUCRALFATE 1 G: 1 SUSPENSION ORAL at 06:04

## 2025-04-14 RX ADMIN — BUDESONIDE 0.5 MG: 0.5 INHALANT RESPIRATORY (INHALATION) at 07:04

## 2025-04-14 RX ADMIN — ALUMINUM HYDROXIDE, MAGNESIUM HYDROXIDE, AND DIMETHICONE 30 ML: 200; 20; 200 SUSPENSION ORAL at 04:04

## 2025-04-14 RX ADMIN — CARVEDILOL 25 MG: 12.5 TABLET, FILM COATED ORAL at 04:04

## 2025-04-14 NOTE — PT/OT/SLP PROGRESS
"Physical Therapy Treatment    Patient Name:  Ruth Gamboa   MRN:  [id number]    Recommendations:     Discharge Recommendations: Low Intensity Therapy  Discharge Equipment Recommendations: none  Barriers to discharge: None    Assessment:     Ruth Gamboa is a 80 y.o. female admitted with a medical diagnosis of UTI (urinary tract infection).  She presents with the following impairments/functional limitations: weakness, impaired endurance, impaired joint extensibility, impaired self care skills, impaired functional mobility, impaired balance, impaired cardiopulmonary response to activity,  and decreased lower extremity function.  Still awaiting nursing home placement.  Ambulated ~528 feet with RW  with 2 liters of O2 via nasal cannula with SBA, no LOB noted or SOB reported.     Rehab Prognosis: Fair; patient would benefit from acute skilled PT services to address these deficits and reach maximum level of function.    Recent Surgery: * No surgery found *      Plan:     During this hospitalization, patient to be seen 5 x/week to address the identified rehab impairments via gait training, therapeutic activities, therapeutic exercises, neuromuscular re-education and progress toward the following goals:    Plan of Care Expires:  04/18/25    Subjective     Chief Complaint: "I just want to get back in bed."   Patient/Family Comments/goals: unstated   Pain/Comfort:  Pain Rating 1: 0/10  Pain Rating Post-Intervention 1: 0/10      Objective:     Communicated with nurse, patient, grandson and daughter  prior to session.  Patient found left sidelying with peripheral IV, oxygen upon PT entry to room.     General Precautions: Standard, fall, respiratory  Orthopedic Precautions: N/A  Braces: N/A  Respiratory Status: Nasal cannula, flow 3 L/min     Functional Mobility:  Rolling Left:  independence  Rolling Right: independence  Scooting: independence  Bridging: independence  Supine to Sit: independence  Sit to Supine: " independence  Transfers:     Sit to Stand:  independence with rolling walker  Gait: ~528   feet with RW with O2 supplement with SBA.    Balance:  independent with static sitting, Set up  with static standing with RW          AM-PAC 6 CLICK MOBILITY  Turning over in bed (including adjusting bedclothes, sheets and blankets)?: 4  Sitting down on and standing up from a chair with arms (e.g., wheelchair, bedside commode, etc.): 4  Moving from lying on back to sitting on the side of the bed?: 4  Moving to and from a bed to a chair (including a wheelchair)?: 4  Need to walk in hospital room?: 3  Climbing 3-5 steps with a railing?: 3 (based on clinical judgement)  Basic Mobility Total Score: 22       Treatment & Education:  Rolling side <> side  Supine <> sit  Scooting to the edge  of the bed   Sitting balance/tolerance  Sit <> stand with RW   Standing balance/tolerance   Gait with RW   Bed organization to reduce risk of skin breakdowns     Patient left HOB elevated with all lines intact, call button in reach, nurse  notified, and PCT and grandson  present..    GOALS:   Multidisciplinary Problems       Physical Therapy Goals          Problem: Physical Therapy    Goal Priority Disciplines Outcome Interventions   Physical Therapy Goal     PT, PT/OT Progressing    Description: Goals to be met by 2025   Patient will increase functional independence with mobility by performin. Bed to chair transfer with Modified Independent with or without rolling walker using Stand step TECHNIQUE (on going dated 4/10/2025)   2. Gait  x 350  feet with Supervision or Set-up Assistance with or without rolling walker (on going dated 4/10/2025)   3. Lower extremity exercise program x10 reps with assistance as needed.  (on going dated 4/10/2025)                          DME Justifications:  No DME recommended requiring DME justifications    Time Tracking:     PT Received On: 25  PT Start Time: 0841     PT Stop Time: 0900  PT Total  Time (min): 19 min     Billable Minutes: Therapeutic Exercise 19    Treatment Type: Treatment  PT/PTA: PT     Number of PTA visits since last PT visit: 0     04/14/2025

## 2025-04-15 VITALS
WEIGHT: 89.5 LBS | RESPIRATION RATE: 20 BRPM | SYSTOLIC BLOOD PRESSURE: 122 MMHG | OXYGEN SATURATION: 98 % | TEMPERATURE: 98 F | HEART RATE: 77 BPM | BODY MASS INDEX: 15.86 KG/M2 | HEIGHT: 63 IN | DIASTOLIC BLOOD PRESSURE: 63 MMHG

## 2025-04-15 LAB
ABSOLUTE EOSINOPHIL (OHS): 0 K/UL
ABSOLUTE MONOCYTE (OHS): 0.79 K/UL (ref 0.3–1)
ABSOLUTE NEUTROPHIL COUNT (OHS): 12.76 K/UL (ref 1.8–7.7)
ALBUMIN SERPL BCP-MCNC: 2.1 G/DL (ref 3.5–5.2)
ALP SERPL-CCNC: 71 UNIT/L (ref 40–150)
ALT SERPL W/O P-5'-P-CCNC: <8 UNIT/L (ref 10–44)
ANION GAP (OHS): 6 MMOL/L (ref 8–16)
AST SERPL-CCNC: 7 UNIT/L (ref 11–45)
BASOPHILS # BLD AUTO: 0.02 K/UL
BASOPHILS NFR BLD AUTO: 0.1 %
BILIRUB SERPL-MCNC: 0.1 MG/DL (ref 0.1–1)
BUN SERPL-MCNC: 19 MG/DL (ref 8–23)
CALCIUM SERPL-MCNC: 8.7 MG/DL (ref 8.7–10.5)
CHLORIDE SERPL-SCNC: 94 MMOL/L (ref 95–110)
CO2 SERPL-SCNC: 28 MMOL/L (ref 23–29)
CREAT SERPL-MCNC: 0.6 MG/DL (ref 0.5–1.4)
ERYTHROCYTE [DISTWIDTH] IN BLOOD BY AUTOMATED COUNT: 19.8 % (ref 11.5–14.5)
GFR SERPLBLD CREATININE-BSD FMLA CKD-EPI: >60 ML/MIN/1.73/M2
GLUCOSE SERPL-MCNC: 173 MG/DL (ref 70–110)
HCT VFR BLD AUTO: 28 % (ref 37–48.5)
HGB BLD-MCNC: 8.8 GM/DL (ref 12–16)
IMM GRANULOCYTES # BLD AUTO: 0.12 K/UL (ref 0–0.04)
IMM GRANULOCYTES NFR BLD AUTO: 0.8 % (ref 0–0.5)
LYMPHOCYTES # BLD AUTO: 1.41 K/UL (ref 1–4.8)
MAGNESIUM SERPL-MCNC: 1.9 MG/DL (ref 1.6–2.6)
MCH RBC QN AUTO: 27.2 PG (ref 27–31)
MCHC RBC AUTO-ENTMCNC: 31.4 G/DL (ref 32–36)
MCV RBC AUTO: 87 FL (ref 82–98)
NUCLEATED RBC (/100WBC) (OHS): 0 /100 WBC
PLATELET # BLD AUTO: 419 K/UL (ref 150–450)
PMV BLD AUTO: 10.2 FL (ref 9.2–12.9)
POTASSIUM SERPL-SCNC: 4.5 MMOL/L (ref 3.5–5.1)
PROT SERPL-MCNC: 5.8 GM/DL (ref 6–8.4)
RBC # BLD AUTO: 3.23 M/UL (ref 4–5.4)
RELATIVE EOSINOPHIL (OHS): 0 %
RELATIVE LYMPHOCYTE (OHS): 9.3 % (ref 18–48)
RELATIVE MONOCYTE (OHS): 5.2 % (ref 4–15)
RELATIVE NEUTROPHIL (OHS): 84.6 % (ref 38–73)
SARS-COV-2 RNA RESP QL NAA+PROBE: NEGATIVE
SODIUM SERPL-SCNC: 128 MMOL/L (ref 136–145)
WBC # BLD AUTO: 15.1 K/UL (ref 3.9–12.7)

## 2025-04-15 PROCEDURE — 94640 AIRWAY INHALATION TREATMENT: CPT

## 2025-04-15 PROCEDURE — 25000242 PHARM REV CODE 250 ALT 637 W/ HCPCS: Performed by: INTERNAL MEDICINE

## 2025-04-15 PROCEDURE — 25000003 PHARM REV CODE 250: Performed by: PSYCHIATRY & NEUROLOGY

## 2025-04-15 PROCEDURE — S0179 MEGESTROL 20 MG: HCPCS | Performed by: NURSE PRACTITIONER

## 2025-04-15 PROCEDURE — 25000003 PHARM REV CODE 250: Performed by: NURSE PRACTITIONER

## 2025-04-15 PROCEDURE — 80053 COMPREHEN METABOLIC PANEL: CPT | Performed by: INTERNAL MEDICINE

## 2025-04-15 PROCEDURE — 87635 SARS-COV-2 COVID-19 AMP PRB: CPT | Performed by: INTERNAL MEDICINE

## 2025-04-15 PROCEDURE — 63600175 PHARM REV CODE 636 W HCPCS: Performed by: INTERNAL MEDICINE

## 2025-04-15 PROCEDURE — 27000221 HC OXYGEN, UP TO 24 HOURS

## 2025-04-15 PROCEDURE — 85025 COMPLETE CBC W/AUTO DIFF WBC: CPT | Performed by: INTERNAL MEDICINE

## 2025-04-15 PROCEDURE — 86580 TB INTRADERMAL TEST: CPT | Performed by: INTERNAL MEDICINE

## 2025-04-15 PROCEDURE — 94761 N-INVAS EAR/PLS OXIMETRY MLT: CPT

## 2025-04-15 PROCEDURE — 99900031 HC PATIENT EDUCATION (STAT)

## 2025-04-15 PROCEDURE — 36415 COLL VENOUS BLD VENIPUNCTURE: CPT | Performed by: INTERNAL MEDICINE

## 2025-04-15 PROCEDURE — 25000003 PHARM REV CODE 250: Performed by: INTERNAL MEDICINE

## 2025-04-15 PROCEDURE — 30200315 PPD INTRADERMAL TEST REV CODE 302: Performed by: INTERNAL MEDICINE

## 2025-04-15 PROCEDURE — 83735 ASSAY OF MAGNESIUM: CPT | Performed by: INTERNAL MEDICINE

## 2025-04-15 PROCEDURE — 99900035 HC TECH TIME PER 15 MIN (STAT)

## 2025-04-15 RX ORDER — FLUOXETINE HYDROCHLORIDE 40 MG/1
40 CAPSULE ORAL DAILY
Start: 2025-04-16 | End: 2026-04-16

## 2025-04-15 RX ORDER — ELECTROLYTES/DEXTROSE
300 SOLUTION, ORAL ORAL 2 TIMES DAILY
Start: 2025-04-15 | End: 2026-04-10

## 2025-04-15 RX ORDER — FERROUS SULFATE 325(65) MG
325 TABLET ORAL EVERY OTHER DAY
Start: 2025-04-15

## 2025-04-15 RX ORDER — ARFORMOTEROL TARTRATE 15 UG/2ML
15 SOLUTION RESPIRATORY (INHALATION) 2 TIMES DAILY
Start: 2025-04-15 | End: 2026-04-15

## 2025-04-15 RX ORDER — ACETAMINOPHEN 325 MG/1
650 TABLET ORAL EVERY 8 HOURS PRN
Start: 2025-04-15

## 2025-04-15 RX ORDER — PREDNISONE 10 MG/1
TABLET ORAL
Start: 2025-04-16 | End: 2025-05-28

## 2025-04-15 RX ORDER — HYDROCODONE BITARTRATE AND ACETAMINOPHEN 5; 325 MG/1; MG/1
1 TABLET ORAL EVERY 8 HOURS PRN
Qty: 60 TABLET | Refills: 0 | Status: SHIPPED | OUTPATIENT
Start: 2025-04-15

## 2025-04-15 RX ORDER — CHOLECALCIFEROL (VITAMIN D3) 25 MCG
1 TABLET,CHEWABLE ORAL EVERY OTHER DAY
Start: 2025-04-15

## 2025-04-15 RX ORDER — MIRTAZAPINE 7.5 MG/1
7.5 TABLET, FILM COATED ORAL NIGHTLY
Start: 2025-04-15 | End: 2026-04-15

## 2025-04-15 RX ADMIN — ARFORMOTEROL TARTRATE 15 MCG: 15 SOLUTION RESPIRATORY (INHALATION) at 07:04

## 2025-04-15 RX ADMIN — CARVEDILOL 25 MG: 12.5 TABLET, FILM COATED ORAL at 05:04

## 2025-04-15 RX ADMIN — OXYCODONE HYDROCHLORIDE AND ACETAMINOPHEN 500 MG: 500 TABLET ORAL at 08:04

## 2025-04-15 RX ADMIN — LEVOTHYROXINE SODIUM 25 MCG: 25 TABLET ORAL at 05:04

## 2025-04-15 RX ADMIN — IPRATROPIUM BROMIDE 0.5 MG: 0.5 SOLUTION RESPIRATORY (INHALATION) at 07:04

## 2025-04-15 RX ADMIN — CARVEDILOL 25 MG: 12.5 TABLET, FILM COATED ORAL at 08:04

## 2025-04-15 RX ADMIN — PANTOPRAZOLE SODIUM 40 MG: 40 TABLET, DELAYED RELEASE ORAL at 08:04

## 2025-04-15 RX ADMIN — ALUMINUM HYDROXIDE, MAGNESIUM HYDROXIDE, AND DIMETHICONE 30 ML: 200; 20; 200 SUSPENSION ORAL at 11:04

## 2025-04-15 RX ADMIN — MEGESTROL ACETATE 200 MG: 400 SUSPENSION ORAL at 08:04

## 2025-04-15 RX ADMIN — Medication 1000 UNITS: at 08:04

## 2025-04-15 RX ADMIN — FLUOXETINE HYDROCHLORIDE 40 MG: 20 CAPSULE ORAL at 08:04

## 2025-04-15 RX ADMIN — CYANOCOBALAMIN TAB 1000 MCG 1000 MCG: 1000 TAB at 08:04

## 2025-04-15 RX ADMIN — LISINOPRIL 40 MG: 20 TABLET ORAL at 08:04

## 2025-04-15 RX ADMIN — PREDNISONE 20 MG: 20 TABLET ORAL at 08:04

## 2025-04-15 RX ADMIN — ALUMINUM HYDROXIDE, MAGNESIUM HYDROXIDE, AND DIMETHICONE 30 ML: 200; 20; 200 SUSPENSION ORAL at 05:04

## 2025-04-15 RX ADMIN — IPRATROPIUM BROMIDE 0.5 MG: 0.5 SOLUTION RESPIRATORY (INHALATION) at 01:04

## 2025-04-15 RX ADMIN — TUBERCULIN PURIFIED PROTEIN DERIVATIVE 5 UNITS: 5 INJECTION, SOLUTION INTRADERMAL at 02:04

## 2025-04-15 RX ADMIN — SUCRALFATE 1 G: 1 SUSPENSION ORAL at 12:04

## 2025-04-15 RX ADMIN — MICONAZOLE NITRATE: 20 POWDER TOPICAL at 08:04

## 2025-04-15 RX ADMIN — BUDESONIDE 0.5 MG: 0.5 INHALANT RESPIRATORY (INHALATION) at 07:04

## 2025-04-15 RX ADMIN — SUCRALFATE 1 G: 1 SUSPENSION ORAL at 05:04

## 2025-04-15 NOTE — NURSING
Pt being DC to [facility]. Called report to Anna, sent to , left  to return call. Will continue to try and call report.

## 2025-04-15 NOTE — PLAN OF CARE
Problem: Physical Therapy  Goal: Physical Therapy Goal  Description: Goals to be met by 2025   Patient will increase functional independence with mobility by performin. Bed to chair transfer with Modified Independent with or without rolling walker using Stand step TECHNIQUE (on going dated 4/10/2025)   2. Gait  x 350  feet with Supervision or Set-up Assistance with or without rolling walker (on going dated 4/10/2025)   3. Lower extremity exercise program x10 reps with assistance as needed.  (on going dated 4/10/2025)     Outcome: Adequate for Care Transition

## 2025-04-15 NOTE — PT/OT/SLP DISCHARGE
Physical Therapy Discharge Summary    Name: Ruth Gamboa  MRN: [id number]   Principal Problem: UTI (urinary tract infection)     Patient Discharged from acute Physical Therapy on 4/15/2025.  Please refer to prior PT note dated   2025 for functional status.     Assessment:     Patient appropriate for care in another setting.    Objective:     GOALS:   Multidisciplinary Problems       Physical Therapy Goals          Problem: Physical Therapy    Goal Priority Disciplines Outcome Interventions   Physical Therapy Goal     PT, PT/OT Adequate for Care Transition    Description: Goals to be met by 2025   Patient will increase functional independence with mobility by performin. Bed to chair transfer with Modified Independent with or without rolling walker using Stand step TECHNIQUE (on going dated 4/10/2025)   2. Gait  x 350  feet with Supervision or Set-up Assistance with or without rolling walker (on going dated 4/10/2025)   3. Lower extremity exercise program x10 reps with assistance as needed.  (on going dated 4/10/2025)                          Reasons for Discontinuation of Therapy Services  Transfer to alternate level of care.      Plan:     Patient Discharged to: Skilled Nursing Facility.      4/15/2025

## 2025-04-15 NOTE — ASSESSMENT & PLAN NOTE
Patient has persistent depression which is severe and is currently uncontrolled. Will Continue anti-depressant medications. We will not consult psychiatry at this time. Patient does not display psychosis at this time. Continue to monitor closely and adjust plan of care as needed.    4/9 FM:  Improved, more talkative, awake and alert.  4/11 FM:  Improved, smiling during exam.  4/12 patient was more withdrawn today compared to previous encounter with patient   4/13 stable, patient on Prozac, Remeron nightly, no suicide ideation reported, continue to monitor  4/14 FM:  Continues to improve, awaiting state approval.

## 2025-04-15 NOTE — PLAN OF CARE
Problem: Skin Injury Risk Increased  Goal: Skin Health and Integrity  Outcome: Progressing     Problem: Adult Inpatient Plan of Care  Goal: Plan of Care Review  Outcome: Progressing  Goal: Patient-Specific Goal (Individualized)  Outcome: Progressing  Goal: Absence of Hospital-Acquired Illness or Injury  Outcome: Progressing  Goal: Optimal Comfort and Wellbeing  Outcome: Progressing  Goal: Readiness for Transition of Care  Outcome: Progressing     Problem: Pneumonia  Goal: Fluid Balance  Outcome: Progressing  Goal: Resolution of Infection Signs and Symptoms  Outcome: Progressing  Goal: Effective Oxygenation and Ventilation  Outcome: Progressing     Problem: Infection  Goal: Absence of Infection Signs and Symptoms  Outcome: Progressing     Problem: Fall Injury Risk  Goal: Absence of Fall and Fall-Related Injury  Outcome: Progressing     Problem: Wound  Goal: Optimal Coping  Outcome: Progressing  Goal: Optimal Functional Ability  Outcome: Progressing

## 2025-04-15 NOTE — NURSING
Pt transferred by jonelle to [facility], family at bedside, took all belongings, AVS given to daughter, family following jonelle.

## 2025-04-15 NOTE — ASSESSMENT & PLAN NOTE
Patient's blood pressure range in the last 24 hours was: BP  Min: 100/55  Max: 117/67.The patient's inpatient anti-hypertensive regimen is listed below:  Current Antihypertensives  carvediloL tablet 25 mg, 2 times daily with meals, Oral  lisinopriL tablet 40 mg, Daily, Oral    Plan  - BP is uncontrolled, will adjust as follows:  Lisinopril increased  Monitor pressure. Resume home meds. Adjust PRN

## 2025-04-15 NOTE — PLAN OF CARE
[facility]  Discharge Final Note    Primary Care Provider: Clark Calix III, MD    Expected Discharge Date:     Final Discharge Note (most recent)       Final Note - 04/15/25 0847          Final Note    Assessment Type Final Discharge Note     Anticipated Discharge Disposition Skilled Nursing Facility        Post-Acute Status    Post-Acute Authorization Placement     Post-Acute Placement Status Set-up Complete/Auth obtained     Discharge Delays None known at this time                     Important Message from Medicare  Important Message from Medicare regarding Discharge Appeal Rights: Given to patient/caregiver, Explained to patient/caregiver, Signed/date by patient/caregiver     Date IMM was signed: 04/13/25  Time IMM was signed: 1400    Final note is completed. The patient will be discharging to [facility] for skilled therapy.

## 2025-04-15 NOTE — PT/OT/SLP PROGRESS
Occupational Therapy      Patient Name:  Ruth Gamboa   MRN:  [id number]    Patient not seen today secondary to Other (Comment) (First attempt this AM pt was recieving bed bath and second attempt this afternoon, pt refused reporting she had headache.). Will follow-up later today if time allows or 4/16/25.    4/15/2025  Shala BURNETT

## 2025-04-15 NOTE — PROGRESS NOTES
"[facility] Medicine  Progress Note    Patient Name: Ruth Gamboa  MRN: [id number]  Patient Class: IP- Inpatient   Admission Date: 3/31/2025  Length of Stay: 13 days  Attending Physician: Clark Calix III, MD  Primary Care Provider: Clark Calix III, MD        Subjective     Principal Problem:UTI (urinary tract infection)        HPI:  ED HPI:  80-year-old female with significant history hypertension, hypothyroidism, COPD and depression who reports to the emergency room for evaluation of failure to thrive picture. Patient's family reports little to no activity and patient's appetite has decreased. Increased weakness and fatigue. Family and pt has discussed with PCP and pending nursing home/SNF placement as requested per pt. Recent changes in depression medications and pt reports just "not hungry". No abdominal pain or other acute complaints.     IM HPI:  Patient is well known to us and had a recent prolonged hospitalization for multiple electrolyte abnormalities weight loss atypical pneumonia COPD and wounds who during that admission had a stabilization of her condition and the family attempted to take her home and care for her.  It has been a challenge and ultimately the patient stopped eating had no appetite and she declined again.  The patient and family were in the process of reaching out to local nursing facilities and getting authorization for admission when she had a further decline with tachycardia and other symptoms of dehydration and weakness.  I have evaluated the patient with family at bedside this morning she is coughing and co anorexia.    Overview/Hospital Course:  4/2 FM:  Patient's family at bedside, today patient is coughing and having difficulty with her breakfast.  Family states no trouble with liquids but mainly with solids and chewing.  I have done a head neck exam today and do not see any gross abnormalities.  We will have speech evaluate and do MBS.  Patient's family " states she is able to take supplements at home we will order ensure.  Medications reconciled case management consulted family would like skilled nursing placement.  4/3 FM:  Patient had improved p.o. intake and has stabilized this morning.  Patient's testing noted and reviewed and I have spoken with physical therapy and speech therapy.  Speech therapy states that as long as patient's positioning is correct with eating she has no aspiration.  She also needs bite sized and a slightly modified diet.  Patient's medications all reviewed patient has seen Psychiatry notes and recommendations noted.  Awaiting skilled nursing placement.  4/4 FM:  Upon entering the room patient was lying supine attempting to drink coffee.  I have re-educated her the importance of sitting upright and aspiration and choking precautions.  Patient's family present all questions answered awaiting skilled nursing home placement and approval via the state and her insurance company.  4/5 AA, weekend crosscover, UTI, antibiotics on board, urine culture with Klebsiella oxytocia, Rocephin on board, white count trending down, afebrile, waiting on nursing home placement  4/6 AA, weekend crosscover, no acute events overnight reported, noted fluctuation blood pressure, meds adjusted, renal function stable, white count slight increased from yesterday, afebrile, continue to monitor  4/7 FM:  Patient has been drowsy somnolent and difficult to arise at times during the day per family.  We will decrease the dosing of her mirtazapine and Megace.  Otherwise we are awaiting on state approval patient states her mood is improved no changes in other medications today.  Labs noted and reviewed.  4/8 FM:  Patient is awake alert this morning and ate breakfast with family at bedside.  Labs noted and reviewed.  Awaiting on states psychiatric determination before patient could be moved.  We will stop drawing daily labs as we wait.  4/9 FM:  Patient has developed a annular  nodular rash and the palms of the hands suprapubic and inguinal area and upper buttocks.  Unsure if this is a drug eruption versus other.  We will check RPR (no recent intercourse or known exposure) and RADHA.  Patient's appetite is improved she is tolerating supplements and this morning is awake and alert.  We are awaiting state approval for skilled nursing home transfer.  4/10 FM:  Patient's daughter is at bedside, patient's mood seems to be improved she is smiling some today.  Patient is ambulating and walking with therapy.  Discussed that we are still waiting on state approval.  4/11 FM:  Continues to work with therapy, ate 100% of bfast this am, waiting on state approval.  4/12 AA, weekend crosscover, patient on prednisone therapy, RADHA positive, positive anti Sm/RNP antibody, mild leukocytosis, likely related to prednisone therapy, H&H stable, continue PT OT efforts  4/13 AA, weekend crosscover, continue prednisone therapy, H&H stable, white count now within normal limits, afebrile, hyponatremia with small improvement, decreased p.o. intake reported, gentle hydration  4/14 FM:  Events over the weekend noted.  Patient's essentially unchanged.  Patient's appetite is good her RADHA is positive will arrange outpatient rheumatology follow-up unsure if significant.  Patient has no fevers sodium has trended downward she is on fluoxetine reluctant to change dose as her mood has been good.  Continue to follow and await nursing home admission.    Interval History: Patient seen and examined.    Review of Systems   Constitutional:  Negative for chills and fever.   HENT:  Negative for ear pain, mouth sores, nosebleeds and sore throat.    Eyes:  Negative for visual disturbance.   Respiratory:  Positive for shortness of breath. Negative for cough and wheezing.    Cardiovascular:  Negative for chest pain, palpitations and leg swelling.   Gastrointestinal:  Negative for abdominal distention, abdominal pain, blood in stool,  constipation, diarrhea, nausea and vomiting.   Endocrine: Negative for polyphagia.   Genitourinary:  Negative for dysuria, flank pain and frequency.   Musculoskeletal:  Positive for arthralgias, gait problem and myalgias.   Skin:  Positive for rash.   Neurological:  Positive for weakness. Negative for seizures, syncope, facial asymmetry, speech difficulty and headaches.   Hematological:  Negative for adenopathy.   Psychiatric/Behavioral:  Negative for agitation and hallucinations. The patient is nervous/anxious.      Objective:     Vital Signs (Most Recent):  Temp: 98 °F (36.7 °C) (04/14/25 1706)  Pulse: 75 (04/14/25 1859)  Resp: 16 (04/14/25 1859)  BP: (!) 100/55 (04/14/25 1706)  SpO2: 100 % (04/14/25 1859) Vital Signs (24h Range):  Temp:  [97.7 °F (36.5 °C)-98.8 °F (37.1 °C)] 98 °F (36.7 °C)  Pulse:  [70-91] 75  Resp:  [14-20] 16  SpO2:  [91 %-100 %] 100 %  BP: (100-117)/(53-67) 100/55     Weight: 40.6 kg (89 lb 8.1 oz)  Body mass index is 15.86 kg/m².    Intake/Output Summary (Last 24 hours) at 4/14/2025 1931  Last data filed at 4/14/2025 1406  Gross per 24 hour   Intake 1380 ml   Output --   Net 1380 ml         Physical Exam  Vitals and nursing note reviewed.   Constitutional:       General: She is not in acute distress.     Appearance: She is ill-appearing. She is not toxic-appearing.   HENT:      Head: Atraumatic.      Comments: Temporal wasting     Nose: No congestion or rhinorrhea.      Mouth/Throat:      Mouth: Mucous membranes are moist.      Pharynx: No oropharyngeal exudate or posterior oropharyngeal erythema.   Eyes:      General: No scleral icterus.     Extraocular Movements: Extraocular movements intact.   Cardiovascular:      Rate and Rhythm: Normal rate and regular rhythm.      Heart sounds: No murmur heard.  Pulmonary:      Effort: No respiratory distress.      Breath sounds: No wheezing, rhonchi or rales.   Abdominal:      General: There is no distension.      Palpations: Abdomen is soft.       Tenderness: There is no abdominal tenderness. There is no guarding or rebound.   Musculoskeletal:         General: No swelling or tenderness.      Right lower leg: No edema.      Left lower leg: No edema.      Comments: Thin, malnurished   Lymphadenopathy:      Cervical: No cervical adenopathy.   Skin:     Capillary Refill: Capillary refill takes less than 2 seconds.      Coloration: Skin is not jaundiced or pale.      Comments: + bilateral palms of hands, inguinal area, upper buttoks, round/nodule/rash, 5mm diameter, scattered   Neurological:      Motor: Weakness present.   Psychiatric:      Comments: Appears withdrawn today               Significant Labs: All pertinent labs within the past 24 hours have been reviewed.  Recent Lab Results         04/14/25  0514        Albumin 2.0       ALP 61       ALT <8       Anion Gap 4       AST 12       Baso # 0.02       Basophil % 0.2       BILIRUBIN TOTAL 0.2  Comment: For infants and newborns, interpretation of results should be based   on gestational age, weight and in agreement with clinical   observations.    Premature Infant recommended reference ranges:   0-24 hours:  <8.0 mg/dL   24-48 hours: <12.0 mg/dL   3-5 days:    <15.0 mg/dL   6-29 days:   <15.0 mg/dL       BUN 21       Calcium 8.7       Chloride 94       CO2 28       Creatinine 0.6       eGFR >60  Comment: Estimated GFR calculated using the CKD-EPI creatinine (2021) equation.       Eos # 0.03       Eos % 0.3       Glucose 100       Gran # (ANC) 8.92       Hematocrit 26.6       Hemoglobin 8.4       Immature Grans (Abs) 0.09  Comment: Mild elevation in immature granulocytes is non specific and can be seen in a variety of conditions including stress response, acute inflammation, trauma and pregnancy. Correlation with other laboratory and clinical findings is essential.       Immature Granulocytes 0.8       Lymph # 1.92       Lymph % 16.4       Magnesium  2.0       MCH 27.5       MCHC 31.6       MCV 87       Mono #  0.74       Mono % 6.3       MPV 10.2       Neut % 76.0       nRBC 0       Platelet Count 423       Potassium 4.8       PROTEIN TOTAL 5.6       RBC 3.05       RDW 19.4       Sodium 126       WBC 11.72               Significant Imaging: I have reviewed all pertinent imaging results/findings within the past 24 hours.      Assessment & Plan  COPD (chronic obstructive pulmonary disease)  Patient's COPD is controlled currently.  Gastroesophageal reflux disease without esophagitis  Protonix    Atherosclerosis of aorta      HTN (hypertension)  Patient's blood pressure range in the last 24 hours was: BP  Min: 100/55  Max: 117/67.The patient's inpatient anti-hypertensive regimen is listed below:  Current Antihypertensives  carvediloL tablet 25 mg, 2 times daily with meals, Oral  lisinopriL tablet 40 mg, Daily, Oral    Plan  - BP is uncontrolled, will adjust as follows:  Lisinopril increased  Monitor pressure. Resume home meds. Adjust PRN  Tobacco abuse  Continue to .    Major depressive disorder, recurrent, moderate  Patient has persistent depression which is severe and is currently uncontrolled. Will Continue anti-depressant medications. We will not consult psychiatry at this time. Patient does not display psychosis at this time. Continue to monitor closely and adjust plan of care as needed.    4/9 FM:  Improved, more talkative, awake and alert.  4/11 FM:  Improved, smiling during exam.  4/12 patient was more withdrawn today compared to previous encounter with patient   4/13 stable, patient on Prozac, Remeron nightly, no suicide ideation reported, continue to monitor  4/14 FM:  Continues to improve, awaiting state approval.      Anorexia  Patient is on Megace therapy    4/9 FM:  Cont Megace, mirtazapine, supplements.  4/11 FM:  Continues to improve with current txt.  Megace on board, monitor intake and weight   4/14 FM:  Continues to improve.  Weight loss, unintentional    Supplements, megace.    4/4 FM:  PO intake  improved with txt.  Hypothyroidism  Cont TRH.    Hyponatremia  Hyponatremia is likely due to Dehydration/hypovolemia. The patient's most recent sodium results are listed below.  Recent Labs     04/12/25  1139 04/13/25  0402 04/14/25  0514   * 129* 126*       Plan  - Correct the sodium by 4-6mEq in 24 hours.  4/12 IV fluids on board  Failure to thrive in adult  AS above.    Severe malnutrition  Nutrition consulted. Most recent weight and BMI monitored-     Measurements:  Wt Readings from Last 1 Encounters:   04/01/25 40.6 kg (89 lb 8.1 oz)   Body mass index is 15.86 kg/m².    Patient has been screened and assessed by RD.    Malnutrition Type:  Context:    Level:      Malnutrition Characteristic Summary:       Interventions/Recommendations (treatment strategy):  1. Continue regular diet as tolerated.     2. Ensure Enlive ONS or alternative TID.    3. Rec'd Moshe BID to promote wound healing and to provide additional nutrtiion.   4. Rec'd Beneprotein TID. 5. RD to monitor and follow up.    4/4 FM:  PO intake improved with txt.  4/9 FM:  Currently on multiple appetite stimulants and her oral intake is improved.  Patient's tolerating supplements and continue to encourage.  Likely related to depression which is also improved.  4/10 FM:  Dietary notes reviewed, agree with plan.  Dietary on board, follow-up with rec  UTI (urinary tract infection)  Abx, CS pending.  4/5 urine culture with Klebsiella oxytocia, Rocephin on board, continue antibiotics white count trending down, afebrile  4/6 continue Rocephin, white count elevated, afebrile, continue IV antibiotics, fluids  4/7 FM:  Finishing abx next few days.  4/8 FM:  Completing abx.  4/9 FM:  Has completed abx, afebrile, no pain, wbc wnl.  Dysphagia  ST to evaluate  4/8 FM:  ST has cleared patient, must stay upright when eating.  4/9 FM:  Improved  Rash  4/9 FM:  ? Drug eruption, now off abx, check VDRL, RADHA.  Granuloma annulare.  4/10 FM:  Improved on  steroids.    VTE Risk Mitigation (From admission, onward)           Ordered     IP VTE HIGH RISK PATIENT  Once         03/31/25 2041     Place sequential compression device  Until discontinued         03/31/25 2041                    Discharge Planning   ORALIA:      Code Status: DNR   Medical Readiness for Discharge Date:   Discharge Plan A: Skilled Nursing Facility                Please place Justification for DME        Clark Calix III, MD  Department of Hospital Medicine   [facility]

## 2025-04-15 NOTE — ASSESSMENT & PLAN NOTE
Patient is on Megace therapy    4/9 FM:  Cont Megace, mirtazapine, supplements.  4/11 FM:  Continues to improve with current txt.  Megace on board, monitor intake and weight   4/14 FM:  Continues to improve.

## 2025-04-15 NOTE — SUBJECTIVE & OBJECTIVE
Interval History: Patient seen and examined.    Review of Systems   Constitutional:  Negative for chills and fever.   HENT:  Negative for ear pain, mouth sores, nosebleeds and sore throat.    Eyes:  Negative for visual disturbance.   Respiratory:  Positive for shortness of breath. Negative for cough and wheezing.    Cardiovascular:  Negative for chest pain, palpitations and leg swelling.   Gastrointestinal:  Negative for abdominal distention, abdominal pain, blood in stool, constipation, diarrhea, nausea and vomiting.   Endocrine: Negative for polyphagia.   Genitourinary:  Negative for dysuria, flank pain and frequency.   Musculoskeletal:  Positive for arthralgias, gait problem and myalgias.   Skin:  Positive for rash.   Neurological:  Positive for weakness. Negative for seizures, syncope, facial asymmetry, speech difficulty and headaches.   Hematological:  Negative for adenopathy.   Psychiatric/Behavioral:  Negative for agitation and hallucinations. The patient is nervous/anxious.      Objective:     Vital Signs (Most Recent):  Temp: 98 °F (36.7 °C) (04/14/25 1706)  Pulse: 75 (04/14/25 1859)  Resp: 16 (04/14/25 1859)  BP: (!) 100/55 (04/14/25 1706)  SpO2: 100 % (04/14/25 1859) Vital Signs (24h Range):  Temp:  [97.7 °F (36.5 °C)-98.8 °F (37.1 °C)] 98 °F (36.7 °C)  Pulse:  [70-91] 75  Resp:  [14-20] 16  SpO2:  [91 %-100 %] 100 %  BP: (100-117)/(53-67) 100/55     Weight: 40.6 kg (89 lb 8.1 oz)  Body mass index is 15.86 kg/m².    Intake/Output Summary (Last 24 hours) at 4/14/2025 1931  Last data filed at 4/14/2025 1406  Gross per 24 hour   Intake 1380 ml   Output --   Net 1380 ml         Physical Exam  Vitals and nursing note reviewed.   Constitutional:       General: She is not in acute distress.     Appearance: She is ill-appearing. She is not toxic-appearing.   HENT:      Head: Atraumatic.      Comments: Temporal wasting     Nose: No congestion or rhinorrhea.      Mouth/Throat:      Mouth: Mucous membranes are  moist.      Pharynx: No oropharyngeal exudate or posterior oropharyngeal erythema.   Eyes:      General: No scleral icterus.     Extraocular Movements: Extraocular movements intact.   Cardiovascular:      Rate and Rhythm: Normal rate and regular rhythm.      Heart sounds: No murmur heard.  Pulmonary:      Effort: No respiratory distress.      Breath sounds: No wheezing, rhonchi or rales.   Abdominal:      General: There is no distension.      Palpations: Abdomen is soft.      Tenderness: There is no abdominal tenderness. There is no guarding or rebound.   Musculoskeletal:         General: No swelling or tenderness.      Right lower leg: No edema.      Left lower leg: No edema.      Comments: Thin, malnurished   Lymphadenopathy:      Cervical: No cervical adenopathy.   Skin:     Capillary Refill: Capillary refill takes less than 2 seconds.      Coloration: Skin is not jaundiced or pale.      Comments: + bilateral palms of hands, inguinal area, upper buttoks, round/nodule/rash, 5mm diameter, scattered   Neurological:      Motor: Weakness present.   Psychiatric:      Comments: Appears withdrawn today               Significant Labs: All pertinent labs within the past 24 hours have been reviewed.  Recent Lab Results         04/14/25  0514        Albumin 2.0       ALP 61       ALT <8       Anion Gap 4       AST 12       Baso # 0.02       Basophil % 0.2       BILIRUBIN TOTAL 0.2  Comment: For infants and newborns, interpretation of results should be based   on gestational age, weight and in agreement with clinical   observations.    Premature Infant recommended reference ranges:   0-24 hours:  <8.0 mg/dL   24-48 hours: <12.0 mg/dL   3-5 days:    <15.0 mg/dL   6-29 days:   <15.0 mg/dL       BUN 21       Calcium 8.7       Chloride 94       CO2 28       Creatinine 0.6       eGFR >60  Comment: Estimated GFR calculated using the CKD-EPI creatinine (2021) equation.       Eos # 0.03       Eos % 0.3       Glucose 100       Gran #  (ANC) 8.92       Hematocrit 26.6       Hemoglobin 8.4       Immature Grans (Abs) 0.09  Comment: Mild elevation in immature granulocytes is non specific and can be seen in a variety of conditions including stress response, acute inflammation, trauma and pregnancy. Correlation with other laboratory and clinical findings is essential.       Immature Granulocytes 0.8       Lymph # 1.92       Lymph % 16.4       Magnesium  2.0       MCH 27.5       MCHC 31.6       MCV 87       Mono # 0.74       Mono % 6.3       MPV 10.2       Neut % 76.0       nRBC 0       Platelet Count 423       Potassium 4.8       PROTEIN TOTAL 5.6       RBC 3.05       RDW 19.4       Sodium 126       WBC 11.72               Significant Imaging: I have reviewed all pertinent imaging results/findings within the past 24 hours.

## 2025-04-15 NOTE — ASSESSMENT & PLAN NOTE
Hyponatremia is likely due to Dehydration/hypovolemia. The patient's most recent sodium results are listed below.  Recent Labs     04/12/25  1139 04/13/25  0402 04/14/25  0514   * 129* 126*       Plan  - Correct the sodium by 4-6mEq in 24 hours.  4/12 IV fluids on board

## 2025-04-19 NOTE — DISCHARGE SUMMARY
"[facility]  Discharge Summary      Patient Name: Ruth Gamboa  MRN: [id number]  ALLI: [id number]  Patient Class: IP- Inpatient  Admission Date: 3/31/2025  Hospital Length of Stay: 14 days  Discharge Date and Time: 4/15/2025  5:55 PM  Attending Physician: No att. providers found   Discharging Provider: Clark Calix III, MD  Primary Care Provider: Clark Calix III, MD    Primary Care Team: Networked reference to record PCT     HPI:   ED HPI:  80-year-old female with significant history hypertension, hypothyroidism, COPD and depression who reports to the emergency room for evaluation of failure to thrive picture. Patient's family reports little to no activity and patient's appetite has decreased. Increased weakness and fatigue. Family and pt has discussed with PCP and pending nursing home/SNF placement as requested per pt. Recent changes in depression medications and pt reports just "not hungry". No abdominal pain or other acute complaints.     IM HPI:  Patient is well known to us and had a recent prolonged hospitalization for multiple electrolyte abnormalities weight loss atypical pneumonia COPD and wounds who during that admission had a stabilization of her condition and the family attempted to take her home and care for her.  It has been a challenge and ultimately the patient stopped eating had no appetite and she declined again.  The patient and family were in the process of reaching out to local nursing facilities and getting authorization for admission when she had a further decline with tachycardia and other symptoms of dehydration and weakness.  I have evaluated the patient with family at bedside this morning she is coughing and co anorexia.    * No surgery found *      Hospital Course:   4/2 FM:  Patient's family at bedside, today patient is coughing and having difficulty with her breakfast.  Family states no trouble with liquids but mainly with solids and chewing.  I have " done a head neck exam today and do not see any gross abnormalities.  We will have speech evaluate and do MBS.  Patient's family states she is able to take supplements at home we will order ensure.  Medications reconciled case management consulted family would like skilled nursing placement.  4/3 FM:  Patient had improved p.o. intake and has stabilized this morning.  Patient's testing noted and reviewed and I have spoken with physical therapy and speech therapy.  Speech therapy states that as long as patient's positioning is correct with eating she has no aspiration.  She also needs bite sized and a slightly modified diet.  Patient's medications all reviewed patient has seen Psychiatry notes and recommendations noted.  Awaiting skilled nursing placement.  4/4 FM:  Upon entering the room patient was lying supine attempting to drink coffee.  I have re-educated her the importance of sitting upright and aspiration and choking precautions.  Patient's family present all questions answered awaiting skilled nursing home placement and approval via the state and her insurance company.  4/5 AA, weekend crosscover, UTI, antibiotics on board, urine culture with Klebsiella oxytocia, Rocephin on board, white count trending down, afebrile, waiting on nursing home placement  4/6 AA, weekend crosscover, no acute events overnight reported, noted fluctuation blood pressure, meds adjusted, renal function stable, white count slight increased from yesterday, afebrile, continue to monitor  4/7 FM:  Patient has been drowsy somnolent and difficult to arise at times during the day per family.  We will decrease the dosing of her mirtazapine and Megace.  Otherwise we are awaiting on state approval patient states her mood is improved no changes in other medications today.  Labs noted and reviewed.  4/8 FM:  Patient is awake alert this morning and ate breakfast with family at bedside.  Labs noted and reviewed.  Awaiting on states psychiatric  determination before patient could be moved.  We will stop drawing daily labs as we wait.  4/9 FM:  Patient has developed a annular nodular rash and the palms of the hands suprapubic and inguinal area and upper buttocks.  Unsure if this is a drug eruption versus other.  We will check RPR (no recent intercourse or known exposure) and RADHA.  Patient's appetite is improved she is tolerating supplements and this morning is awake and alert.  We are awaiting state approval for skilled nursing home transfer.  4/10 FM:  Patient's daughter is at bedside, patient's mood seems to be improved she is smiling some today.  Patient is ambulating and walking with therapy.  Discussed that we are still waiting on state approval.  4/11 FM:  Continues to work with therapy, ate 100% of bfast this am, waiting on state approval.  4/12 AA, weekend crosscover, patient on prednisone therapy, RADHA positive, positive anti Sm/RNP antibody, mild leukocytosis, likely related to prednisone therapy, H&H stable, continue PT OT efforts  4/13 AA, weekend crosscover, continue prednisone therapy, H&H stable, white count now within normal limits, afebrile, hyponatremia with small improvement, decreased p.o. intake reported, gentle hydration  4/14 FM:  Events over the weekend noted.  Patient's essentially unchanged.  Patient's appetite is good her RADHA is positive will arrange outpatient rheumatology follow-up unsure if significant.  Patient has no fevers sodium has trended downward she is on fluoxetine reluctant to change dose as her mood has been good.  Continue to follow and await nursing home admission.    Discharge Note:  At the time of discharge patient was tolerating her diet her appetite improved her nourishment improved.  Patient must sit upright with p.o. intake as there is a high-risk of aspiration if in alternate positioning.  Patient's COPD and other chronic illnesses are significant barriers but she seems to be motivated and is moving into a  skilled nursing unit for further care.  We have motivated and encouraged her if the patient continues to improve she can follow-up positive RADHA and arthralgias in rheumatology clinic.  Patient's stay was prolonged because of state approval and insurance company delays.     Goals of Care Treatment Preferences:  Code Status: DNR      SDOH Screening:  The patient was screened for utility difficulties, food insecurity, transport difficulties, housing insecurity, and interpersonal safety and there were no concerns identified this admission.     Consults:   Consults (From admission, onward)          Status Ordering Provider     Inpatient consult to Psychiatry  Once        Provider:  Micheal Bajwa, NP    Completed ARIANE WHYTE III     Inpatient consult to Social Work/Case Management  Once        Provider:  (Not yet assigned)    Completed ARIANE WHYTE III     Inpatient consult to Registered Dietitian/Nutritionist  Once        Provider:  (Not yet assigned)    Completed RAMBO AGUIRRE     Inpatient consult to Social Work/Case Management  Once        Provider:  (Not yet assigned)    Completed RAMBO AGUIRRE            Assessment & Plan  COPD (chronic obstructive pulmonary disease)  Patient's COPD is controlled currently.  Gastroesophageal reflux disease without esophagitis  Protonix    Atherosclerosis of aorta      HTN (hypertension)  Patient's blood pressure range in the last 24 hours was: No data recorded.The patient's inpatient anti-hypertensive regimen is listed below:  Current Antihypertensives       Plan  - BP is uncontrolled, will adjust as follows:  Lisinopril increased  Monitor pressure. Resume home meds. Adjust PRN  Tobacco abuse  Continue to .    Major depressive disorder, recurrent, moderate  Patient has persistent depression which is severe and is currently uncontrolled. Will Continue anti-depressant medications. We will not consult psychiatry at this time. Patient does not display psychosis at  "this time. Continue to monitor closely and adjust plan of care as needed.    4/9 FM:  Improved, more talkative, awake and alert.  4/11 FM:  Improved, smiling during exam.  4/12 patient was more withdrawn today compared to previous encounter with patient   4/13 stable, patient on Prozac, Remeron nightly, no suicide ideation reported, continue to monitor  4/14 FM:  Continues to improve, awaiting state approval.      Anorexia  Patient is on Megace therapy    4/9 FM:  Cont Megace, mirtazapine, supplements.  4/11 FM:  Continues to improve with current txt.  Megace on board, monitor intake and weight   4/14 FM:  Continues to improve.  Weight loss, unintentional    Supplements, megace.    4/4 FM:  PO intake improved with txt.  Hypothyroidism  Cont TRH.    Hyponatremia  Hyponatremia is likely due to Dehydration/hypovolemia. The patient's most recent sodium results are listed below.  No results for input(s): "NA" in the last 72 hours.      Plan  - Correct the sodium by 4-6mEq in 24 hours.  4/12 IV fluids on board  Failure to thrive in adult  AS above.    Severe malnutrition  Nutrition consulted. Most recent weight and BMI monitored-     Measurements:  Wt Readings from Last 1 Encounters:   04/01/25 40.6 kg (89 lb 8.1 oz)   Body mass index is 15.86 kg/m².    Patient has been screened and assessed by RD.    Malnutrition Type:  Context:    Level:      Malnutrition Characteristic Summary:       Interventions/Recommendations (treatment strategy):  1. Continue regular diet as tolerated.     2. Ensure Enlive ONS or alternative TID.    3. Rec'd Moshe BID to promote wound healing and to provide additional nutrtiion.   4. Rec'd Beneprotein TID. 5. RD to monitor and follow up.    4/4 FM:  PO intake improved with txt.  4/9 FM:  Currently on multiple appetite stimulants and her oral intake is improved.  Patient's tolerating supplements and continue to encourage.  Likely related to depression which is also improved.  4/10 FM:  Dietary " notes reviewed, agree with plan.  Dietary on board, follow-up with rec  UTI (urinary tract infection)  Abx, CS pending.  4/5 urine culture with Klebsiella oxytocia, Rocephin on board, continue antibiotics white count trending down, afebrile  4/6 continue Rocephin, white count elevated, afebrile, continue IV antibiotics, fluids  4/7 FM:  Finishing abx next few days.  4/8 FM:  Completing abx.  4/9 FM:  Has completed abx, afebrile, no pain, wbc wnl.  Dysphagia  ST to evaluate  4/8 FM:  ST has cleared patient, must stay upright when eating.  4/9 FM:  Improved  Rash  4/9 FM:  ? Drug eruption, now off abx, check VDRL, RADHA.  Granuloma annulare.  4/10 FM:  Improved on steroids.    Final Active Diagnoses:    Diagnosis Date Noted POA    PRINCIPAL PROBLEM:  UTI (urinary tract infection) [N39.0] 04/01/2025 Yes    Rash [R21] 04/09/2025 No    Severe malnutrition [E43] 02/21/2025 Yes    Failure to thrive in adult [R62.7] 02/19/2025 Yes    Hyponatremia [E87.1] 02/19/2025 Yes    Dysphagia [R13.10] 02/19/2025 Yes    Hypothyroidism [E03.9] 01/09/2025 Yes    Weight loss, unintentional [R63.4] 11/27/2024 Yes    Anorexia [R63.0] 11/27/2024 Yes    Major depressive disorder, recurrent, moderate [F33.1] 05/16/2024 Yes    Tobacco abuse [Z72.0] 12/12/2023 Yes    HTN (hypertension) [I10] 07/13/2022 Yes    Gastroesophageal reflux disease without esophagitis [K21.9] 07/13/2022 Yes    COPD (chronic obstructive pulmonary disease) [J44.9] 07/13/2022 Yes    Atherosclerosis of aorta [I70.0] 07/13/2022 Yes      Problems Resolved During this Admission:    Diagnosis Date Noted Date Resolved POA    Hypomagnesemia [E83.42] 02/19/2025 04/13/2025 Yes    Hypokalemia [E87.6] 02/19/2025 04/13/2025 Yes       Discharged Condition: stable    Disposition: Skilled Nursing Facility    Follow Up:   Follow-up Information       [facility] Team Rounds Follow up.                           Patient Instructions:      Diet Adult Regular   Order Comments: Must be sitting upright  and in good position for aspiration precautions.     Reason for not Ordering Smoking Cessation Referral     Order Specific Question Answer Comments   Reason for not ordering: Not medically appropriate at this time      Reason for not Prescribing Nicotine Replacement     Order Specific Question Answer Comments   Reason for not Prescribing: Not medically appropriate at this time      Activity as tolerated       Significant Diagnostic Studies: N/A    Pending Diagnostic Studies:       None           Medications:  Reconciled Home Medications:      Medication List        START taking these medications      acetaminophen 325 MG tablet  Commonly known as: TYLENOL  Take 2 tablets (650 mg total) by mouth every 8 (eight) hours as needed.     arformoteroL 15 mcg/2 mL Nebu  Commonly known as: BROVANA  Take 2 mLs (15 mcg total) by nebulization 2 (two) times daily. Controller     cyanocobalamin (vitamin B-12) 1,000 mcg Lozg  Place 1 lozenge under the tongue every other day.  Replaces: VITAMIN B-12 1000 MCG tablet     electrolytes-dextrose oral solution  Commonly known as: Pedialyte  Take 300 mLs by mouth 2 (two) times a day.     predniSONE 10 MG tablet  Commonly known as: DELTASONE  Take 2 tablets (20 mg total) by mouth once daily for 14 days, THEN 1 tablet (10 mg total) once daily for 14 days, THEN 0.5 tablets (5 mg total) once daily for 14 days.  Start taking on: April 16, 2025            CHANGE how you take these medications      ferrous sulfate 325 mg (65 mg iron) Tab tablet  Commonly known as: FeroSuL  Take 1 tablet (325 mg total) by mouth every other day.  What changed:   how much to take  when to take this     FLUoxetine 40 MG capsule  Take 1 capsule (40 mg total) by mouth once daily.  What changed:   medication strength  how much to take     HYDROcodone-acetaminophen 5-325 mg per tablet  Commonly known as: NORCO  Take 1 tablet by mouth every 8 (eight) hours as needed for Pain.  What changed: when to take this      mirtazapine 7.5 MG Tab  Commonly known as: REMERON  Take 1 tablet (7.5 mg total) by mouth every evening.  What changed:   medication strength  how much to take            CONTINUE taking these medications      albuterol-ipratropium 2.5 mg-0.5 mg/3 mL nebulizer solution  Commonly known as: DUO-NEB  Take 3 mLs by nebulization every 6 (six) hours as needed for Wheezing. Rescue     alendronate 70 MG tablet  Commonly known as: FOSAMAX  TAKE 1 TABLET(70 MG) BY MOUTH EVERY 7 DAYS     ascorbic acid (vitamin C) 500 MG tablet  Commonly known as: VITAMIN C  Take 1 tablet (500 mg total) by mouth once daily.     budesonide 0.5 mg/2 mL nebulizer solution  Commonly known as: PULMICORT  Take 2 mLs (0.5 mg total) by nebulization 2 (two) times a day. Controller     carvediloL 25 MG tablet  Commonly known as: COREG  TAKE 1 TABLET(25 MG) BY MOUTH TWICE DAILY WITH MEALS     COMP-AIR NEBULIZER COMPRESSOR Kesha  Generic drug: nebulizer and compressor  use as directed     levothyroxine 25 MCG tablet  Commonly known as: SYNTHROID  Take 1 tablet (25 mcg total) by mouth before breakfast.     lisinopriL 30 MG tablet  Commonly known as: PRINIVIL,ZESTRIL  Take 1 tablet (30 mg total) by mouth once daily.     megestroL 400 mg/10 mL (10 mL) Susp  Commonly known as: MEGACE  Take 5 mLs (200 mg total) by mouth once daily.     miconazole NITRATE 2 % 2 % top powder  Commonly known as: MICOTIN  Apply topically 2 (two) times daily.     nystatin powder  Commonly known as: MYCOSTATIN  Apply topically 4 (four) times daily.     pantoprazole 40 MG tablet  Commonly known as: PROTONIX  Take 1 tablet (40 mg total) by mouth once daily.     PROBIOTIC ACIDOPHILUS ORAL  Take by mouth.     VITAMIN D3 25 mcg (1,000 unit) capsule  Generic drug: cholecalciferol (vitamin D3)  Take 1,000 Units by mouth once daily.            STOP taking these medications      albuterol 90 mcg/actuation inhaler  Commonly known as: PROVENTIL/VENTOLIN HFA     buPROPion 150 MG TB24  tablet  Commonly known as: WELLBUTRIN XL     nystatin-triamcinolone cream  Commonly known as: MYCOLOG II     potassium bicarbonate disintegrating tablet  Commonly known as: K-LYTE     TRELEGY ELLIPTA 200-62.5-25 mcg inhaler  Generic drug: fluticasone-umeclidin-vilanter     VITAMIN B-12 1000 MCG tablet  Generic drug: cyanocobalamin  Replaced by: cyanocobalamin (vitamin B-12) 1,000 mcg Lozg              Indwelling Lines/Drains at time of discharge:   Lines/Drains/Airways       None                   Time spent on the discharge of patient: 36 minutes         Clark Calix III, MD  Department of Hospital Medicine  [facility]

## 2025-04-19 NOTE — ASSESSMENT & PLAN NOTE
"Hyponatremia is likely due to Dehydration/hypovolemia. The patient's most recent sodium results are listed below.  No results for input(s): "NA" in the last 72 hours.      Plan  - Correct the sodium by 4-6mEq in 24 hours.  4/12 IV fluids on board  "

## 2025-04-19 NOTE — ASSESSMENT & PLAN NOTE
Patient's blood pressure range in the last 24 hours was: No data recorded.The patient's inpatient anti-hypertensive regimen is listed below:  Current Antihypertensives       Plan  - BP is uncontrolled, will adjust as follows:  Lisinopril increased  Monitor pressure. Resume home meds. Adjust PRN

## 2025-04-29 ENCOUNTER — LAB REQUISITION (OUTPATIENT)
Dept: LAB | Facility: HOSPITAL | Age: 80
End: 2025-04-29
Payer: MEDICARE

## 2025-04-29 DIAGNOSIS — E03.9 HYPOTHYROIDISM, UNSPECIFIED: ICD-10-CM

## 2025-04-29 DIAGNOSIS — E56.9 VITAMIN DEFICIENCY, UNSPECIFIED: ICD-10-CM

## 2025-04-29 DIAGNOSIS — I70.0 ATHEROSCLEROSIS OF AORTA: ICD-10-CM

## 2025-04-29 DIAGNOSIS — I10 ESSENTIAL (PRIMARY) HYPERTENSION: ICD-10-CM

## 2025-04-29 LAB
ABSOLUTE EOSINOPHIL (OHS): 0.1 K/UL
ABSOLUTE MONOCYTE (OHS): 0.73 K/UL (ref 0.3–1)
ABSOLUTE NEUTROPHIL COUNT (OHS): 15.56 K/UL (ref 1.8–7.7)
ALBUMIN SERPL BCP-MCNC: 2.3 G/DL (ref 3.5–5.2)
ALP SERPL-CCNC: 78 UNIT/L (ref 40–150)
ALT SERPL W/O P-5'-P-CCNC: <8 UNIT/L (ref 10–44)
ANION GAP (OHS): 7 MMOL/L (ref 8–16)
AST SERPL-CCNC: 12 UNIT/L (ref 11–45)
BASOPHILS # BLD AUTO: 0.04 K/UL
BASOPHILS NFR BLD AUTO: 0.2 %
BILIRUB SERPL-MCNC: 0.1 MG/DL (ref 0.1–1)
BUN SERPL-MCNC: 14 MG/DL (ref 8–23)
CALCIUM SERPL-MCNC: 7.3 MG/DL (ref 8.7–10.5)
CHLORIDE SERPL-SCNC: 99 MMOL/L (ref 95–110)
CHOLEST SERPL-MCNC: 111 MG/DL (ref 120–199)
CHOLEST/HDLC SERPL: 2 {RATIO} (ref 2–5)
CO2 SERPL-SCNC: 28 MMOL/L (ref 23–29)
CREAT SERPL-MCNC: 0.6 MG/DL (ref 0.5–1.4)
ERYTHROCYTE [DISTWIDTH] IN BLOOD BY AUTOMATED COUNT: 20.1 % (ref 11.5–14.5)
FERRITIN SERPL-MCNC: 81.1 NG/ML (ref 20–300)
GFR SERPLBLD CREATININE-BSD FMLA CKD-EPI: >60 ML/MIN/1.73/M2
GLUCOSE SERPL-MCNC: 162 MG/DL (ref 70–110)
HCT VFR BLD AUTO: 30.7 % (ref 37–48.5)
HDLC SERPL-MCNC: 56 MG/DL (ref 40–75)
HDLC SERPL: 50.5 % (ref 20–50)
HGB BLD-MCNC: 9.5 GM/DL (ref 12–16)
IMM GRANULOCYTES # BLD AUTO: 0.22 K/UL (ref 0–0.04)
IMM GRANULOCYTES NFR BLD AUTO: 1.2 % (ref 0–0.5)
LDLC SERPL CALC-MCNC: 46.8 MG/DL (ref 63–159)
LYMPHOCYTES # BLD AUTO: 1.33 K/UL (ref 1–4.8)
MCH RBC QN AUTO: 27.6 PG (ref 27–31)
MCHC RBC AUTO-ENTMCNC: 30.9 G/DL (ref 32–36)
MCV RBC AUTO: 89 FL (ref 82–98)
NONHDLC SERPL-MCNC: 55 MG/DL
NUCLEATED RBC (/100WBC) (OHS): 0 /100 WBC
PLATELET # BLD AUTO: 336 K/UL (ref 150–450)
PMV BLD AUTO: 9.3 FL (ref 9.2–12.9)
POTASSIUM SERPL-SCNC: 3.4 MMOL/L (ref 3.5–5.1)
PROT SERPL-MCNC: 5.5 GM/DL (ref 6–8.4)
RBC # BLD AUTO: 3.44 M/UL (ref 4–5.4)
RELATIVE EOSINOPHIL (OHS): 0.6 %
RELATIVE LYMPHOCYTE (OHS): 7.4 % (ref 18–48)
RELATIVE MONOCYTE (OHS): 4.1 % (ref 4–15)
RELATIVE NEUTROPHIL (OHS): 86.5 % (ref 38–73)
SODIUM SERPL-SCNC: 134 MMOL/L (ref 136–145)
TRIGL SERPL-MCNC: 41 MG/DL (ref 30–150)
TSH SERPL-ACNC: 2.37 UIU/ML (ref 0.4–4)
VIT B12 SERPL-MCNC: 669 PG/ML (ref 210–950)
WBC # BLD AUTO: 17.98 K/UL (ref 3.9–12.7)

## 2025-04-29 PROCEDURE — 84443 ASSAY THYROID STIM HORMONE: CPT

## 2025-04-29 PROCEDURE — 80061 LIPID PANEL: CPT

## 2025-04-29 PROCEDURE — 85025 COMPLETE CBC W/AUTO DIFF WBC: CPT

## 2025-04-29 PROCEDURE — 82607 VITAMIN B-12: CPT

## 2025-04-29 PROCEDURE — 82728 ASSAY OF FERRITIN: CPT

## 2025-04-29 PROCEDURE — 84075 ASSAY ALKALINE PHOSPHATASE: CPT

## 2025-05-06 ENCOUNTER — LAB REQUISITION (OUTPATIENT)
Dept: LAB | Facility: HOSPITAL | Age: 80
End: 2025-05-06
Payer: MEDICARE

## 2025-05-06 DIAGNOSIS — N39.0 URINARY TRACT INFECTION, SITE NOT SPECIFIED: ICD-10-CM

## 2025-05-06 LAB
BACTERIA #/AREA URNS AUTO: ABNORMAL /HPF
BILIRUB UR QL STRIP.AUTO: NEGATIVE
CLARITY UR: CLEAR
COLOR UR AUTO: YELLOW
GLUCOSE UR QL STRIP: NEGATIVE
HGB UR QL STRIP: NEGATIVE
HOLD SPECIMEN: NORMAL
HYALINE CASTS UR QL AUTO: 7 /LPF (ref 0–1)
KETONES UR QL STRIP: ABNORMAL
LEUKOCYTE ESTERASE UR QL STRIP: NEGATIVE
MICROSCOPIC COMMENT: ABNORMAL
NITRITE UR QL STRIP: NEGATIVE
PH UR STRIP: 7 [PH]
PROT UR QL STRIP: ABNORMAL
RBC #/AREA URNS AUTO: 1 /HPF (ref 0–4)
SP GR UR STRIP: 1.02
SQUAMOUS #/AREA URNS AUTO: 4 /HPF
UROBILINOGEN UR STRIP-ACNC: 1 EU/DL
WBC #/AREA URNS AUTO: 7 /HPF (ref 0–5)

## 2025-05-06 PROCEDURE — 81003 URINALYSIS AUTO W/O SCOPE: CPT

## 2025-05-09 DIAGNOSIS — R63.4 WEIGHT LOSS: Primary | ICD-10-CM

## 2025-05-09 DIAGNOSIS — D50.9 IRON DEFICIENCY ANEMIA: ICD-10-CM

## 2025-05-16 DIAGNOSIS — S32.050A CLOSED COMPRESSION FRACTURE OF L5 LUMBAR VERTEBRA, INITIAL ENCOUNTER: Primary | ICD-10-CM

## 2025-05-16 RX ORDER — HYDROCODONE BITARTRATE AND ACETAMINOPHEN 7.5; 325 MG/1; MG/1
1 TABLET ORAL 3 TIMES DAILY
Qty: 90 TABLET | Refills: 0 | Status: SHIPPED | OUTPATIENT
Start: 2025-05-16

## 2025-05-18 ENCOUNTER — HOSPITAL ENCOUNTER (INPATIENT)
Facility: HOSPITAL | Age: 80
LOS: 3 days | Discharge: HOSPICE | DRG: 189 | End: 2025-05-21
Attending: EMERGENCY MEDICINE | Admitting: INTERNAL MEDICINE
Payer: MEDICARE

## 2025-05-18 DIAGNOSIS — J96.21 ACUTE ON CHRONIC RESPIRATORY FAILURE WITH HYPOXIA AND HYPERCAPNIA: ICD-10-CM

## 2025-05-18 DIAGNOSIS — J44.1 COPD EXACERBATION: Primary | ICD-10-CM

## 2025-05-18 DIAGNOSIS — R62.7 FAILURE TO THRIVE IN ADULT: ICD-10-CM

## 2025-05-18 DIAGNOSIS — J96.22 ACUTE ON CHRONIC RESPIRATORY FAILURE WITH HYPOXIA AND HYPERCAPNIA: ICD-10-CM

## 2025-05-18 DIAGNOSIS — E43 SEVERE MALNUTRITION: ICD-10-CM

## 2025-05-18 DIAGNOSIS — R06.02 SOB (SHORTNESS OF BREATH): ICD-10-CM

## 2025-05-18 LAB
ABSOLUTE EOSINOPHIL (OHS): 0.17 K/UL
ABSOLUTE MONOCYTE (OHS): 0.77 K/UL (ref 0.3–1)
ABSOLUTE NEUTROPHIL COUNT (OHS): 9.8 K/UL (ref 1.8–7.7)
ALBUMIN SERPL BCP-MCNC: 2.7 G/DL (ref 3.5–5.2)
ALP SERPL-CCNC: 62 UNIT/L (ref 40–150)
ALT SERPL W/O P-5'-P-CCNC: 10 UNIT/L (ref 10–44)
ANION GAP (OHS): 7 MMOL/L (ref 8–16)
AST SERPL-CCNC: 18 UNIT/L (ref 11–45)
BASOPHILS # BLD AUTO: 0.05 K/UL
BASOPHILS NFR BLD AUTO: 0.4 %
BILIRUB SERPL-MCNC: 0.3 MG/DL (ref 0.1–1)
BUN SERPL-MCNC: 13 MG/DL (ref 8–23)
CALCIUM SERPL-MCNC: 8.2 MG/DL (ref 8.7–10.5)
CHLORIDE SERPL-SCNC: 100 MMOL/L (ref 95–110)
CO2 SERPL-SCNC: 29 MMOL/L (ref 23–29)
CREAT SERPL-MCNC: 0.6 MG/DL (ref 0.5–1.4)
CTP QC/QA: YES
CTP QC/QA: YES
ERYTHROCYTE [DISTWIDTH] IN BLOOD BY AUTOMATED COUNT: 17.8 % (ref 11.5–14.5)
GFR SERPLBLD CREATININE-BSD FMLA CKD-EPI: >60 ML/MIN/1.73/M2
GLUCOSE SERPL-MCNC: 84 MG/DL (ref 70–110)
HCT VFR BLD AUTO: 35.1 % (ref 37–48.5)
HGB BLD-MCNC: 11 GM/DL (ref 12–16)
IMM GRANULOCYTES # BLD AUTO: 0.25 K/UL (ref 0–0.04)
IMM GRANULOCYTES NFR BLD AUTO: 1.9 % (ref 0–0.5)
LYMPHOCYTES # BLD AUTO: 2.32 K/UL (ref 1–4.8)
MAGNESIUM SERPL-MCNC: 1.8 MG/DL (ref 1.6–2.6)
MCH RBC QN AUTO: 29.1 PG (ref 27–31)
MCHC RBC AUTO-ENTMCNC: 31.3 G/DL (ref 32–36)
MCV RBC AUTO: 93 FL (ref 82–98)
NT-PROBNP SERPL-MCNC: 2776 PG/ML
NUCLEATED RBC (/100WBC) (OHS): 0 /100 WBC
PLATELET # BLD AUTO: 302 K/UL (ref 150–450)
PMV BLD AUTO: 8.6 FL (ref 9.2–12.9)
POC MOLECULAR INFLUENZA A AGN: NEGATIVE
POC MOLECULAR INFLUENZA B AGN: NEGATIVE
POTASSIUM SERPL-SCNC: 3.6 MMOL/L (ref 3.5–5.1)
PROT SERPL-MCNC: 6.3 GM/DL (ref 6–8.4)
RBC # BLD AUTO: 3.78 M/UL (ref 4–5.4)
RELATIVE EOSINOPHIL (OHS): 1.3 %
RELATIVE LYMPHOCYTE (OHS): 17.4 % (ref 18–48)
RELATIVE MONOCYTE (OHS): 5.8 % (ref 4–15)
RELATIVE NEUTROPHIL (OHS): 73.2 % (ref 38–73)
SARS-COV-2 RDRP RESP QL NAA+PROBE: NEGATIVE
SODIUM SERPL-SCNC: 136 MMOL/L (ref 136–145)
WBC # BLD AUTO: 13.36 K/UL (ref 3.9–12.7)

## 2025-05-18 PROCEDURE — 96365 THER/PROPH/DIAG IV INF INIT: CPT

## 2025-05-18 PROCEDURE — 36415 COLL VENOUS BLD VENIPUNCTURE: CPT | Performed by: EMERGENCY MEDICINE

## 2025-05-18 PROCEDURE — 93005 ELECTROCARDIOGRAM TRACING: CPT

## 2025-05-18 PROCEDURE — 25000003 PHARM REV CODE 250: Performed by: EMERGENCY MEDICINE

## 2025-05-18 PROCEDURE — 87635 SARS-COV-2 COVID-19 AMP PRB: CPT | Performed by: EMERGENCY MEDICINE

## 2025-05-18 PROCEDURE — 94660 CPAP INITIATION&MGMT: CPT

## 2025-05-18 PROCEDURE — 27100171 HC OXYGEN HIGH FLOW UP TO 24 HOURS

## 2025-05-18 PROCEDURE — 99285 EMERGENCY DEPT VISIT HI MDM: CPT | Mod: 25

## 2025-05-18 PROCEDURE — 96375 TX/PRO/DX INJ NEW DRUG ADDON: CPT

## 2025-05-18 PROCEDURE — 27000221 HC OXYGEN, UP TO 24 HOURS

## 2025-05-18 PROCEDURE — 63600175 PHARM REV CODE 636 W HCPCS: Mod: JZ,TB | Performed by: EMERGENCY MEDICINE

## 2025-05-18 PROCEDURE — 99900031 HC PATIENT EDUCATION (STAT)

## 2025-05-18 PROCEDURE — 25000003 PHARM REV CODE 250: Performed by: INTERNAL MEDICINE

## 2025-05-18 PROCEDURE — 83735 ASSAY OF MAGNESIUM: CPT | Performed by: EMERGENCY MEDICINE

## 2025-05-18 PROCEDURE — 85025 COMPLETE CBC W/AUTO DIFF WBC: CPT | Performed by: EMERGENCY MEDICINE

## 2025-05-18 PROCEDURE — 21400001 HC TELEMETRY ROOM

## 2025-05-18 PROCEDURE — 83880 ASSAY OF NATRIURETIC PEPTIDE: CPT | Performed by: INTERNAL MEDICINE

## 2025-05-18 PROCEDURE — 94761 N-INVAS EAR/PLS OXIMETRY MLT: CPT

## 2025-05-18 PROCEDURE — 27000190 HC CPAP FULL FACE MASK W/VALVE

## 2025-05-18 PROCEDURE — 94799 UNLISTED PULMONARY SVC/PX: CPT

## 2025-05-18 PROCEDURE — 25000242 PHARM REV CODE 250 ALT 637 W/ HCPCS: Performed by: EMERGENCY MEDICINE

## 2025-05-18 PROCEDURE — 94640 AIRWAY INHALATION TREATMENT: CPT | Mod: XB

## 2025-05-18 PROCEDURE — 99900035 HC TECH TIME PER 15 MIN (STAT)

## 2025-05-18 PROCEDURE — 93010 ELECTROCARDIOGRAM REPORT: CPT | Mod: ,,, | Performed by: INTERNAL MEDICINE

## 2025-05-18 PROCEDURE — 5A09357 ASSISTANCE WITH RESPIRATORY VENTILATION, LESS THAN 24 CONSECUTIVE HOURS, CONTINUOUS POSITIVE AIRWAY PRESSURE: ICD-10-PCS | Performed by: INTERNAL MEDICINE

## 2025-05-18 PROCEDURE — 87502 INFLUENZA DNA AMP PROBE: CPT

## 2025-05-18 PROCEDURE — 80053 COMPREHEN METABOLIC PANEL: CPT | Performed by: EMERGENCY MEDICINE

## 2025-05-18 RX ORDER — ALENDRONATE SODIUM 70 MG/1
70 TABLET ORAL
Status: DISCONTINUED | OUTPATIENT
Start: 2025-05-18 | End: 2025-05-18

## 2025-05-18 RX ORDER — HYDROCODONE BITARTRATE AND ACETAMINOPHEN 7.5; 325 MG/1; MG/1
1 TABLET ORAL EVERY 8 HOURS PRN
Refills: 0 | Status: DISCONTINUED | OUTPATIENT
Start: 2025-05-18 | End: 2025-05-19

## 2025-05-18 RX ORDER — TALC
6 POWDER (GRAM) TOPICAL NIGHTLY PRN
Status: DISCONTINUED | OUTPATIENT
Start: 2025-05-18 | End: 2025-05-21 | Stop reason: HOSPADM

## 2025-05-18 RX ORDER — FLUTICASONE FUROATE, UMECLIDINIUM BROMIDE AND VILANTEROL TRIFENATATE 200; 62.5; 25 UG/1; UG/1; UG/1
POWDER RESPIRATORY (INHALATION)
COMMUNITY
Start: 2025-05-09

## 2025-05-18 RX ORDER — ACETAMINOPHEN 325 MG/1
650 TABLET ORAL EVERY 8 HOURS PRN
Status: DISCONTINUED | OUTPATIENT
Start: 2025-05-18 | End: 2025-05-21 | Stop reason: HOSPADM

## 2025-05-18 RX ORDER — FUROSEMIDE 20 MG/1
TABLET ORAL
Status: ON HOLD | COMMUNITY
Start: 2025-05-09 | End: 2025-05-21 | Stop reason: HOSPADM

## 2025-05-18 RX ORDER — LIDOCAINE 560 MG/1
PATCH PERCUTANEOUS; TOPICAL; TRANSDERMAL
Status: ON HOLD | COMMUNITY
Start: 2025-05-15 | End: 2025-05-21 | Stop reason: HOSPADM

## 2025-05-18 RX ORDER — AMLODIPINE BESYLATE 2.5 MG/1
2.5 TABLET ORAL DAILY
Status: DISCONTINUED | OUTPATIENT
Start: 2025-05-18 | End: 2025-05-21 | Stop reason: HOSPADM

## 2025-05-18 RX ORDER — FLUOXETINE 20 MG/1
40 CAPSULE ORAL DAILY
Status: DISCONTINUED | OUTPATIENT
Start: 2025-05-18 | End: 2025-05-21 | Stop reason: HOSPADM

## 2025-05-18 RX ORDER — CHOLECALCIFEROL (VITAMIN D3) 25 MCG
1000 TABLET ORAL DAILY
Status: DISCONTINUED | OUTPATIENT
Start: 2025-05-18 | End: 2025-05-20

## 2025-05-18 RX ORDER — CARVEDILOL 12.5 MG/1
25 TABLET ORAL 2 TIMES DAILY WITH MEALS
Status: DISCONTINUED | OUTPATIENT
Start: 2025-05-18 | End: 2025-05-21 | Stop reason: HOSPADM

## 2025-05-18 RX ORDER — CEFUROXIME AXETIL 500 MG/1
TABLET ORAL
Status: ON HOLD | COMMUNITY
Start: 2025-05-14 | End: 2025-05-21 | Stop reason: HOSPADM

## 2025-05-18 RX ORDER — SODIUM CHLORIDE 0.9 % (FLUSH) 0.9 %
10 SYRINGE (ML) INJECTION
Status: DISCONTINUED | OUTPATIENT
Start: 2025-05-18 | End: 2025-05-21 | Stop reason: HOSPADM

## 2025-05-18 RX ORDER — ELECTROLYTES/DEXTROSE
300 SOLUTION, ORAL ORAL 2 TIMES DAILY
Status: DISCONTINUED | OUTPATIENT
Start: 2025-05-18 | End: 2025-05-21 | Stop reason: HOSPADM

## 2025-05-18 RX ORDER — MIRTAZAPINE 7.5 MG/1
7.5 TABLET, FILM COATED ORAL NIGHTLY
Status: DISCONTINUED | OUTPATIENT
Start: 2025-05-18 | End: 2025-05-20

## 2025-05-18 RX ORDER — LEVOTHYROXINE SODIUM 25 UG/1
25 TABLET ORAL
Status: DISCONTINUED | OUTPATIENT
Start: 2025-05-19 | End: 2025-05-21 | Stop reason: HOSPADM

## 2025-05-18 RX ORDER — ALENDRONATE SODIUM 70 MG/1
70 TABLET ORAL
Status: DISCONTINUED | OUTPATIENT
Start: 2025-05-22 | End: 2025-05-20

## 2025-05-18 RX ORDER — AMLODIPINE BESYLATE 2.5 MG/1
2.5 TABLET ORAL
COMMUNITY
Start: 2025-05-15

## 2025-05-18 RX ORDER — ALENDRONATE SODIUM 70 MG/1
70 TABLET ORAL
Status: DISCONTINUED | OUTPATIENT
Start: 2025-05-19 | End: 2025-05-18

## 2025-05-18 RX ORDER — ASCORBIC ACID 500 MG
500 TABLET ORAL DAILY
Status: DISCONTINUED | OUTPATIENT
Start: 2025-05-18 | End: 2025-05-20

## 2025-05-18 RX ORDER — IPRATROPIUM BROMIDE AND ALBUTEROL SULFATE 2.5; .5 MG/3ML; MG/3ML
SOLUTION RESPIRATORY (INHALATION)
Status: COMPLETED
Start: 2025-05-18 | End: 2025-05-18

## 2025-05-18 RX ORDER — PANTOPRAZOLE SODIUM 40 MG/1
40 TABLET, DELAYED RELEASE ORAL DAILY
Status: DISCONTINUED | OUTPATIENT
Start: 2025-05-18 | End: 2025-05-21 | Stop reason: HOSPADM

## 2025-05-18 RX ORDER — DEXAMETHASONE SODIUM PHOSPHATE 4 MG/ML
INJECTION, SOLUTION INTRA-ARTICULAR; INTRALESIONAL; INTRAMUSCULAR; INTRAVENOUS; SOFT TISSUE
Status: ON HOLD | COMMUNITY
Start: 2025-05-14 | End: 2025-05-21 | Stop reason: HOSPADM

## 2025-05-18 RX ORDER — LOSARTAN POTASSIUM 100 MG/1
TABLET ORAL
COMMUNITY
Start: 2025-05-12

## 2025-05-18 RX ORDER — IPRATROPIUM BROMIDE AND ALBUTEROL SULFATE 2.5; .5 MG/3ML; MG/3ML
3 SOLUTION RESPIRATORY (INHALATION) EVERY 6 HOURS PRN
Status: DISCONTINUED | OUTPATIENT
Start: 2025-05-18 | End: 2025-05-19

## 2025-05-18 RX ORDER — CEFUROXIME AXETIL 250 MG/1
500 TABLET ORAL EVERY 12 HOURS
Status: DISCONTINUED | OUTPATIENT
Start: 2025-05-18 | End: 2025-05-21 | Stop reason: HOSPADM

## 2025-05-18 RX ORDER — IPRATROPIUM BROMIDE AND ALBUTEROL SULFATE 2.5; .5 MG/3ML; MG/3ML
3 SOLUTION RESPIRATORY (INHALATION)
Status: COMPLETED | OUTPATIENT
Start: 2025-05-18 | End: 2025-05-18

## 2025-05-18 RX ORDER — LOSARTAN POTASSIUM 50 MG/1
100 TABLET ORAL DAILY
Status: DISCONTINUED | OUTPATIENT
Start: 2025-05-18 | End: 2025-05-21 | Stop reason: HOSPADM

## 2025-05-18 RX ADMIN — CARVEDILOL 25 MG: 12.5 TABLET, FILM COATED ORAL at 06:05

## 2025-05-18 RX ADMIN — IPRATROPIUM BROMIDE AND ALBUTEROL SULFATE 3 ML: 2.5; .5 SOLUTION RESPIRATORY (INHALATION) at 09:05

## 2025-05-18 RX ADMIN — HYDROCODONE BITARTRATE AND ACETAMINOPHEN 1 TABLET: 7.5; 325 TABLET ORAL at 03:05

## 2025-05-18 RX ADMIN — CEFUROXIME AXETIL 500 MG: 250 TABLET ORAL at 09:05

## 2025-05-18 RX ADMIN — DOXYCYCLINE 100 MG: 100 INJECTION, POWDER, LYOPHILIZED, FOR SOLUTION INTRAVENOUS at 10:05

## 2025-05-18 RX ADMIN — MIRTAZAPINE 7.5 MG: 7.5 TABLET ORAL at 09:05

## 2025-05-18 RX ADMIN — METHYLPREDNISOLONE SODIUM SUCCINATE 120 MG: 40 INJECTION, POWDER, FOR SOLUTION INTRAMUSCULAR; INTRAVENOUS at 10:05
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
## 2025-05-18 NOTE — ED NOTES
Notified [facility] that pt was admitted to the hospital, Rm 650, for COPD Exacerbation, spoke with ADELINA Bermeo

## 2025-05-19 PROBLEM — J96.22 ACUTE ON CHRONIC RESPIRATORY FAILURE WITH HYPOXIA AND HYPERCAPNIA: Status: ACTIVE | Noted: 2025-05-19

## 2025-05-19 PROBLEM — J96.21 ACUTE ON CHRONIC RESPIRATORY FAILURE WITH HYPOXIA AND HYPERCAPNIA: Status: ACTIVE | Noted: 2025-05-19

## 2025-05-19 LAB
OHS QRS DURATION: 128 MS
OHS QTC CALCULATION: 485 MS

## 2025-05-19 PROCEDURE — 25000242 PHARM REV CODE 250 ALT 637 W/ HCPCS: Performed by: EMERGENCY MEDICINE

## 2025-05-19 PROCEDURE — 25000242 PHARM REV CODE 250 ALT 637 W/ HCPCS: Performed by: INTERNAL MEDICINE

## 2025-05-19 PROCEDURE — 99900035 HC TECH TIME PER 15 MIN (STAT)

## 2025-05-19 PROCEDURE — 21400001 HC TELEMETRY ROOM

## 2025-05-19 PROCEDURE — 25000003 PHARM REV CODE 250: Performed by: EMERGENCY MEDICINE

## 2025-05-19 PROCEDURE — 94640 AIRWAY INHALATION TREATMENT: CPT

## 2025-05-19 PROCEDURE — 94761 N-INVAS EAR/PLS OXIMETRY MLT: CPT

## 2025-05-19 PROCEDURE — 25000003 PHARM REV CODE 250: Performed by: INTERNAL MEDICINE

## 2025-05-19 PROCEDURE — 27100171 HC OXYGEN HIGH FLOW UP TO 24 HOURS

## 2025-05-19 PROCEDURE — 94660 CPAP INITIATION&MGMT: CPT

## 2025-05-19 PROCEDURE — 99900031 HC PATIENT EDUCATION (STAT)

## 2025-05-19 PROCEDURE — 63600175 PHARM REV CODE 636 W HCPCS: Performed by: INTERNAL MEDICINE

## 2025-05-19 RX ORDER — ARFORMOTEROL TARTRATE 15 UG/2ML
15 SOLUTION RESPIRATORY (INHALATION) 2 TIMES DAILY
Status: DISCONTINUED | OUTPATIENT
Start: 2025-05-19 | End: 2025-05-21 | Stop reason: HOSPADM

## 2025-05-19 RX ORDER — DOXYLAMINE SUCCINATE 25 MG
TABLET ORAL 2 TIMES DAILY
Status: DISCONTINUED | OUTPATIENT
Start: 2025-05-20 | End: 2025-05-21 | Stop reason: HOSPADM

## 2025-05-19 RX ORDER — ARFORMOTEROL TARTRATE 15 UG/2ML
15 SOLUTION RESPIRATORY (INHALATION) 2 TIMES DAILY
Status: DISCONTINUED | OUTPATIENT
Start: 2025-05-19 | End: 2025-05-19

## 2025-05-19 RX ORDER — MORPHINE SULFATE 4 MG/ML
4 INJECTION, SOLUTION INTRAMUSCULAR; INTRAVENOUS EVERY 4 HOURS PRN
Status: DISCONTINUED | OUTPATIENT
Start: 2025-05-19 | End: 2025-05-21 | Stop reason: HOSPADM

## 2025-05-19 RX ORDER — IPRATROPIUM BROMIDE AND ALBUTEROL SULFATE 2.5; .5 MG/3ML; MG/3ML
3 SOLUTION RESPIRATORY (INHALATION) EVERY 4 HOURS PRN
Status: DISCONTINUED | OUTPATIENT
Start: 2025-05-19 | End: 2025-05-21 | Stop reason: HOSPADM

## 2025-05-19 RX ORDER — MORPHINE SULFATE 2 MG/ML
2 INJECTION, SOLUTION INTRAMUSCULAR; INTRAVENOUS EVERY 4 HOURS PRN
Status: DISCONTINUED | OUTPATIENT
Start: 2025-05-19 | End: 2025-05-21 | Stop reason: HOSPADM

## 2025-05-19 RX ORDER — BUDESONIDE 0.5 MG/2ML
0.5 INHALANT ORAL 2 TIMES DAILY
Status: DISCONTINUED | OUTPATIENT
Start: 2025-05-19 | End: 2025-05-19

## 2025-05-19 RX ORDER — BUDESONIDE 0.5 MG/2ML
0.5 INHALANT ORAL 2 TIMES DAILY
Status: DISCONTINUED | OUTPATIENT
Start: 2025-05-19 | End: 2025-05-21 | Stop reason: HOSPADM

## 2025-05-19 RX ADMIN — Medication 300 ML: at 09:05

## 2025-05-19 RX ADMIN — MORPHINE SULFATE 4 MG: 4 INJECTION, SOLUTION INTRAMUSCULAR; INTRAVENOUS at 06:05

## 2025-05-19 RX ADMIN — MIRTAZAPINE 7.5 MG: 7.5 TABLET ORAL at 09:05

## 2025-05-19 RX ADMIN — PREDNISONE 30 MG: 20 TABLET ORAL at 09:05

## 2025-05-19 RX ADMIN — Medication 1000 UNITS: at 09:05

## 2025-05-19 RX ADMIN — PANTOPRAZOLE SODIUM 40 MG: 40 TABLET, DELAYED RELEASE ORAL at 09:05

## 2025-05-19 RX ADMIN — CARVEDILOL 25 MG: 12.5 TABLET, FILM COATED ORAL at 06:05

## 2025-05-19 RX ADMIN — OXYCODONE HYDROCHLORIDE AND ACETAMINOPHEN 500 MG: 500 TABLET ORAL at 09:05

## 2025-05-19 RX ADMIN — IPRATROPIUM BROMIDE AND ALBUTEROL SULFATE 3 ML: 2.5; .5 SOLUTION RESPIRATORY (INHALATION) at 07:05

## 2025-05-19 RX ADMIN — AMLODIPINE BESYLATE 2.5 MG: 2.5 TABLET ORAL at 09:05

## 2025-05-19 RX ADMIN — FLUOXETINE HYDROCHLORIDE 40 MG: 20 CAPSULE ORAL at 09:05

## 2025-05-19 RX ADMIN — BUDESONIDE 0.5 MG: 0.5 INHALANT RESPIRATORY (INHALATION) at 07:05

## 2025-05-19 RX ADMIN — LOSARTAN POTASSIUM 100 MG: 50 TABLET, FILM COATED ORAL at 09:05

## 2025-05-19 RX ADMIN — CEFUROXIME AXETIL 500 MG: 250 TABLET ORAL at 09:05

## 2025-05-19 RX ADMIN — BUDESONIDE 0.5 MG: 0.5 INHALANT RESPIRATORY (INHALATION) at 10:05

## 2025-05-19 RX ADMIN — LEVOTHYROXINE SODIUM 25 MCG: 25 TABLET ORAL at 06:05

## 2025-05-19 RX ADMIN — ARFORMOTEROL TARTRATE 15 MCG: 15 SOLUTION RESPIRATORY (INHALATION) at 10:05

## 2025-05-19 RX ADMIN — CARVEDILOL 25 MG: 12.5 TABLET, FILM COATED ORAL at 09:05

## 2025-05-19 RX ADMIN — MORPHINE SULFATE 4 MG: 4 INJECTION, SOLUTION INTRAMUSCULAR; INTRAVENOUS at 09:05

## 2025-05-19 RX ADMIN — ARFORMOTEROL TARTRATE 15 MCG: 15 SOLUTION RESPIRATORY (INHALATION) at 07:05
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
## 2025-05-19 NOTE — NURSING
Patient's bp is in the 170;s. Patient is asymptomatic. Denies any headache or dizziness. Dr. Abreu notified and new order noted to give am bp medication now. Will continue to monitor.

## 2025-05-19 NOTE — ASSESSMENT & PLAN NOTE
Patient with Hypercapnic and Hypoxic Respiratory failure which is Acute on chronic.  she is on home oxygen at 2 LPM. Supplemental oxygen was provided and noted- Oxygen Concentration (%):  [36-40] 36    .   Signs/symptoms of respiratory failure include- tachypnea, increased work of breathing, respiratory distress, use of accessory muscles, and wheezing. Contributing diagnoses includes - COPD Labs and images were reviewed. Patient Has not had a recent ABG. Will treat underlying causes and adjust management of respiratory failure as follows- see orders    At the end of discussion and interview the patient informed family that she wishes to be comfortable.  We will see how she does off of BiPAP and p.r.n. morphine ordered.  May likely transitioned to hospice type care in the next few days if she declines.
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
## 2025-05-19 NOTE — PLAN OF CARE
[facility]  Initial Discharge Assessment       Primary Care Provider: Clark Calxi III, MD    Admission Diagnosis: SOB (shortness of breath) [R06.02]  COPD exacerbation [J44.1]    Admission Date: 5/18/2025  Expected Discharge Date:     Transition of Care Barriers: Other (see comments) (The patient's daughter is very interested in learning more about how to help her mother.)    Payor: Glanse MGD [employer] [facility] / Plan: [employer] / Product Type: Medicare Advantage /     Extended Emergency Contact Information  Primary Emergency Contact: MARGOT MCMAHON  Address: [address] [address] [facility]  Mobile Phone: [phone]  Relation: Daughter  Preferred language: English   needed? No    Discharge Plan A: Skilled Nursing Facility  Discharge Plan B: Skilled Nursing Facility      [id number] - [place], [place] - [address] AT [facility] [address]  [address]  [facility] [address]  Phone: [phone] Fax: [phone]    [facility] [place] [address]  [address]  [place] [address]  Phone: [phone] Fax: [phone]      Initial Assessment (most recent)       Adult Discharge Assessment - 05/19/25 0830          Discharge Assessment    Assessment Type Discharge Planning Assessment     Confirmed/corrected address, phone number and insurance Yes   The patient is currently on the Skilled Unit at [facility] stated the patient's daughter. The patient's daughter provided the information for this discharge planning assessment; patient's current address is the home of daughter.    Confirmed Demographics Correct on Facesheet     Source of Information family;other (see comments)   The haydeet is unable to provide information at this time. She received assistance from her daughter who provided most of the information. Her name  is Jerri Santana.    If unable to respond/provide information was family/caregiver contacted? Yes     Contact Name/Number Jerri Santana (Daughter) [phone]     When was your last doctors appointment? 03/17/25     Reason For Admission Blood pressure was high, oxygen level dropped and the patient became unstable according to patient's daughter who is Jerri Santana. This appeared to be a COPD incident.     People in Home --   The patient is currently staying on a skilled unit at [facility]. The patient does not wish to go home at this time; the patient is living, when she is at home, at her daughter's house before going to the skilled unit.    Facility Arrived From: [facility] (The patient is on the skilled  Nursing Unit according to patient's daughter.)     Do you expect to return to your current living situation? Yes     Do you have help at home or someone to help you manage your care at home? Yes     Who are your caregiver(s) and their phone number(s)? The patient's primary caregiver is her daughter whose name is Jerri Santana. The patient is currently on a skilled nursing unit at [facility].     Prior to hospitilization cognitive status: Unable to Assess     Current cognitive status: Unable to Assess   The patient is currently on the BIPAP    Walking or Climbing Stairs Difficulty yes     Walking or Climbing Stairs --   The patient is current participating in Rehab at the Nursing home; the patient has been making progress in her physical therapy.    Mobility Management The patient fell twice after rehab and when she got back to her bed. Neither fall was taken place during the rehab. The patient's sister stated that she has been making much progress in terms of developing strength. The patient just recently received an extended stay at [facility] and the patient's family is grateful for that and is hoping the patient goes back to [facility].      Dressing/Bathing Difficulty yes     Dressing/Bathing bathing difficulty, requires equipment;bathing difficulty, assistance 1 person     Dressing/Bathing Management The patient's daughter stated that some of the answers that she is giving may not be totally accurate because she stated she is not there all day. The patient's daughter stated that she is not able to totally make an informed statement due to not being there.     Do you have any problems with: --   Currently in the skilled unit at [facility].    Home Accessibility other (see comments)   The patient is currently receiving physical therapy at the Nursing Home.    Home Layout Other (see comments)   The patient is currently at the skilled unit in the Nursing Home.    Equipment Currently Used at Home BIPAP;other (see comments)   The patient just started using the BIPAP.    Readmission within 30 days? No     Patient currently being followed by outpatient case management? No     Do you currently have service(s) that help you manage your care at home? Yes   The patient is currently residing in a skilled nursing home    How Many hours does patient receive services --   The patient is currently residing in a nursing home.    Name and Contact number of agency [facility] [phone]     Is the pt/caregiver preference to resume services with current agency Yes     Do you take prescription medications? Yes     Do you have prescription coverage? Yes     Do you have any problems affording any of your prescribed medications? TBD     Is the patient taking medications as prescribed? yes     Who is going to help you get home at discharge? [facility] will be informed.     How do you get to doctors appointments? other (see comments)   The patient is currently residing at [facility]    Are you on dialysis? No     Do you take coumadin? No     Discharge Plan A Skilled Nursing Facility     Discharge Plan B Skilled Nursing  Facility     DME Needed Upon Discharge  other (see comments)   To be decided    Discharge Plan discussed with: Adult children     Transition of Care Barriers Other (see comments)   The patient's daughter is very interested in learning more about how to help her mother.    SDOH --   No issues identified at this time.       Physical Activity    On average, how many days per week do you engage in moderate to strenuous exercise (like a brisk walk)? Patient declined   The patient was respectful with her response.    On average, how many minutes do you engage in exercise at this level? Patient declined   The patient was respectful and declined.       Financial Resource Strain    How hard is it for you to pay for the very basics like food, housing, medical care, and heating? Patient declined        Housing Stability    In the last 12 months, was there a time when you were not able to pay the mortgage or rent on time? Patient declined     At any time in the past 12 months, were you homeless or living in a shelter (including now)? Patient declined        Transportation Needs    In the past 12 months, has lack of transportation kept you from medical appointments or from getting medications? Patient declined     In the past 12 months, has lack of transportation kept you from meetings, work, or from getting things needed for daily living? Patient declined        Food Insecurity    Within the past 12 months, you worried that your food would run out before you got the money to buy more. Patient declined     Within the past 12 months, the food you bought just didn't last and you didn't have money to get more. Patient declined        Stress    Do you feel stress - tense, restless, nervous, or anxious, or unable to sleep at night because your mind is troubled all the time - these days? Patient declined        Social Isolation    How often do you feel lonely or isolated from those around you?  Never        Alcohol Use    Q1: How  often do you have a drink containing alcohol? Never     Q2: How many drinks containing alcohol do you have on a typical day when you are drinking? Patient does not drink     Q3: How often do you have six or more drinks on one occasion? Never        Utilities    In the past 12 months has the electric, gas, oil, or water company threatened to shut off services in your home? Patient declined        Health Literacy    How often do you need to have someone help you when you read instructions, pamphlets, or other written material from your doctor or pharmacy? Patient declines to respond                         Discharge Planning Assessment completed with both the patient's mother and the patient's daughter participating in the assessment. Another unidentified male was in the room who also answered some questions but would not reveal who he was despite an invitation to do so. The patient's daughter is Jerri Santana; she suggested that many of her answers may not be complete and confirmation needs to be obtained by the skilled nursing facility where the patient is currently residing. The patient hopes to go back to that facility.                  .

## 2025-05-19 NOTE — PLAN OF CARE
Problem: Adult Inpatient Plan of Care  Goal: Plan of Care Review  Outcome: Ongoing  Goal: Patient-Specific Goal (Individualized)  Outcome: Ongoing  Goal: Absence of Hospital-Acquired Illness or Injury  Outcome: Ongoing  Goal: Optimal Comfort and Wellbeing  Outcome: Ongoing  Goal: Readiness for Transition of Care  Outcome: Ongoing     Problem: Pneumonia  Goal: Fluid Balance  Outcome: Ongoing  Goal: Resolution of Infection Signs and Symptoms  Outcome: Ongoing  Goal: Effective Oxygenation and Ventilation  Outcome: Ongoing     Problem: Wound  Goal: Optimal Coping  Outcome: Ongoing  Goal: Optimal Functional Ability  Outcome: Ongoing  Goal: Absence of Infection Signs and Symptoms  Outcome: Ongoing  Goal: Improved Oral Intake  Outcome: Ongoing  Goal: Optimal Pain Control and Function  Outcome: Ongoing  Goal: Skin Health and Integrity  Outcome: Ongoing  Goal: Optimal Wound Healing  Outcome: Ongoing     Problem: Fall Injury Risk  Goal: Absence of Fall and Fall-Related Injury  Outcome: Ongoing     Problem: Skin Injury Risk Increased  Goal: Skin Health and Integrity  Outcome: Ongoing

## 2025-05-19 NOTE — ASSESSMENT & PLAN NOTE
Nutrition consulted. Most recent weight and BMI monitored-     Measurements:  Wt Readings from Last 1 Encounters:   05/19/25 40.8 kg (89 lb 15.2 oz)   Body mass index is 15.93 kg/m².    Patient has been screened and assessed by RD.    Malnutrition Type:  Context:    Level:      Malnutrition Characteristic Summary:       Interventions/Recommendations (treatment strategy):

## 2025-05-19 NOTE — HPI
ED HPI:  Patient with a history of COPD and [facility] presents emergency department via EMS after a grandson came to visit on her. She was found to be in respiratory distress with oxygen saturation in the 90s. She is oxygen dependent 2 L 24 x 7. Patient was placed on CPAP and provide a nebulizer treatment by EMS who states that she had restricted air movement. Her symptoms improved after CPAP and nebulizer treatment. In the emergency department patient oriented x3 denying any chest pain at this time resting comfortably moving all extremities command and in no distress with BiPAP on oxygenating at 100%.     IM HPI:  Patient well known to me, patient currently admitted to a skilled nursing facility after several acute care stays.  Patient has had a decline over the last year related to COPD chronic tobacco use malnourishment falls.  Patient has had worsening depression and family has been very involved in attempting to assist the patient to recover.  At the skilled nursing facility the patient has not made much gains but at times has had some improvement with therapy and participation.  Patient presented back to our care facility with hypoxemia and dyspnea patient has been on and off a BiPAP has a history of chronic respiratory failure secondary to her COPD and tobacco use.  Patient during interview was on BiPAP and I have met with family and her daughter outside who has been her primary decision maker.  Patient is do not resuscitate and we have been having discussions about hospice care if she did not improve.  Towards the end of the visit the patient daughter spoke to her mother and she states that she does not want any further aggressive care in his requesting for the BiPAP to be removed and she be treated via comfort care.  We have resumed her do not resuscitate order and see how she does off BiPAP and how much respiratory distress occurs.  We are ordering p.r.n. morphine orders and will transition to  hospice type care if the patient has a deterioration overnight.

## 2025-05-20 PROCEDURE — 25000242 PHARM REV CODE 250 ALT 637 W/ HCPCS: Performed by: INTERNAL MEDICINE

## 2025-05-20 PROCEDURE — 94761 N-INVAS EAR/PLS OXIMETRY MLT: CPT

## 2025-05-20 PROCEDURE — 63600175 PHARM REV CODE 636 W HCPCS: Performed by: INTERNAL MEDICINE

## 2025-05-20 PROCEDURE — 99900031 HC PATIENT EDUCATION (STAT)

## 2025-05-20 PROCEDURE — 25000003 PHARM REV CODE 250: Performed by: INTERNAL MEDICINE

## 2025-05-20 PROCEDURE — 25000003 PHARM REV CODE 250: Performed by: EMERGENCY MEDICINE

## 2025-05-20 PROCEDURE — 94640 AIRWAY INHALATION TREATMENT: CPT

## 2025-05-20 PROCEDURE — 99900035 HC TECH TIME PER 15 MIN (STAT)

## 2025-05-20 PROCEDURE — 21400001 HC TELEMETRY ROOM

## 2025-05-20 PROCEDURE — 94660 CPAP INITIATION&MGMT: CPT

## 2025-05-20 PROCEDURE — 27100171 HC OXYGEN HIGH FLOW UP TO 24 HOURS

## 2025-05-20 RX ORDER — LORAZEPAM 2 MG/ML
1 INJECTION INTRAMUSCULAR EVERY 6 HOURS PRN
Status: DISCONTINUED | OUTPATIENT
Start: 2025-05-20 | End: 2025-05-21 | Stop reason: HOSPADM

## 2025-05-20 RX ADMIN — AMLODIPINE BESYLATE 2.5 MG: 2.5 TABLET ORAL at 09:05

## 2025-05-20 RX ADMIN — ARFORMOTEROL TARTRATE 15 MCG: 15 SOLUTION RESPIRATORY (INHALATION) at 07:05

## 2025-05-20 RX ADMIN — BUDESONIDE 0.5 MG: 0.5 INHALANT RESPIRATORY (INHALATION) at 07:05

## 2025-05-20 RX ADMIN — LEVOTHYROXINE SODIUM 25 MCG: 25 TABLET ORAL at 05:05

## 2025-05-20 RX ADMIN — CEFUROXIME AXETIL 500 MG: 250 TABLET ORAL at 09:05

## 2025-05-20 RX ADMIN — LORAZEPAM 1 MG: 2 INJECTION INTRAMUSCULAR; INTRAVENOUS at 09:05

## 2025-05-20 RX ADMIN — MORPHINE SULFATE 4 MG: 4 INJECTION, SOLUTION INTRAMUSCULAR; INTRAVENOUS at 09:05

## 2025-05-20 RX ADMIN — FLUOXETINE HYDROCHLORIDE 40 MG: 20 CAPSULE ORAL at 09:05

## 2025-05-20 RX ADMIN — CARVEDILOL 25 MG: 12.5 TABLET, FILM COATED ORAL at 07:05

## 2025-05-20 RX ADMIN — LORAZEPAM 1 MG: 2 INJECTION INTRAMUSCULAR; INTRAVENOUS at 10:05

## 2025-05-20 RX ADMIN — LOSARTAN POTASSIUM 100 MG: 50 TABLET, FILM COATED ORAL at 09:05

## 2025-05-20 RX ADMIN — PREDNISONE 30 MG: 20 TABLET ORAL at 09:05

## 2025-05-20 RX ADMIN — MORPHINE SULFATE 2 MG: 2 INJECTION, SOLUTION INTRAMUSCULAR; INTRAVENOUS at 05:05

## 2025-05-20 RX ADMIN — PANTOPRAZOLE SODIUM 40 MG: 40 TABLET, DELAYED RELEASE ORAL at 09:05

## 2025-05-20 RX ADMIN — Medication 300 ML: at 09:05

## 2025-05-20 RX ADMIN — CARVEDILOL 25 MG: 12.5 TABLET, FILM COATED ORAL at 05:05

## 2025-05-20 RX ADMIN — ACETAMINOPHEN, DEXTROMETHORPHAN HYDROBROMIDE, GUAIFENESIN, AND PHENYLEPHRINE HYDROCHLORIDE: 325; 10; 200; 5 TABLET, COATED ORAL at 09:05

## 2025-05-20 NOTE — ASSESSMENT & PLAN NOTE
Nutrition consulted. Most recent weight and BMI monitored-     Measurements:  Wt Readings from Last 1 Encounters:   05/19/25 40.8 kg (89 lb 15.2 oz)   Body mass index is 15.93 kg/m².    Patient has been screened and assessed by RD.    Malnutrition Type:  Context:    Level:      Malnutrition Characteristic Summary:       Interventions/Recommendations (treatment strategy):       5/20 FM:  Referring to hospice care.

## 2025-05-20 NOTE — PLAN OF CARE
Spoke to Maris with [facility]. She informed me that she spoke to the patient's daughter, Jerri, via phone, and she reports  that she has to return back to [place] regarding the patient's financials. I was able to reach out to the patient's daughter, and she did confirm that she is going to [facility] to complete paperwork. I informed her that I spoke to the attending, and he reports that the patient is medically stable to discharge to the nursing home with hospice care once everything is arranged.

## 2025-05-20 NOTE — PLAN OF CARE
05/20/25 1419   Medicare Message   Important Message from Medicare regarding Discharge Appeal Rights Given to patient/caregiver;Explained to patient/caregiver;Signed/date by patient/caregiver   Date IMM was signed 05/20/25   Time IMM was signed 1419     Patient sleeping now.  Delivered copy of Important Medicare Message.  Daughter at bedside.  Explained Medicare IM discharge appeal rights.  Daughter signs for delivery of IM.

## 2025-05-20 NOTE — ASSESSMENT & PLAN NOTE
Patient with Hypercapnic and Hypoxic Respiratory failure which is Acute on chronic.  she is on home oxygen at 2 LPM. Supplemental oxygen was provided and noted- Oxygen Concentration (%):  [36-40] 40    .   Signs/symptoms of respiratory failure include- tachypnea, increased work of breathing, respiratory distress, use of accessory muscles, and wheezing. Contributing diagnoses includes - COPD Labs and images were reviewed. Patient Has not had a recent ABG. Will treat underlying causes and adjust management of respiratory failure as follows- see orders    At the end of discussion and interview the patient informed family that she wishes to be comfortable.  We will see how she does off of BiPAP and p.r.n. morphine ordered.  May likely transitioned to hospice type care in the next few days if she declines.    5/20 FM:  As above, referring to hospice care.
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
## 2025-05-20 NOTE — PLAN OF CARE
Spoke to Maris with [facility]. She informed me that the equipment is in route to [facility]. The daughter is still in the process of finishing the required documents needed for the financial part.

## 2025-05-20 NOTE — PLAN OF CARE
This counselor spoke with Ms. Maris from [facility]. The relevant and responsible family members caring for the patient are on board with the discharge plan to include hospice in [facility]. Ms. Pollock was informed that the BIPAP has been discontinued and the only remaining component, once a final conversation is held with the patient's family members, is to obtain the discharge orders. [facility] will be ordering the oxygen; , Ms. Alexandre, spoke with Geeta with [facility] and she will be checking up on the financials.

## 2025-05-20 NOTE — HOSPITAL COURSE
5/20 FM:  Patient eating while lying supine.  Informed of choking and aspiration risk and to please sit upright when eating and taking p.o..  Family at bedside I have met with daughter daughter spoke to patient and they are wishing for withdrawal of care and hospice type services.  Patient and her discuss end of life and they feel that this would best be served with hospice care in the nursing home setting.  We will begin stopping needle sticks blood draws and discontinuing all nonessential medications.  Adding Ativan for some anxiety and distress.  Patient is do not resuscitate and will consult hospice to begin making preparations for hospice care in the facility.

## 2025-05-20 NOTE — PLAN OF CARE
05/20/25 1005   Post-Acute Status   Post-Acute Authorization Hospice   Hospice Status Referrals Sent   Discharge Delays None known at this time   Discharge Plan   Discharge Plan A Return to nursing home;Hospice/home   Discharge Plan B Return to Nursing Home;Hospice/home     New referral. Jeovany MONTES, and MARY spoke to the patient alone about the hospice consult. The patient expressed that she is in agreement with hospice care. She wanted me to wait for her daughter to come back to the room in order for me to start the process. Jeovany and MARY went back to the room once the patient's daughter and family came back to the room. The Patient's Choice form was signed.
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
## 2025-05-21 VITALS
RESPIRATION RATE: 16 BRPM | WEIGHT: 89.94 LBS | HEIGHT: 63 IN | DIASTOLIC BLOOD PRESSURE: 64 MMHG | HEART RATE: 80 BPM | TEMPERATURE: 98 F | BODY MASS INDEX: 15.94 KG/M2 | OXYGEN SATURATION: 100 % | SYSTOLIC BLOOD PRESSURE: 121 MMHG

## 2025-05-21 PROCEDURE — 25000003 PHARM REV CODE 250: Performed by: EMERGENCY MEDICINE

## 2025-05-21 PROCEDURE — 63600175 PHARM REV CODE 636 W HCPCS: Performed by: INTERNAL MEDICINE

## 2025-05-21 PROCEDURE — 94761 N-INVAS EAR/PLS OXIMETRY MLT: CPT

## 2025-05-21 PROCEDURE — 25000242 PHARM REV CODE 250 ALT 637 W/ HCPCS: Performed by: INTERNAL MEDICINE

## 2025-05-21 PROCEDURE — 99900031 HC PATIENT EDUCATION (STAT)

## 2025-05-21 PROCEDURE — 94640 AIRWAY INHALATION TREATMENT: CPT

## 2025-05-21 PROCEDURE — 99900035 HC TECH TIME PER 15 MIN (STAT)

## 2025-05-21 PROCEDURE — 27000221 HC OXYGEN, UP TO 24 HOURS

## 2025-05-21 PROCEDURE — 25000003 PHARM REV CODE 250: Performed by: INTERNAL MEDICINE

## 2025-05-21 RX ORDER — PREDNISONE 10 MG/1
TABLET ORAL
Start: 2025-05-22 | End: 2025-06-01

## 2025-05-21 RX ADMIN — AMLODIPINE BESYLATE 2.5 MG: 2.5 TABLET ORAL at 09:05

## 2025-05-21 RX ADMIN — ARFORMOTEROL TARTRATE 15 MCG: 15 SOLUTION RESPIRATORY (INHALATION) at 07:05

## 2025-05-21 RX ADMIN — PANTOPRAZOLE SODIUM 40 MG: 40 TABLET, DELAYED RELEASE ORAL at 09:05

## 2025-05-21 RX ADMIN — MORPHINE SULFATE 4 MG: 4 INJECTION, SOLUTION INTRAMUSCULAR; INTRAVENOUS at 01:05

## 2025-05-21 RX ADMIN — ACETAMINOPHEN, DEXTROMETHORPHAN HYDROBROMIDE, GUAIFENESIN, AND PHENYLEPHRINE HYDROCHLORIDE: 325; 10; 200; 5 TABLET, COATED ORAL at 09:05

## 2025-05-21 RX ADMIN — PREDNISONE 30 MG: 20 TABLET ORAL at 09:05

## 2025-05-21 RX ADMIN — BUDESONIDE 0.5 MG: 0.5 INHALANT RESPIRATORY (INHALATION) at 07:05

## 2025-05-21 RX ADMIN — LORAZEPAM 1 MG: 2 INJECTION INTRAMUSCULAR; INTRAVENOUS at 03:05

## 2025-05-21 RX ADMIN — MORPHINE SULFATE 2 MG: 2 INJECTION, SOLUTION INTRAMUSCULAR; INTRAVENOUS at 05:05

## 2025-05-21 RX ADMIN — MORPHINE SULFATE 4 MG: 4 INJECTION, SOLUTION INTRAMUSCULAR; INTRAVENOUS at 09:05

## 2025-05-21 RX ADMIN — FLUOXETINE HYDROCHLORIDE 40 MG: 20 CAPSULE ORAL at 09:05

## 2025-05-21 RX ADMIN — LOSARTAN POTASSIUM 100 MG: 50 TABLET, FILM COATED ORAL at 09:05

## 2025-05-21 RX ADMIN — CEFUROXIME AXETIL 500 MG: 250 TABLET ORAL at 09:05

## 2025-05-21 RX ADMIN — CARVEDILOL 25 MG: 12.5 TABLET, FILM COATED ORAL at 08:05

## 2025-05-21 NOTE — CARE UPDATE
Spoke this morning with Ms. Pollock with [facility]. She informed this counselor that as of yesterday the paperwork for the financial end, in terms of [facility] admission, has not yet been completed. Ms. Pollock stated further that Ms. Pollock will not be available for the rest of the day and for this counselor to please continue attempts to follow-up with patient's family concerning status of finishing the paperwork for the financials. MsJose Maris referred this counselor to Ms. Sheth whom she stated will be available today in the event the patient's daughter is able to complete the paperwork. Ms. Pollock also stated that DME has been ordered and is in place.       Plan    To meet with patient's daughter to determine status of the patient's application in terms of the financials that need to be completed in order to begin receiving home health care.

## 2025-05-21 NOTE — CARE UPDATE
Patient's daughter, Ms Jerri Santana, completed all the financials in terms of the Long Term Medicaid application. Ms. Sheth from [facility] as well as Ms. Connor with [facility] were informed and confirmed, respectfully, of the patient's readiness to be discharged now that the financials are in place, DME is in order and everything needed seems to be now in place. This counselor stated to both parties that since [facility] has all of the patient's financials, equipment has been delivered that most likely, pending approval from the patient's doctor, the patient will be discharged today.     Plan    Once the doctor's order has been obtained for discharge, the discharge order will be sent to both [facility] and [place].

## 2025-05-21 NOTE — PLAN OF CARE
Pt discharged via [facility] to [facility] in stable condition.       Problem: Adult Inpatient Plan of Care  Goal: Plan of Care Review  Outcome: Met  Goal: Patient-Specific Goal (Individualized)  Outcome: Met  Goal: Absence of Hospital-Acquired Illness or Injury  Outcome: Met  Goal: Optimal Comfort and Wellbeing  Outcome: Met  Goal: Readiness for Transition of Care  Outcome: Met     Problem: Pneumonia  Goal: Fluid Balance  Outcome: Met  Goal: Resolution of Infection Signs and Symptoms  Outcome: Met  Goal: Effective Oxygenation and Ventilation  Outcome: Met     Problem: Wound  Goal: Optimal Coping  Outcome: Met  Goal: Optimal Functional Ability  Outcome: Met  Goal: Absence of Infection Signs and Symptoms  Outcome: Met  Goal: Improved Oral Intake  Outcome: Met  Goal: Optimal Pain Control and Function  Outcome: Met  Goal: Skin Health and Integrity  Outcome: Met  Goal: Optimal Wound Healing  Outcome: Met     Problem: Fall Injury Risk  Goal: Absence of Fall and Fall-Related Injury  Outcome: Met     Problem: Skin Injury Risk Increased  Goal: Skin Health and Integrity  Outcome: Met